# Patient Record
Sex: FEMALE | Race: WHITE | NOT HISPANIC OR LATINO | URBAN - METROPOLITAN AREA
[De-identification: names, ages, dates, MRNs, and addresses within clinical notes are randomized per-mention and may not be internally consistent; named-entity substitution may affect disease eponyms.]

---

## 2017-01-24 ENCOUNTER — OFFICE VISIT - HEALTHEAST (OUTPATIENT)
Dept: INTERNAL MEDICINE | Facility: CLINIC | Age: 52
End: 2017-01-24

## 2017-01-24 DIAGNOSIS — G25.81 RLS (RESTLESS LEGS SYNDROME): ICD-10-CM

## 2017-01-24 DIAGNOSIS — G47.00 INSOMNIA: ICD-10-CM

## 2017-01-24 DIAGNOSIS — E66.811 OBESITY (BMI 30.0-34.9): ICD-10-CM

## 2017-01-24 DIAGNOSIS — E03.9 HYPOTHYROIDISM: ICD-10-CM

## 2017-01-24 DIAGNOSIS — F41.9 ANXIETY: ICD-10-CM

## 2017-01-24 DIAGNOSIS — Z87.891 FORMER SMOKER: ICD-10-CM

## 2017-01-24 DIAGNOSIS — F34.1 DYSTHYMIA: ICD-10-CM

## 2017-01-24 DIAGNOSIS — E55.9 VITAMIN D DEFICIENCY: ICD-10-CM

## 2017-01-24 DIAGNOSIS — G43.909 MIGRAINE HEADACHE: ICD-10-CM

## 2017-01-24 DIAGNOSIS — N95.1 MENOPAUSAL HOT FLUSHES: ICD-10-CM

## 2017-01-24 LAB
CHOLEST SERPL-MCNC: 188 MG/DL
FASTING STATUS PATIENT QL REPORTED: YES
HBA1C MFR BLD: 5.5 % (ref 3.5–6)
HDLC SERPL-MCNC: 49 MG/DL
LDLC SERPL CALC-MCNC: 100 MG/DL
TRIGL SERPL-MCNC: 194 MG/DL

## 2017-01-26 ENCOUNTER — RECORDS - HEALTHEAST (OUTPATIENT)
Dept: ADMINISTRATIVE | Facility: OTHER | Age: 52
End: 2017-01-26

## 2017-01-30 ENCOUNTER — COMMUNICATION - HEALTHEAST (OUTPATIENT)
Dept: INTERNAL MEDICINE | Facility: CLINIC | Age: 52
End: 2017-01-30

## 2017-01-30 DIAGNOSIS — E03.9 HYPOTHYROIDISM: ICD-10-CM

## 2017-03-02 ENCOUNTER — RECORDS - HEALTHEAST (OUTPATIENT)
Dept: ADMINISTRATIVE | Facility: OTHER | Age: 52
End: 2017-03-02

## 2017-03-07 ENCOUNTER — RECORDS - HEALTHEAST (OUTPATIENT)
Dept: ADMINISTRATIVE | Facility: OTHER | Age: 52
End: 2017-03-07

## 2017-08-16 ENCOUNTER — OFFICE VISIT - HEALTHEAST (OUTPATIENT)
Dept: INTERNAL MEDICINE | Facility: CLINIC | Age: 52
End: 2017-08-16

## 2017-08-16 DIAGNOSIS — R60.0 BILATERAL LEG EDEMA: ICD-10-CM

## 2017-08-16 DIAGNOSIS — R09.89 JVD (JUGULAR VENOUS DISTENSION): ICD-10-CM

## 2017-08-22 ENCOUNTER — HOSPITAL ENCOUNTER (OUTPATIENT)
Dept: CARDIOLOGY | Facility: HOSPITAL | Age: 52
Discharge: HOME | End: 2017-08-22

## 2017-08-22 ENCOUNTER — COMMUNICATION - HEALTHEAST (OUTPATIENT)
Dept: INTERNAL MEDICINE | Facility: CLINIC | Age: 52
End: 2017-08-22

## 2017-08-22 DIAGNOSIS — R60.0 BILATERAL LEG EDEMA: ICD-10-CM

## 2017-08-22 DIAGNOSIS — R09.89 JVD (JUGULAR VENOUS DISTENSION): ICD-10-CM

## 2017-08-22 LAB
AORTIC ROOT: 2.6 CM
AORTIC VALVE MEAN VELOCITY: 75 CM/S
AV DIMENSIONLESS INDEX VTI: 0.7
AV MEAN GRADIENT: 3 MMHG
AV PEAK GRADIENT: 5.1 MMHG
AV VALVE AREA: 1.8 CM2
AV VELOCITY RATIO: 0.8
BSA FOR ECHO PROCEDURE: 1.76 M2
CV BLOOD PRESSURE: NORMAL MMHG
CV ECHO HEIGHT: 60 IN
CV ECHO WEIGHT: 162 LBS
DOP CALC AO PEAK VEL: 113 CM/S
DOP CALC AO VTI: 23.5 CM
DOP CALC LVOT AREA: 2.54 CM2
DOP CALC LVOT DIAMETER: 1.8 CM
DOP CALC LVOT PEAK VEL: 85 CM/S
DOP CALC LVOT STROKE VOLUME: 42.5 CM3
DOP CALCLVOT PEAK VEL VTI: 16.7 CM
EJECTION FRACTION: 65 % (ref 55–75)
FRACTIONAL SHORTENING: 32.3 % (ref 28–44)
INTERVENTRICULAR SEPTUM IN END DIASTOLE: 0.72 CM (ref 0.6–0.9)
IVS/PW RATIO: 0.7
LA AREA 1: 15.3 CM2
LA AREA 2: 15.3 CM2
LEFT ATRIUM LENGTH: 4.3 CM
LEFT ATRIUM SIZE: 3.1 CM
LEFT ATRIUM TO AORTIC ROOT RATIO: 1.19 NO UNITS
LEFT ATRIUM VOLUME INDEX: 26.3 ML/M2
LEFT ATRIUM VOLUME: 46.3 CM3
LEFT VENTRICLE CARDIAC INDEX: 1.7 L/MIN/M2
LEFT VENTRICLE CARDIAC OUTPUT: 3.1 L/MIN
LEFT VENTRICLE DIASTOLIC VOLUME INDEX: 18.8 CM3/M2 (ref 34–74)
LEFT VENTRICLE DIASTOLIC VOLUME: 33.1 CM3 (ref 46–106)
LEFT VENTRICLE HEART RATE: 72 BPM
LEFT VENTRICLE MASS INDEX: 60.2 G/M2
LEFT VENTRICLE SYSTOLIC VOLUME INDEX: 6.6 CM3/M2 (ref 11–31)
LEFT VENTRICLE SYSTOLIC VOLUME: 11.6 CM3 (ref 14–42)
LEFT VENTRICULAR INTERNAL DIMENSION IN DIASTOLE: 3.93 CM (ref 3.8–5.2)
LEFT VENTRICULAR INTERNAL DIMENSION IN SYSTOLE: 2.66 CM (ref 2.2–3.5)
LEFT VENTRICULAR MASS: 106 G
LEFT VENTRICULAR OUTFLOW TRACT MEAN GRADIENT: 1 MMHG
LEFT VENTRICULAR OUTFLOW TRACT MEAN VELOCITY: 51.3 CM/S
LEFT VENTRICULAR OUTFLOW TRACT PEAK GRADIENT: 3 MMHG
LEFT VENTRICULAR POSTERIOR WALL IN END DIASTOLE: 1.07 CM (ref 0.6–0.9)
LV STROKE VOLUME INDEX: 24.1 ML/M2
MITRAL VALVE DECELERATION SLOPE: 3230 MM/S2
MITRAL VALVE DECELERATION SLOPE: 3230 MM/S2
MITRAL VALVE E/A RATIO: 1.1
MITRAL VALVE PRESSURE HALF-TIME: 60 MS
MV AVERAGE E/E' RATIO: 8.7 CM/S
MV DECELERATION TIME: 194 MS
MV E'TISSUE VEL-LAT: 9.86 CM/S
MV E'TISSUE VEL-MED: 5.51 CM/S
MV LATERAL E/E' RATIO: 6.7
MV MEDIAL E/E' RATIO: 12.1
MV PEAK A VELOCITY: 62.6 CM/S
MV PEAK E VELOCITY: 66.5 CM/S
MV VALVE AREA PRESSURE 1/2 METHOD: 3.7 CM2
NUC REST DIASTOLIC VOLUME INDEX: 2592 LBS
NUC REST SYSTOLIC VOLUME INDEX: 60 IN
RIGHT VENTRICULAR INTERNAL DIMENSION IN DYSTOLE: 2.71 CM
TRICUSPID VALVE ANULAR PLANE SYSTOLIC EXCURSION: 2.4 CM

## 2017-08-22 ASSESSMENT — MIFFLIN-ST. JEOR: SCORE: 1261.33

## 2017-09-01 ENCOUNTER — OFFICE VISIT - HEALTHEAST (OUTPATIENT)
Dept: INTERNAL MEDICINE | Facility: CLINIC | Age: 52
End: 2017-09-01

## 2017-09-01 DIAGNOSIS — E03.9 HYPOTHYROIDISM: ICD-10-CM

## 2017-09-01 DIAGNOSIS — R60.0 BILATERAL LOWER EXTREMITY EDEMA: ICD-10-CM

## 2017-09-01 DIAGNOSIS — F34.1 DYSTHYMIA: ICD-10-CM

## 2017-09-01 DIAGNOSIS — E66.9 OBESITY: ICD-10-CM

## 2017-09-01 ASSESSMENT — MIFFLIN-ST. JEOR: SCORE: 1250.44

## 2017-09-12 ENCOUNTER — COMMUNICATION - HEALTHEAST (OUTPATIENT)
Dept: INTERNAL MEDICINE | Facility: CLINIC | Age: 52
End: 2017-09-12

## 2017-10-04 ENCOUNTER — COMMUNICATION - HEALTHEAST (OUTPATIENT)
Dept: INTERNAL MEDICINE | Facility: CLINIC | Age: 52
End: 2017-10-04

## 2017-10-04 DIAGNOSIS — R60.0 BILATERAL LOWER EXTREMITY EDEMA: ICD-10-CM

## 2017-10-06 ENCOUNTER — OFFICE VISIT - HEALTHEAST (OUTPATIENT)
Dept: FAMILY MEDICINE | Facility: CLINIC | Age: 52
End: 2017-10-06

## 2017-10-06 ENCOUNTER — COMMUNICATION - HEALTHEAST (OUTPATIENT)
Dept: FAMILY MEDICINE | Facility: CLINIC | Age: 52
End: 2017-10-06

## 2017-10-06 ENCOUNTER — AMBULATORY - HEALTHEAST (OUTPATIENT)
Dept: LAB | Facility: CLINIC | Age: 52
End: 2017-10-06

## 2017-10-06 DIAGNOSIS — Z13.0 SCREENING FOR DEFICIENCY ANEMIA: ICD-10-CM

## 2017-10-06 DIAGNOSIS — E88.810 METABOLIC SYNDROME: ICD-10-CM

## 2017-10-06 DIAGNOSIS — R63.5 WEIGHT GAIN: ICD-10-CM

## 2017-10-06 DIAGNOSIS — E78.5 DYSLIPIDEMIA: ICD-10-CM

## 2017-10-06 DIAGNOSIS — E66.9 OBESITY: ICD-10-CM

## 2017-10-06 DIAGNOSIS — E03.9 HYPOTHYROIDISM: ICD-10-CM

## 2017-10-06 DIAGNOSIS — E55.9 VITAMIN D DEFICIENCY: ICD-10-CM

## 2017-10-06 DIAGNOSIS — Z71.9 HEALTH EDUCATION: ICD-10-CM

## 2017-10-06 DIAGNOSIS — Z13.1 SCREENING FOR DIABETES MELLITUS: ICD-10-CM

## 2017-10-06 DIAGNOSIS — Z13.220 SCREENING FOR LIPID DISORDERS: ICD-10-CM

## 2017-10-06 DIAGNOSIS — R60.9 EDEMA: ICD-10-CM

## 2017-10-06 LAB
CHOLEST SERPL-MCNC: 178 MG/DL
FASTING STATUS PATIENT QL REPORTED: YES
HBA1C MFR BLD: 5.5 % (ref 3.5–6)
HDLC SERPL-MCNC: 48 MG/DL
LDLC SERPL CALC-MCNC: 89 MG/DL
TRIGL SERPL-MCNC: 206 MG/DL

## 2017-10-06 ASSESSMENT — MIFFLIN-ST. JEOR: SCORE: 1242.05

## 2017-10-06 NOTE — ASSESSMENT & PLAN NOTE
"51 y.o. female in clinic today to discuss treatment of the following conditions through radical diet change, lifestyle modification and weight loss:  1. Obesity    2. Hypothyroidism    3. Screening for lipid disorders    4. Screening for deficiency anemia    5. Vitamin D deficiency    6. Screening for diabetes mellitus    7. Metabolic syndrome    8. Dyslipidemia    9. Edema      This patient has multiple psychosocial components to her obesity and obstacles to losing weight.  She has a stressful job with long hours.  There are financial concerns which may necessitate her getting an additional job.  She is the caregiver of her  who is both emotionally needy and actively hostile to her degree of obesity and her desire to lose weight.  During our encounter we talked about strategies to structure her life to address her nutritional goals.  We also discussed the benefits of physical activity both a physical in a mental health perspective.  She recently had an echocardiogram which is normal.  There is no contra indication to her going on phentermine.  Given that she has been on phentermine we discussed that I will be evaluating her lifestyle changes while she is on this medication to crease likelihood that she does not gain weight after it is ultimately tapered.  Additionally, given the laboratory and physical exam characteristics consistent with metabolic syndrome, I am recommending metformin.  We discussed potential side effects.She will start this medication a couple of weeks after starting phentermine.    The patient defines success for this program as follows:   - qualitative: Closed bit better, \"feel goo, \"increase strength in legs (metric would be to wear heels).   - goal weight: Lose 50 pounds.  She does not recall having been at this weight in the past.     Medications prescribed today:   - Phentermine.  No obvious contraindication (reviewed echo from August). Metformin was prescribed.     The patient " attended group visit to learn about obesity as a disease, an approach to treatment and metabolic factors. Nutrition counseling reviewed., Labs ordered and will review and discuss with the patient., Body composition analyzer done and results reviewed with the patient. Please see scanned results in chart. and Recommend 2000 mg of fish oil daily, a multivitamin daily, chromium as directed, and vitamin D supplementation as directed.    Dietary Intervention: Reduced calorie, reduced carbohydrates, whole food diet., Recommend increasing movement throughout the day--sitting less, moving more. Will increase activity over time to reach a goal of at least 150 minutes of moderate exercise each week., Behavior modification: Cognitive restructuring exercises, journaling stressors, triggers for food cravings., Recommend journaling and tracking food intake using either an online program such as [employer] or loseit.com or tracking using a paper and pencil. Advised paying particular attention to total carbohydrates, fiber, protein, calories, and fats, and added sugars. and Greater than 50% of this 30 minute visit was spent in counseling regarding obesity is a disease as well as the nutritional and exercise recommendations of our program as it pertains to the patient's own individual healthcare needs.           Return to clinicin 4 weeks.

## 2017-10-12 ENCOUNTER — COMMUNICATION - HEALTHEAST (OUTPATIENT)
Dept: INTERNAL MEDICINE | Facility: CLINIC | Age: 52
End: 2017-10-12

## 2017-10-16 ENCOUNTER — COMMUNICATION - HEALTHEAST (OUTPATIENT)
Dept: FAMILY MEDICINE | Facility: CLINIC | Age: 52
End: 2017-10-16

## 2017-10-16 ENCOUNTER — RECORDS - HEALTHEAST (OUTPATIENT)
Dept: ADMINISTRATIVE | Facility: OTHER | Age: 52
End: 2017-10-16

## 2017-10-16 DIAGNOSIS — E03.9 HYPOTHYROIDISM: ICD-10-CM

## 2017-11-10 ENCOUNTER — OFFICE VISIT - HEALTHEAST (OUTPATIENT)
Dept: FAMILY MEDICINE | Facility: CLINIC | Age: 52
End: 2017-11-10

## 2017-11-10 DIAGNOSIS — E78.5 DYSLIPIDEMIA: ICD-10-CM

## 2017-11-10 DIAGNOSIS — E66.9 OBESITY: ICD-10-CM

## 2017-11-10 DIAGNOSIS — E88.810 METABOLIC SYNDROME: ICD-10-CM

## 2017-11-10 DIAGNOSIS — E55.9 VITAMIN D DEFICIENCY: ICD-10-CM

## 2017-11-10 NOTE — ASSESSMENT & PLAN NOTE
51 y.o. year old female in clinic today to discuss treatment of the following conditions through diet and lifestyle modification and weight loss:  1. Obesity    2. Vitamin D deficiency    3. Metabolic syndrome    4. Dyslipidemia      The patient's efforts this past month were successful as evidenced by weight loss.  I am concerned that the patient was not consuming adequate nutrition.  She is on an anorexigenic medication and had been waiting until she is hungry to trigger eating.  We performed a body composition analysis and compared to the study that was done last month.  She is losing approximately 1.25 pounds of fat for every pound muscle..  I recommend that the patient focus on eating regularily.   -Increase regularity of eating.  I recommended to the patient that she decrease the rate of weight loss given the balanced fat/muscle loss as shown in the body composition analysis.  -For now, continue phentermine.  If she continues to describe skipping meals and inadequate nutrition, will taper and discontinue this medication (or at least consider decreasing the dose given her weight).  -Continue metformin.  -Utilize ways to wellness.  Meet with dietitian.

## 2017-12-15 ENCOUNTER — OFFICE VISIT - HEALTHEAST (OUTPATIENT)
Dept: FAMILY MEDICINE | Facility: CLINIC | Age: 52
End: 2017-12-15

## 2017-12-15 DIAGNOSIS — E55.9 VITAMIN D DEFICIENCY: ICD-10-CM

## 2017-12-15 DIAGNOSIS — E78.5 DYSLIPIDEMIA: ICD-10-CM

## 2017-12-15 DIAGNOSIS — E66.9 OBESITY: ICD-10-CM

## 2017-12-15 DIAGNOSIS — E88.810 METABOLIC SYNDROME: ICD-10-CM

## 2017-12-15 NOTE — ASSESSMENT & PLAN NOTE
52 y.o. year old female in clinic today to discuss treatment of the following conditions through diet and lifestyle modification and weight loss:  1. Obesity    2. Vitamin D deficiency    3. Metabolic syndrome    4. Dyslipidemia      Thepatient's efforts this past month were successful as evidenced by weightloss.  I recommend that the patient focus on journaling, increasing exercise.   - continue phentermine   - JOURNAL.  I encouraged the patient to slow down weight loss in the interest of stability.   - Plan (discuss with )

## 2018-01-14 ENCOUNTER — COMMUNICATION - HEALTHEAST (OUTPATIENT)
Dept: FAMILY MEDICINE | Facility: CLINIC | Age: 53
End: 2018-01-14

## 2018-01-14 DIAGNOSIS — E11.9 DIABETES (H): ICD-10-CM

## 2018-01-15 ENCOUNTER — OFFICE VISIT - HEALTHEAST (OUTPATIENT)
Dept: FAMILY MEDICINE | Facility: CLINIC | Age: 53
End: 2018-01-15

## 2018-01-15 DIAGNOSIS — E55.9 VITAMIN D DEFICIENCY: ICD-10-CM

## 2018-01-15 DIAGNOSIS — E78.5 DYSLIPIDEMIA: ICD-10-CM

## 2018-01-15 DIAGNOSIS — Z12.31 VISIT FOR SCREENING MAMMOGRAM: ICD-10-CM

## 2018-01-15 DIAGNOSIS — E03.9 HYPOTHYROIDISM: ICD-10-CM

## 2018-01-15 DIAGNOSIS — E66.9 OBESITY: ICD-10-CM

## 2018-01-15 DIAGNOSIS — E88.810 METABOLIC SYNDROME: ICD-10-CM

## 2018-01-15 LAB — TSH SERPL DL<=0.005 MIU/L-ACNC: 0.28 UIU/ML (ref 0.3–5)

## 2018-01-15 NOTE — ASSESSMENT & PLAN NOTE
52 y.o. year old female in clinic today to discuss treatment of the following conditions through diet and lifestyle modification and weight loss:  1. Obesity    2. Vitamin D deficiency    3. Metabolic syndrome    4. Dyslipidemia    5. Visit for screening mammogram    6. Hypothyroidism      The patient's efforts this past month were successful as evidenced by weight stability.  - I am concerned the patient is describing depression.  She does endorse a history of variation of mood with the season.  She is working with a psychologist.  She also continues to take her citalopram as prescribed by her primary care physician.  Recommended a light box.  She might benefit from Wellbutrin but I am hesitant to prescribe this medication in conjunction with phentermine.  She continues to struggle, I will have her follow-up with her primary care physician.  -Continue planning and tracking.  -Continue metformin and phentermine.  -Check TSH.  Synthroid was recently adjusted.  -Return to clinic in 1 month.

## 2018-01-16 ENCOUNTER — COMMUNICATION - HEALTHEAST (OUTPATIENT)
Dept: FAMILY MEDICINE | Facility: CLINIC | Age: 53
End: 2018-01-16

## 2018-01-16 DIAGNOSIS — E03.9 HYPOTHYROIDISM, UNSPECIFIED TYPE: ICD-10-CM

## 2018-02-08 ENCOUNTER — OFFICE VISIT - HEALTHEAST (OUTPATIENT)
Dept: INTERNAL MEDICINE | Facility: CLINIC | Age: 53
End: 2018-02-08

## 2018-02-08 DIAGNOSIS — J04.0 LARYNGITIS: ICD-10-CM

## 2018-02-08 DIAGNOSIS — J02.9 ST (SORE THROAT): ICD-10-CM

## 2018-02-08 DIAGNOSIS — B34.9 VIRAL INFECTION: ICD-10-CM

## 2018-02-08 ASSESSMENT — MIFFLIN-ST. JEOR: SCORE: 1173.45

## 2018-02-09 ENCOUNTER — COMMUNICATION - HEALTHEAST (OUTPATIENT)
Dept: INTERNAL MEDICINE | Facility: CLINIC | Age: 53
End: 2018-02-09

## 2018-02-13 ENCOUNTER — COMMUNICATION - HEALTHEAST (OUTPATIENT)
Dept: INTERNAL MEDICINE | Facility: CLINIC | Age: 53
End: 2018-02-13

## 2018-02-14 ENCOUNTER — COMMUNICATION - HEALTHEAST (OUTPATIENT)
Dept: FAMILY MEDICINE | Facility: CLINIC | Age: 53
End: 2018-02-14

## 2018-02-14 DIAGNOSIS — E66.9 OBESITY: ICD-10-CM

## 2018-02-14 DIAGNOSIS — E88.810 METABOLIC SYNDROME: ICD-10-CM

## 2018-02-14 DIAGNOSIS — E55.9 VITAMIN D DEFICIENCY: ICD-10-CM

## 2018-02-14 DIAGNOSIS — E78.5 DYSLIPIDEMIA: ICD-10-CM

## 2018-02-23 ENCOUNTER — COMMUNICATION - HEALTHEAST (OUTPATIENT)
Dept: INTERNAL MEDICINE | Facility: CLINIC | Age: 53
End: 2018-02-23

## 2018-02-23 ENCOUNTER — OFFICE VISIT - HEALTHEAST (OUTPATIENT)
Dept: FAMILY MEDICINE | Facility: CLINIC | Age: 53
End: 2018-02-23

## 2018-02-23 ENCOUNTER — COMMUNICATION - HEALTHEAST (OUTPATIENT)
Dept: FAMILY MEDICINE | Facility: CLINIC | Age: 53
End: 2018-02-23

## 2018-02-23 DIAGNOSIS — J01.00 ACUTE NON-RECURRENT MAXILLARY SINUSITIS: ICD-10-CM

## 2018-02-23 DIAGNOSIS — G25.81 RLS (RESTLESS LEGS SYNDROME): ICD-10-CM

## 2018-02-23 DIAGNOSIS — R05.9 COUGH: ICD-10-CM

## 2018-02-23 DIAGNOSIS — E88.810 METABOLIC SYNDROME: ICD-10-CM

## 2018-02-23 DIAGNOSIS — E55.9 VITAMIN D DEFICIENCY: ICD-10-CM

## 2018-02-23 DIAGNOSIS — F34.1 DYSTHYMIA: ICD-10-CM

## 2018-02-23 DIAGNOSIS — E78.5 DYSLIPIDEMIA: ICD-10-CM

## 2018-02-23 DIAGNOSIS — J37.0 LARYNGITIS, CHRONIC: ICD-10-CM

## 2018-02-23 DIAGNOSIS — E66.9 OBESITY: ICD-10-CM

## 2018-02-23 ASSESSMENT — MIFFLIN-ST. JEOR: SCORE: 1159.84

## 2018-02-25 LAB — BACTERIA SPEC CULT: NORMAL

## 2018-03-07 ENCOUNTER — OFFICE VISIT - HEALTHEAST (OUTPATIENT)
Dept: OTOLARYNGOLOGY | Facility: CLINIC | Age: 53
End: 2018-03-07

## 2018-03-07 DIAGNOSIS — J37.0 CHRONIC LARYNGITIS: ICD-10-CM

## 2018-03-07 DIAGNOSIS — J38.2 SINGERS' NODULES: ICD-10-CM

## 2018-03-19 ENCOUNTER — COMMUNICATION - HEALTHEAST (OUTPATIENT)
Dept: INTERNAL MEDICINE | Facility: CLINIC | Age: 53
End: 2018-03-19

## 2018-03-19 DIAGNOSIS — N95.1 MENOPAUSAL HOT FLUSHES: ICD-10-CM

## 2018-03-27 ENCOUNTER — COMMUNICATION - HEALTHEAST (OUTPATIENT)
Dept: FAMILY MEDICINE | Facility: CLINIC | Age: 53
End: 2018-03-27

## 2018-03-27 DIAGNOSIS — E78.5 DYSLIPIDEMIA: ICD-10-CM

## 2018-03-27 DIAGNOSIS — E88.810 METABOLIC SYNDROME: ICD-10-CM

## 2018-03-27 DIAGNOSIS — E55.9 VITAMIN D DEFICIENCY: ICD-10-CM

## 2018-03-30 ENCOUNTER — OFFICE VISIT - HEALTHEAST (OUTPATIENT)
Dept: FAMILY MEDICINE | Facility: CLINIC | Age: 53
End: 2018-03-30

## 2018-03-30 DIAGNOSIS — F34.1 DYSTHYMIA: ICD-10-CM

## 2018-03-30 DIAGNOSIS — E78.5 DYSLIPIDEMIA: ICD-10-CM

## 2018-03-30 DIAGNOSIS — E55.9 VITAMIN D DEFICIENCY: ICD-10-CM

## 2018-03-30 DIAGNOSIS — Z91.89 AT HIGH RISK FOR CAREGIVER ROLE STRAIN: ICD-10-CM

## 2018-03-30 DIAGNOSIS — E88.810 METABOLIC SYNDROME: ICD-10-CM

## 2018-03-30 DIAGNOSIS — F43.20 ADJUSTMENT DISORDER: ICD-10-CM

## 2018-03-30 DIAGNOSIS — E66.9 OBESITY: ICD-10-CM

## 2018-03-30 NOTE — ASSESSMENT & PLAN NOTE
52 y.o. year old female in clinic today to discuss treatment of the following conditions through diet and lifestyle modification and weight loss:  1. Vitamin D deficiency    2. Metabolic syndrome    3. Dyslipidemia      The patient's efforts this past month were mixed.   - she has strong social and family barriers to weight loss.  Ending the second job should help.   - continue phentermine, continue metformin.  -  Psychotherapy referral.     - consider starting long taper of phentermine if she continues to stagnate with her weight.

## 2018-04-27 ENCOUNTER — COMMUNICATION - HEALTHEAST (OUTPATIENT)
Dept: FAMILY MEDICINE | Facility: CLINIC | Age: 53
End: 2018-04-27

## 2018-04-27 DIAGNOSIS — E78.5 DYSLIPIDEMIA: ICD-10-CM

## 2018-04-27 DIAGNOSIS — E55.9 VITAMIN D DEFICIENCY: ICD-10-CM

## 2018-04-27 DIAGNOSIS — E88.810 METABOLIC SYNDROME: ICD-10-CM

## 2018-05-01 ENCOUNTER — COMMUNICATION - HEALTHEAST (OUTPATIENT)
Dept: FAMILY MEDICINE | Facility: CLINIC | Age: 53
End: 2018-05-01

## 2018-05-01 DIAGNOSIS — E03.9 HYPOTHYROIDISM, UNSPECIFIED TYPE: ICD-10-CM

## 2018-05-03 ENCOUNTER — AMBULATORY - HEALTHEAST (OUTPATIENT)
Dept: FAMILY MEDICINE | Facility: CLINIC | Age: 53
End: 2018-05-03

## 2018-05-03 DIAGNOSIS — J38.2 VOCAL NODULES IN ADULTS: ICD-10-CM

## 2018-05-04 ENCOUNTER — OFFICE VISIT - HEALTHEAST (OUTPATIENT)
Dept: FAMILY MEDICINE | Facility: CLINIC | Age: 53
End: 2018-05-04

## 2018-05-04 DIAGNOSIS — E66.811 CLASS 1 OBESITY WITH SERIOUS COMORBIDITY AND BODY MASS INDEX (BMI) OF 30.0 TO 30.9 IN ADULT, UNSPECIFIED OBESITY TYPE: ICD-10-CM

## 2018-05-04 DIAGNOSIS — F41.9 ANXIETY: ICD-10-CM

## 2018-05-04 DIAGNOSIS — E88.810 METABOLIC SYNDROME: ICD-10-CM

## 2018-05-04 DIAGNOSIS — E55.9 VITAMIN D DEFICIENCY: ICD-10-CM

## 2018-05-04 DIAGNOSIS — E78.5 DYSLIPIDEMIA: ICD-10-CM

## 2018-05-04 NOTE — ASSESSMENT & PLAN NOTE
52 y.o. year old female in clinic today to discuss treatment of the following conditions through diet and lifestyle modification and weight loss:  1. Anxiety    2. Vitamin D deficiency    3. Metabolic syndrome    4. Dyslipidemia    patient's efforts this past month were successful as evidenced by weight stability despite stress and increased cravings.  It appears that phentermine is no longer providing anorexigenic properties for this patient.  Her stress levels and family responsibilities remain high.  -Discussed taper regimen for phentermine.  The medicine was refilled last week.  -Start Wellbutrin/naltrexone combination.  Bupropion prescribed at 100 mg twice daily sustained-release formulation.  Start naltrexone 25 mg daily.  -Return to clinic in 1 month.

## 2018-06-11 ENCOUNTER — COMMUNICATION - HEALTHEAST (OUTPATIENT)
Dept: FAMILY MEDICINE | Facility: CLINIC | Age: 53
End: 2018-06-11

## 2018-06-11 ENCOUNTER — OFFICE VISIT - HEALTHEAST (OUTPATIENT)
Dept: FAMILY MEDICINE | Facility: CLINIC | Age: 53
End: 2018-06-11

## 2018-06-11 DIAGNOSIS — E55.9 VITAMIN D DEFICIENCY: ICD-10-CM

## 2018-06-11 DIAGNOSIS — E78.5 DYSLIPIDEMIA: ICD-10-CM

## 2018-06-11 DIAGNOSIS — E03.9 HYPOTHYROIDISM: ICD-10-CM

## 2018-06-11 DIAGNOSIS — F41.9 ANXIETY: ICD-10-CM

## 2018-06-11 DIAGNOSIS — E88.810 METABOLIC SYNDROME: ICD-10-CM

## 2018-06-11 DIAGNOSIS — E66.811 CLASS 1 OBESITY WITH SERIOUS COMORBIDITY AND BODY MASS INDEX (BMI) OF 30.0 TO 30.9 IN ADULT, UNSPECIFIED OBESITY TYPE: ICD-10-CM

## 2018-06-11 LAB
ANION GAP SERPL CALCULATED.3IONS-SCNC: 9 MMOL/L (ref 5–18)
BUN SERPL-MCNC: 13 MG/DL (ref 8–22)
CALCIUM SERPL-MCNC: 9.1 MG/DL (ref 8.5–10.5)
CHLORIDE BLD-SCNC: 105 MMOL/L (ref 98–107)
CO2 SERPL-SCNC: 26 MMOL/L (ref 22–31)
CREAT SERPL-MCNC: 0.89 MG/DL (ref 0.6–1.1)
GFR SERPL CREATININE-BSD FRML MDRD: >60 ML/MIN/1.73M2
GLUCOSE BLD-MCNC: 100 MG/DL (ref 70–125)
POTASSIUM BLD-SCNC: 3.9 MMOL/L (ref 3.5–5)
SODIUM SERPL-SCNC: 140 MMOL/L (ref 136–145)
TSH SERPL DL<=0.005 MIU/L-ACNC: 1.1 UIU/ML (ref 0.3–5)

## 2018-06-11 NOTE — ASSESSMENT & PLAN NOTE
52 y.o. year old female in clinic today to discuss treatment of the following conditions through diet and lifestyle modification and weight loss:  1. Class 1 obesity with serious comorbidity and body mass index (BMI)of 30.0 to 30.9 in adult, unspecified obesity type    2. Anxiety    3. Metabolic syndrome    4. Dyslipidemia    5. Vitamin D deficiency    6. Hypothyroidism      The patient's efforts this past month were successful as evidenced by weight loss.    - continue phentermine taper.  Every other day ---> 1 day on and 2 days off.   - continue bupropion/naltrexone at current dose.  Consider increasing bupropion.   - start walking at work (tomorrow) with follow-up    - check TSH. BMP.

## 2018-06-15 ENCOUNTER — COMMUNICATION - HEALTHEAST (OUTPATIENT)
Dept: FAMILY MEDICINE | Facility: CLINIC | Age: 53
End: 2018-06-15

## 2018-06-19 ENCOUNTER — COMMUNICATION - HEALTHEAST (OUTPATIENT)
Dept: INTERNAL MEDICINE | Facility: CLINIC | Age: 53
End: 2018-06-19

## 2018-06-19 DIAGNOSIS — N95.1 MENOPAUSAL HOT FLUSHES: ICD-10-CM

## 2018-06-20 ENCOUNTER — COMMUNICATION - HEALTHEAST (OUTPATIENT)
Dept: FAMILY MEDICINE | Facility: CLINIC | Age: 53
End: 2018-06-20

## 2018-07-07 ENCOUNTER — COMMUNICATION - HEALTHEAST (OUTPATIENT)
Dept: INTERNAL MEDICINE | Facility: CLINIC | Age: 53
End: 2018-07-07

## 2018-07-07 DIAGNOSIS — G25.81 RLS (RESTLESS LEGS SYNDROME): ICD-10-CM

## 2018-07-13 ENCOUNTER — COMMUNICATION - HEALTHEAST (OUTPATIENT)
Dept: FAMILY MEDICINE | Facility: CLINIC | Age: 53
End: 2018-07-13

## 2018-07-13 DIAGNOSIS — E88.810 METABOLIC SYNDROME: ICD-10-CM

## 2018-07-13 DIAGNOSIS — E55.9 VITAMIN D DEFICIENCY: ICD-10-CM

## 2018-07-13 DIAGNOSIS — E78.5 DYSLIPIDEMIA: ICD-10-CM

## 2018-07-27 ENCOUNTER — OFFICE VISIT - HEALTHEAST (OUTPATIENT)
Dept: FAMILY MEDICINE | Facility: CLINIC | Age: 53
End: 2018-07-27

## 2018-07-27 DIAGNOSIS — E55.9 VITAMIN D DEFICIENCY: ICD-10-CM

## 2018-07-27 DIAGNOSIS — E78.5 DYSLIPIDEMIA: ICD-10-CM

## 2018-07-27 DIAGNOSIS — E88.810 METABOLIC SYNDROME: ICD-10-CM

## 2018-07-27 DIAGNOSIS — E66.811 CLASS 1 OBESITY WITH SERIOUS COMORBIDITY AND BODY MASS INDEX (BMI) OF 30.0 TO 30.9 IN ADULT, UNSPECIFIED OBESITY TYPE: ICD-10-CM

## 2018-07-27 ASSESSMENT — MIFFLIN-ST. JEOR: SCORE: 1146.23

## 2018-07-27 NOTE — ASSESSMENT & PLAN NOTE
52 y.o. year old female in clinic today to discuss treatment of the following conditions through diet and lifestyle modification and weight loss:  1. Class 1 obesity with serious comorbidity and body mass index (BMI) of 30.0 to 30.9 in adult, unspecified obesity type    2. Vitamin D deficiency    3. Metabolic syndrome    4. Dyslipidemia      The patient's weight loss result since the last visit was successful as evidenced by weight loss.  The patient did not taper phentermine as we had previously discussed.  However, she has been very diligent with planning and prepping her meals.  -Continue bupropion/naltrexone without change.  No side effects identified.  -Phentermine taper: 2 days on.  One day off.  -No symptoms of hypotension.  No down titration of blood pressure medications.  -Consider increasing bupropion at a future visit as we taper phentermine.  -Return to clinic in 1 month.

## 2018-08-06 ENCOUNTER — COMMUNICATION - HEALTHEAST (OUTPATIENT)
Dept: INTERNAL MEDICINE | Facility: CLINIC | Age: 53
End: 2018-08-06

## 2018-08-12 ENCOUNTER — COMMUNICATION - HEALTHEAST (OUTPATIENT)
Dept: FAMILY MEDICINE | Facility: CLINIC | Age: 53
End: 2018-08-12

## 2018-08-12 DIAGNOSIS — F41.9 ANXIETY: ICD-10-CM

## 2018-08-12 DIAGNOSIS — F34.1 DYSTHYMIA: ICD-10-CM

## 2018-08-12 DIAGNOSIS — Z91.89 AT HIGH RISK FOR CAREGIVER ROLE STRAIN: ICD-10-CM

## 2018-08-21 ENCOUNTER — COMMUNICATION - HEALTHEAST (OUTPATIENT)
Dept: FAMILY MEDICINE | Facility: CLINIC | Age: 53
End: 2018-08-21

## 2018-08-21 DIAGNOSIS — F34.1 DYSTHYMIA: ICD-10-CM

## 2018-08-21 DIAGNOSIS — Z91.89 AT HIGH RISK FOR CAREGIVER ROLE STRAIN: ICD-10-CM

## 2018-08-21 DIAGNOSIS — F41.9 ANXIETY: ICD-10-CM

## 2018-08-24 ENCOUNTER — OFFICE VISIT - HEALTHEAST (OUTPATIENT)
Dept: INTERNAL MEDICINE | Facility: CLINIC | Age: 53
End: 2018-08-24

## 2018-08-24 DIAGNOSIS — K14.6 TONGUE SORE: ICD-10-CM

## 2018-08-24 DIAGNOSIS — R22.0 SUBMANDIBULAR SWELLING: ICD-10-CM

## 2018-08-24 DIAGNOSIS — Z12.31 VISIT FOR SCREENING MAMMOGRAM: ICD-10-CM

## 2018-08-24 DIAGNOSIS — F32.5 MAJOR DEPRESSION IN COMPLETE REMISSION (H): ICD-10-CM

## 2018-08-24 DIAGNOSIS — Z12.11 SCREEN FOR COLON CANCER: ICD-10-CM

## 2018-08-24 DIAGNOSIS — R22.1 SUBMANDIBULAR SWELLING: ICD-10-CM

## 2018-08-24 ASSESSMENT — MIFFLIN-ST. JEOR: SCORE: 1139.65

## 2018-08-27 ENCOUNTER — COMMUNICATION - HEALTHEAST (OUTPATIENT)
Dept: INTERNAL MEDICINE | Facility: CLINIC | Age: 53
End: 2018-08-27

## 2018-08-27 LAB
HSV SPECIMEN: NORMAL
HSV1 DNA SPEC QL NAA+PROBE: NEGATIVE
HSV2 DNA SPEC QL NAA+PROBE: NEGATIVE

## 2018-08-29 ENCOUNTER — COMMUNICATION - HEALTHEAST (OUTPATIENT)
Dept: FAMILY MEDICINE | Facility: CLINIC | Age: 53
End: 2018-08-29

## 2018-08-29 ENCOUNTER — OFFICE VISIT - HEALTHEAST (OUTPATIENT)
Dept: FAMILY MEDICINE | Facility: CLINIC | Age: 53
End: 2018-08-29

## 2018-08-29 DIAGNOSIS — E66.9 OBESITY: ICD-10-CM

## 2018-08-29 DIAGNOSIS — E78.5 DYSLIPIDEMIA: ICD-10-CM

## 2018-08-29 DIAGNOSIS — E55.9 VITAMIN D DEFICIENCY: ICD-10-CM

## 2018-08-29 DIAGNOSIS — E88.810 METABOLIC SYNDROME: ICD-10-CM

## 2018-08-29 NOTE — ASSESSMENT & PLAN NOTE
52 y.o. year old female in clinic today to discuss treatment of the following conditions throughdiet and lifestyle modification and weight loss:  1. Obesity    2. Metabolic syndrome    3. Vitamin D deficiency    4. Dyslipidemia      The patient's weight loss result since the last visit was successful as evidenced by weight stability.  She has successfully started her taper of phentermine.  Bupropion/naltrexone is going well.   - increase bupropion/naltrexone.  She is approaching Contrave doses.   - continue to meal plan/prep.   - add exercise as able   - decrease phentermine.  Take medication everyother day.   - continue taking metformin.   - no hypotensive symptoms.

## 2018-08-30 ENCOUNTER — COMMUNICATION - HEALTHEAST (OUTPATIENT)
Dept: FAMILY MEDICINE | Facility: CLINIC | Age: 53
End: 2018-08-30

## 2018-08-31 ENCOUNTER — COMMUNICATION - HEALTHEAST (OUTPATIENT)
Dept: INTERNAL MEDICINE | Facility: CLINIC | Age: 53
End: 2018-08-31

## 2018-08-31 DIAGNOSIS — R60.0 BILATERAL LOWER EXTREMITY EDEMA: ICD-10-CM

## 2018-09-16 ENCOUNTER — COMMUNICATION - HEALTHEAST (OUTPATIENT)
Dept: FAMILY MEDICINE | Facility: CLINIC | Age: 53
End: 2018-09-16

## 2018-09-16 DIAGNOSIS — E88.810 METABOLIC SYNDROME: ICD-10-CM

## 2018-09-16 DIAGNOSIS — E78.5 DYSLIPIDEMIA: ICD-10-CM

## 2018-09-16 DIAGNOSIS — E55.9 VITAMIN D DEFICIENCY: ICD-10-CM

## 2018-10-01 ENCOUNTER — COMMUNICATION - HEALTHEAST (OUTPATIENT)
Dept: OTOLARYNGOLOGY | Facility: CLINIC | Age: 53
End: 2018-10-01

## 2018-10-01 ENCOUNTER — COMMUNICATION - HEALTHEAST (OUTPATIENT)
Dept: INTERNAL MEDICINE | Facility: CLINIC | Age: 53
End: 2018-10-01

## 2018-10-01 DIAGNOSIS — K14.6 TONGUE SORE: ICD-10-CM

## 2018-10-03 ENCOUNTER — OFFICE VISIT - HEALTHEAST (OUTPATIENT)
Dept: OTOLARYNGOLOGY | Facility: CLINIC | Age: 53
End: 2018-10-03

## 2018-10-03 DIAGNOSIS — K21.9 LPRD (LARYNGOPHARYNGEAL REFLUX DISEASE): ICD-10-CM

## 2018-10-03 DIAGNOSIS — K14.6 TONGUE SORE: ICD-10-CM

## 2018-10-18 ENCOUNTER — COMMUNICATION - HEALTHEAST (OUTPATIENT)
Dept: FAMILY MEDICINE | Facility: CLINIC | Age: 53
End: 2018-10-18

## 2018-10-18 DIAGNOSIS — E03.9 HYPOTHYROIDISM, UNSPECIFIED TYPE: ICD-10-CM

## 2018-10-25 ENCOUNTER — COMMUNICATION - HEALTHEAST (OUTPATIENT)
Dept: FAMILY MEDICINE | Facility: CLINIC | Age: 53
End: 2018-10-25

## 2018-10-25 DIAGNOSIS — E55.9 VITAMIN D DEFICIENCY: ICD-10-CM

## 2018-10-25 DIAGNOSIS — E88.810 METABOLIC SYNDROME: ICD-10-CM

## 2018-10-25 DIAGNOSIS — E78.5 DYSLIPIDEMIA: ICD-10-CM

## 2019-01-01 ENCOUNTER — COMMUNICATION - HEALTHEAST (OUTPATIENT)
Dept: INTERNAL MEDICINE | Facility: CLINIC | Age: 54
End: 2019-01-01

## 2019-01-01 ENCOUNTER — COMMUNICATION - HEALTHEAST (OUTPATIENT)
Dept: FAMILY MEDICINE | Facility: CLINIC | Age: 54
End: 2019-01-01

## 2019-01-01 ENCOUNTER — OFFICE VISIT - HEALTHEAST (OUTPATIENT)
Dept: INTERNAL MEDICINE | Facility: CLINIC | Age: 54
End: 2019-01-01

## 2019-01-01 ENCOUNTER — COMMUNICATION - HEALTHEAST (OUTPATIENT)
Dept: SCHEDULING | Facility: CLINIC | Age: 54
End: 2019-01-01

## 2019-01-01 ENCOUNTER — OFFICE VISIT - HEALTHEAST (OUTPATIENT)
Dept: OTOLARYNGOLOGY | Facility: CLINIC | Age: 54
End: 2019-01-01

## 2019-01-01 ENCOUNTER — AMBULATORY - HEALTHEAST (OUTPATIENT)
Dept: INTERNAL MEDICINE | Facility: CLINIC | Age: 54
End: 2019-01-01

## 2019-01-01 ENCOUNTER — OFFICE VISIT - HEALTHEAST (OUTPATIENT)
Dept: FAMILY MEDICINE | Facility: CLINIC | Age: 54
End: 2019-01-01

## 2019-01-01 DIAGNOSIS — E88.810 METABOLIC SYNDROME: ICD-10-CM

## 2019-01-01 DIAGNOSIS — E78.5 DYSLIPIDEMIA: ICD-10-CM

## 2019-01-01 DIAGNOSIS — F32.5 MAJOR DEPRESSION IN COMPLETE REMISSION (H): ICD-10-CM

## 2019-01-01 DIAGNOSIS — F34.1 DYSTHYMIA: ICD-10-CM

## 2019-01-01 DIAGNOSIS — K02.9 DENTAL CARIES: ICD-10-CM

## 2019-01-01 DIAGNOSIS — E55.9 VITAMIN D DEFICIENCY: ICD-10-CM

## 2019-01-01 DIAGNOSIS — Z12.11 SCREEN FOR COLON CANCER: ICD-10-CM

## 2019-01-01 DIAGNOSIS — E03.9 HYPOTHYROIDISM, UNSPECIFIED TYPE: ICD-10-CM

## 2019-01-01 DIAGNOSIS — G25.81 RLS (RESTLESS LEGS SYNDROME): ICD-10-CM

## 2019-01-01 DIAGNOSIS — K08.89 PAIN, DENTAL: ICD-10-CM

## 2019-01-01 DIAGNOSIS — J37.0 CHRONIC LARYNGITIS: ICD-10-CM

## 2019-01-01 DIAGNOSIS — K14.8 TONGUE LESION: ICD-10-CM

## 2019-01-01 DIAGNOSIS — K14.6 TONGUE SORE: ICD-10-CM

## 2019-01-01 DIAGNOSIS — R09.81 CONGESTION OF PARANASAL SINUS: ICD-10-CM

## 2019-01-01 DIAGNOSIS — J40 BRONCHITIS: ICD-10-CM

## 2019-01-01 DIAGNOSIS — H69.91 DYSFUNCTION OF RIGHT EUSTACHIAN TUBE: ICD-10-CM

## 2019-01-01 DIAGNOSIS — R68.84 JAW PAIN: ICD-10-CM

## 2019-01-01 DIAGNOSIS — K21.9 LPRD (LARYNGOPHARYNGEAL REFLUX DISEASE): ICD-10-CM

## 2019-01-01 DIAGNOSIS — Z12.11 SCREENING FOR COLON CANCER: ICD-10-CM

## 2019-01-01 DIAGNOSIS — N95.1 MENOPAUSAL HOT FLUSHES: ICD-10-CM

## 2019-01-01 DIAGNOSIS — R60.0 BILATERAL LOWER EXTREMITY EDEMA: ICD-10-CM

## 2019-01-01 DIAGNOSIS — E66.09 CLASS 1 OBESITY DUE TO EXCESS CALORIES WITH SERIOUS COMORBIDITY AND BODY MASS INDEX (BMI) OF 30.0 TO 30.9 IN ADULT: ICD-10-CM

## 2019-01-01 DIAGNOSIS — Z23 NEED FOR VACCINATION: ICD-10-CM

## 2019-01-01 DIAGNOSIS — E66.811 CLASS 1 OBESITY DUE TO EXCESS CALORIES WITH SERIOUS COMORBIDITY AND BODY MASS INDEX (BMI) OF 30.0 TO 30.9 IN ADULT: ICD-10-CM

## 2019-01-01 LAB — TSH SERPL DL<=0.005 MIU/L-ACNC: 28.83 UIU/ML (ref 0.3–5)

## 2019-01-01 RX ORDER — PRAMIPEXOLE DIHYDROCHLORIDE 0.75 MG/1
TABLET ORAL
Qty: 90 TABLET | Refills: 0 | Status: SHIPPED | OUTPATIENT
Start: 2019-01-01

## 2019-01-01 RX ORDER — BUPROPION HYDROCHLORIDE 150 MG/1
TABLET, EXTENDED RELEASE ORAL
Qty: 180 TABLET | Refills: 2 | Status: SHIPPED | OUTPATIENT
Start: 2019-01-01

## 2019-01-01 RX ORDER — NALTREXONE HYDROCHLORIDE 50 MG/1
25 TABLET, FILM COATED ORAL 2 TIMES DAILY
Qty: 90 TABLET | Refills: 0 | Status: SHIPPED | OUTPATIENT
Start: 2019-01-01

## 2019-01-01 ASSESSMENT — MIFFLIN-ST. JEOR
SCORE: 1129.22
SCORE: 1138.29
SCORE: 1161.54

## 2019-01-01 ASSESSMENT — PATIENT HEALTH QUESTIONNAIRE - PHQ9: SUM OF ALL RESPONSES TO PHQ QUESTIONS 1-9: 8

## 2019-01-22 ENCOUNTER — COMMUNICATION - HEALTHEAST (OUTPATIENT)
Dept: INTERNAL MEDICINE | Facility: CLINIC | Age: 54
End: 2019-01-22

## 2019-01-22 DIAGNOSIS — N95.1 MENOPAUSAL HOT FLUSHES: ICD-10-CM

## 2019-01-31 ENCOUNTER — COMMUNICATION - HEALTHEAST (OUTPATIENT)
Dept: ALLERGY | Facility: CLINIC | Age: 54
End: 2019-01-31

## 2019-02-08 ENCOUNTER — OFFICE VISIT - HEALTHEAST (OUTPATIENT)
Dept: FAMILY MEDICINE | Facility: CLINIC | Age: 54
End: 2019-02-08

## 2019-02-08 DIAGNOSIS — Z12.31 VISIT FOR SCREENING MAMMOGRAM: ICD-10-CM

## 2019-02-08 DIAGNOSIS — E88.810 METABOLIC SYNDROME: ICD-10-CM

## 2019-02-08 DIAGNOSIS — E78.5 DYSLIPIDEMIA: ICD-10-CM

## 2019-02-08 DIAGNOSIS — E55.9 VITAMIN D DEFICIENCY: ICD-10-CM

## 2019-02-08 DIAGNOSIS — E03.9 HYPOTHYROIDISM, UNSPECIFIED TYPE: ICD-10-CM

## 2019-02-08 LAB
ANION GAP SERPL CALCULATED.3IONS-SCNC: 8 MMOL/L (ref 5–18)
BUN SERPL-MCNC: 10 MG/DL (ref 8–22)
CALCIUM SERPL-MCNC: 9.3 MG/DL (ref 8.5–10.5)
CHLORIDE BLD-SCNC: 105 MMOL/L (ref 98–107)
CO2 SERPL-SCNC: 29 MMOL/L (ref 22–31)
CREAT SERPL-MCNC: 0.83 MG/DL (ref 0.6–1.1)
GFR SERPL CREATININE-BSD FRML MDRD: >60 ML/MIN/1.73M2
GLUCOSE BLD-MCNC: 85 MG/DL (ref 70–125)
POTASSIUM BLD-SCNC: 4.3 MMOL/L (ref 3.5–5)
SODIUM SERPL-SCNC: 142 MMOL/L (ref 136–145)
TSH SERPL DL<=0.005 MIU/L-ACNC: 1.32 UIU/ML (ref 0.3–5)

## 2019-02-08 NOTE — ASSESSMENT & PLAN NOTE
"53 y.o. year old female in clinic today to discuss treatment of the following conditions through diet and lifestyle modification and weight loss:  1. BMI 28.0-28.9,adult    2. Vitamin D deficiency    3. Metabolic syndrome    4. Dyslipidemia    5. Visit for screening mammogram    6. Hypothyroidism, unspecified type      From a purely weight loss perspective, this patient continues to do well.  At this time, she has lost 16% from where she started.  Her energy remains high and she has increased her physical activity over the past couple of months since her last visit.  I am somewhat concerned that her cravings continue to be \"overwhelming\" at times, in particular while she is on phentermine, naltrexone, bupropion.  She is been intolerant to metformin and this was discontinued.  For now, we will continue current eating strategy.  At a future visit I am going to suggest a deliberate increase in healthy fat consumption, specifically omega-3-containing foods.  Refill phentermine sent to pharmacy.    Follow-up: 1 months    "

## 2019-03-18 ENCOUNTER — COMMUNICATION - HEALTHEAST (OUTPATIENT)
Dept: FAMILY MEDICINE | Facility: CLINIC | Age: 54
End: 2019-03-18

## 2019-03-18 DIAGNOSIS — F34.1 DYSTHYMIA: ICD-10-CM

## 2019-03-28 ENCOUNTER — COMMUNICATION - HEALTHEAST (OUTPATIENT)
Dept: INTERNAL MEDICINE | Facility: CLINIC | Age: 54
End: 2019-03-28

## 2019-03-28 DIAGNOSIS — G25.81 RLS (RESTLESS LEGS SYNDROME): ICD-10-CM

## 2019-06-26 NOTE — ASSESSMENT & PLAN NOTE
53 y.o. year old female in clinic today to discuss treatment of the following conditions through diet and lifestyle modification and weight loss:  1. Screen for colon cancer    2. Major depression in complete remission (H)    3. BMI 28.0-28.9,adult    4. Hypothyroidism, unspecified type    5. Metabolic syndrome    6. Dyslipidemia    7. Vitamin D deficiency      The patient's weight loss result since the last visit was mixed based on lack of confidence and contentment with current progress.   - she lacks time for self-care, sleep, healthy eating    - return to clinic in 6-8 weeks   - no change to medications    - check labs at next visit   - exercise: Less than previous.  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  Continue to be physically active.  Given her workload and lack of sleep, recommendation increase her physical activity is impractical although this was discussed.

## 2019-10-20 NOTE — ASSESSMENT & PLAN NOTE
This patient is overdue for clinic visit.  Refill will be sent with instructions to return before additional refills.  Consider decrease from 30 mg to 15 mg.

## 2019-12-03 NOTE — ASSESSMENT & PLAN NOTE
54 y.o. year old female in clinic today to discuss treatment of the following conditions through diet and lifestyle modification and weight loss:   Class 1 obesity due to excess calories with serious comorbidity and body mass index (BMI) of 30.0 to 30.9 in adult    2. Need for vaccination    3. Screening for colon cancer    4. Metabolic syndrome    5. Hypothyroidism, unspecified type    6. Vitamin D deficiency    7. Dyslipidemia    8. Major depression in completeremission (H)    9. BMI 28.0-28.9,adult    10. Pain, dental      The patient's weight loss result since the last visit was mixed based on weight gain.  The patient describes working 70+ hours per week.  Social stress is high she is undergoing a bankruptcy.  She anticipates a change in her situation after the bankruptcy goes through and she will work only her primary job.  She says that the medications are not helpful at this time.  I believe the medications are somewhat helpful but they are not able to compensate for the lack of self-care with respect to sleep and stress.  No medication changes were recommended at this time.  I am hopeful that she will find success as she has time to take care of herself.  I gave her a goal of 8+ hours of sleep per night.  -Continue current medications.  - Hormonal replacement therapy: Status post oophorectomy.  Unclear if the discontinuance of Premarin is playing a role in weight gain recently.  She will follow-up with her gynecologist.  - Return to clinic in 6-8 weeks.

## 2020-01-01 ENCOUNTER — COMMUNICATION - HEALTHEAST (OUTPATIENT)
Dept: FAMILY MEDICINE | Facility: CLINIC | Age: 55
End: 2020-01-01

## 2020-01-01 ENCOUNTER — COMMUNICATION - HEALTHEAST (OUTPATIENT)
Dept: INTERNAL MEDICINE | Facility: CLINIC | Age: 55
End: 2020-01-01

## 2020-01-01 ENCOUNTER — ANESTHESIA - HEALTHEAST (OUTPATIENT)
Dept: SURGERY | Facility: AMBULATORY SURGERY CENTER | Age: 55
End: 2020-01-01

## 2020-01-01 ENCOUNTER — RECORDS - HEALTHEAST (OUTPATIENT)
Dept: ADMINISTRATIVE | Facility: OTHER | Age: 55
End: 2020-01-01

## 2020-01-01 ENCOUNTER — COMMUNICATION - HEALTHEAST (OUTPATIENT)
Dept: SURGERY | Facility: CLINIC | Age: 55
End: 2020-01-01

## 2020-01-01 ENCOUNTER — HOSPITAL ENCOUNTER (INPATIENT)
Dept: INTENSIVE CARE | Facility: HOSPITAL | Age: 55
End: 2020-04-01
Attending: INTERNAL MEDICINE | Admitting: INTERNAL MEDICINE

## 2020-01-01 ENCOUNTER — OFFICE VISIT - HEALTHEAST (OUTPATIENT)
Dept: FAMILY MEDICINE | Facility: CLINIC | Age: 55
End: 2020-01-01

## 2020-01-01 ENCOUNTER — SURGERY - HEALTHEAST (OUTPATIENT)
Dept: GASTROENTEROLOGY | Facility: HOSPITAL | Age: 55
End: 2020-01-01

## 2020-01-01 ENCOUNTER — COMMUNICATION - HEALTHEAST (OUTPATIENT)
Dept: SCHEDULING | Facility: CLINIC | Age: 55
End: 2020-01-01

## 2020-01-01 ENCOUNTER — SURGERY - HEALTHEAST (OUTPATIENT)
Dept: SURGERY | Facility: AMBULATORY SURGERY CENTER | Age: 55
End: 2020-01-01

## 2020-01-01 ENCOUNTER — RECORDS - HEALTHEAST (OUTPATIENT)
Dept: HEALTH INFORMATION MANAGEMENT | Facility: CLINIC | Age: 55
End: 2020-01-01

## 2020-01-01 DIAGNOSIS — R60.0 BILATERAL LOWER EXTREMITY EDEMA: ICD-10-CM

## 2020-01-01 DIAGNOSIS — E55.9 VITAMIN D DEFICIENCY: ICD-10-CM

## 2020-01-01 DIAGNOSIS — R19.5 POSITIVE COLORECTAL CANCER SCREENING USING COLOGUARD TEST: ICD-10-CM

## 2020-01-01 DIAGNOSIS — E66.09 CLASS 1 OBESITY DUE TO EXCESS CALORIES WITH SERIOUS COMORBIDITY AND BODY MASS INDEX (BMI) OF 31.0 TO 31.9 IN ADULT: ICD-10-CM

## 2020-01-01 DIAGNOSIS — Z12.31 VISIT FOR SCREENING MAMMOGRAM: ICD-10-CM

## 2020-01-01 DIAGNOSIS — E66.811 CLASS 1 OBESITY DUE TO EXCESS CALORIES WITH SERIOUS COMORBIDITY AND BODY MASS INDEX (BMI) OF 31.0 TO 31.9 IN ADULT: ICD-10-CM

## 2020-01-01 DIAGNOSIS — Z99.11: ICD-10-CM

## 2020-01-01 DIAGNOSIS — Z43.0 ATTENTION TO TRACHEOSTOMY (H): ICD-10-CM

## 2020-01-01 DIAGNOSIS — E88.810 METABOLIC SYNDROME: ICD-10-CM

## 2020-01-01 DIAGNOSIS — G72.81 INTENSIVE CARE (ICU) MYOPATHY: ICD-10-CM

## 2020-01-01 DIAGNOSIS — I26.99 OTHER PULMONARY EMBOLISM WITHOUT ACUTE COR PULMONALE, UNSPECIFIED CHRONICITY (H): ICD-10-CM

## 2020-01-01 DIAGNOSIS — E03.9 HYPOTHYROIDISM, UNSPECIFIED TYPE: ICD-10-CM

## 2020-01-01 DIAGNOSIS — J69.0 ASPIRATION PNEUMONIA OF LEFT LOWER LOBE DUE TO GASTRIC SECRETIONS (H): ICD-10-CM

## 2020-01-01 DIAGNOSIS — J80 ACUTE RESPIRATORY DISTRESS SYNDROME (ARDS) (H): ICD-10-CM

## 2020-01-01 DIAGNOSIS — J96.01 ACUTE RESPIRATORY FAILURE WITH HYPOXEMIA (H): ICD-10-CM

## 2020-01-01 DIAGNOSIS — E78.5 DYSLIPIDEMIA: ICD-10-CM

## 2020-01-01 LAB
25(OH)D3 SERPL-MCNC: 16.2 NG/ML (ref 30–80)
AEROBIC BLOOD CULTURE BOTTLE: NO GROWTH
AEROBIC BLOOD CULTURE BOTTLE: NO GROWTH
ALBUMIN SERPL-MCNC: 2 G/DL (ref 3.5–5)
ALBUMIN SERPL-MCNC: 2.3 G/DL (ref 3.5–5)
ALBUMIN SERPL-MCNC: 2.4 G/DL (ref 3.5–5)
ALBUMIN UR-MCNC: ABNORMAL MG/DL
ALP SERPL-CCNC: 119 U/L (ref 45–120)
ALP SERPL-CCNC: 253 U/L (ref 45–120)
ALP SERPL-CCNC: 78 U/L (ref 45–120)
ALT SERPL W P-5'-P-CCNC: 14 U/L (ref 0–45)
ALT SERPL W P-5'-P-CCNC: 15 U/L (ref 0–45)
ALT SERPL W P-5'-P-CCNC: 41 U/L (ref 0–45)
ANA SER QL: 0.3 U
ANAEROBIC BLOOD CULTURE BOTTLE: NO GROWTH
ANAEROBIC BLOOD CULTURE BOTTLE: NO GROWTH
ANCA IGG TITR SER IF: NORMAL {TITER}
ANION GAP SERPL CALCULATED.3IONS-SCNC: 10 MMOL/L (ref 5–18)
ANION GAP SERPL CALCULATED.3IONS-SCNC: 12 MMOL/L (ref 5–18)
ANION GAP SERPL CALCULATED.3IONS-SCNC: 5 MMOL/L (ref 5–18)
ANION GAP SERPL CALCULATED.3IONS-SCNC: 5 MMOL/L (ref 5–18)
ANION GAP SERPL CALCULATED.3IONS-SCNC: 6 MMOL/L (ref 5–18)
ANION GAP SERPL CALCULATED.3IONS-SCNC: 7 MMOL/L (ref 5–18)
ANION GAP SERPL CALCULATED.3IONS-SCNC: 8 MMOL/L (ref 5–18)
ANION GAP SERPL CALCULATED.3IONS-SCNC: 9 MMOL/L (ref 5–18)
APPEARANCE FLD: ABNORMAL
APPEARANCE FLD: ABNORMAL
APPEARANCE UR: CLEAR
APTT PPP: 38 SECONDS (ref 24–37)
ARUP MISCELLANEOUS TEST: NORMAL
AST SERPL W P-5'-P-CCNC: 13 U/L (ref 0–40)
AST SERPL W P-5'-P-CCNC: 16 U/L (ref 0–40)
AST SERPL W P-5'-P-CCNC: 31 U/L (ref 0–40)
ATRIAL RATE - MUSE: 81 BPM
BACTERIA #/AREA URNS HPF: ABNORMAL HPF
BACTERIA SPEC CULT: ABNORMAL
BACTERIA SPEC CULT: NORMAL
BAL CELLS/UL- HISTORICAL: 108 /UL
BAL CELLS/UL- HISTORICAL: 86 /UL
BASE EXCESS BLDA CALC-SCNC: 1.2 MMOL/L
BASE EXCESS BLDA CALC-SCNC: 1.2 MMOL/L
BASE EXCESS BLDA CALC-SCNC: 1.4 MMOL/L
BASE EXCESS BLDA CALC-SCNC: 1.5 MMOL/L
BASE EXCESS BLDA CALC-SCNC: 1.6 MMOL/L
BASE EXCESS BLDA CALC-SCNC: 10.8 MMOL/L
BASE EXCESS BLDA CALC-SCNC: 11.5 MMOL/L
BASE EXCESS BLDA CALC-SCNC: 11.9 MMOL/L
BASE EXCESS BLDA CALC-SCNC: 12.4 MMOL/L
BASE EXCESS BLDA CALC-SCNC: 12.8 MMOL/L
BASE EXCESS BLDA CALC-SCNC: 12.9 MMOL/L
BASE EXCESS BLDA CALC-SCNC: 13 MMOL/L
BASE EXCESS BLDA CALC-SCNC: 13 MMOL/L
BASE EXCESS BLDA CALC-SCNC: 13.1 MMOL/L
BASE EXCESS BLDA CALC-SCNC: 13.3 MMOL/L
BASE EXCESS BLDA CALC-SCNC: 13.5 MMOL/L
BASE EXCESS BLDA CALC-SCNC: 13.6 MMOL/L
BASE EXCESS BLDA CALC-SCNC: 13.7 MMOL/L
BASE EXCESS BLDA CALC-SCNC: 13.8 MMOL/L
BASE EXCESS BLDA CALC-SCNC: 14.1 MMOL/L
BASE EXCESS BLDA CALC-SCNC: 14.5 MMOL/L
BASE EXCESS BLDA CALC-SCNC: 14.6 MMOL/L
BASE EXCESS BLDA CALC-SCNC: 14.7 MMOL/L
BASE EXCESS BLDA CALC-SCNC: 14.9 MMOL/L
BASE EXCESS BLDA CALC-SCNC: 15.7 MMOL/L
BASE EXCESS BLDA CALC-SCNC: 2 MMOL/L
BASE EXCESS BLDA CALC-SCNC: 2 MMOL/L
BASE EXCESS BLDA CALC-SCNC: 2.4 MMOL/L
BASE EXCESS BLDA CALC-SCNC: 2.4 MMOL/L
BASE EXCESS BLDA CALC-SCNC: 2.7 MMOL/L
BASE EXCESS BLDA CALC-SCNC: 2.8 MMOL/L
BASE EXCESS BLDA CALC-SCNC: 3.1 MMOL/L
BASE EXCESS BLDA CALC-SCNC: 3.5 MMOL/L
BASE EXCESS BLDA CALC-SCNC: 4 MMOL/L
BASE EXCESS BLDA CALC-SCNC: 4.3 MMOL/L
BASE EXCESS BLDA CALC-SCNC: 5.2 MMOL/L
BASE EXCESS BLDA CALC-SCNC: 6.4 MMOL/L
BASE EXCESS BLDA CALC-SCNC: 6.6 MMOL/L
BASE EXCESS BLDA CALC-SCNC: 6.7 MMOL/L
BASE EXCESS BLDA CALC-SCNC: 7.4 MMOL/L
BASE EXCESS BLDA CALC-SCNC: 7.9 MMOL/L
BASE EXCESS BLDA CALC-SCNC: 8.3 MMOL/L
BASE EXCESS BLDA CALC-SCNC: 8.8 MMOL/L
BASE EXCESS BLDA CALC-SCNC: 9.7 MMOL/L
BASE EXCESS BLDV CALC-SCNC: 5.1 MMOL/L
BASOPHILS # BLD AUTO: 0 THOU/UL (ref 0–0.2)
BASOPHILS NFR BLD AUTO: 0 % (ref 0–2)
BILIRUB DIRECT SERPL-MCNC: 0.1 MG/DL
BILIRUB DIRECT SERPL-MCNC: 0.1 MG/DL
BILIRUB SERPL-MCNC: 0.2 MG/DL (ref 0–1)
BILIRUB SERPL-MCNC: 0.3 MG/DL (ref 0–1)
BILIRUB SERPL-MCNC: 0.5 MG/DL (ref 0–1)
BILIRUB UR QL STRIP: NEGATIVE
BNP SERPL-MCNC: 260 PG/ML (ref 0–76)
BSA FOR ECHO PROCEDURE: 1.58 M2
BSA FOR ECHO PROCEDURE: 1.66 M2
BSA FOR ECHO PROCEDURE: 1.66 M2
BUN SERPL-MCNC: 10 MG/DL (ref 8–22)
BUN SERPL-MCNC: 11 MG/DL (ref 8–22)
BUN SERPL-MCNC: 13 MG/DL (ref 8–22)
BUN SERPL-MCNC: 14 MG/DL (ref 8–22)
BUN SERPL-MCNC: 16 MG/DL (ref 8–22)
BUN SERPL-MCNC: 20 MG/DL (ref 8–22)
BUN SERPL-MCNC: 21 MG/DL (ref 8–22)
BUN SERPL-MCNC: 21 MG/DL (ref 8–22)
BUN SERPL-MCNC: 23 MG/DL (ref 8–22)
BUN SERPL-MCNC: 23 MG/DL (ref 8–22)
BUN SERPL-MCNC: 24 MG/DL (ref 8–22)
BUN SERPL-MCNC: 25 MG/DL (ref 8–22)
BUN SERPL-MCNC: 25 MG/DL (ref 8–22)
BUN SERPL-MCNC: 26 MG/DL (ref 8–22)
BUN SERPL-MCNC: 28 MG/DL (ref 8–22)
BUN SERPL-MCNC: 32 MG/DL (ref 8–22)
BUN SERPL-MCNC: 35 MG/DL (ref 8–22)
BUN SERPL-MCNC: 35 MG/DL (ref 8–22)
BUN SERPL-MCNC: 40 MG/DL (ref 8–22)
BUN SERPL-MCNC: 41 MG/DL (ref 8–22)
BUN SERPL-MCNC: 44 MG/DL (ref 8–22)
BUN SERPL-MCNC: 55 MG/DL (ref 8–22)
BUN SERPL-MCNC: 56 MG/DL (ref 8–22)
BUN SERPL-MCNC: 59 MG/DL (ref 8–22)
BUN SERPL-MCNC: 60 MG/DL (ref 8–22)
BUN SERPL-MCNC: 62 MG/DL (ref 8–22)
C DIFF TOX B STL QL: NEGATIVE
C REACTIVE PROTEIN LHE: 10.9 MG/DL (ref 0–0.8)
C REACTIVE PROTEIN LHE: 13.4 MG/DL (ref 0–0.8)
C REACTIVE PROTEIN LHE: 22.4 MG/DL (ref 0–0.8)
C REACTIVE PROTEIN LHE: 29.4 MG/DL (ref 0–0.8)
C REACTIVE PROTEIN LHE: 31.9 MG/DL (ref 0–0.8)
CALCIUM SERPL-MCNC: 7.9 MG/DL (ref 8.5–10.5)
CALCIUM SERPL-MCNC: 7.9 MG/DL (ref 8.5–10.5)
CALCIUM SERPL-MCNC: 8 MG/DL (ref 8.5–10.5)
CALCIUM SERPL-MCNC: 8.3 MG/DL (ref 8.5–10.5)
CALCIUM SERPL-MCNC: 8.4 MG/DL (ref 8.5–10.5)
CALCIUM SERPL-MCNC: 8.5 MG/DL (ref 8.5–10.5)
CALCIUM SERPL-MCNC: 8.6 MG/DL (ref 8.5–10.5)
CALCIUM SERPL-MCNC: 8.6 MG/DL (ref 8.5–10.5)
CALCIUM SERPL-MCNC: 8.7 MG/DL (ref 8.5–10.5)
CALCIUM SERPL-MCNC: 8.8 MG/DL (ref 8.5–10.5)
CALCIUM SERPL-MCNC: 8.9 MG/DL (ref 8.5–10.5)
CALCIUM SERPL-MCNC: 8.9 MG/DL (ref 8.5–10.5)
CALCIUM SERPL-MCNC: 9 MG/DL (ref 8.5–10.5)
CALCIUM SERPL-MCNC: 9 MG/DL (ref 8.5–10.5)
CALCIUM SERPL-MCNC: 9.1 MG/DL (ref 8.5–10.5)
CALCIUM SERPL-MCNC: 9.1 MG/DL (ref 8.5–10.5)
CALCIUM SERPL-MCNC: 9.2 MG/DL (ref 8.5–10.5)
CALCIUM SERPL-MCNC: 9.3 MG/DL (ref 8.5–10.5)
CALCIUM SERPL-MCNC: 9.3 MG/DL (ref 8.5–10.5)
CALCIUM SERPL-MCNC: 9.5 MG/DL (ref 8.5–10.5)
CALCIUM, IONIZED MEASURED: 1.11 MMOL/L (ref 1.11–1.3)
CAP COMMENT: ABNORMAL
CHLORIDE BLD-SCNC: 100 MMOL/L (ref 98–107)
CHLORIDE BLD-SCNC: 101 MMOL/L (ref 98–107)
CHLORIDE BLD-SCNC: 102 MMOL/L (ref 98–107)
CHLORIDE BLD-SCNC: 103 MMOL/L (ref 98–107)
CHLORIDE BLD-SCNC: 104 MMOL/L (ref 98–107)
CHLORIDE BLD-SCNC: 105 MMOL/L (ref 98–107)
CHLORIDE BLD-SCNC: 106 MMOL/L (ref 98–107)
CHLORIDE BLD-SCNC: 107 MMOL/L (ref 98–107)
CHLORIDE BLD-SCNC: 107 MMOL/L (ref 98–107)
CHLORIDE BLD-SCNC: 94 MMOL/L (ref 98–107)
CHLORIDE BLD-SCNC: 95 MMOL/L (ref 98–107)
CHLORIDE BLD-SCNC: 95 MMOL/L (ref 98–107)
CHLORIDE BLD-SCNC: 96 MMOL/L (ref 98–107)
CHLORIDE BLD-SCNC: 96 MMOL/L (ref 98–107)
CHLORIDE BLD-SCNC: 97 MMOL/L (ref 98–107)
CHLORIDE BLD-SCNC: 98 MMOL/L (ref 98–107)
CHLORIDE BLD-SCNC: 99 MMOL/L (ref 98–107)
CHLORIDE BLD-SCNC: 99 MMOL/L (ref 98–107)
CHOLEST SERPL-MCNC: 239 MG/DL
CK SERPL-CCNC: 180 U/L (ref 30–190)
CK SERPL-CCNC: 30 U/L (ref 30–190)
CK SERPL-CCNC: 31 U/L (ref 30–190)
CO2 SERPL-SCNC: 24 MMOL/L (ref 22–31)
CO2 SERPL-SCNC: 26 MMOL/L (ref 22–31)
CO2 SERPL-SCNC: 26 MMOL/L (ref 22–31)
CO2 SERPL-SCNC: 27 MMOL/L (ref 22–31)
CO2 SERPL-SCNC: 28 MMOL/L (ref 22–31)
CO2 SERPL-SCNC: 28 MMOL/L (ref 22–31)
CO2 SERPL-SCNC: 29 MMOL/L (ref 22–31)
CO2 SERPL-SCNC: 30 MMOL/L (ref 22–31)
CO2 SERPL-SCNC: 30 MMOL/L (ref 22–31)
CO2 SERPL-SCNC: 31 MMOL/L (ref 22–31)
CO2 SERPL-SCNC: 32 MMOL/L (ref 22–31)
CO2 SERPL-SCNC: 33 MMOL/L (ref 22–31)
CO2 SERPL-SCNC: 33 MMOL/L (ref 22–31)
CO2 SERPL-SCNC: 34 MMOL/L (ref 22–31)
CO2 SERPL-SCNC: 35 MMOL/L (ref 22–31)
CO2 SERPL-SCNC: 36 MMOL/L (ref 22–31)
CO2 SERPL-SCNC: 36 MMOL/L (ref 22–31)
CO2 SERPL-SCNC: 37 MMOL/L (ref 22–31)
COHGB MFR BLD: 100 % (ref 96–97)
COHGB MFR BLD: 100 % (ref 96–97)
COHGB MFR BLD: 91.3 % (ref 96–97)
COHGB MFR BLD: 95.1 % (ref 96–97)
COHGB MFR BLD: 95.4 % (ref 96–97)
COHGB MFR BLD: 95.7 % (ref 96–97)
COHGB MFR BLD: 95.9 % (ref 96–97)
COHGB MFR BLD: 96 % (ref 96–97)
COHGB MFR BLD: 96.5 % (ref 96–97)
COHGB MFR BLD: 96.7 % (ref 96–97)
COHGB MFR BLD: 96.8 % (ref 96–97)
COHGB MFR BLD: 96.9 % (ref 96–97)
COHGB MFR BLD: 97 % (ref 96–97)
COHGB MFR BLD: 97 % (ref 96–97)
COHGB MFR BLD: 97.4 % (ref 96–97)
COHGB MFR BLD: 97.5 % (ref 96–97)
COHGB MFR BLD: 97.6 % (ref 96–97)
COHGB MFR BLD: 97.6 % (ref 96–97)
COHGB MFR BLD: 97.7 % (ref 96–97)
COHGB MFR BLD: 97.8 % (ref 96–97)
COHGB MFR BLD: 97.8 % (ref 96–97)
COHGB MFR BLD: 97.9 % (ref 96–97)
COHGB MFR BLD: 98 % (ref 96–97)
COHGB MFR BLD: 98.1 % (ref 96–97)
COHGB MFR BLD: 98.1 % (ref 96–97)
COHGB MFR BLD: 98.2 % (ref 96–97)
COHGB MFR BLD: 98.2 % (ref 96–97)
COHGB MFR BLD: 98.3 % (ref 96–97)
COHGB MFR BLD: 98.4 % (ref 96–97)
COHGB MFR BLD: 98.5 % (ref 96–97)
COHGB MFR BLD: 98.6 % (ref 96–97)
COHGB MFR BLD: 98.8 % (ref 96–97)
COHGB MFR BLD: 98.9 % (ref 96–97)
COHGB MFR BLD: 99 % (ref 96–97)
COHGB MFR BLD: 99 % (ref 96–97)
COHGB MFR BLD: 99.3 % (ref 96–97)
COHGB MFR BLD: 99.4 % (ref 96–97)
COHGB MFR BLD: 99.5 % (ref 96–97)
COHGB MFR BLD: 99.6 % (ref 96–97)
COHGB MFR BLD: 99.8 % (ref 96–97)
COLOGUARD-ABSTRACT: POSITIVE
COLOR UR AUTO: YELLOW
COVID-19 - HISTORICAL: NEGATIVE
CREAT SERPL-MCNC: 0.57 MG/DL (ref 0.6–1.1)
CREAT SERPL-MCNC: 0.61 MG/DL (ref 0.6–1.1)
CREAT SERPL-MCNC: 0.61 MG/DL (ref 0.6–1.1)
CREAT SERPL-MCNC: 0.64 MG/DL (ref 0.6–1.1)
CREAT SERPL-MCNC: 0.65 MG/DL (ref 0.6–1.1)
CREAT SERPL-MCNC: 0.67 MG/DL (ref 0.6–1.1)
CREAT SERPL-MCNC: 0.67 MG/DL (ref 0.6–1.1)
CREAT SERPL-MCNC: 0.68 MG/DL (ref 0.6–1.1)
CREAT SERPL-MCNC: 0.72 MG/DL (ref 0.6–1.1)
CREAT SERPL-MCNC: 0.74 MG/DL (ref 0.6–1.1)
CREAT SERPL-MCNC: 0.78 MG/DL (ref 0.6–1.1)
CREAT SERPL-MCNC: 0.84 MG/DL (ref 0.6–1.1)
CREAT SERPL-MCNC: 0.84 MG/DL (ref 0.6–1.1)
CREAT SERPL-MCNC: 0.87 MG/DL (ref 0.6–1.1)
CREAT SERPL-MCNC: 0.88 MG/DL (ref 0.6–1.1)
CREAT SERPL-MCNC: 0.9 MG/DL (ref 0.6–1.1)
CREAT SERPL-MCNC: 0.95 MG/DL (ref 0.6–1.1)
CREAT SERPL-MCNC: 0.97 MG/DL (ref 0.6–1.1)
CREAT SERPL-MCNC: 1 MG/DL (ref 0.6–1.1)
CREAT SERPL-MCNC: 1.09 MG/DL (ref 0.6–1.1)
CREAT SERPL-MCNC: 1.2 MG/DL (ref 0.6–1.1)
CREAT SERPL-MCNC: 1.33 MG/DL (ref 0.6–1.1)
CREAT SERPL-MCNC: 1.66 MG/DL (ref 0.6–1.1)
CV BLOOD PRESSURE: NORMAL MMHG
CV ECHO HEIGHT: 58 IN
CV ECHO HEIGHT: 58 IN
CV ECHO HEIGHT: 58.5 IN
CV ECHO WEIGHT: 139 LBS
CV ECHO WEIGHT: 140 LBS
CV ECHO WEIGHT: 152 LBS
DIASTOLIC BLOOD PRESSURE - MUSE: NORMAL
EJECTION FRACTION: 63 % (ref 55–75)
EJECTION FRACTION: 63 % (ref 55–75)
EOSINOPHIL # BLD AUTO: 0 THOU/UL (ref 0–0.4)
EOSINOPHIL # BLD AUTO: 0.2 THOU/UL (ref 0–0.4)
EOSINOPHIL # BLD AUTO: 0.2 THOU/UL (ref 0–0.4)
EOSINOPHIL # BLD AUTO: 0.4 THOU/UL (ref 0–0.4)
EOSINOPHIL NFR BLD AUTO: 0 % (ref 0–6)
EOSINOPHIL NFR BLD AUTO: 2 % (ref 0–6)
EOSINOPHIL NFR BLD AUTO: 2 % (ref 0–6)
EOSINOPHIL NFR BLD AUTO: 3 % (ref 0–6)
EOSINOPHIL NFR FLD MANUAL: 1 %
EOSINOPHIL NFR FLD MANUAL: 5 %
EPITHELIAL %- HISTORICAL: 2 %
EPITHELIAL %- HISTORICAL: 4 %
ERYTHROCYTE [DISTWIDTH] IN BLOOD BY AUTOMATED COUNT: 13.1 % (ref 11–14.5)
ERYTHROCYTE [DISTWIDTH] IN BLOOD BY AUTOMATED COUNT: 13.2 % (ref 11–14.5)
ERYTHROCYTE [DISTWIDTH] IN BLOOD BY AUTOMATED COUNT: 13.3 % (ref 11–14.5)
ERYTHROCYTE [DISTWIDTH] IN BLOOD BY AUTOMATED COUNT: 13.4 % (ref 11–14.5)
ERYTHROCYTE [DISTWIDTH] IN BLOOD BY AUTOMATED COUNT: 13.6 % (ref 11–14.5)
ERYTHROCYTE [DISTWIDTH] IN BLOOD BY AUTOMATED COUNT: 13.7 % (ref 11–14.5)
ERYTHROCYTE [DISTWIDTH] IN BLOOD BY AUTOMATED COUNT: 13.8 % (ref 11–14.5)
ERYTHROCYTE [DISTWIDTH] IN BLOOD BY AUTOMATED COUNT: 13.9 % (ref 11–14.5)
ERYTHROCYTE [DISTWIDTH] IN BLOOD BY AUTOMATED COUNT: 14 % (ref 11–14.5)
ERYTHROCYTE [DISTWIDTH] IN BLOOD BY AUTOMATED COUNT: 14 % (ref 11–14.5)
ERYTHROCYTE [DISTWIDTH] IN BLOOD BY AUTOMATED COUNT: 14.2 % (ref 11–14.5)
ERYTHROCYTE [DISTWIDTH] IN BLOOD BY AUTOMATED COUNT: 15 % (ref 11–14.5)
ERYTHROCYTE [DISTWIDTH] IN BLOOD BY AUTOMATED COUNT: 15.1 % (ref 11–14.5)
ERYTHROCYTE [DISTWIDTH] IN BLOOD BY AUTOMATED COUNT: 15.3 % (ref 11–14.5)
ERYTHROCYTE [DISTWIDTH] IN BLOOD BY AUTOMATED COUNT: 15.5 % (ref 11–14.5)
ERYTHROCYTE [DISTWIDTH] IN BLOOD BY AUTOMATED COUNT: 15.6 % (ref 11–14.5)
ERYTHROCYTE [DISTWIDTH] IN BLOOD BY AUTOMATED COUNT: 15.7 % (ref 11–14.5)
ERYTHROCYTE [DISTWIDTH] IN BLOOD BY AUTOMATED COUNT: 15.8 % (ref 11–14.5)
ERYTHROCYTE [SEDIMENTATION RATE] IN BLOOD BY WESTERGREN METHOD: 58 MM/HR (ref 0–20)
FASTING STATUS PATIENT QL REPORTED: NO
FASTING STATUS PATIENT QL REPORTED: NO
FASTING STATUS PATIENT QL REPORTED: NORMAL
FASTING STATUS PATIENT QL REPORTED: YES
FLOW: 45 LPM
FLOW: 45 LPM
FLUAV AG SPEC QL IA: NORMAL
FLUBV AG SPEC QL IA: NORMAL
FLUBV IGG TITR SER: ABNORMAL {TITER}
FRACTIONAL SHORTENING: 44.9 % (ref 28–44)
GFR SERPL CREATININE-BSD FRML MDRD: 32 ML/MIN/1.73M2
GFR SERPL CREATININE-BSD FRML MDRD: 42 ML/MIN/1.73M2
GFR SERPL CREATININE-BSD FRML MDRD: 47 ML/MIN/1.73M2
GFR SERPL CREATININE-BSD FRML MDRD: 53 ML/MIN/1.73M2
GFR SERPL CREATININE-BSD FRML MDRD: 58 ML/MIN/1.73M2
GFR SERPL CREATININE-BSD FRML MDRD: 60 ML/MIN/1.73M2
GFR SERPL CREATININE-BSD FRML MDRD: >60 ML/MIN/1.73M2
GLUCOSE BLD-MCNC: 101 MG/DL (ref 70–125)
GLUCOSE BLD-MCNC: 102 MG/DL (ref 70–125)
GLUCOSE BLD-MCNC: 103 MG/DL (ref 70–125)
GLUCOSE BLD-MCNC: 104 MG/DL (ref 70–125)
GLUCOSE BLD-MCNC: 104 MG/DL (ref 70–125)
GLUCOSE BLD-MCNC: 107 MG/DL (ref 70–125)
GLUCOSE BLD-MCNC: 113 MG/DL (ref 70–125)
GLUCOSE BLD-MCNC: 115 MG/DL (ref 70–125)
GLUCOSE BLD-MCNC: 115 MG/DL (ref 70–125)
GLUCOSE BLD-MCNC: 119 MG/DL (ref 70–125)
GLUCOSE BLD-MCNC: 121 MG/DL (ref 70–125)
GLUCOSE BLD-MCNC: 121 MG/DL (ref 70–125)
GLUCOSE BLD-MCNC: 123 MG/DL (ref 70–125)
GLUCOSE BLD-MCNC: 125 MG/DL (ref 70–125)
GLUCOSE BLD-MCNC: 126 MG/DL (ref 70–125)
GLUCOSE BLD-MCNC: 129 MG/DL (ref 70–125)
GLUCOSE BLD-MCNC: 135 MG/DL (ref 70–125)
GLUCOSE BLD-MCNC: 140 MG/DL (ref 70–125)
GLUCOSE BLD-MCNC: 141 MG/DL (ref 70–125)
GLUCOSE BLD-MCNC: 147 MG/DL (ref 70–125)
GLUCOSE BLD-MCNC: 169 MG/DL (ref 70–125)
GLUCOSE BLD-MCNC: 198 MG/DL (ref 70–125)
GLUCOSE BLD-MCNC: 77 MG/DL (ref 70–125)
GLUCOSE BLD-MCNC: 90 MG/DL (ref 70–125)
GLUCOSE BLD-MCNC: 92 MG/DL (ref 70–125)
GLUCOSE BLD-MCNC: 93 MG/DL (ref 70–125)
GLUCOSE BLD-MCNC: 95 MG/DL (ref 70–125)
GLUCOSE BLD-MCNC: 98 MG/DL (ref 70–125)
GLUCOSE BLD-MCNC: 99 MG/DL (ref 70–125)
GLUCOSE BLDC GLUCOMTR-MCNC: 101 MG/DL (ref 70–139)
GLUCOSE BLDC GLUCOMTR-MCNC: 101 MG/DL (ref 70–139)
GLUCOSE BLDC GLUCOMTR-MCNC: 102 MG/DL (ref 70–139)
GLUCOSE BLDC GLUCOMTR-MCNC: 102 MG/DL (ref 70–139)
GLUCOSE BLDC GLUCOMTR-MCNC: 103 MG/DL (ref 70–139)
GLUCOSE BLDC GLUCOMTR-MCNC: 104 MG/DL (ref 70–139)
GLUCOSE BLDC GLUCOMTR-MCNC: 105 MG/DL (ref 70–139)
GLUCOSE BLDC GLUCOMTR-MCNC: 105 MG/DL (ref 70–139)
GLUCOSE BLDC GLUCOMTR-MCNC: 106 MG/DL (ref 70–139)
GLUCOSE BLDC GLUCOMTR-MCNC: 106 MG/DL (ref 70–139)
GLUCOSE BLDC GLUCOMTR-MCNC: 107 MG/DL (ref 70–139)
GLUCOSE BLDC GLUCOMTR-MCNC: 108 MG/DL (ref 70–139)
GLUCOSE BLDC GLUCOMTR-MCNC: 109 MG/DL (ref 70–139)
GLUCOSE BLDC GLUCOMTR-MCNC: 110 MG/DL (ref 70–139)
GLUCOSE BLDC GLUCOMTR-MCNC: 111 MG/DL (ref 70–139)
GLUCOSE BLDC GLUCOMTR-MCNC: 112 MG/DL (ref 70–139)
GLUCOSE BLDC GLUCOMTR-MCNC: 113 MG/DL (ref 70–139)
GLUCOSE BLDC GLUCOMTR-MCNC: 114 MG/DL (ref 70–139)
GLUCOSE BLDC GLUCOMTR-MCNC: 115 MG/DL (ref 70–139)
GLUCOSE BLDC GLUCOMTR-MCNC: 116 MG/DL (ref 70–139)
GLUCOSE BLDC GLUCOMTR-MCNC: 117 MG/DL (ref 70–139)
GLUCOSE BLDC GLUCOMTR-MCNC: 118 MG/DL (ref 70–139)
GLUCOSE BLDC GLUCOMTR-MCNC: 119 MG/DL (ref 70–139)
GLUCOSE BLDC GLUCOMTR-MCNC: 120 MG/DL (ref 70–139)
GLUCOSE BLDC GLUCOMTR-MCNC: 120 MG/DL (ref 70–139)
GLUCOSE BLDC GLUCOMTR-MCNC: 121 MG/DL (ref 70–139)
GLUCOSE BLDC GLUCOMTR-MCNC: 123 MG/DL (ref 70–139)
GLUCOSE BLDC GLUCOMTR-MCNC: 124 MG/DL (ref 70–139)
GLUCOSE BLDC GLUCOMTR-MCNC: 125 MG/DL (ref 70–139)
GLUCOSE BLDC GLUCOMTR-MCNC: 126 MG/DL (ref 70–139)
GLUCOSE BLDC GLUCOMTR-MCNC: 127 MG/DL (ref 70–139)
GLUCOSE BLDC GLUCOMTR-MCNC: 127 MG/DL (ref 70–139)
GLUCOSE BLDC GLUCOMTR-MCNC: 129 MG/DL (ref 70–139)
GLUCOSE BLDC GLUCOMTR-MCNC: 130 MG/DL (ref 70–139)
GLUCOSE BLDC GLUCOMTR-MCNC: 130 MG/DL (ref 70–139)
GLUCOSE BLDC GLUCOMTR-MCNC: 131 MG/DL (ref 70–139)
GLUCOSE BLDC GLUCOMTR-MCNC: 132 MG/DL (ref 70–139)
GLUCOSE BLDC GLUCOMTR-MCNC: 133 MG/DL (ref 70–139)
GLUCOSE BLDC GLUCOMTR-MCNC: 134 MG/DL (ref 70–139)
GLUCOSE BLDC GLUCOMTR-MCNC: 135 MG/DL (ref 70–139)
GLUCOSE BLDC GLUCOMTR-MCNC: 136 MG/DL (ref 70–139)
GLUCOSE BLDC GLUCOMTR-MCNC: 136 MG/DL (ref 70–139)
GLUCOSE BLDC GLUCOMTR-MCNC: 137 MG/DL (ref 70–139)
GLUCOSE BLDC GLUCOMTR-MCNC: 138 MG/DL (ref 70–139)
GLUCOSE BLDC GLUCOMTR-MCNC: 139 MG/DL (ref 70–139)
GLUCOSE BLDC GLUCOMTR-MCNC: 140 MG/DL (ref 70–139)
GLUCOSE BLDC GLUCOMTR-MCNC: 141 MG/DL (ref 70–139)
GLUCOSE BLDC GLUCOMTR-MCNC: 142 MG/DL (ref 70–139)
GLUCOSE BLDC GLUCOMTR-MCNC: 142 MG/DL (ref 70–139)
GLUCOSE BLDC GLUCOMTR-MCNC: 143 MG/DL (ref 70–139)
GLUCOSE BLDC GLUCOMTR-MCNC: 145 MG/DL (ref 70–139)
GLUCOSE BLDC GLUCOMTR-MCNC: 145 MG/DL (ref 70–139)
GLUCOSE BLDC GLUCOMTR-MCNC: 146 MG/DL (ref 70–139)
GLUCOSE BLDC GLUCOMTR-MCNC: 147 MG/DL (ref 70–139)
GLUCOSE BLDC GLUCOMTR-MCNC: 148 MG/DL (ref 70–139)
GLUCOSE BLDC GLUCOMTR-MCNC: 149 MG/DL (ref 70–139)
GLUCOSE BLDC GLUCOMTR-MCNC: 149 MG/DL (ref 70–139)
GLUCOSE BLDC GLUCOMTR-MCNC: 150 MG/DL (ref 70–139)
GLUCOSE BLDC GLUCOMTR-MCNC: 150 MG/DL (ref 70–139)
GLUCOSE BLDC GLUCOMTR-MCNC: 151 MG/DL (ref 70–139)
GLUCOSE BLDC GLUCOMTR-MCNC: 151 MG/DL (ref 70–139)
GLUCOSE BLDC GLUCOMTR-MCNC: 152 MG/DL (ref 70–139)
GLUCOSE BLDC GLUCOMTR-MCNC: 153 MG/DL (ref 70–139)
GLUCOSE BLDC GLUCOMTR-MCNC: 153 MG/DL (ref 70–139)
GLUCOSE BLDC GLUCOMTR-MCNC: 154 MG/DL (ref 70–139)
GLUCOSE BLDC GLUCOMTR-MCNC: 154 MG/DL (ref 70–139)
GLUCOSE BLDC GLUCOMTR-MCNC: 156 MG/DL (ref 70–139)
GLUCOSE BLDC GLUCOMTR-MCNC: 157 MG/DL (ref 70–139)
GLUCOSE BLDC GLUCOMTR-MCNC: 158 MG/DL (ref 70–139)
GLUCOSE BLDC GLUCOMTR-MCNC: 160 MG/DL (ref 70–139)
GLUCOSE BLDC GLUCOMTR-MCNC: 160 MG/DL (ref 70–139)
GLUCOSE BLDC GLUCOMTR-MCNC: 162 MG/DL (ref 70–139)
GLUCOSE BLDC GLUCOMTR-MCNC: 163 MG/DL (ref 70–139)
GLUCOSE BLDC GLUCOMTR-MCNC: 164 MG/DL (ref 70–139)
GLUCOSE BLDC GLUCOMTR-MCNC: 164 MG/DL (ref 70–139)
GLUCOSE BLDC GLUCOMTR-MCNC: 165 MG/DL (ref 70–139)
GLUCOSE BLDC GLUCOMTR-MCNC: 168 MG/DL (ref 70–139)
GLUCOSE BLDC GLUCOMTR-MCNC: 168 MG/DL (ref 70–139)
GLUCOSE BLDC GLUCOMTR-MCNC: 169 MG/DL (ref 70–139)
GLUCOSE BLDC GLUCOMTR-MCNC: 170 MG/DL (ref 70–139)
GLUCOSE BLDC GLUCOMTR-MCNC: 174 MG/DL (ref 70–139)
GLUCOSE BLDC GLUCOMTR-MCNC: 176 MG/DL (ref 70–139)
GLUCOSE BLDC GLUCOMTR-MCNC: 177 MG/DL (ref 70–139)
GLUCOSE BLDC GLUCOMTR-MCNC: 178 MG/DL (ref 70–139)
GLUCOSE BLDC GLUCOMTR-MCNC: 178 MG/DL (ref 70–139)
GLUCOSE BLDC GLUCOMTR-MCNC: 180 MG/DL (ref 70–139)
GLUCOSE BLDC GLUCOMTR-MCNC: 195 MG/DL (ref 70–139)
GLUCOSE BLDC GLUCOMTR-MCNC: 196 MG/DL (ref 70–139)
GLUCOSE BLDC GLUCOMTR-MCNC: 205 MG/DL (ref 70–139)
GLUCOSE BLDC GLUCOMTR-MCNC: 211 MG/DL (ref 70–139)
GLUCOSE BLDC GLUCOMTR-MCNC: 211 MG/DL (ref 70–139)
GLUCOSE BLDC GLUCOMTR-MCNC: 219 MG/DL (ref 70–139)
GLUCOSE BLDC GLUCOMTR-MCNC: 26 MG/DL (ref 70–139)
GLUCOSE BLDC GLUCOMTR-MCNC: 326 MG/DL (ref 70–139)
GLUCOSE BLDC GLUCOMTR-MCNC: 79 MG/DL (ref 70–139)
GLUCOSE BLDC GLUCOMTR-MCNC: 79 MG/DL (ref 70–139)
GLUCOSE BLDC GLUCOMTR-MCNC: 84 MG/DL (ref 70–139)
GLUCOSE BLDC GLUCOMTR-MCNC: 84 MG/DL (ref 70–139)
GLUCOSE BLDC GLUCOMTR-MCNC: 87 MG/DL (ref 70–139)
GLUCOSE BLDC GLUCOMTR-MCNC: 88 MG/DL (ref 70–139)
GLUCOSE BLDC GLUCOMTR-MCNC: 89 MG/DL (ref 70–139)
GLUCOSE BLDC GLUCOMTR-MCNC: 90 MG/DL (ref 70–139)
GLUCOSE BLDC GLUCOMTR-MCNC: 91 MG/DL (ref 70–139)
GLUCOSE BLDC GLUCOMTR-MCNC: 92 MG/DL (ref 70–139)
GLUCOSE BLDC GLUCOMTR-MCNC: 92 MG/DL (ref 70–139)
GLUCOSE BLDC GLUCOMTR-MCNC: 93 MG/DL (ref 70–139)
GLUCOSE BLDC GLUCOMTR-MCNC: 94 MG/DL (ref 70–139)
GLUCOSE BLDC GLUCOMTR-MCNC: 94 MG/DL (ref 70–139)
GLUCOSE BLDC GLUCOMTR-MCNC: 95 MG/DL (ref 70–139)
GLUCOSE BLDC GLUCOMTR-MCNC: 95 MG/DL (ref 70–139)
GLUCOSE BLDC GLUCOMTR-MCNC: 96 MG/DL (ref 70–139)
GLUCOSE BLDC GLUCOMTR-MCNC: 96 MG/DL (ref 70–139)
GLUCOSE BLDC GLUCOMTR-MCNC: 97 MG/DL (ref 70–139)
GLUCOSE BLDC GLUCOMTR-MCNC: 98 MG/DL (ref 70–139)
GLUCOSE BLDC GLUCOMTR-MCNC: 99 MG/DL (ref 70–139)
GLUCOSE BLDC GLUCOMTR-MCNC: NORMAL MG/DL
GLUCOSE UR STRIP-MCNC: NEGATIVE MG/DL
GRAM STAIN RESULT: NORMAL
HBA1C MFR BLD: 5.2 %
HCO3, ARTERIAL CALC - HISTORICAL: 25.8 MMOL/L (ref 23–29)
HCO3, ARTERIAL CALC - HISTORICAL: 25.9 MMOL/L (ref 23–29)
HCO3, ARTERIAL CALC - HISTORICAL: 26 MMOL/L (ref 23–29)
HCO3, ARTERIAL CALC - HISTORICAL: 26 MMOL/L (ref 23–29)
HCO3, ARTERIAL CALC - HISTORICAL: 26.1 MMOL/L (ref 23–29)
HCO3, ARTERIAL CALC - HISTORICAL: 26.4 MMOL/L (ref 23–29)
HCO3, ARTERIAL CALC - HISTORICAL: 26.5 MMOL/L (ref 23–29)
HCO3, ARTERIAL CALC - HISTORICAL: 26.8 MMOL/L (ref 23–29)
HCO3, ARTERIAL CALC - HISTORICAL: 26.8 MMOL/L (ref 23–29)
HCO3, ARTERIAL CALC - HISTORICAL: 27 MMOL/L (ref 23–29)
HCO3, ARTERIAL CALC - HISTORICAL: 27.1 MMOL/L (ref 23–29)
HCO3, ARTERIAL CALC - HISTORICAL: 27.4 MMOL/L (ref 23–29)
HCO3, ARTERIAL CALC - HISTORICAL: 27.6 MMOL/L (ref 23–29)
HCO3, ARTERIAL CALC - HISTORICAL: 28.1 MMOL/L (ref 23–29)
HCO3, ARTERIAL CALC - HISTORICAL: 28.3 MMOL/L (ref 23–29)
HCO3, ARTERIAL CALC - HISTORICAL: 29 MMOL/L (ref 23–29)
HCO3, ARTERIAL CALC - HISTORICAL: 29.9 MMOL/L (ref 23–29)
HCO3, ARTERIAL CALC - HISTORICAL: 30 MMOL/L (ref 23–29)
HCO3, ARTERIAL CALC - HISTORICAL: 30.1 MMOL/L (ref 23–29)
HCO3, ARTERIAL CALC - HISTORICAL: 30.7 MMOL/L (ref 23–29)
HCO3, ARTERIAL CALC - HISTORICAL: 31 MMOL/L (ref 23–29)
HCO3, ARTERIAL CALC - HISTORICAL: 31.3 MMOL/L (ref 23–29)
HCO3, ARTERIAL CALC - HISTORICAL: 31.7 MMOL/L (ref 23–29)
HCO3, ARTERIAL CALC - HISTORICAL: 32.5 MMOL/L (ref 23–29)
HCO3, ARTERIAL CALC - HISTORICAL: 33.3 MMOL/L (ref 23–29)
HCO3, ARTERIAL CALC - HISTORICAL: 33.9 MMOL/L (ref 23–29)
HCO3, ARTERIAL CALC - HISTORICAL: 34.2 MMOL/L (ref 23–29)
HCO3, ARTERIAL CALC - HISTORICAL: 34.6 MMOL/L (ref 23–29)
HCO3, ARTERIAL CALC - HISTORICAL: 34.9 MMOL/L (ref 23–29)
HCO3, ARTERIAL CALC - HISTORICAL: 35 MMOL/L (ref 23–29)
HCO3, ARTERIAL CALC - HISTORICAL: 35.1 MMOL/L (ref 23–29)
HCO3, ARTERIAL CALC - HISTORICAL: 35.1 MMOL/L (ref 23–29)
HCO3, ARTERIAL CALC - HISTORICAL: 35.3 MMOL/L (ref 23–29)
HCO3, ARTERIAL CALC - HISTORICAL: 35.5 MMOL/L (ref 23–29)
HCO3, ARTERIAL CALC - HISTORICAL: 35.5 MMOL/L (ref 23–29)
HCO3, ARTERIAL CALC - HISTORICAL: 35.6 MMOL/L (ref 23–29)
HCO3, ARTERIAL CALC - HISTORICAL: 35.7 MMOL/L (ref 23–29)
HCO3, ARTERIAL CALC - HISTORICAL: 35.9 MMOL/L (ref 23–29)
HCO3, ARTERIAL CALC - HISTORICAL: 36.2 MMOL/L (ref 23–29)
HCO3, ARTERIAL CALC - HISTORICAL: 36.3 MMOL/L (ref 23–29)
HCO3, ARTERIAL CALC - HISTORICAL: 36.4 MMOL/L (ref 23–29)
HCO3, ARTERIAL CALC - HISTORICAL: 36.6 MMOL/L (ref 23–29)
HCO3, ARTERIAL CALC - HISTORICAL: 37.2 MMOL/L (ref 23–29)
HCO3, VENOUS, CALC - HISTORICAL: 28.8 MMOL/L (ref 24–30)
HCT VFR BLD AUTO: 24.2 % (ref 35–47)
HCT VFR BLD AUTO: 24.2 % (ref 35–47)
HCT VFR BLD AUTO: 26.8 % (ref 35–47)
HCT VFR BLD AUTO: 26.8 % (ref 35–47)
HCT VFR BLD AUTO: 26.9 % (ref 35–47)
HCT VFR BLD AUTO: 28.4 % (ref 35–47)
HCT VFR BLD AUTO: 29.1 % (ref 35–47)
HCT VFR BLD AUTO: 29.1 % (ref 35–47)
HCT VFR BLD AUTO: 29.2 % (ref 35–47)
HCT VFR BLD AUTO: 29.2 % (ref 35–47)
HCT VFR BLD AUTO: 29.5 % (ref 35–47)
HCT VFR BLD AUTO: 29.9 % (ref 35–47)
HCT VFR BLD AUTO: 30 % (ref 35–47)
HCT VFR BLD AUTO: 30.4 % (ref 35–47)
HCT VFR BLD AUTO: 30.5 % (ref 35–47)
HCT VFR BLD AUTO: 31.2 % (ref 35–47)
HCT VFR BLD AUTO: 31.9 % (ref 35–47)
HCT VFR BLD AUTO: 31.9 % (ref 35–47)
HCT VFR BLD AUTO: 32.6 % (ref 35–47)
HCT VFR BLD AUTO: 33.5 % (ref 35–47)
HCT VFR BLD AUTO: 33.9 % (ref 35–47)
HCT VFR BLD AUTO: 33.9 % (ref 35–47)
HCT VFR BLD AUTO: 35.6 % (ref 35–47)
HDLC SERPL-MCNC: 37 MG/DL
HGB BLD-MCNC: 10.1 G/DL (ref 12–16)
HGB BLD-MCNC: 10.2 G/DL (ref 12–16)
HGB BLD-MCNC: 10.4 G/DL (ref 12–16)
HGB BLD-MCNC: 10.5 G/DL (ref 12–16)
HGB BLD-MCNC: 10.7 G/DL (ref 12–16)
HGB BLD-MCNC: 10.9 G/DL (ref 12–16)
HGB BLD-MCNC: 10.9 G/DL (ref 12–16)
HGB BLD-MCNC: 11.1 G/DL (ref 12–16)
HGB BLD-MCNC: 7.7 G/DL (ref 12–16)
HGB BLD-MCNC: 8 G/DL (ref 12–16)
HGB BLD-MCNC: 8.2 G/DL (ref 12–16)
HGB BLD-MCNC: 8.2 G/DL (ref 12–16)
HGB BLD-MCNC: 8.3 G/DL (ref 12–16)
HGB BLD-MCNC: 8.3 G/DL (ref 12–16)
HGB BLD-MCNC: 8.7 G/DL (ref 12–16)
HGB BLD-MCNC: 8.8 G/DL (ref 12–16)
HGB BLD-MCNC: 9 G/DL (ref 12–16)
HGB BLD-MCNC: 9 G/DL (ref 12–16)
HGB BLD-MCNC: 9.1 G/DL (ref 12–16)
HGB BLD-MCNC: 9.3 G/DL (ref 12–16)
HGB BLD-MCNC: 9.5 G/DL (ref 12–16)
HGB BLD-MCNC: 9.7 G/DL (ref 12–16)
HGB BLD-MCNC: 9.8 G/DL (ref 12–16)
HGB BLD-MCNC: 9.9 G/DL (ref 12–16)
HGB UR QL STRIP: ABNORMAL
HIV 1+2 AB+HIV1 P24 AG SERPL QL IA: NEGATIVE
HIV 1+2 AB+HIV1 P24 AG SERPL QL IA: NEGATIVE
INFLUENZA VIRUS A IGG AB [TITER] IN SERUM: ABNORMAL {TITER}
INFLUENZA VIRUS A IGM AB [TITER] IN SERUM: ABNORMAL {TITER}
INFLUENZA VIRUS B IGM AB [TITER] IN SERUM: ABNORMAL {TITER}
INR PPP: 1.34 (ref 0.9–1.1)
INTERPRETATION ECG - MUSE: NORMAL
INTERVENTRICULAR SEPTUM IN END DIASTOLE: 0.84 CM (ref 0.6–0.9)
ION CA PH 7.4: 1.13 MMOL/L (ref 1.11–1.3)
IVS/PW RATIO: 1.1
KETONES UR STRIP-MCNC: NEGATIVE MG/DL
KOH PREPARATION: ABNORMAL
KOH PREPARATION: NORMAL
L PNEUMO DNA SPEC QL NAA+PROBE: NOT DETECTED
L PNEUMO DNA SPEC QL NAA+PROBE: NOT DETECTED
L PNEUMO1 AG UR QL IA: NORMAL
LAB AP CHARGES (HE HISTORICAL CONVERSION): ABNORMAL
LAB MED GENERAL PATH INTERP (HE HISTORICAL CONVERSION): ABNORMAL
LACTATE SERPL-SCNC: 0.6 MMOL/L (ref 0.5–2.2)
LACTATE SERPL-SCNC: 0.9 MMOL/L (ref 0.5–2.2)
LACTATE SERPL-SCNC: 1.2 MMOL/L (ref 0.5–2.2)
LACTATE SERPL-SCNC: 1.6 MMOL/L (ref 0.5–2.2)
LDLC SERPL CALC-MCNC: 172 MG/DL
LEFT ATRIUM SIZE: 3.1 CM
LEFT VENTRICLE DIASTOLIC VOLUME INDEX: 50.7 CM3/M2 (ref 29–61)
LEFT VENTRICLE DIASTOLIC VOLUME INDEX: 59.2 CM3/M2 (ref 29–61)
LEFT VENTRICLE DIASTOLIC VOLUME: 84.1 CM3 (ref 46–106)
LEFT VENTRICLE DIASTOLIC VOLUME: 93.5 CM3 (ref 46–106)
LEFT VENTRICLE HEART RATE: 82 BPM
LEFT VENTRICLE HEART RATE: 83 BPM
LEFT VENTRICLE HEART RATE: 93 BPM
LEFT VENTRICLE MASS INDEX: 59 G/M2
LEFT VENTRICLE SYSTOLIC VOLUME INDEX: 18.7 CM3/M2 (ref 8–24)
LEFT VENTRICLE SYSTOLIC VOLUME INDEX: 21.8 CM3/M2 (ref 8–24)
LEFT VENTRICLE SYSTOLIC VOLUME: 31 CM3 (ref 14–42)
LEFT VENTRICLE SYSTOLIC VOLUME: 34.4 CM3 (ref 14–42)
LEFT VENTRICULAR INTERNAL DIMENSION IN DIASTOLE: 4.05 CM (ref 3.8–5.2)
LEFT VENTRICULAR INTERNAL DIMENSION IN SYSTOLE: 2.23 CM (ref 2.2–3.5)
LEFT VENTRICULAR MASS: 98 G
LEFT VENTRICULAR POSTERIOR WALL IN END DIASTOLE: 0.79 CM (ref 0.6–0.9)
LEGIONELLA DNA SPEC NAA+PROBE: NOT DETECTED
LEGIONELLA DNA SPEC NAA+PROBE: NOT DETECTED
LEUKOCYTE ESTERASE UR QL STRIP: ABNORMAL
LYMPHOCYTES # BLD AUTO: 1.2 THOU/UL (ref 0.8–4.4)
LYMPHOCYTES # BLD AUTO: 1.2 THOU/UL (ref 0.8–4.4)
LYMPHOCYTES # BLD AUTO: 1.4 THOU/UL (ref 0.8–4.4)
LYMPHOCYTES # BLD AUTO: 1.5 THOU/UL (ref 0.8–4.4)
LYMPHOCYTES NFR BLD AUTO: 11 % (ref 20–40)
LYMPHOCYTES NFR BLD AUTO: 11 % (ref 20–40)
LYMPHOCYTES NFR BLD AUTO: 14 % (ref 20–40)
LYMPHOCYTES NFR BLD AUTO: 4 % (ref 20–40)
LYMPHOCYTES NFR FLD MANUAL: 11 %
LYMPHOCYTES NFR FLD MANUAL: 15 %
MACROPHAGE % - HISTORICAL: 41 %
MACROPHAGE % - HISTORICAL: 46 %
MAGNESIUM SERPL-MCNC: 1.5 MG/DL (ref 1.8–2.6)
MAGNESIUM SERPL-MCNC: 1.5 MG/DL (ref 1.8–2.6)
MAGNESIUM SERPL-MCNC: 1.6 MG/DL (ref 1.8–2.6)
MAGNESIUM SERPL-MCNC: 1.7 MG/DL (ref 1.8–2.6)
MAGNESIUM SERPL-MCNC: 1.7 MG/DL (ref 1.8–2.6)
MAGNESIUM SERPL-MCNC: 1.8 MG/DL (ref 1.8–2.6)
MAGNESIUM SERPL-MCNC: 1.9 MG/DL (ref 1.8–2.6)
MAGNESIUM SERPL-MCNC: 2 MG/DL (ref 1.8–2.6)
MAGNESIUM SERPL-MCNC: 2.1 MG/DL (ref 1.8–2.6)
MAGNESIUM SERPL-MCNC: 2.2 MG/DL (ref 1.8–2.6)
MAGNESIUM SERPL-MCNC: 2.3 MG/DL (ref 1.8–2.6)
MAGNESIUM SERPL-MCNC: 2.5 MG/DL (ref 1.8–2.6)
MAGNESIUM SERPL-MCNC: 2.7 MG/DL (ref 1.8–2.6)
MCH RBC QN AUTO: 28.9 PG (ref 27–34)
MCH RBC QN AUTO: 29.2 PG (ref 27–34)
MCH RBC QN AUTO: 29.3 PG (ref 27–34)
MCH RBC QN AUTO: 29.4 PG (ref 27–34)
MCH RBC QN AUTO: 29.5 PG (ref 27–34)
MCH RBC QN AUTO: 29.5 PG (ref 27–34)
MCH RBC QN AUTO: 29.6 PG (ref 27–34)
MCH RBC QN AUTO: 29.6 PG (ref 27–34)
MCH RBC QN AUTO: 29.7 PG (ref 27–34)
MCH RBC QN AUTO: 29.8 PG (ref 27–34)
MCH RBC QN AUTO: 29.9 PG (ref 27–34)
MCH RBC QN AUTO: 30 PG (ref 27–34)
MCH RBC QN AUTO: 30.2 PG (ref 27–34)
MCHC RBC AUTO-ENTMCNC: 30.4 G/DL (ref 32–36)
MCHC RBC AUTO-ENTMCNC: 30.5 G/DL (ref 32–36)
MCHC RBC AUTO-ENTMCNC: 30.8 G/DL (ref 32–36)
MCHC RBC AUTO-ENTMCNC: 30.9 G/DL (ref 32–36)
MCHC RBC AUTO-ENTMCNC: 31 G/DL (ref 32–36)
MCHC RBC AUTO-ENTMCNC: 31 G/DL (ref 32–36)
MCHC RBC AUTO-ENTMCNC: 31.2 G/DL (ref 32–36)
MCHC RBC AUTO-ENTMCNC: 31.3 G/DL (ref 32–36)
MCHC RBC AUTO-ENTMCNC: 31.4 G/DL (ref 32–36)
MCHC RBC AUTO-ENTMCNC: 31.7 G/DL (ref 32–36)
MCHC RBC AUTO-ENTMCNC: 31.7 G/DL (ref 32–36)
MCHC RBC AUTO-ENTMCNC: 31.8 G/DL (ref 32–36)
MCHC RBC AUTO-ENTMCNC: 31.8 G/DL (ref 32–36)
MCHC RBC AUTO-ENTMCNC: 31.9 G/DL (ref 32–36)
MCHC RBC AUTO-ENTMCNC: 32 G/DL (ref 32–36)
MCHC RBC AUTO-ENTMCNC: 32.2 G/DL (ref 32–36)
MCHC RBC AUTO-ENTMCNC: 32.6 G/DL (ref 32–36)
MCHC RBC AUTO-ENTMCNC: 32.6 G/DL (ref 32–36)
MCHC RBC AUTO-ENTMCNC: 33.1 G/DL (ref 32–36)
MCHC RBC AUTO-ENTMCNC: 33.1 G/DL (ref 32–36)
MCHC RBC AUTO-ENTMCNC: 33.2 G/DL (ref 32–36)
MCV RBC AUTO: 90 FL (ref 80–100)
MCV RBC AUTO: 90 FL (ref 80–100)
MCV RBC AUTO: 91 FL (ref 80–100)
MCV RBC AUTO: 92 FL (ref 80–100)
MCV RBC AUTO: 93 FL (ref 80–100)
MCV RBC AUTO: 94 FL (ref 80–100)
MCV RBC AUTO: 95 FL (ref 80–100)
MCV RBC AUTO: 96 FL (ref 80–100)
MCV RBC AUTO: 96 FL (ref 80–100)
MCV RBC AUTO: 97 FL (ref 80–100)
MISCELLANEOUS TEST DEPT. - HE HISTORICAL: NORMAL
MONOCYTES # BLD AUTO: 0.5 THOU/UL (ref 0–0.9)
MONOCYTES # BLD AUTO: 0.6 THOU/UL (ref 0–0.9)
MONOCYTES # BLD AUTO: 0.6 THOU/UL (ref 0–0.9)
MONOCYTES # BLD AUTO: 0.8 THOU/UL (ref 0–0.9)
MONOCYTES NFR BLD AUTO: 3 % (ref 2–10)
MONOCYTES NFR BLD AUTO: 4 % (ref 2–10)
MONOCYTES NFR BLD AUTO: 5 % (ref 2–10)
MONOCYTES NFR BLD AUTO: 6 % (ref 2–10)
MUCOUS THREADS #/AREA URNS LPF: ABNORMAL LPF
NEUTROPHILS # BLD AUTO: 10.2 THOU/UL (ref 2–7.7)
NEUTROPHILS # BLD AUTO: 23.5 THOU/UL (ref 2–7.7)
NEUTROPHILS # BLD AUTO: 8.1 THOU/UL (ref 2–7.7)
NEUTROPHILS # BLD AUTO: 8.7 THOU/UL (ref 2–7.7)
NEUTROPHILS NFR BLD AUTO: 76 % (ref 50–70)
NEUTROPHILS NFR BLD AUTO: 78 % (ref 50–70)
NEUTROPHILS NFR BLD AUTO: 80 % (ref 50–70)
NEUTROPHILS NFR BLD AUTO: 87 % (ref 50–70)
NEUTS BAND NFR FLD MANUAL: 30 %
NEUTS BAND NFR FLD MANUAL: 45 %
NITRATE UR QL: NEGATIVE
NUC REST DIASTOLIC VOLUME INDEX: 2224 LBS
NUC REST DIASTOLIC VOLUME INDEX: 2239.87 LBS
NUC REST DIASTOLIC VOLUME INDEX: 2430.35 LBS
NUC REST SYSTOLIC VOLUME INDEX: 58 IN
NUC REST SYSTOLIC VOLUME INDEX: 58 IN
NUC REST SYSTOLIC VOLUME INDEX: 58.5 IN
O2/TOTAL GAS SETTING VFR VENT: 0.5 %
O2/TOTAL GAS SETTING VFR VENT: 0.5 %
O2/TOTAL GAS SETTING VFR VENT: 0.65 %
O2/TOTAL GAS SETTING VFR VENT: 0.9 %
O2/TOTAL GAS SETTING VFR VENT: 0.9 %
O2/TOTAL GAS SETTING VFR VENT: 100 %
O2/TOTAL GAS SETTING VFR VENT: 100 %
O2/TOTAL GAS SETTING VFR VENT: 40 %
O2/TOTAL GAS SETTING VFR VENT: 45 %
O2/TOTAL GAS SETTING VFR VENT: 50 %
O2/TOTAL GAS SETTING VFR VENT: 60 %
O2/TOTAL GAS SETTING VFR VENT: 65 %
O2/TOTAL GAS SETTING VFR VENT: 65 %
O2/TOTAL GAS SETTING VFR VENT: 70 %
O2/TOTAL GAS SETTING VFR VENT: 80 %
O2/TOTAL GAS SETTING VFR VENT: 90 %
OTHER CELLS FLD MANUAL: ABNORMAL
OTHER CELLS FLD MANUAL: ABNORMAL
OXYHEMOGLOBIN (VBG) - HISTORICAL: 89.8 % (ref 70–75)
OXYHEMOGLOBIN - HISTORICAL: 88.9 % (ref 96–97)
OXYHEMOGLOBIN - HISTORICAL: 92.2 % (ref 96–97)
OXYHEMOGLOBIN - HISTORICAL: 92.2 % (ref 96–97)
OXYHEMOGLOBIN - HISTORICAL: 92.9 % (ref 96–97)
OXYHEMOGLOBIN - HISTORICAL: 93.1 % (ref 96–97)
OXYHEMOGLOBIN - HISTORICAL: 93.2 % (ref 96–97)
OXYHEMOGLOBIN - HISTORICAL: 93.5 % (ref 96–97)
OXYHEMOGLOBIN - HISTORICAL: 93.6 % (ref 96–97)
OXYHEMOGLOBIN - HISTORICAL: 93.7 % (ref 96–97)
OXYHEMOGLOBIN - HISTORICAL: 93.8 % (ref 96–97)
OXYHEMOGLOBIN - HISTORICAL: 94.1 % (ref 96–97)
OXYHEMOGLOBIN - HISTORICAL: 94.1 % (ref 96–97)
OXYHEMOGLOBIN - HISTORICAL: 94.2 % (ref 96–97)
OXYHEMOGLOBIN - HISTORICAL: 94.3 % (ref 96–97)
OXYHEMOGLOBIN - HISTORICAL: 94.3 % (ref 96–97)
OXYHEMOGLOBIN - HISTORICAL: 94.4 % (ref 96–97)
OXYHEMOGLOBIN - HISTORICAL: 94.4 % (ref 96–97)
OXYHEMOGLOBIN - HISTORICAL: 94.5 % (ref 96–97)
OXYHEMOGLOBIN - HISTORICAL: 94.5 % (ref 96–97)
OXYHEMOGLOBIN - HISTORICAL: 94.7 % (ref 96–97)
OXYHEMOGLOBIN - HISTORICAL: 94.7 % (ref 96–97)
OXYHEMOGLOBIN - HISTORICAL: 95 % (ref 96–97)
OXYHEMOGLOBIN - HISTORICAL: 95.1 % (ref 96–97)
OXYHEMOGLOBIN - HISTORICAL: 95.2 % (ref 96–97)
OXYHEMOGLOBIN - HISTORICAL: 95.3 % (ref 96–97)
OXYHEMOGLOBIN - HISTORICAL: 95.4 % (ref 96–97)
OXYHEMOGLOBIN - HISTORICAL: 95.5 % (ref 96–97)
OXYHEMOGLOBIN - HISTORICAL: 95.6 % (ref 96–97)
OXYHEMOGLOBIN - HISTORICAL: 95.7 % (ref 96–97)
OXYHEMOGLOBIN - HISTORICAL: 95.7 % (ref 96–97)
OXYHEMOGLOBIN - HISTORICAL: 95.8 % (ref 96–97)
OXYHEMOGLOBIN - HISTORICAL: 95.8 % (ref 96–97)
OXYHEMOGLOBIN - HISTORICAL: 95.9 % (ref 96–97)
OXYHEMOGLOBIN - HISTORICAL: 96.2 % (ref 96–97)
OXYHEMOGLOBIN - HISTORICAL: 96.2 % (ref 96–97)
OXYHEMOGLOBIN - HISTORICAL: 96.5 % (ref 96–97)
OXYHEMOGLOBIN - HISTORICAL: 96.5 % (ref 96–97)
OXYHEMOGLOBIN - HISTORICAL: 96.6 % (ref 96–97)
OXYHEMOGLOBIN - HISTORICAL: 96.9 % (ref 96–97)
OXYHEMOGLOBIN - HISTORICAL: 97.1 % (ref 96–97)
OXYHEMOGLOBIN - HISTORICAL: 97.3 % (ref 96–97)
OXYHGB MFR BLDV: 94.4 % (ref 70–75)
P AXIS - MUSE: 19 DEGREES
PATH REPORT.COMMENTS IMP SPEC: ABNORMAL
PATH REPORT.FINAL DX SPEC: ABNORMAL
PATH REPORT.MICROSCOPIC SPEC OTHER STN: ABNORMAL
PATH REPORT.RELEVANT HX SPEC: ABNORMAL
PCO2 BLD: 102 MM HG (ref 35–45)
PCO2 BLD: 42 MM HG (ref 35–45)
PCO2 BLD: 44 MM HG (ref 35–45)
PCO2 BLD: 51 MM HG (ref 35–45)
PCO2 BLD: 51 MM HG (ref 35–45)
PCO2 BLD: 52 MM HG (ref 35–45)
PCO2 BLD: 56 MM HG (ref 35–45)
PCO2 BLD: 56 MM HG (ref 35–45)
PCO2 BLD: 57 MM HG (ref 35–45)
PCO2 BLD: 58 MM HG (ref 35–45)
PCO2 BLD: 59 MM HG (ref 35–45)
PCO2 BLD: 60 MM HG (ref 35–45)
PCO2 BLD: 61 MM HG (ref 35–45)
PCO2 BLD: 61 MM HG (ref 35–45)
PCO2 BLD: 62 MM HG (ref 35–45)
PCO2 BLD: 63 MM HG (ref 35–45)
PCO2 BLD: 64 MM HG (ref 35–45)
PCO2 BLD: 64 MM HG (ref 35–45)
PCO2 BLD: 65 MM HG (ref 35–45)
PCO2 BLD: 66 MM HG (ref 35–45)
PCO2 BLD: 67 MM HG (ref 35–45)
PCO2 BLD: 68 MM HG (ref 35–45)
PCO2 BLD: 68 MM HG (ref 35–45)
PCO2 BLD: 69 MM HG (ref 35–45)
PCO2 BLD: 70 MM HG (ref 35–45)
PCO2 BLD: 70 MM HG (ref 35–45)
PCO2 BLD: 71 MM HG (ref 35–45)
PCO2 BLD: 71 MM HG (ref 35–45)
PCO2 BLD: 74 MM HG (ref 35–45)
PCO2 BLD: 79 MM HG (ref 35–45)
PCO2 BLD: 84 MM HG (ref 35–45)
PCO2 BLD: 93 MM HG (ref 35–45)
PCO2 BLD: 93 MM HG (ref 35–45)
PCO2 BLDV: 39 MM HG (ref 35–50)
PEEP: 10 CM H2O
PEEP: 12 CM H2O
PEEP: 14 CM H2O
PEEP: 5 CM H2O
PEEP: 8 CM H2O
PERFORMING LAB: NORMAL
PH BLD: 7.11 [PH] (ref 7.37–7.44)
PH BLD: 7.15 [PH] (ref 7.37–7.44)
PH BLD: 7.16 [PH] (ref 7.37–7.44)
PH BLD: 7.2 [PH] (ref 7.37–7.44)
PH BLD: 7.24 [PH] (ref 7.37–7.44)
PH BLD: 7.24 [PH] (ref 7.37–7.44)
PH BLD: 7.25 [PH] (ref 7.37–7.44)
PH BLD: 7.25 [PH] (ref 7.37–7.44)
PH BLD: 7.27 [PH] (ref 7.37–7.44)
PH BLD: 7.28 [PH] (ref 7.37–7.44)
PH BLD: 7.28 [PH] (ref 7.37–7.44)
PH BLD: 7.29 [PH] (ref 7.37–7.44)
PH BLD: 7.29 [PH] (ref 7.37–7.44)
PH BLD: 7.31 [PH] (ref 7.37–7.44)
PH BLD: 7.31 [PH] (ref 7.37–7.44)
PH BLD: 7.32 [PH] (ref 7.37–7.44)
PH BLD: 7.32 [PH] (ref 7.37–7.44)
PH BLD: 7.33 [PH] (ref 7.37–7.44)
PH BLD: 7.34 [PH] (ref 7.37–7.44)
PH BLD: 7.36 [PH] (ref 7.37–7.44)
PH BLD: 7.37 [PH] (ref 7.37–7.44)
PH BLD: 7.38 [PH] (ref 7.37–7.44)
PH BLD: 7.38 [PH] (ref 7.37–7.44)
PH BLD: 7.39 [PH] (ref 7.37–7.44)
PH BLD: 7.4 [PH] (ref 7.37–7.44)
PH BLD: 7.41 [PH] (ref 7.37–7.44)
PH BLD: 7.42 [PH] (ref 7.37–7.44)
PH BLD: 7.42 [PH] (ref 7.37–7.44)
PH BLD: 7.43 [PH] (ref 7.37–7.44)
PH BLD: 7.43 [PH] (ref 7.37–7.44)
PH BLD: 7.44 [PH] (ref 7.37–7.44)
PH BLD: 7.45 [PH] (ref 7.37–7.44)
PH BLD: 7.46 [PH] (ref 7.37–7.44)
PH BLD: 7.46 [PH] (ref 7.37–7.44)
PH BLD: 7.48 [PH] (ref 7.37–7.44)
PH BLD: 7.5 [PH] (ref 7.37–7.44)
PH BLDV: 7.48 [PH] (ref 7.35–7.45)
PH UR STRIP: 6.5 [PH] (ref 4.5–8)
PH: 7.44 (ref 7.35–7.45)
PHOSPHATE SERPL-MCNC: 3.7 MG/DL (ref 2.5–4.5)
PLATELET # BLD AUTO: 124 THOU/UL (ref 140–440)
PLATELET # BLD AUTO: 132 THOU/UL (ref 140–440)
PLATELET # BLD AUTO: 145 THOU/UL (ref 140–440)
PLATELET # BLD AUTO: 148 THOU/UL (ref 140–440)
PLATELET # BLD AUTO: 157 THOU/UL (ref 140–440)
PLATELET # BLD AUTO: 165 THOU/UL (ref 140–440)
PLATELET # BLD AUTO: 173 THOU/UL (ref 140–440)
PLATELET # BLD AUTO: 175 THOU/UL (ref 140–440)
PLATELET # BLD AUTO: 176 THOU/UL (ref 140–440)
PLATELET # BLD AUTO: 190 THOU/UL (ref 140–440)
PLATELET # BLD AUTO: 193 THOU/UL (ref 140–440)
PLATELET # BLD AUTO: 199 THOU/UL (ref 140–440)
PLATELET # BLD AUTO: 200 THOU/UL (ref 140–440)
PLATELET # BLD AUTO: 214 THOU/UL (ref 140–440)
PLATELET # BLD AUTO: 224 THOU/UL (ref 140–440)
PLATELET # BLD AUTO: 227 THOU/UL (ref 140–440)
PLATELET # BLD AUTO: 227 THOU/UL (ref 140–440)
PLATELET # BLD AUTO: 235 THOU/UL (ref 140–440)
PLATELET # BLD AUTO: 241 THOU/UL (ref 140–440)
PLATELET # BLD AUTO: 243 THOU/UL (ref 140–440)
PLATELET # BLD AUTO: 244 THOU/UL (ref 140–440)
PLATELET # BLD AUTO: 245 THOU/UL (ref 140–440)
PLATELET # BLD AUTO: 276 THOU/UL (ref 140–440)
PLATELET # BLD AUTO: 280 THOU/UL (ref 140–440)
PLATELET # BLD AUTO: 319 THOU/UL (ref 140–440)
PLATELET # BLD AUTO: 464 THOU/UL (ref 140–440)
PMV BLD AUTO: 10.1 FL (ref 8.5–12.5)
PMV BLD AUTO: 10.3 FL (ref 8.5–12.5)
PMV BLD AUTO: 8.8 FL (ref 8.5–12.5)
PMV BLD AUTO: 8.9 FL (ref 8.5–12.5)
PMV BLD AUTO: 9.1 FL (ref 8.5–12.5)
PMV BLD AUTO: 9.1 FL (ref 8.5–12.5)
PMV BLD AUTO: 9.2 FL (ref 8.5–12.5)
PMV BLD AUTO: 9.3 FL (ref 8.5–12.5)
PMV BLD AUTO: 9.3 FL (ref 8.5–12.5)
PMV BLD AUTO: 9.4 FL (ref 8.5–12.5)
PMV BLD AUTO: 9.4 FL (ref 8.5–12.5)
PMV BLD AUTO: 9.5 FL (ref 8.5–12.5)
PMV BLD AUTO: 9.6 FL (ref 8.5–12.5)
PMV BLD AUTO: 9.6 FL (ref 8.5–12.5)
PMV BLD AUTO: 9.7 FL (ref 8.5–12.5)
PMV BLD AUTO: 9.7 FL (ref 8.5–12.5)
PMV BLD AUTO: 9.8 FL (ref 8.5–12.5)
PO2 BLD: 104 MM HG (ref 80–90)
PO2 BLD: 106 MM HG (ref 80–90)
PO2 BLD: 108 MM HG (ref 80–90)
PO2 BLD: 115 MM HG (ref 80–90)
PO2 BLD: 137 MM HG (ref 80–90)
PO2 BLD: 227 MM HG (ref 80–90)
PO2 BLD: 58 MM HG (ref 80–90)
PO2 BLD: 64 MM HG (ref 80–90)
PO2 BLD: 65 MM HG (ref 80–90)
PO2 BLD: 66 MM HG (ref 80–90)
PO2 BLD: 67 MM HG (ref 80–90)
PO2 BLD: 67 MM HG (ref 80–90)
PO2 BLD: 68 MM HG (ref 80–90)
PO2 BLD: 69 MM HG (ref 80–90)
PO2 BLD: 72 MM HG (ref 80–90)
PO2 BLD: 73 MM HG (ref 80–90)
PO2 BLD: 74 MM HG (ref 80–90)
PO2 BLD: 75 MM HG (ref 80–90)
PO2 BLD: 76 MM HG (ref 80–90)
PO2 BLD: 77 MM HG (ref 80–90)
PO2 BLD: 78 MM HG (ref 80–90)
PO2 BLD: 79 MM HG (ref 80–90)
PO2 BLD: 79 MM HG (ref 80–90)
PO2 BLD: 80 MM HG (ref 80–90)
PO2 BLD: 81 MM HG (ref 80–90)
PO2 BLD: 82 MM HG (ref 80–90)
PO2 BLD: 83 MM HG (ref 80–90)
PO2 BLD: 83 MM HG (ref 80–90)
PO2 BLD: 84 MM HG (ref 80–90)
PO2 BLD: 84 MM HG (ref 80–90)
PO2 BLD: 85 MM HG (ref 80–90)
PO2 BLD: 86 MM HG (ref 80–90)
PO2 BLD: 86 MM HG (ref 80–90)
PO2 BLD: 87 MM HG (ref 80–90)
PO2 BLD: 88 MM HG (ref 80–90)
PO2 BLD: 92 MM HG (ref 80–90)
PO2 BLD: 94 MM HG (ref 80–90)
PO2 BLD: 97 MM HG (ref 80–90)
PO2 BLD: 97 MM HG (ref 80–90)
PO2 BLDV: 54 MM HG (ref 25–47)
POTASSIUM BLD-SCNC: 3.3 MMOL/L (ref 3.5–5)
POTASSIUM BLD-SCNC: 3.4 MMOL/L (ref 3.5–5)
POTASSIUM BLD-SCNC: 3.4 MMOL/L (ref 3.5–5)
POTASSIUM BLD-SCNC: 3.5 MMOL/L (ref 3.5–5)
POTASSIUM BLD-SCNC: 3.5 MMOL/L (ref 3.5–5)
POTASSIUM BLD-SCNC: 3.6 MMOL/L (ref 3.5–5)
POTASSIUM BLD-SCNC: 3.7 MMOL/L (ref 3.5–5)
POTASSIUM BLD-SCNC: 3.8 MMOL/L (ref 3.5–5)
POTASSIUM BLD-SCNC: 3.9 MMOL/L (ref 3.5–5)
POTASSIUM BLD-SCNC: 4 MMOL/L (ref 3.5–5)
POTASSIUM BLD-SCNC: 4 MMOL/L (ref 3.5–5)
POTASSIUM BLD-SCNC: 4.1 MMOL/L (ref 3.5–5)
POTASSIUM BLD-SCNC: 4.2 MMOL/L (ref 3.5–5)
POTASSIUM BLD-SCNC: 4.3 MMOL/L (ref 3.5–5)
POTASSIUM BLD-SCNC: 4.3 MMOL/L (ref 3.5–5)
POTASSIUM BLD-SCNC: 4.4 MMOL/L (ref 3.5–5)
POTASSIUM BLD-SCNC: 4.6 MMOL/L (ref 3.5–5)
POTASSIUM BLD-SCNC: 4.6 MMOL/L (ref 3.5–5)
POTASSIUM BLD-SCNC: 4.7 MMOL/L (ref 3.5–5)
POTASSIUM BLD-SCNC: 4.8 MMOL/L (ref 3.5–5)
POTASSIUM BLD-SCNC: 5 MMOL/L (ref 3.5–5)
POTASSIUM BLD-SCNC: 5.1 MMOL/L (ref 3.5–5)
POTASSIUM BLD-SCNC: 5.1 MMOL/L (ref 3.5–5)
PR INTERVAL - MUSE: 130 MS
PROCALCITONIN SERPL-MCNC: 0.66 NG/ML (ref 0–0.49)
PROCALCITONIN SERPL-MCNC: 0.82 NG/ML (ref 0–0.49)
PROT SERPL-MCNC: 5 G/DL (ref 6–8)
PROT SERPL-MCNC: 6 G/DL (ref 6–8)
PROT SERPL-MCNC: 6.2 G/DL (ref 6–8)
PSV (LAB BLOOD GAS): 14 CM H2O
PSV (LAB BLOOD GAS): 14 CM H2O
QRS DURATION - MUSE: 84 MS
QT - MUSE: 396 MS
QTC - MUSE: 460 MS
R AXIS - MUSE: 75 DEGREES
RATE: 12 RR/MIN
RATE: 16 RR/MIN
RATE: 18 RR/MIN
RATE: 20 RR/MIN
RATE: 22 RR/MIN
RATE: 24 RR/MIN
RATE: 26 RR/MIN
RATE: 28 RR/MIN
RATE: 30 RR/MIN
RATE: 32 RR/MIN
RATE: 35 RR/MIN
RBC # BLD AUTO: 2.63 MILL/UL (ref 3.8–5.4)
RBC # BLD AUTO: 2.7 MILL/UL (ref 3.8–5.4)
RBC # BLD AUTO: 2.78 MILL/UL (ref 3.8–5.4)
RBC # BLD AUTO: 2.81 MILL/UL (ref 3.8–5.4)
RBC # BLD AUTO: 2.83 MILL/UL (ref 3.8–5.4)
RBC # BLD AUTO: 3.07 MILL/UL (ref 3.8–5.4)
RBC # BLD AUTO: 3.08 MILL/UL (ref 3.8–5.4)
RBC # BLD AUTO: 3.1 MILL/UL (ref 3.8–5.4)
RBC # BLD AUTO: 3.11 MILL/UL (ref 3.8–5.4)
RBC # BLD AUTO: 3.11 MILL/UL (ref 3.8–5.4)
RBC # BLD AUTO: 3.12 MILL/UL (ref 3.8–5.4)
RBC # BLD AUTO: 3.23 MILL/UL (ref 3.8–5.4)
RBC # BLD AUTO: 3.25 MILL/UL (ref 3.8–5.4)
RBC # BLD AUTO: 3.31 MILL/UL (ref 3.8–5.4)
RBC # BLD AUTO: 3.32 MILL/UL (ref 3.8–5.4)
RBC # BLD AUTO: 3.34 MILL/UL (ref 3.8–5.4)
RBC # BLD AUTO: 3.48 MILL/UL (ref 3.8–5.4)
RBC # BLD AUTO: 3.5 MILL/UL (ref 3.8–5.4)
RBC # BLD AUTO: 3.55 MILL/UL (ref 3.8–5.4)
RBC # BLD AUTO: 3.57 MILL/UL (ref 3.8–5.4)
RBC # BLD AUTO: 3.71 MILL/UL (ref 3.8–5.4)
RBC # BLD AUTO: 3.73 MILL/UL (ref 3.8–5.4)
RBC # BLD AUTO: 3.79 MILL/UL (ref 3.8–5.4)
RBC #/AREA URNS AUTO: ABNORMAL HPF
REPORT STATUS: NORMAL
RHEUMATOID FACT SERPL-ACNC: <15 IU/ML (ref 0–30)
RHEUMATOID FACT SERPL-ACNC: <15 IU/ML (ref 0–30)
RIBOTYPE 027/NAP1/BI: NORMAL
SODIUM SERPL-SCNC: 137 MMOL/L (ref 136–145)
SODIUM SERPL-SCNC: 138 MMOL/L (ref 136–145)
SODIUM SERPL-SCNC: 138 MMOL/L (ref 136–145)
SODIUM SERPL-SCNC: 139 MMOL/L (ref 136–145)
SODIUM SERPL-SCNC: 140 MMOL/L (ref 136–145)
SODIUM SERPL-SCNC: 141 MMOL/L (ref 136–145)
SODIUM SERPL-SCNC: 142 MMOL/L (ref 136–145)
SODIUM SERPL-SCNC: 143 MMOL/L (ref 136–145)
SODIUM SERPL-SCNC: 145 MMOL/L (ref 136–145)
SP GR UR STRIP: 1.01 (ref 1–1.03)
SPECIMEN DESCRIPTION: ABNORMAL
SPECIMEN DESCRIPTION: NORMAL
SPECIMEN SOURCE: NORMAL
SPECIMEN SOURCE: NORMAL
SPECIMEN STATUS: NORMAL
SPECIMEN STATUS: NORMAL
SQUAMOUS #/AREA URNS AUTO: ABNORMAL LPF
SYSTOLIC BLOOD PRESSURE - MUSE: NORMAL
T AXIS - MUSE: 52 DEGREES
T4 FREE SERPL-MCNC: 0.7 NG/DL (ref 0.7–1.8)
TEMPERATURE: 37 DEGREES C
TEST NAME: NORMAL
TRICUSPID REGURGITATION PEAK PRESSURE GRADIENT: 21.2 MMHG
TRICUSPID REGURGITATION PEAK PRESSURE GRADIENT: 37 MMHG
TRICUSPID VALVE ANULAR PLANE SYSTOLIC EXCURSION: 2.2 CM
TRICUSPID VALVE PEAK REGURGITANT VELOCITY: 230 CM/S
TRICUSPID VALVE PEAK REGURGITANT VELOCITY: 304 CM/S
TRIGL SERPL-MCNC: 127 MG/DL
TRIGL SERPL-MCNC: 132 MG/DL
TRIGL SERPL-MCNC: 147 MG/DL
TRIGL SERPL-MCNC: 151 MG/DL
TROPONIN I SERPL-MCNC: 0.05 NG/ML (ref 0–0.29)
TROPONIN I SERPL-MCNC: 0.07 NG/ML (ref 0–0.29)
TROPONIN I SERPL-MCNC: 0.13 NG/ML (ref 0–0.29)
TROPONIN I SERPL-MCNC: 0.13 NG/ML (ref 0–0.29)
TROPONIN I SERPL-MCNC: 0.14 NG/ML (ref 0–0.29)
TSH SERPL DL<=0.005 MIU/L-ACNC: 9.07 UIU/ML (ref 0.3–5)
TSH SERPL DL<=0.005 MIU/L-ACNC: 9.86 UIU/ML (ref 0.3–5)
UFH PPP CHRO-ACNC: 0.24 IU/ML (ref 0.3–0.7)
UFH PPP CHRO-ACNC: 0.29 IU/ML (ref 0.3–0.7)
UFH PPP CHRO-ACNC: 0.3 IU/ML (ref 0.3–0.7)
UFH PPP CHRO-ACNC: 0.31 IU/ML (ref 0.3–0.7)
UFH PPP CHRO-ACNC: 0.34 IU/ML (ref 0.3–0.7)
UFH PPP CHRO-ACNC: 0.38 IU/ML (ref 0.3–0.7)
UFH PPP CHRO-ACNC: 0.41 IU/ML (ref 0.3–0.7)
UFH PPP CHRO-ACNC: 0.42 IU/ML (ref 0.3–0.7)
UFH PPP CHRO-ACNC: 0.46 IU/ML (ref 0.3–0.7)
UFH PPP CHRO-ACNC: 0.46 IU/ML (ref 0.3–0.7)
UFH PPP CHRO-ACNC: 0.49 IU/ML (ref 0.3–0.7)
UFH PPP CHRO-ACNC: 0.51 IU/ML (ref 0.3–0.7)
UFH PPP CHRO-ACNC: 0.55 IU/ML (ref 0.3–0.7)
UFH PPP CHRO-ACNC: 0.55 IU/ML (ref 0.3–0.7)
UFH PPP CHRO-ACNC: 0.56 IU/ML (ref 0.3–0.7)
UFH PPP CHRO-ACNC: 0.62 IU/ML (ref 0.3–0.7)
UFH PPP CHRO-ACNC: 0.62 IU/ML (ref 0.3–0.7)
UFH PPP CHRO-ACNC: 0.64 IU/ML (ref 0.3–0.7)
UFH PPP CHRO-ACNC: 0.71 IU/ML (ref 0.3–0.7)
UFH PPP CHRO-ACNC: 0.73 IU/ML (ref 0.3–0.7)
UFH PPP CHRO-ACNC: 0.87 IU/ML (ref 0.3–0.7)
UFH PPP CHRO-ACNC: 1.09 IU/ML (ref 0.3–0.7)
UROBILINOGEN UR STRIP-ACNC: ABNORMAL
VANCOMYCIN SERPL-MCNC: 18.9 UG/ML
VANCOMYCIN SERPL-MCNC: 21.1 UG/ML
VENTILATION MODE: ABNORMAL
VENTILATION MODE: AC
VENTILATOR TIDAL VOLUME: 170 ML
VENTILATOR TIDAL VOLUME: 195 ML
VENTILATOR TIDAL VOLUME: 205 ML
VENTILATOR TIDAL VOLUME: 205 ML
VENTILATOR TIDAL VOLUME: 225 ML
VENTILATOR TIDAL VOLUME: 250 ML
VENTILATOR TIDAL VOLUME: 280 ML
VENTILATOR TIDAL VOLUME: 300 ML
VENTILATOR TIDAL VOLUME: 325 ML
VENTRICULAR RATE- MUSE: 81 BPM
VIRUS SPEC CULT: NORMAL
VOLUME LAVAGE- HISTORICAL: 15 ML
VOLUME LAVAGE- HISTORICAL: 25 ML
WBC #/AREA URNS AUTO: ABNORMAL HPF
WBC: 10.6 THOU/UL (ref 4–11)
WBC: 11.1 THOU/UL (ref 4–11)
WBC: 11.8 THOU/UL (ref 4–11)
WBC: 12.4 THOU/UL (ref 4–11)
WBC: 12.7 THOU/UL (ref 4–11)
WBC: 14.7 THOU/UL (ref 4–11)
WBC: 14.8 THOU/UL (ref 4–11)
WBC: 15.1 THOU/UL (ref 4–11)
WBC: 16.5 THOU/UL (ref 4–11)
WBC: 17.9 THOU/UL (ref 4–11)
WBC: 19.8 THOU/UL (ref 4–11)
WBC: 20.2 THOU/UL (ref 4–11)
WBC: 20.6 THOU/UL (ref 4–11)
WBC: 21.1 THOU/UL (ref 4–11)
WBC: 21.5 THOU/UL (ref 4–11)
WBC: 25.1 THOU/UL (ref 4–11)
WBC: 25.2 THOU/UL (ref 4–11)
WBC: 27 THOU/UL (ref 4–11)
WBC: 27.1 THOU/UL (ref 4–11)
WBC: 27.9 THOU/UL (ref 4–11)
WBC: 28.4 THOU/UL (ref 4–11)
WBC: 29.4 THOU/UL (ref 4–11)
WBC: 32.1 THOU/UL (ref 4–11)
WBC: 9.8 THOU/UL (ref 4–11)
YEAST #/AREA URNS HPF: ABNORMAL HPF

## 2020-01-01 RX ORDER — METFORMIN HCL 500 MG
500 TABLET, EXTENDED RELEASE 24 HR ORAL DAILY
Qty: 30 TABLET | Refills: 2 | Status: SHIPPED | OUTPATIENT
Start: 2020-01-01

## 2020-01-01 RX ORDER — CITALOPRAM HYDROBROMIDE 40 MG/1
40 TABLET ORAL AT BEDTIME
Status: SHIPPED | COMMUNITY
Start: 2020-01-01

## 2020-01-01 RX ORDER — LEVOTHYROXINE SODIUM 112 UG/1
112 TABLET ORAL DAILY
Qty: 45 TABLET | Refills: 1 | Status: SHIPPED | OUTPATIENT
Start: 2020-01-01

## 2020-01-01 RX ORDER — TRIAMTERENE/HYDROCHLOROTHIAZID 37.5-25 MG
TABLET ORAL
Qty: 90 TABLET | Refills: 0 | Status: SHIPPED | OUTPATIENT
Start: 2020-01-01

## 2020-01-01 ASSESSMENT — MIFFLIN-ST. JEOR
SCORE: 1191.69
SCORE: 1138.86
SCORE: 1133.87
SCORE: 1147.75
SCORE: 1138.86
SCORE: 1142.75
SCORE: 1095.75
SCORE: 1134.32
SCORE: 1134.32
SCORE: 1129.75
SCORE: 1098.75
SCORE: 1142.75
SCORE: 1194.43
SCORE: 1125.25
SCORE: 1142.75
SCORE: 1170.69
SCORE: 1191.69
SCORE: 1095.75
SCORE: 1179.68
SCORE: 1129.75
SCORE: 1111.75
SCORE: 1103.75
SCORE: 1151.33
SCORE: 1111.75
SCORE: 1194.43
SCORE: 1133.87
SCORE: 1118.75
SCORE: 1120.75
SCORE: 1103.75
SCORE: 1159.27
SCORE: 1145.66
SCORE: 1120.75
SCORE: 1103.93
SCORE: 1132.96
SCORE: 1147.75
SCORE: 1103.93
SCORE: 1179.68
SCORE: 1129.79
SCORE: 1129.75
SCORE: 1118.75
SCORE: 1170.69
SCORE: 1106.75
SCORE: 1162.9
SCORE: 1159.27
SCORE: 1132.96
SCORE: 1129.75
SCORE: 1162.9
SCORE: 1125.25
SCORE: 1098.75
SCORE: 1142.75
SCORE: 1145.66
SCORE: 1201.23
SCORE: 1106.75
SCORE: 1201.23
SCORE: 1129.79

## 2020-01-31 NOTE — ASSESSMENT & PLAN NOTE
54 y.o. year old female in clinic today to discuss treatment of the following conditions through diet and lifestyle modification and weight loss:  1. Class 1 obesity due to excess calories with serious comorbidity and body mass index (BMI) of 31.0 to 31.9 in adult    2. Metabolic syndrome    3. Dyslipidemia    4. Hypothyroidism, unspecified type      The patient's weight loss result sincethe last visit was unsuccessful based on weight gain.  She has struggled since her job stopped. Less exercise. More snacking.     - phentermine?  Efficacy?  I suggested that she should reduce the dose to 15mg to reduce side effects (tongue discomfort? From dry mouth).   - continue bupropion/naltrexone.  - stress reduction?   - resume metformin   - return to clinic

## 2020-02-04 NOTE — ASSESSMENT & PLAN NOTE
Positive test dated 1/30/2020.  Recommenddiagnostic colonoscopy as follow-up to determine cause of positive results.  I left a message for the patient to call back clinic so I can speak with her directly.

## 2020-02-28 NOTE — ASSESSMENT & PLAN NOTE
Colonoscopy completed February 2020.  Study result was completely normal.  10-year interval was recommended for repeat.

## 2020-04-02 LAB
AEROBIC BLOOD CULTURE BOTTLE: NO GROWTH
AEROBIC BLOOD CULTURE BOTTLE: NO GROWTH
ANAEROBIC BLOOD CULTURE BOTTLE: NO GROWTH
ANAEROBIC BLOOD CULTURE BOTTLE: NO GROWTH

## 2020-04-06 LAB — BACTERIA SPEC CULT: ABNORMAL

## 2020-04-17 ENCOUNTER — COMMUNICATION - HEALTHEAST (OUTPATIENT)
Dept: FAMILY MEDICINE | Facility: CLINIC | Age: 55
End: 2020-04-17

## 2020-04-23 LAB
ACID FAST STN SPEC QL: NORMAL
BACTERIA SPEC CULT: NORMAL

## 2020-04-27 LAB
ACID FAST STN SPEC QL: NORMAL
BACTERIA SPEC CULT: NORMAL

## 2021-05-26 ASSESSMENT — PATIENT HEALTH QUESTIONNAIRE - PHQ9: SUM OF ALL RESPONSES TO PHQ QUESTIONS 1-9: 8

## 2021-05-27 NOTE — TELEPHONE ENCOUNTER
Last appt: 02/08/2019  Plan: follow up 1 month    Set up to authorize if you agree    Kimberly Castro LPN

## 2021-05-27 NOTE — TELEPHONE ENCOUNTER
RN cannot approve Refill Request    RN can NOT refill this medication PCP messaged that patient is overdue for Office Visit.     Last office visit: 8/24/2018 Misael Romero MD Last Physical: Visit date not found Last MTM visit: Visit date not found Last visit same specialty: 8/24/2018 Misael Romero MD.  Next visit within 3 mo: Visit date not found  Next physical within 3 mo: Visit date not found      Rupal Starr, [facility] Triage/Med Refill 3/28/2019    Requested Prescriptions   Pending Prescriptions Disp Refills     pramipexole (MIRAPEX) 0.75 MG tablet [Pharmacy Med Name: PRAMIPEXOLE 0.75MG TABLETS] 90 tablet 0     Sig: TAKE 1 TABLET(0.75 MG) BY MOUTH AT BEDTIME    Parkinson's Meds I Refill Protocol Failed - 3/28/2019 10:43 PM       Failed - PCP or prescribing provider visit in past 6 months     Last office visit with prescriber/PCP: Visit date not found OR same dept: 8/24/2018 Misael Romero MD OR same specialty: 8/24/2018 Misael Romero MD Last physical: Visit date not found Last MTM visit: Visit date not found     Next appt within 3 mo: Visit date not found  Next physical within 3 mo: Visit date not found  Prescriber OR PCP: Misael Romero MD  Last diagnosis associated with med order: 1. RLS (restless legs syndrome)  - pramipexole (MIRAPEX) 0.75 MG tablet [Pharmacy Med Name: PRAMIPEXOLE 0.75MG TABLETS]; TAKE 1 TABLET(0.75 MG) BY MOUTH AT BEDTIME  Dispense: 90 tablet; Refill: 0    If protocol passes may refill for 6 months if within 3 months of last provider visit (or a total of 9 months).

## 2021-05-28 NOTE — TELEPHONE ENCOUNTER
Controlled Substance Refill Request  Medication:   Requested Prescriptions     Pending Prescriptions Disp Refills     phentermine 30 MG capsule 30 capsule 0     Sig: TAKE 1 CAPSULE BY MOUTH FOR 2 OUT OF 3 DAYS(2 DAYS ON THEN 1 DAY OFF) AS DIRECTED     Date Last Fill: 4/3/19  Pharmacy: [id number]   Submit electronically to pharmacy  Controlled Substance Agreement on File:   Encounter-Level CSA Scan Date:    There are no encounter-level csa scan date.       Last office visit: Last office visit pertaining to requested medication was 2/8/19.

## 2021-05-28 NOTE — TELEPHONE ENCOUNTER
Refill Approved    Rx renewed per Medication Renewal Policy. Medication was last renewed on 8/29/18.    Radha Swann, [facility] Triage/Med Refill 4/23/2019     Requested Prescriptions   Pending Prescriptions Disp Refills     buPROPion (WELLBUTRIN SR) 150 MG 12 hr tablet [Pharmacy Med Name: BUPROPION SR 150MG TABLETS (12 H)] 180 tablet 0     Sig: TAKE 1 TABLET(150 MG) BY MOUTH TWICE DAILY       Tricyclics/Misc Antidepressant/Antianxiety Meds Refill Protocol Passed - 4/20/2019  7:19 AM        Passed - PCP or prescribing provider visit in last year     Last office visit with prescriber/PCP: 2/8/2019 Zain Cuevas MD OR same dept: 2/8/2019 Zain Cuevas MD OR same specialty: 2/8/2019 Zain Cuevas MD  Last physical: Visit date not found Last MTM visit: Visit date not found   Next visit within 3 mo: Visit date not found  Next physical within 3 mo: Visit date not found  Prescriber OR PCP: Zain Cuevas MD  Last diagnosis associated with med order: There are no diagnoses linked to this encounter.  If protocol passes may refill for 12 months if within 3 months of last provider visit (or a total of 15 months).

## 2021-05-28 NOTE — TELEPHONE ENCOUNTER
[place]  data:    4/3/19:  #30/45 days  2/8/19:  #30/30 days  12/27/18:  #30/45 days    No data for [place].

## 2021-05-29 NOTE — TELEPHONE ENCOUNTER
Controlled Substance Refill Request  Medication:   Requested Prescriptions     Pending Prescriptions Disp Refills     phentermine 30 MG capsule 30 capsule 0     Sig: TAKE 1 CAPSULE BY MOUTH FOR 2 OUT OF 3 DAYS(2 DAYS ON THEN 1 DAY OFF) AS DIRECTED.  Clinic visit required before additional refills.     Date Last Fill: 5.16.19  [facility]: [facility]   Submit electronically to pharmacy  Controlled Substance Agreement on File:   Encounter-Level CSA Scan Date:    There are no encounter-level csa scan date.       Last office visit: Last office visit pertaining to requested medication was 2.8.19 [facility].

## 2021-05-29 NOTE — TELEPHONE ENCOUNTER
Refill Approved    Rx renewed per Medication Renewal Policy. Medication was last renewed on 10/18/18.    Celeste Brown, [facility] Triage/Med Refill 6/10/2019     Requested Prescriptions   Pending Prescriptions Disp Refills     levothyroxine (SYNTHROID, LEVOTHROID) 88 MCG tablet [Pharmacy Med Name: LEVOTHYROXINE 0.088MG (88MCG) TAB] 90 tablet 0     Sig: TAKE 1 TABLET(88 MCG) BY MOUTH DAILY       Thyroid Hormones Protocol Passed - 6/6/2019  5:28 PM        Passed - Provider visit in past 12 months or next 3 months     Last office visit with prescriber/PCP: 2/8/2019 Zain Cuevas MD OR same dept: 2/8/2019 Zain Cuevas MD OR same specialty: 2/8/2019 Zain Cuevas MD  Last physical: Visit date not found Last MTM visit: Visit date not found   Next visit within 3 mo: Visit date not found  Next physical within 3 mo: Visit date not found  Prescriber OR PCP: Zain Cuevas MD  Last diagnosis associated with med order: 1. Hypothyroidism, unspecified type  - levothyroxine (SYNTHROID, LEVOTHROID) 88 MCG tablet [Pharmacy Med Name: LEVOTHYROXINE 0.088MG (88MCG) TAB]; TAKE 1 TABLET(88 MCG) BY MOUTH DAILY  Dispense: 90 tablet; Refill: 0    If protocol passes may refill for 12 months if within 3 months of last provider visit (or a total of 15 months).             Passed - TSH on file in past 12 months for patient age 12 & older     TSH   Date Value Ref Range Status   02/08/2019 1.32 0.30 - 5.00 uIU/mL Final

## 2021-05-30 VITALS — BODY MASS INDEX: 32.37 KG/M2 | WEIGHT: 163 LBS

## 2021-05-30 NOTE — TELEPHONE ENCOUNTER
RN Assessment/Reason for Call:   Okay to leave Detailed Message  Patient calling in, saw  7/19 given  rx for amoxicillin, allergic to PCN.  Pharmacist noticed she is also allergic to macrobid,   She has severe pain In her face.  She didn't want to have another issue.      RN Action/Disposition:  Dr Romero on call, paged.  Called back; verbal  Order for  clinidamycin 300 mg three times a day for  7 days.   If not improving ; needs to be  revaluted with dentist.  [facility]  Patient called and given above instructions  Call back if worse symptoms  Discussed home care measures.  Agrees to plan.    Kimberly Perera, VERO    [facility] Triage/med refill  7/20/2019  12:52 PM

## 2021-05-30 NOTE — PATIENT INSTRUCTIONS - HE
Ayr Nasal gel twice daily to the nares     Jen Pot nasal rinse daily    Flonase nasal spray 1 spray each nostril daily    Prednisone 10mg daily for 5 day with food.

## 2021-05-30 NOTE — PROGRESS NOTES
"Assessment and Plan:     BMI 28.0-28.9,adult  53 y.o. year old female in clinic today to discuss treatment of the following conditions through diet and lifestyle modification and weight loss:  1. Screen for colon cancer    2. Major depression in complete remission (H)    3. BMI 28.0-28.9,adult    4. Hypothyroidism, unspecified type    5. Metabolic syndrome    6. Dyslipidemia    7. Vitamin D deficiency      The patient's weight loss result since the last visit was mixed based on lack of confidence and contentment with current progress.   - she lacks time for self-care, sleep, healthy eating    - return to clinic in 6-8 weeks   - no change to medications    - check labs at next visit   - exercise: Less than previous.  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  Continue to be physically active.  Given her workload and lack of sleep, recommendation increase her physical activity is impractical although this was discussed.    Continue supplements.    Recommend increasing movement throughout the day--sitting less, moving more.  Will increase activity over time to reach a goal of at least 150 minutes of moderate exercise each week.  Behavior modification:  Cognitive restructuring exercises, journaling stressors, triggers for food cravings.  Dietary Intervention:  Reduced calorie, reduced carbohydrates, whole food diet.  Greater than 50% of this 15 minute visit was spent in counseling regarding patient's obesity, medications, dietary, exercise, and behavior modification recommendations.    Subjective  Patient presents for treatment of chronic, comorbid conditions using intensive therapeutic lifestyle interventions including nutrition, physical activity, and behavior management.   - successes: maintaining.     - struggles: \"I feel like I am not eating very good.\"  \"Gaining weight.\"  \"I want to be at 125.\"    - exercise plan: less than before.  Working on the floor or a retail store.  Not tracking steps. " "    - tracking/journaling: no   - following nutritional plan: no.  Deviations from plan: low macro content of food.   - hunger: increased.  \"I eat more.\"   - medication side effects: none   - Barriers to losing weight:  Behavior:  inadequate exercise    - sleep: 6 hours. (works 50+25.  Commute: 90 minutes per day)   - in process of moving to [place].      Patient Active Problem List   Diagnosis     BMI 28.0-28.9,adult     Menopausal hot flushes     Migraine headache     Former smoker - quit in 2014     Insomnia     RLS (restless legs syndrome)     Anxiety     Hypothyroidism     Metabolic syndrome     Dyslipidemia     Vitamin D deficiency     At high risk for caregiver role strain     Major depression in complete remission (H)       Current Outpatient Medications on File Prior to Visit   Medication Sig Dispense Refill     citalopram (CELEXA) 40 MG tablet TAKE 1 TABLET BY MOUTH DAILY 90 tablet 1     levothyroxine (SYNTHROID, LEVOTHROID) 88 MCG tablet TAKE 1 TABLET(88 MCG) BY MOUTH DAILY 90 tablet 2     naltrexone (DEPADE) 50 mg tablet Take 0.5 tablets (25 mg total) by mouth 2 (two) times a day. Clinic visit required before additional refills. 90 tablet 0     phentermine 30 MG capsule Take 1 capsule (30 mg total) by mouth every morning. 30 capsule 0     pramipexole (MIRAPEX) 0.75 MG tablet TAKE 1 TABLET(0.75 MG) BY MOUTH AT BEDTIME 90 tablet 1     PREMARIN 1.25 mg tablet TAKE 1 TABLET BY MOUTH DAILY 90 tablet 1     triamterene-hydrochlorothiazide (MAXZIDE-25) 37.5-25 mg per tablet Take 1 tablet by mouth daily. 90 tablet 3     [DISCONTINUED] buPROPion (WELLBUTRIN SR) 100 MG 12 hr tablet Take 100 mg by mouth 2 (two) times a day.       [DISCONTINUED] phentermine 30 MG capsule TAKE 1 CAPSULE BY MOUTH FOR 2 OUT OF 3 DAYS(2 DAYS ON THEN 1 DAY OFF) AS DIRECTED.  Clinic visit required before additional refills. 30 capsule 0     [DISCONTINUED] buPROPion (WELLBUTRIN SR) 150 MG 12 hr tablet TAKE 1 TABLET(150 MG) BY MOUTH TWICE " DAILY 180 tablet 2     [DISCONTINUED] ibuprofen (ADVIL,MOTRIN) 600 MG tablet Take 600 mg by mouth every 8 (eight) hours as needed.        [DISCONTINUED] traZODone (DESYREL) 150 MG tablet Take 0.5-1 tablets ( mg total) by mouth bedtime as needed. 90 tablet 3     No current facility-administered medications on file prior to visit.        Objective:  Vitals:    06/26/19 0832   BP: 118/70   Pulse: 72     Initial Weight: 163 lbs  Weight: 138 lb (62.6 kg)  Weight loss from initial: 25  % Weight loss: 15.34 %    Body mass index is 28.59 kg/m .  No LMP recorded. Patient has had a hysterectomy.  General:  Patient is alert, pleasant, no distress.  Patient is obese.    This note has been dictated using voice recognition software. Any grammatical or context distortions are unintentional and inherent to the software

## 2021-05-30 NOTE — PROGRESS NOTES
Internal Medicine Office Visit  [facility]  Patient Name: Harriett Romero  Patient Age: 53 y.o.  YOB: 1965  MRN: [id number]    Date of Visit: 2019  Reason for Office Visit:   Chief Complaint   Patient presents with     Sore Throat     sore throat for a couple months. Pain in ears, but right is worse. Pain is extending to the side of jaw. Sore throat/cough.            Assessment / Plan / Medical Decision Makin. Jaw pain  - Ambulatory referral to [facility]  Patient may utilize over-the-counter pain reliever as needed  2. Congestion of paranasal sinus  - predniSONE (DELTASONE) 10 mg tablet; Take 10 mg by mouth daily.  Dispense: 5 tablet; Refill: 0  - fluticasone propionate (FLONASE) 50 mcg/actuation nasal spray; 2 sprays into each nostril daily for 7 days.  Dispense: 16 g; Refill: 1  Recommend saline sinus rinse once daily  3. Dysfunction of right eustachian tube  - predniSONE (DELTASONE) 10 mg tablet; Take 10 mg by mouth daily.  Dispense: 5 tablet; Refill: 0  - fluticasone propionate (FLONASE) 50 mcg/actuation nasal spray; 2 sprays into each nostril daily for 7 days.  Dispense: 16 g; Refill: 1    Patient advised if symptoms persist, worsen or new symptoms arise they are to seek medical care.  All patients questions addressed. Patient verbalized understanding and agreement with plan.       Orders Placed This Encounter   Procedures     Ambulatory referral to [facility]   Followup: Return in about 4 weeks (around 2019). earlier if needed.    Health Maintenance Review  Health Maintenance   Topic Date Due     COLONOSCOPY  2017     MAMMOGRAM  2018     DEPRESSION FOLLOW UP  2019     TD 18+ HE  2019     ADVANCE DIRECTIVES DISCUSSED WITH PATIENT  2030 (Originally 2/10/2014)     INFLUENZA VACCINE RULE BASED (1) 2019     TDAP ADULT ONE TIME DOSE  Completed         I am having Harriett Romero start on  predniSONE and fluticasone propionate. I am also having her maintain her triamterene-hydrochlorothiazide, PREMARIN, citalopram, pramipexole, naltrexone, levothyroxine, buPROPion, and phentermine.      HPI:  Harriett Romero is a 53 y.o. year old who presents to the office today Chronic conditions including metabolic syndrome, dyslipidemia, hypothyroidism, vitamin D deficiency, menopausal hot flashes, migraine headaches, former smoker, insomnia, restless leg syndrome, anxiety, at high risk for caregiver role strain, major depression.  Patient presents to clinic today with a several week history of right sided jaw pain, right ear pain with some decline in hearing and pressure, sinus pressure, postnasal drainage.  Patient is not utilize any over-the-counter medications or home remedies for relief of symptoms.  She denies any exacerbating or alleviating factors.  Patient also reports since undergoing nasal scoping she has had some irritation in her left nare believes that she has an intermittent scab.  Patient reports no additional concerns.        Review of Systems- pertinent positive in bold:  Constitutional: Fever, chills, night sweats, fainting, weight change, fatigue, dizziness, sleeping difficulties, loud snoring/pauses in breathing  Eyes: change in vision, blurred or double vision, redness/eye pain  Ears, nose, mouth, throat: change in hearing, ear pain, hoarseness, difficulty swallowing, sores in the mouth or throat  Respiratory: shortness of breath, cough, bloody sputum, wheezing  Cardiovascular: chest pain, palpitations   Skin: change in moles/freckles, rash, nodules        Current Scheduled Meds:  Outpatient Encounter Medications as of 7/2/2019   Medication Sig Dispense Refill     buPROPion (WELLBUTRIN SR) 150 MG 12 hr tablet TAKE 1 TABLET(150 MG) BY MOUTH TWICE DAILY 180 tablet 2     citalopram (CELEXA) 40 MG tablet TAKE 1 TABLET BY MOUTH DAILY 90 tablet 1     levothyroxine (SYNTHROID, LEVOTHROID) 88 MCG  "tablet TAKE 1 TABLET(88 MCG) BY MOUTH DAILY 90 tablet 2     naltrexone (DEPADE) 50 mg tablet Take 0.5 tablets (25 mg total) by mouth 2 (two) times a day. Clinic visit required before additional refills. 90 tablet 0     phentermine 30 MG capsule Take 1 capsule (30 mg total) by mouth every morning. 30 capsule 0     pramipexole (MIRAPEX) 0.75 MG tablet TAKE 1 TABLET(0.75 MG) BY MOUTH AT BEDTIME 90 tablet 1     PREMARIN 1.25 mg tablet TAKE 1 TABLET BY MOUTH DAILY 90 tablet 1     triamterene-hydrochlorothiazide (MAXZIDE-25) 37.5-25 mg per tablet Take 1 tablet by mouth daily. 90 tablet 3     fluticasone propionate (FLONASE) 50 mcg/actuation nasal spray 2 sprays into each nostril daily for 7 days. 16 g 1     predniSONE (DELTASONE) 10 mg tablet Take 10 mg by mouth daily. 5 tablet 0     No facility-administered encounter medications on file as of 2019.      Past Medical History:   Diagnosis Date     10 year risk of MI or stroke 1.0% in 2017      Anxiety      Current use of estrogen therapy      Dysthymia      Endometriosis     History - had hysterectomy.      Former smoker      Graves disease      Hypothyroidism      Insomnia      Migraine headache      Nephrolithiasis      Trigeminal neuralgia      UTI (lower urinary tract infection)      Vitamin D deficiency      Past Surgical History:   Procedure Laterality Date     BILATERAL OOPHORECTOMY       CYSTOSCOPY W/ URETERAL STENT PLACEMENT       HYSTERECTOMY       THYROIDECTOMY       Social History     Tobacco Use     Smoking status: Former Smoker     Packs/day: 0.25     Last attempt to quit: 2014     Years since quittin.9     Smokeless tobacco: Never Used   Substance Use Topics     Alcohol use: No     Drug use: No       Objective / Physical Examination:  Vitals:    19 1234   BP: 116/68   Pulse: 88   Resp: 24   Temp: 97.9  F (36.6  C)   SpO2: 98%   Weight: 139 lb (63 kg)   Height: 4' 10.25\" (1.48 m)     Wt Readings from Last 3 Encounters:   19 139 lb (63 " kg)   06/26/19 138 lb (62.6 kg)   02/08/19 136 lb 8 oz (61.9 kg)     Body mass index is 28.8 kg/m .     General Appearance: Alert and oriented, cooperative, affect appropriate, speech clear, in no apparent distress  Head: Normocephalic, atraumatic  Ears:  mild retraction of the TM right is noted  Eyes:   Conjunctivae clear and sclerae non-icteric  Nose: Septum midline, patient does have moderate congestion clear nasal drainage with a small scab in the inner table of the left nare  Throat: Lips and mucosa moist, pharynx without erythema or exudate, postnasal drainage is noted.  She has discomfort upon palpation of the right transmandibular joint no clicking is noted  Neck: Supple, trachea midline. No cervical adenopathy  Lungs: Clear to auscultation bilaterally. No adventitious lung sounds noted. Normal inspiratory and expiratory effort  Cardiovascular: Regular rate, normal S1, S2. No murmurs, rubs, or gallops  Integumentary: Warm and dry. Without suspicious looking lesions  Neuro: Alert and oriented, follows commands appropriately.     No results found.  No results found for this or any previous visit (from the past 240 hour(s)).          Terri Jimenez, CNP

## 2021-05-30 NOTE — TELEPHONE ENCOUNTER
Medication Question or Clarification  Who is calling: Pharmacy: [facility]  What medication are you calling about? (include dose and sig)    Disp Refills Start End    amoxicillin (AMOXIL) 875 MG tablet 20 tablet 0 7/19/2019 7/29/2019    Sig - Route: Take 1 tablet (875 mg total) by mouth 2 (two) times a day for 10 days. - Oral    Sent to pharmacy as: amoxicillin (AMOXIL) 875 MG tablet      Who prescribed the medication?: Terri Jimenez CNP  What is your question/concern?: Pharmacist is requesting clarification for a concern for possible allergy.  Pharmacist also reported the patient is concerned as well and stated she has an allergy to penicillin.  Pharmacy: [facility] ([place])  Okay to leave a detailed message?: No  [phone]  - Pharmacy  [phone] - Patient's phone numbar  Site CMT - Please call the pharmacy to obtain any additional needed information.

## 2021-05-30 NOTE — PATIENT INSTRUCTIONS - HE
Interesting reading:    Dr. Naseem Mcmullen MD   - The Obesity Code   - YouTube    Dr. Savannah Rascon, DO.   Search for her name in Youtube with added criteria: Shannon Washington, PhD   - Why We Sleep

## 2021-05-30 NOTE — PROGRESS NOTES
Internal Medicine Office Visit  [facility]  Patient Name: Harriett Romero  Patient Age: 53 y.o.  YOB: 1965  MRN: [id number]    Date of Visit: 2019  Reason for Office Visit:   Chief Complaint   Patient presents with     Dental Pain     Had a tooth removed on Wednesday. Is having a lot of pain. Patient called the dentist office and was told to take Tylenol and Ibuprofen. Patient is worried out a possible infection.            Assessment / Plan / Medical Decision Makin. Pain, dental  - ketorolac injection 30 mg (TORADOL)  - naproxen (NAPROSYN) 500 MG tablet; Take 1 tablet (500 mg total) by mouth 2 (two) times a day with meals.  Dispense: 14 tablet; Refill: 0  Encouraged to alterate with tylenol.   - viscous lidocaine HC (LIDOCAINE) 2 % Soln viscous solution; Rub along gumline every 3 hours as needed  Dispense: 15 mL; Refill: 0  Ice 20 minutes at a time 4 times a day to the left lower jaw    2. Dental caries  - amoxicillin (AMOXIL) 875 MG tablet; Take 1 tablet (875 mg total) by mouth 2 (two) times a day for 10 days.  Dispense: 20 tablet; Refill: 0    Patient advised if symptoms persist, worsen or new symptoms arise they are to seek medical care.  All patients questions addressed. Patient verbalized understanding and agreement with plan.     No orders of the defined types were placed in this encounter.  Followup: Return in about 3 days (around 2019). earlier if needed.    Health Maintenance Review  Health Maintenance   Topic Date Due     COLONOSCOPY  2017     MAMMOGRAM  2018     DEPRESSION FOLLOW UP  2019     TD 18+ HE  2019     ADVANCE DIRECTIVES DISCUSSED WITH PATIENT  2030 (Originally 2/10/2014)     INFLUENZA VACCINE RULE BASED (1) 2019     TDAP ADULT ONE TIME DOSE  Completed         I am having Harriett Romero start on naproxen, viscous lidocaine HC, and amoxicillin. I am also having her maintain her  triamterene-hydrochlorothiazide, PREMARIN, citalopram, pramipexole, naltrexone, levothyroxine, buPROPion, phentermine, and predniSONE. We will continue to administer ketorolac.      HPI:  Harriett Romero is a 53 y.o. year old who presents to the office today With a chief concern of dental pain.  Reports that she was seen by the dentist earlier this week at which time they felt that it was secondary to a dental infection was subsequently extracted a lower molar she notes increased pain and discomfort she is been alternating between Tylenol ibuprofen she did speak with the dentist advised her to continue this is concern for infection she has pain in the left jaw and down into the neck.  Patient is tearful and notes that she is having trouble controlling the discomfort is requesting assistance.  Patient denies any fever, chills, malaise or diaphoresis no pain or difficulty with swallowing.        Review of Systems- pertinent positive in bold:  Constitutional: Fever, chills, night sweats, fainting, weight change, fatigue, dizziness, sleeping difficulties, loud snoring/pauses in breathing  Eyes: change in vision, blurred or double vision, redness/eye pain  Ears, nose, mouth, throat: change in hearing, ear pain, hoarseness, difficulty swallowing, sores in the mouth or throat  Respiratory: shortness of breath, cough, bloody sputum, wheezing  Cardiovascular: chest pain, palpitations   Skin: change in moles/freckles, rash, nodules  Hematologic/lymphatic: swollen lymph glands, abnormal bruising/bleeding      Current Scheduled Meds:  Outpatient Encounter Medications as of 7/19/2019   Medication Sig Dispense Refill     buPROPion (WELLBUTRIN SR) 150 MG 12 hr tablet TAKE 1 TABLET(150 MG) BY MOUTH TWICE DAILY 180 tablet 2     citalopram (CELEXA) 40 MG tablet TAKE 1 TABLET BY MOUTH DAILY 90 tablet 1     levothyroxine (SYNTHROID, LEVOTHROID) 88 MCG tablet TAKE 1 TABLET(88 MCG) BY MOUTH DAILY 90 tablet 2     naltrexone (DEPADE) 50  mg tablet Take 0.5 tablets (25 mg total) by mouth 2 (two) times a day. Clinic visit required before additional refills. 90 tablet 0     phentermine 30 MG capsule Take 1 capsule (30 mg total) by mouth every morning. 30 capsule 0     pramipexole (MIRAPEX) 0.75 MG tablet TAKE 1 TABLET(0.75 MG) BY MOUTH AT BEDTIME 90 tablet 1     PREMARIN 1.25 mg tablet TAKE 1 TABLET BY MOUTH DAILY 90 tablet 1     triamterene-hydrochlorothiazide (MAXZIDE-25) 37.5-25 mg per tablet Take 1 tablet by mouth daily. 90 tablet 3     amoxicillin (AMOXIL) 875 MG tablet Take 1 tablet (875 mg total) by mouth 2 (two) times a day for 10 days. 20 tablet 0     naproxen (NAPROSYN) 500 MG tablet Take 1 tablet (500 mg total) by mouth 2 (two) times a day with meals. 14 tablet 0     predniSONE (DELTASONE) 10 mg tablet Take 10 mg by mouth daily. 5 tablet 0     viscous lidocaine HC (LIDOCAINE) 2 % Soln viscous solution Rub along gumline every 3 hours as needed 15 mL 0     Facility-Administered Encounter Medications as of 2019   Medication Dose Route Frequency Provider Last Rate Last Dose     ketorolac injection 30 mg (TORADOL)  30 mg Intramuscular Once Terri Jimenez CNP         Past Medical History:   Diagnosis Date     10 year risk of MI or stroke 1.0% in 2017      Anxiety      Current use of estrogen therapy      Dysthymia      Endometriosis     History - had hysterectomy.      Former smoker      Graves disease      Hypothyroidism      Insomnia      Migraine headache      Nephrolithiasis      Trigeminal neuralgia      UTI (lower urinary tract infection)      Vitamin D deficiency      Past Surgical History:   Procedure Laterality Date     BILATERAL OOPHORECTOMY       CYSTOSCOPY W/ URETERAL STENT PLACEMENT       HYSTERECTOMY       THYROIDECTOMY       Social History     Tobacco Use     Smoking status: Former Smoker     Packs/day: 0.25     Last attempt to quit: 2014     Years since quittin.0     Smokeless tobacco: Never Used   Substance  "Use Topics     Alcohol use: No     Drug use: No     Family History   Problem Relation Age of Onset     Von Hippel-Lindau syndrome Sister      Von Hippel-Lindau syndrome Mother      Breast cancer Paternal Aunt         65     Diabetes Neg Hx      Heart disease Neg Hx      Colon cancer Neg Hx      Objective / Physical Examination:  Vitals:    07/19/19 1355   BP: 130/74   Pulse: 75   Resp: 20   Temp: 98.5  F (36.9  C)   SpO2: 98%   Weight: 141 lb (64 kg)   Height: 4' 10.25\" (1.48 m)     Wt Readings from Last 3 Encounters:   07/19/19 141 lb (64 kg)   07/02/19 139 lb (63 kg)   06/26/19 138 lb (62.6 kg)     Body mass index is 29.22 kg/m .     General Appearance: Alert and oriented, cooperative, affect appropriate, speech clear, in no apparent distress  Head: Normocephalic, atraumatic  Ears: Tympanic membrane clear with landmarks well visualized bilaterally  Eyes:   Conjunctivae clear and sclerae non-icteric  Nose: Septum midline, nares patent,  mucosa moist and without drainage  Throat: Lips and mucosa moist.  Dentition in fair repair she has extraction level left lower molar erythema and edema noted discomfort and swelling are noted along the left jawline to the neck  Neck: Supple, trachea midline. No cervical adenopathy  Lungs:  Normal inspiratory and expiratory effort  Integumentary: Warm and dry. Without suspicious looking lesions  Neuro: Alert and oriented, follows commands appropriately.       Terri Jimenez, CNP  "

## 2021-05-31 VITALS — BODY MASS INDEX: 30.2 KG/M2 | WEIGHT: 147 LBS

## 2021-05-31 VITALS — HEIGHT: 59 IN | BODY MASS INDEX: 32.86 KG/M2 | WEIGHT: 163 LBS

## 2021-05-31 VITALS — WEIGHT: 152 LBS | BODY MASS INDEX: 31.23 KG/M2

## 2021-05-31 VITALS — HEIGHT: 60 IN | BODY MASS INDEX: 31.33 KG/M2 | WEIGHT: 159.6 LBS

## 2021-05-31 VITALS — BODY MASS INDEX: 31.8 KG/M2 | HEIGHT: 60 IN | WEIGHT: 162 LBS

## 2021-05-31 VITALS — WEIGHT: 162.8 LBS | BODY MASS INDEX: 32.33 KG/M2

## 2021-05-31 NOTE — TELEPHONE ENCOUNTER
RN cannot approve Refill Request    RN can NOT refill this medication med is not covered by policy/route to provider. Last office visit: 6/26/2019 Zain Cuevas MD Last Physical: Visit date not found Last MTM visit: Visit date not found Last visit same specialty: 6/26/2019 Zain Cuevas MD.  Next visit within 3 mo: Visit date not found  Next physical within 3 mo: Visit date not found      Millie Villareal, [facility] Triage/Med Refill 8/28/2019    Requested Prescriptions   Pending Prescriptions Disp Refills     naltrexone (DEPADE) 50 mg tablet [Pharmacy Med Name: NALTREXONE 50MG TABLETS] 90 tablet 0     Sig: TAKE ONE-HALF TABLET BY MOUTH TWICE DAILY.       There is no refill protocol information for this order

## 2021-05-31 NOTE — TELEPHONE ENCOUNTER
RN cannot approve Refill Request    RN can NOT refill this medication med is not covered by policy/route to provider. Last office visit: 6/26/2019 Zain Cuevas MD Last Physical: Visit date not found Last MTM visit: Visit date not found Last visit same specialty: 6/26/2019 Zain Cuevas MD.  Next visit within 3 mo: Visit date not found  Next physical within 3 mo: Visit date not found      [facility]    Requested Prescriptions   Pending Prescriptions Disp Refills     phentermine 30 MG capsule [Pharmacy Med Name: PHENTERMINE HCL 30MG CAPSULES] 30 capsule 0     Sig: TAKE 1 CAPSULE(30 MG) BY MOUTH EVERY MORNING       There is no refill protocol information for this order

## 2021-06-01 VITALS — WEIGHT: 147 LBS | BODY MASS INDEX: 29.94 KG/M2

## 2021-06-01 VITALS — WEIGHT: 144 LBS | BODY MASS INDEX: 29.03 KG/M2 | HEIGHT: 59 IN

## 2021-06-01 VITALS — BODY MASS INDEX: 29.64 KG/M2 | WEIGHT: 147 LBS | HEIGHT: 59 IN

## 2021-06-01 VITALS — WEIGHT: 141 LBS | BODY MASS INDEX: 28.43 KG/M2 | HEIGHT: 59 IN

## 2021-06-01 VITALS — WEIGHT: 146 LBS | BODY MASS INDEX: 29.74 KG/M2

## 2021-06-02 ENCOUNTER — RECORDS - HEALTHEAST (OUTPATIENT)
Dept: ADMINISTRATIVE | Facility: CLINIC | Age: 56
End: 2021-06-02

## 2021-06-02 VITALS — BODY MASS INDEX: 29.66 KG/M2 | HEIGHT: 58 IN | WEIGHT: 141.3 LBS

## 2021-06-02 VITALS — WEIGHT: 136.5 LBS | BODY MASS INDEX: 28.28 KG/M2

## 2021-06-02 VITALS — WEIGHT: 141.3 LBS | BODY MASS INDEX: 29.28 KG/M2

## 2021-06-02 NOTE — TELEPHONE ENCOUNTER
Please call patient -    ______________________________________________________________________     Home phone:  [phone] (home)     Cell phone:   Telephone Information:   Mobile [phone]       Other contacts:  Name Home Phone Work Phone Mobile Phone Relationship Lgl Grd   VINCE SOLER [phone] Spouse      ______________________________________________________________________     We did a 3 month refill of her citalopram (Celexa) and her pramipexole (Mirapex).    I would like to see her back in the next 3 months to follow up on her health.    Please help her to schedule a follow up (?physical?).    Misael Romero MD  [facility]  10/3/2019, 6:49 AM

## 2021-06-02 NOTE — TELEPHONE ENCOUNTER
RN cannot approve Refill Request    RN can NOT refill this medication med is not covered by policy/route to provider. Last office visit: 6/26/2019 Zain Cuevas MD Last Physical: Visit date not found Last MTM visit: Visit date not found Last visit same specialty: 6/26/2019 Zain Cuevas MD.  Next visit within 3 mo: Visit date not found  Next physical within 3 mo: Visit date not found      Kimberly Perera, [facility] Triage/Med Refill 10/20/2019    Requested Prescriptions   Pending Prescriptions Disp Refills     phentermine 30 MG capsule [Pharmacy Med Name: PHENTERMINE HCL 30MG CAPSULES] 30 capsule 0     Sig: TAKE 1 CAPSULE BY MOUTH EVERY MORNING       There is no refill protocol information for this order

## 2021-06-02 NOTE — TELEPHONE ENCOUNTER
Refill NOT Approved  Refill NOT Given> 15 months since last office visit  Rx renewed per Medication Renewal Policy. Medication was last renewed on 9/1/18  .    Kimberly Perera, [facility] Connection Triage/Med Refill 10/20/2019     Requested Prescriptions   Pending Prescriptions Disp Refills     triamterene-hydrochlorothiazide (MAXZIDE-25) 37.5-25 mg per tablet [Pharmacy Med Name: TRIAMTERENE 37.5MG/ HCTZ 25MG TABS] 90 tablet 0     Sig: TAKE 1 TABLET BY MOUTH DAILY       Diuretics/Combination Diuretics Refill Protocol  Passed - 10/20/2019  1:44 PM        Passed - Visit with PCP or prescribing provider visit in past 12 months     Last office visit with prescriber/PCP: 8/24/2018 Misael Romero MD OR same dept: 7/19/2019 Terri Jimenez CNP OR same specialty: 7/19/2019 Terri Jimenez CNP  Last physical: Visit date not found Last MTM visit: Visit date not found   Next visit within 3 mo: Visit date not found  Next physical within 3 mo: Visit date not found  Prescriber OR PCP: Misael Romero MD  Last diagnosis associated with med order: 1. Bilateral lower extremity edema  - triamterene-hydrochlorothiazide (MAXZIDE-25) 37.5-25 mg per tablet [Pharmacy Med Name: TRIAMTERENE 37.5MG/ HCTZ 25MG TABS]; Take 1 tablet by mouth daily.  Dispense: 90 tablet; Refill: 0    If protocol passes may refill for 12 months if within 3 months of last provider visit (or a total of 15 months).             Passed - Serum Potassium in past 12 months      Lab Results   Component Value Date    Potassium 4.3 02/08/2019             Passed - Serum Sodium in past 12 months      Lab Results   Component Value Date    Sodium 142 02/08/2019             Passed - Blood pressure on file in past 12 months     BP Readings from Last 1 Encounters:   07/19/19 130/74             Passed - Serum Creatinine in past 12 months      Creatinine   Date Value Ref Range Status   02/08/2019 0.83 0.60 - 1.10 mg/dL Final

## 2021-06-02 NOTE — TELEPHONE ENCOUNTER
BMI 28.0-28.9,adult  This patient is overdue for clinic visit.  Refill will be sent with instructions to return before additional refills.  Consider decrease from 30 mg to 15 mg.

## 2021-06-03 VITALS
OXYGEN SATURATION: 99 % | SYSTOLIC BLOOD PRESSURE: 122 MMHG | TEMPERATURE: 98.2 F | WEIGHT: 141.3 LBS | DIASTOLIC BLOOD PRESSURE: 62 MMHG | HEART RATE: 72 BPM | BODY MASS INDEX: 29.28 KG/M2 | RESPIRATION RATE: 16 BRPM

## 2021-06-03 VITALS — BODY MASS INDEX: 29.6 KG/M2 | HEIGHT: 58 IN | WEIGHT: 141 LBS

## 2021-06-03 VITALS — WEIGHT: 139 LBS | BODY MASS INDEX: 29.18 KG/M2 | HEIGHT: 58 IN

## 2021-06-03 VITALS — WEIGHT: 138 LBS | BODY MASS INDEX: 28.59 KG/M2

## 2021-06-03 NOTE — PROGRESS NOTES
"HPI: This patient is a 54yo F previously seen by me in 3/2018 and 10/2018 for chronic reflux-related symptoms who presents back for another evaluation of the same symptoms.  Patient notes she has had metallic taste in her mouth for awhile. Continues to feel her tongue is \"too big\" for her mouth and \"gets in the way\" when talking.  Also notes she's had a sore on the left side of her tongue that made it difficult to eat.  Did see a dentist who pulled a tooth on the lower left side, but she notes it didn't relieve tongue symptoms.  Patient does note tongue does feel much better from onset of symptoms. She does have chronic dry mouth due to medication side effects. Denies fevers, otalgia, weight loss, odynophagia, dysphagia, hemoptysis, and shortness of breath. Does not complain of sour taste, heartburn, or burping, but does complain pain on the left side of her throat. Is not taking reflux medication. Quit smoking in 2014.     Past medical history, surgical history, social history, family history, medications, and allergies have been reviewed with the patient and are documented above.     Review of Systems: a 10-system review was performed. Pertinent positives are noted in the HPI and on a separate scanned document in the chart.     PHYSICAL EXAMINATION:  GEN: no acute distress, normocephalic  EYES: extraocular movements are intact, pupils are equal and round. Sclera clear.   EARS: auricles are normally formed.  NOSE: anterior nares are patent.   OC/OP: clear, dentition is in good repair. Dry mucosa. Left lateral tongue with healing ulcer.  Lower molars on either side of where tooth was pulled, have sharp edges that correlates with area on lateral tongue.  NECK: soft and supple. No lymphadenopathy or masses. Airway is midline.  NEURO: CN VII and XII symmetric and strong. No nystagmus. Gait is normal.  PULM: breathing comfortably on room air, normal chest expansion with respiration     MEDICAL DECISION-MAKING: This " patient is a 54yo F with tongue ulcer that appears to be healing.  Recommend she follow-up with dentist to have teeth filed.  Should follow-up in 2-3 months for recheck.  As for her throat symptoms, recommend she work on managing reflux. Patient evaluated and examined with Meryl Venegas PA-C.

## 2021-06-03 NOTE — PROGRESS NOTES
Internal Medicine Office Visit  [facility]  Patient Name: Harriett Romero  Patient Age: 53 y.o.  YOB: 1965  MRN: [id number]    Date of Visit: 2019  Reason for Office Visit:   Chief Complaint   Patient presents with     Cough     x 2 weeks and now has lost her voice. no SOB or chest pain, she wants her tongue checked out again. she says he has a hole in her tongue.           Assessment / Plan / Medical Decision Makin. Bronchitis  Recommend Mucinex 12-hour, increase water intake  - XR Chest 2 Views  - predniSONE (DELTASONE) 10 mg tablet; Take 10 mg by mouth daily for 5 days.  Dispense: 5 tablet; Refill: 0    2. Tongue lesion    - Ambulatory referral to ENT      Orders Placed This Encounter   Procedures     XR Chest 2 Views     Ambulatory referral to ENT   Followup: No follow-ups on file. earlier if needed.    Health Maintenance Review  Health Maintenance   Topic Date Due     PREVENTIVE CARE VISIT  1965     HIV SCREENING  1982     COLONOSCOPY  2017     ZOSTER VACCINES (1 of 2) 2017     MAMMOGRAM  2018     DEPRESSION FOLLOW UP  2019     INFLUENZA VACCINE RULE BASED (1) 2019     TD 18+ HE  2019     ADVANCE CARE PLANNING  2030 (Originally 2/10/2014)     TDAP ADULT ONE TIME DOSE  Completed     PAP SMEAR  Discontinued     HPV TEST  Discontinued         I am having Harriett Romero start on predniSONE. I am also having her maintain her levothyroxine, naproxen, viscous lidocaine HC, buPROPion, naltrexone, pramipexole, citalopram, triamterene-hydrochlorothiazide, and phentermine.      HPI:  Harriett Romero is a 53 y.o. year old who presents to the office today Chronic conditions include migraine headaches, hypothyroidism, former smoker, major depression, obesity, menopausal hot flashes, insomnia, anxiety, metabolic disorder syndrome, dyslipidemia, vitamin D deficiency, Graves' disease, trigeminal  neuralgia    Patient presents with a 2-week history of nonproductive cough which she states has now become productive over the last 24 hours no shortness of breath, chest pain, chest pressure heart palpitations.  She reports that she has some laryngitis she been utilizing some over-the-counter NyQuil though has not found relief of symptoms.    Patient also reports having a sore on the left lateral side of her tongue for the last 6 months she initially thought it was related to medication discontinue the medication started to improve start medication again though the lesion continues she notes it is quite painful she was seen by her dentist had a tooth extracted though again no improvement in the lesion.    Review of Systems- pertinent positive in bold:  Constitutional: Fever, chills, night sweats, fainting, weight change, fatigue, dizziness, sleeping difficulties, loud snoring/pauses in breathing  Eyes: change in vision, blurred or double vision, redness/eye pain  Ears, nose, mouth, throat: change in hearing, ear pain, hoarseness, difficulty swallowing, sores in the mouth or throat  Respiratory: shortness of breath, cough, bloody sputum, wheezing  Cardiovascular: chest pain, palpitations   Gastrointestinal: abdominal pain, heartburn/indigestion, nausea/vomiting, change in appetite, change in bowel habits, constipation or diarrhea, rectal bleeding/dark stools, difficulty swallowing  Urinary: painful urination, frequent urination, urinary urgency/incontinence, blood in urine/dark urine, nocturia (new or increasing), muscle cramps, swelling of hands, feet, ankles, leg pain/redness  Skin: change in moles/freckles, rash, nodules  Hematologic/lymphatic: swollen lymph glands, abnormal bruising/bleeding  Endocrine: excessive thirst/urination, cold or heat intolerance  Neurologic/emotional: worrisome memory change, numbness/tingling, anxiety, mood swings      Current Scheduled Meds:  Outpatient Encounter Medications as of  11/6/2019   Medication Sig Dispense Refill     buPROPion (WELLBUTRIN SR) 150 MG 12 hr tablet TAKE 1 TABLET(150 MG) BY MOUTH TWICE DAILY 180 tablet 2     citalopram (CELEXA) 40 MG tablet TAKE 1 TABLET BY MOUTH DAILY 90 tablet 0     levothyroxine (SYNTHROID, LEVOTHROID) 88 MCG tablet TAKE 1 TABLET(88 MCG) BY MOUTH DAILY 90 tablet 2     naltrexone (DEPADE) 50 mg tablet TAKE ONE-HALF TABLET BY MOUTH TWICE DAILY. 90 tablet 0     phentermine 30 MG capsule Take 1 capsule (30 mg total) by mouth every morning. Clinic visit required before additional refills. 30 capsule 0     pramipexole (MIRAPEX) 0.75 MG tablet TAKE 1 TABLET(0.75 MG) BY MOUTH AT BEDTIME 90 tablet 0     triamterene-hydrochlorothiazide (MAXZIDE-25) 37.5-25 mg per tablet Take 1 tablet by mouth daily. Overdue for follow up with Dr. Romero.  LAST refill at this time.  Schedule visit. 90 tablet 0     naproxen (NAPROSYN) 500 MG tablet Take 1 tablet (500 mg total) by mouth 2 (two) times a day with meals. (Patient not taking: Reported on 11/6/2019      ) 14 tablet 0     predniSONE (DELTASONE) 10 mg tablet Take 10 mg by mouth daily for 5 days. 5 tablet 0     viscous lidocaine HC (LIDOCAINE) 2 % Soln viscous solution Rub along gumline every 3 hours as needed 15 mL 0     No facility-administered encounter medications on file as of 11/6/2019.      Past Medical History:   Diagnosis Date     10 year risk of MI or stroke 1.0% in 2017      Anxiety      Current use of estrogen therapy      Dysthymia      Endometriosis     History - had hysterectomy.      Former smoker      Graves disease      Hypothyroidism      Insomnia      Migraine headache      Nephrolithiasis      Trigeminal neuralgia      UTI (lower urinary tract infection)      Vitamin D deficiency      Past Surgical History:   Procedure Laterality Date     BILATERAL OOPHORECTOMY       CYSTOSCOPY W/ URETERAL STENT PLACEMENT       THYROIDECTOMY       TOTAL ABDOMINAL HYSTERECTOMY  2006    FOR ENDOMETRIOSIS     Social  History     Tobacco Use     Smoking status: Former Smoker     Packs/day: 0.25     Last attempt to quit: 2014     Years since quittin.3     Smokeless tobacco: Never Used   Substance Use Topics     Alcohol use: No     Drug use: No     Family History   Problem Relation Age of Onset     Von Hippel-Lindau syndrome Sister      Von Hippel-Lindau syndrome Mother      Breast cancer Paternal Aunt         65     Diabetes Neg Hx      Heart disease Neg Hx      Colon cancer Neg Hx      Objective / Physical Examination:  Vitals:    19 1033   BP: 122/62   Patient Site: Right Arm   Patient Position: Sitting   Cuff Size: Adult Regular   Pulse: 72   Resp: 16   Temp: 98.2  F (36.8  C)   TempSrc: Oral   SpO2: 99%   Weight: 141 lb 4.8 oz (64.1 kg)     Wt Readings from Last 3 Encounters:   19 141 lb 4.8 oz (64.1 kg)   19 141 lb (64 kg)   19 139 lb (63 kg)     Body mass index is 29.28 kg/m .     General Appearance: Alert and oriented, cooperative, affect appropriate, speech clear, in no apparent distress  Head: Normocephalic, atraumatic  Ears: Tympanic membrane clear with landmarks well visualized bilaterally  Eyes:  Conjunctivae clear and sclerae non-icteric  Nose: Septum midline, mild congestion with clear nasal drainage  Throat: Lips and mucosa moist. Teeth in good repair, pharynx without erythema or exudate, patient noted to have a lesion on the left lateral aspect of her tongue  Neck: Supple, trachea midline.  Lymphadenopathy appreciated  Lungs: Coarsely clear to auscultation bilaterally. No adventitious lung sounds noted. Normal inspiratory and expiratory effort  Cardiovascular: Regular rate, normal S1, S2. No murmurs, rubs, or gallops  Extremities: Pulses 2+ and equal throughout. No edema.   Integumentary: Warm and dry.   Neuro: Alert and oriented, follows commands appropriately.         Terri Jimenez, CNP

## 2021-06-03 NOTE — PROGRESS NOTES
Assessment and Plan:     Obesity due to excess calories with serious comorbidity  54 y.o. year old female in clinic today to discuss treatment of the following conditions through diet and lifestyle modification and weight loss:  1. Class 1 obesity due to excess calories with serious comorbidity and body mass index (BMI) of 30.0 to 30.9 in adult    2. Need for vaccination    3. Screening for colon cancer    4. Metabolic syndrome    5. Hypothyroidism, unspecified type    6. Vitamin D deficiency    7. Dyslipidemia    8. Major depression in complete remission (H)    9. BMI 28.0-28.9,adult    10. Pain, dental      The patient's weight loss result since the last visit was mixed based on weight gain.  The patient describes working 70+ hours per week.  Social stress is high she is undergoing a bankruptcy.  She anticipates a change in her situation after the bankruptcy goes through and she will work only her primary job.  She says that the medications are not helpful at this time.  I believe the medications are somewhat helpful but they are not able to compensate for the lack of self-care with respect to sleep and stress.  No medication changes were recommended at this time.  I am hopeful that she will find success as she has time to take care of herself.  I gave her a goal of 8+ hours of sleep per night.  -Continue current medications.  - Hormonal replacement therapy: Status post oophorectomy.  Unclear if the discontinuance of Premarin is playing a role in weight gain recently.  She will follow-up with her gynecologist.  - Return to clinic in 6-8 weeks.    Continue supplements.    Recommend increasing movement throughout the day--sitting less, moving more.  Will increase activity over time to reach a goal of at least 150 minutes of moderate exercise each week.  Behavior modification:  Cognitive restructuring exercises, journaling stressors, triggers for food cravings.  Dietary Intervention:  Reduced calorie, reduced  "carbohydrates, whole food diet.  Greater than 50% of this 30 minute visit was spent in counseling regarding patient's obesity, medications, dietary, exercise, and behavior modification recommendations.    Subjective  Patient presents for treatment of chronic, comorbid conditions using intensive therapeutic lifestyle interventions including nutrition, physical activity, and behavior management.   - successes: None - \"back to old habits.\"  Frustrated that she has reverted.  She starts day with protein and veggies.  She is hungry at night at work.  \"Peck\"  \"More bread.\"  More candy.     - sleep: 4 1/2 hours per night.    - struggles: social stress: in process of filing personal bankruptcy.  Weight gain over the past couple of months.   - exercise plan: Walking on job.  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   - medication side effects: Increased shortness with people around her.  Unsure if this is related to phentermine or medications.    Patient Active Problem List   Diagnosis     Obesity due to excess calories with serious comorbidity     Menopausal hot flushes     Migraine headache     Former smoker - quit in 2014     Insomnia     RLS (restless legs syndrome)     Anxiety     Hypothyroidism     Metabolic syndrome     Dyslipidemia     Vitamin D deficiency     At high risk for caregiver role strain     Major depression in complete remission (H)       Current Outpatient Medications on File Prior to Visit   Medication Sig Dispense Refill     citalopram (CELEXA) 40 MG tablet TAKE 1 TABLET BY MOUTH DAILY 90 tablet 0     levothyroxine (SYNTHROID, LEVOTHROID) 88 MCG tablet TAKE 1 TABLET(88 MCG) BY MOUTH DAILY 90 tablet 2     naproxen (NAPROSYN) 500 MG tablet Take 1 tablet (500 mg total) by mouth 2 (two) times a day with meals. 14 tablet 0     pramipexole (MIRAPEX) 0.75 MG tablet TAKE 1 TABLET(0.75 MG) BY MOUTH AT BEDTIME 90 tablet 0     triamterene-hydrochlorothiazide " (MAXZIDE-25) 37.5-25 mg per tablet Take 1 tablet by mouth daily. Overdue for follow up with Dr. Romero.  LAST refill at this time.  Schedule visit. 90 tablet 0     [DISCONTINUED] buPROPion (WELLBUTRIN SR) 150 MG 12 hr tablet TAKE 1 TABLET(150 MG) BY MOUTH TWICE DAILY 180 tablet 2     [DISCONTINUED] naltrexone (DEPADE) 50 mg tablet TAKE ONE-HALF TABLET BY MOUTH TWICE DAILY. 90 tablet 0     [DISCONTINUED] phentermine 30 MG capsule Take 1 capsule (30 mg total) by mouth every morning. Clinic visit required before additional refills. 30 capsule 0     [DISCONTINUED] viscous lidocaine HC (LIDOCAINE) 2 % Soln viscous solution Rub along gumline every 3 hours as needed 15 mL 0     No current facility-administered medications on file prior to visit.        Objective:  Vitals:    12/03/19 0835   BP: 126/74   Pulse: 72     Initial Weight: 163 lbs  Weight: 147 lb (66.7 kg)  Weight loss from initial: 16  % Weight loss: 9.82 %    Body mass index is 30.72 kg/m .  No LMP recorded. Patient has had a hysterectomy.  General:  Patient is alert, pleasant, no distress.  Patient is obese.    This note has been dictated using voice recognition software. Any grammatical or context distortions are unintentional and inherent to the software

## 2021-06-03 NOTE — TELEPHONE ENCOUNTER
Left message to call back for: LM for patient to call and schedule appointment  Information to relay to patient:  Please see note below and schedule appointment upon recall

## 2021-06-04 VITALS
BODY MASS INDEX: 28.55 KG/M2 | BODY MASS INDEX: 28.55 KG/M2 | HEIGHT: 58 IN | HEIGHT: 58 IN | WEIGHT: 136.02 LBS | WEIGHT: 136.02 LBS

## 2021-06-04 VITALS — HEIGHT: 60 IN | WEIGHT: 144 LBS | BODY MASS INDEX: 28.27 KG/M2

## 2021-06-04 VITALS
HEART RATE: 80 BPM | WEIGHT: 149.2 LBS | BODY MASS INDEX: 31.18 KG/M2 | DIASTOLIC BLOOD PRESSURE: 60 MMHG | SYSTOLIC BLOOD PRESSURE: 122 MMHG

## 2021-06-04 VITALS
BODY MASS INDEX: 30.86 KG/M2 | WEIGHT: 147 LBS | HEIGHT: 58 IN | SYSTOLIC BLOOD PRESSURE: 126 MMHG | HEART RATE: 72 BPM | DIASTOLIC BLOOD PRESSURE: 74 MMHG

## 2021-06-04 VITALS
WEIGHT: 143 LBS | HEIGHT: 59 IN | OXYGEN SATURATION: 97 % | HEART RATE: 76 BPM | DIASTOLIC BLOOD PRESSURE: 64 MMHG | RESPIRATION RATE: 16 BRPM | BODY MASS INDEX: 28.83 KG/M2 | SYSTOLIC BLOOD PRESSURE: 100 MMHG | TEMPERATURE: 98.5 F

## 2021-06-05 NOTE — TELEPHONE ENCOUNTER
Positive cologuard result received. One copy was placed in your mail box for review, and one copy was sent to be scanned into her EHR.

## 2021-06-05 NOTE — PROGRESS NOTES
Assessment and Plan:     Obesity due to excess calories with serious comorbidity  54 y.o. year old female in clinic today to discuss treatment of the following conditions through diet and lifestyle modification and weight loss:  1. Class 1 obesity due to excess calories with serious comorbidity and body mass index (BMI) of 31.0 to 31.9 in adult    2. Metabolic syndrome    3. Dyslipidemia    4. Hypothyroidism, unspecified type      The patient's weight loss result since the last visit was unsuccessful based on weight gain.  She has struggled since her job stopped. Less exercise. More snacking.     - phentermine?  Efficacy?  I suggested that she should reduce the dose to 15mg to reduce side effects (tongue discomfort? From dry mouth).   - continue bupropion/naltrexone.  - stress reduction?   - resume metformin   - return to clinic     Continue supplements.    Recommend increasing movement throughout the day--sitting less, moving more.  Will increase activity over time to reach a goal of at least 150 minutes of moderate exercise each week.  Behavior modification:  Cognitive restructuring exercises, journaling stressors, triggers for food cravings.  Dietary Intervention:  Reduced calorie, reduced carbohydrates, whole food diet.  Greater than 50% of this 15 minute visit was spent in counseling regarding patient's obesity, medications, dietary, exercise, and behavior modification recommendations.    Subjective  Patient presents for treatment of chronic, comorbid conditions using intensive therapeutic lifestyle interventions including nutrition, physical activity, and behavior management.   - struggles: she had to quit her part time job.  She has not applied this time to replacing the physical activity.  Tired in general.    - dentist thinks that she has sleep apnea.   - stress remains high (maybe less than normal)   - the patient is upset that she has gained weight.   - less diligent with meals.  Dinner is animal protein.   More time for snacks.  More focus in the past three days.  She has focused on portions and timing of meals.      - medication side effects: dry mouth?   - Barriers to losing weight:  Behavior:  inadequate exercise    Patient Active Problem List   Diagnosis     Obesity due to excess calories with serious comorbidity     Menopausal hot flushes     Migraine headache     Former smoker - quit in 2014     Insomnia     RLS (restless legs syndrome)     Anxiety     Hypothyroidism     Metabolic syndrome     Dyslipidemia     Vitamin D deficiency     At high risk for caregiver role strain     Major depression in complete remission (H)       Current Outpatient Medications on File Prior to Visit   Medication Sig Dispense Refill     buPROPion (WELLBUTRIN SR) 150 MG 12 hr tablet TAKE 1 TABLET(150 MG) BY MOUTH TWICE DAILY 180 tablet 2     citalopram (CELEXA) 40 MG tablet TAKE 1 TABLET BY MOUTH DAILY 90 tablet 0     levothyroxine (SYNTHROID, LEVOTHROID) 100 MCG tablet Take 1 tablet (100 mcg total) by mouth Daily at 6:00 am. 45 tablet 1     naltrexone (DEPADE) 50 mg tablet Take 0.5 tablets (25 mg total) by mouth 2 (two) times a day. 90 tablet 0     naproxen (NAPROSYN) 500 MG tablet Take 1 tablet (500 mg total) by mouth 2 (two) times a day with meals. 14 tablet 0     phentermine 30 MG capsule Take 1 capsule (30 mg total) by mouth every morning. 90 capsule 0     pramipexole (MIRAPEX) 0.75 MG tablet TAKE 1 TABLET(0.75 MG) BY MOUTH AT BEDTIME 90 tablet 0     triamterene-hydrochlorothiazide (MAXZIDE-25) 37.5-25 mg per tablet TAKE 1 TABLET BY MOUTH DAILY 90 tablet 0     No current facility-administered medications on file prior to visit.        Objective:  Vitals:    01/31/20 1430   BP: 122/60   Pulse: 80     Initial Weight: 163 lbs  Weight: 149 lb 3.2 oz (67.7 kg)  Weight loss from initial: 13.8  % Weight loss: 8.47 %    Body mass index is 31.18 kg/m .  No LMP recorded. Patient has had a hysterectomy.  General:  Patient is alert,  pleasant, no distress.  Patient is obese.    This note has been dictated using voice recognition software. Any grammatical or context distortions are unintentional and inherent to the software

## 2021-06-05 NOTE — TELEPHONE ENCOUNTER
Positive colorectal cancer screening using Cologuard test  Positive test dated 1/30/2020.  Recommend diagnostic colonoscopy as follow-up to determine cause of positive results.  I left a message for the patient to call back clinic so I can speak with her directly.    I spoke with the patient directly in the morning of 2/7.  Colonoscopy referral placed.

## 2021-06-06 NOTE — PROCEDURES
ENDOTRACHEAL INTUBATION PROCEDURE NOTE (NON-OR)    Date of Procedure:  3/11/2020  Procedural Physician:  Jenni Quinteros CNP    Indication for endotracheal intubation:  ARDS, b/l PE  Route:  mouth    Sedation:  etomidate and propofol  Paralytic:  succinylcholine  Lidocaine:  not applicable  Atropine:  not applicable  Equipment:  # 7.5 ETT, glide scope  Cricoid Pressure:  not applicable  Number of attempts:  1  ETT location confirmed by:  by CXR and ETCO2 monitor.    Complications: none    Procedure Details:   Procedure done under emergency: yes  The risks, benefits, complications, treatment options, and expected outcomes were discussed with PATIENT and   . The risks and potential complications of their problem and purposed treatment include but are not limited to infection, bleeding, adverse drug reaction, pulmonary aspiration, the need for additional procedures, and creating a complication requiring transfusion or operation.   The patient/alternate (see above) concurred with the proposed plan, giving informed consent.  The patient was identified as Harriett Romero with Date of Birth 1965 and the procedure verified as  Endotracheal Intubation.   A Time Out was held and the above information confirmed.    Vocal cords were able to be visualized and the patient was intubated via the mouth    route.  The tube was taped at22  cm.  Post intubation examination was carried out with auscultation, end tidal carbon dioxide detector, and a stat portable chest x-ray.  She had 98% oxygen saturation noted following the sedation.      Condition: unstable    Attending Attestation: I was present for the entire procedure.  Sedation orders given by Dr Rock;    Jenni Quinteros, 3/11/2020, 3:15 PM    Attending Physician: Kirk Rock MD

## 2021-06-06 NOTE — PLAN OF CARE
Problem: Inadequate Airway Clearance  Goal: Patient will maintain patent airway  Outcome: Progressing     Problem: Mechanical Ventilation  Goal: Patient will maintain patent airway  Outcome: Progressing  Goal: ET tube will be managed safely  Outcome: Progressing     Vent Mode: PCV/VG  FiO2 (%):  [40 %-50 %] 45 %  S RR:  [35] 35  S VT:  [225 mL] 225 mL  PEEP/CPAP (cm H2O):  [12 cm H2O-14 cm H2O] 12 cm H2O  Minute Ventilation (L/min):  [6.3 L/min-8.9 L/min] 8.7 L/min  PIP:  [33 cm H2O-40 cm H2O] 33 cm H2O  MAP (cm H2O):  [19-23] 19  ETT 7.5 21 at teeth.  Albuterol nebs given as scheduled, breath sounds coarse.  Suctioning thick yellow secretions.  veletri full strength running continously, no weans

## 2021-06-06 NOTE — TREATMENT PLAN
".......... RCAT Treatment Plan      Patient Score:5  Patient Acutiy:1    Clinical Indication for Therapy:Aspiration Pneumon  Therapy Ordered: Albuterol PRN    Assessment Summary: PT in with Aspiration Pneumonia with persistent cough and inability to mobilize secretions even with a Strong cough.  PT on RA currently.    /61   Pulse 72   Temp 97.3  F (36.3  C) (Oral)   Resp 20   Ht 4' 10.5\" (1.486 m)   Wt 147 lb 4.3 oz (66.8 kg)   SpO2 95%   BMI 30.26 kg/m          03/03/20 0900   Reason for Assessment (RCAT)   RCAT Assesment Initial   $ RCAT Eval Time 30 min.  Yes   Vitals (RCAT)   Heart Rate 72   /61   Resp 20   SpO2 95 %   FiO2 (%) 21 %   Chart Assessment (RCAT)   Pulmonary Status  1   Surgical Status  0   Chest Xray  2   Patient Assessment (RCAT)    Respiratory Pattern  0   Mental Status  0   Breath Sounds  1   Cough Effectiveness  1   Level of Activity  0   O2 Required SpO2 >= 92%  0   Chart + Pt. Assessment Total Points  5   RCAT Acuity Score 5 (Acuity 1)   Clinical Indications (RCAT)   Aerosol Hygiene Physician order   Broncho-Pulmonary Therapy Productive cough   Volume Expansion Therapy Decreased breath sounds   RCAT Order Placed  Yes         ALBERT TinsleyT  "

## 2021-06-06 NOTE — PROGRESS NOTES
RESPIRATORY CARE NOTE     Patient Name: Harriett Romero  Today's Date: 3/14/2020     Increased RR to 24 at 0601 after AM ABG at 0511: 7.27/70/104/28.1/99.6.    Donna Buckley, LRT/NPS

## 2021-06-06 NOTE — PROGRESS NOTES
[address]   [facility] [address]  Phone: [phone]  April 29, 2019      Angela Crawford  [address] [facility] [address]      Dear Angela,    We care about your health and have reviewed your health plan including your medical conditions, medications, and lab results.  Based on this review, it is recommended that you follow up regarding the following health topic(s):  -Cervical Cancer Screening  -Wellness (Physical) Visit     We recommend you take the following action(s):  -schedule a PAP SMEAR EXAM which is due.  Please disregard this reminder if you have had this exam elsewhere within the last year.  It would be helpful for us to have a copy of your recent pap smear report to update your records.     Please call us at the [facility] - [phone] (or use [employer]) to address the above recommendations.     Thank you for trusting [facility] and we appreciate the opportunity to serve you.  We look forward to supporting your healthcare needs in the future.    Healthy Regards,    Your Health Care Team  [facility]   "                                    Respiratory Care Procedure note  Harriett Romero, 1965    Problem:  Aspiration pneumonia of left lower lobe due to gastric secretions (H)    Procedure:  Bronch Assist :  Procedure done by Dr. Pranav Trent present.  Pt hyper oxygenated during procedure.     Time out done pre procedure.     /60   Pulse 93   Temp 97.7  F (36.5  C) (Axillary)   Resp (!) 34   Ht 4' 10\" (1.473 m)   Wt 139 lb (63 kg)   SpO2 94%   BMI 29.05 kg/m       Vent Mode: VCV  FiO2 (%):  [50 %-100 %] 100 %  S RR:  [16] 16  S VT:  [300 mL-325 mL] 300 mL  PEEP/CPAP (cm H2O):  [10 cm H2O] 10 cm H2O  Minute Ventilation (L/min):  [5.6 L/min-9.9 L/min] 9.9 L/min  PIP:  [36 cm H2O-38 cm H2O] 38 cm H2O  MAP (cm H2O):  [13-15] 15      Lidocaine 4% x 0 nebulized x 0.    Lidocaine 4% x 0, instilled.   Lidocaine 1% x 0, instilled.    50 ml x 2 NaCl instilled for BAL sample.     Airway secured, Ambu present. Bilateral BS.     Airway patent during procedure. Specimens sent/given to lab. BAL - RML and LLL.     Patient tolerated procedure well, no accessory muscles used, no strider, no wheezing. WOB with in normal limits.      Houston Jackson, LRT          "

## 2021-06-06 NOTE — PLAN OF CARE
Goal: Patient s discharge needs are met.  Outcome: Care Progression reviewed with Hospitalist, Care Manager.  Discharge Disposition: Discussed and plan to discharge to: TBD  Planned Discharge Date: several days  Problem: Barriers to discharge include: critical care needs, clinical progression, intubation, pressors  Transportation needs/Ride Time:  TBD    Chart reviewed.  Patient critically ill.  Care management will follow care progression and assist as needed.    Ileana Zacarias RN, BSN,  Care Manager  3/11/2020   4:57 PM

## 2021-06-06 NOTE — PROGRESS NOTES
Clinical Nutrition Therapy Follow Up Note      Current Nutrition Prescription:   Diet: Tube Feeding, No Tray  Formula: Isosource 1.5 @ 25ml/hr x 21hrs dt levothyroxine hold  Flushes 300ml q 6 hours  Diet Supplements: none  IV dextrose or Fluids:dextrose 10%  epoprostenol 0.25 mg/50 mL (Half Strength) (VELETRI) inhalation solution, Last Rate: 40,000 ng/hr (03/14/20 0748)  fentaNYL 2500 mcg/50 mL (50 mcg/mL), Last Rate: 175 mcg/hr (03/14/20 0802)  heparin, Last Rate: 15 Units/kg/hr (03/14/20 0800)  norepinephrine, Last Rate: Stopped (03/14/20 0915)  propofol, Last Rate: 65 mcg/kg/min (03/14/20 0800)    Current Nutrition Intake:  Enteral nutrition access is a oral gastric tube, with a placement date of 3/11/20. The current tube feeding order will provide 787 kcals, 35 grams protein, 7 grams fiber, 92 grams carbohydrate, 401mls free water from formula, 1200 mls from fluid flush, for a total of 1601 mls free water daily.  The current propofol order provides 649.4 calories daily  Total nutrient intake from all sources meets estimated nutritional needs for calories but not protein.     Anthropometrics:  Admission weight: 147 lb 4.3 oz (66.8 kg) bed  Weight: 143 lb 8 oz (65.1 kg) +1 and nonpitting edema, 5.5% wt loss in 1 week, fluid up per I/Os    GI Status/Output:   GI symptoms include: Loose per nurse  Bowel Sounds hypoactive per nurse  Last BM: 3/14/20 per nurse    Skin/Wound:  No wound was noted.    Medications:  Maxipime, colace, lasix, novolog, lantus, synthroid/levothroid, solu-medrol, pantoprazole, kayciel, mirapex, senokot, thiamine, insulin, diprivan    Labs:  -219, Bun 35, Cr 1.33, GFR 42, rbc 3.07, hgb 9.0, hct 29.1    Estimated Nutrition Needs:  Assessment weight is 50.1kg, adjusted weight     Energy Needs: 0937-8996 kcals daily, 25-30 kcal/kg  Protein Needs: 60-75 g daily, 1.2-1.5 g/kg.  Fluid Needs: 9303-3754 mls daily, 30-35 mls/kg    Malnutrition: Not noted yet at risk w/ prolonged poor  intake    Nutrition Risk Level: high risk    Nutrition dx:  Inadequate oral intake r/t respiratory failure as evidenced by need for nutrition support    Goal Status:  Lose weight-met  Meet estimated nutrition needs- not progressing  Diet advance-not progressing    Intervention:  Per doctor request during rounds reduced TF to trickle feed @ 15ml/hr which will provide 540kcals, 24g protein, 64g CHO, 21g fat, 275ml free water, 900ml water from flushes for a total of 1175ml fluid daily.     Reduced flushes per nurse d/t suspected culprit for high residuals.    Monitoring/Evaluation:  TF tolerance, weight, labs, propofol rate

## 2021-06-06 NOTE — PLAN OF CARE
Problem: Discharge Barriers  Goal: Patient's discharge needs are met  Outcome: Progressing     Goal: Patient s discharge needs are met.  Outcome: Care Progression reviewed with Hospitalist, Care Manager.  Discharge Disposition: Discussed and plan to discharge to: TBD  Planned Discharge Date: TBD  Problem: Barriers to discharge include: critically ill patient requiring mechanical vent, IV meds  Transportation needs/Ride Time: TBD

## 2021-06-06 NOTE — PROGRESS NOTES
Hospital Medicine Progress Note    Assessment/Plan  Harriett Romero is a 54 y.o. female with obesity, MDD, anxiety, restless leg syndrome, former smoker , migraine headaches, insomnia admitted from [facility] center after failing outpatient treatment with clindamycin for aspiration pneumonia.  Patient aspirated on 2/25 while laying on her left side during a screening colonoscopy.  Patient transferred to ICU 3/7 due to increased O2 needs.     Acute resp failure increased O2 needs and transferred to ICU 3/7  - pulm consulted and following along.  Patient weaned off of BiPAP and now on high flow nasal cannula  -She was initially started on a steroid burst and did seem to have benefit from this and so it has been continued.  Discussed with pulmonologist this is not typically used in aspiration pneumonitis, okay with me if they want to try stopping this.  -Being given intermittent doses of diuretic to keep her on the dry side   - echo was normal  - likely significant irritation and inflammation from aspiration vs underlying lung disease with h/o smoking   -Influenza, Legionella negative  -Continue pulmonary toilet with flutter valve and okay to use incentive spirometry as well  -Patient does feel she is unable to take deep breaths when breathing through her nose, I ordered her some Afrin to try  -Patient is very discouraged about slow progress, reassurance provided. Did order another CXR today at family request  -Of note she complains of feeling like she cannot take a deep breath and when I listened I did notice that it seems like her alveolar breath sounds stop part way through her expiration, I wonder if she is having some vocal cord abnormality from her aspiration?     Aspiration pneumonia Left lower and upper lobes Due to gastric secretions during colonoscopy  -failed outpatient clindamycin took 6 doses  - pleuritic pain improving with toradol scheduled and as above bronchial hygiene with nebs  - aspiration of  gastric acid and significant inflammation and irritation will take some time to improve  -On antibiotics with cefepime, WBC and CXR improving  -Passed bedside swallow evaluation  - PPI at at bedtime and HOB > 30      Hypothyroidism  - TSH, 9.07 on 1/31, previously 28 12/2019> Rechecked here TSH  9.86   -on 112mcg, dose increased to 125mcg, recheck TSH in 4-6 weeks     Former smoker   -Patient smoke-free for over 5 years      Diarrhea having liquid stools and was on clindamycin no further complaints   - sent stool for Cdiff negative and stool cx NGTD  - immodium prn      Anxiety/MDD  -Stable on home medications Wellbutrin and Celexa      Restless leg syndrome  -Seems to be controlled on Mirapex continue this     Metabolic syndrome  -takes phentermine and Glucophage only has medications for the past 1 week  -We will hold these medications for now      Hypokalemia   -replacement protocol      JANAE  - recommend patient get her CPAP from her sleep clinic     Deconditioning  PT/OT    Social: I filled out [place] paperwork for her    Primary hospital problems:  Principal Problem:    Aspiration pneumonia of left lower lobe due to gastric secretions (H)  Active Problems:    Hypokalemia    Former smoker - quit in 2014    Anxiety    Hypothyroidism    Metabolic syndrome    Aspiration pneumonia due to inhalation of vomitus (H)    Diarrhea of presumed infectious origin    Hypotension, unspecified hypotension type    Acute respiratory failure with hypoxia (H)    Remains hospitalized for: resp fail     FENGI: Diet Regular.    VTE prophylaxis: Moderate risk. Lovenox    Disposition: Inpatient. Not ready for discharge.  Code status: Full Code    Objective    Physical Exam  General: Well-appearing female sitting in bedside chair not in acute distress, oriented x3  HEENT: Head normocephalic atraumatic, oral mucosa moist. Sclerae anicteric  CV: Regular rhythm, normal rate, no murmurs  Resp: Scattered rhonchi, alveolar breath sounds less  "during second half of expiratory phase  GI: Belly soft, nondistended, nontender, bowel sounds present  Skin: No rashes or lesions  Extremities: No peripheral edema  Psych: Normal affect, anxious mood  Neuro: CNII-XII grossly intact, moving all 4 extremities  BP 92/50 (Patient Position: Lying)   Pulse 77   Temp 97.6  F (36.4  C) (Axillary)   Resp (!) 32   Ht 4' 10\" (1.473 m)   Wt 151 lb 14.4 oz (68.9 kg)   SpO2 96%   BMI 31.75 kg/m      Results    Recent Results (from the past 16 hour(s))   Hyperal Lytes    Collection Time: 03/09/20  4:15 AM   Result Value Ref Range    BUN 32 (H) 8 - 22 mg/dL    Calcium 9.2 8.5 - 10.5 mg/dL    Chloride 95 (L) 98 - 107 mmol/L    CO2 37 (H) 22 - 31 mmol/L    Creatinine 0.88 0.60 - 1.10 mg/dL    GFR MDRD Af Amer >60 >60 mL/min/1.73m2    GFR MDRD Non Af Amer >60 >60 mL/min/1.73m2    Glucose 95 70 - 125 mg/dL    Potassium 3.8 3.5 - 5.0 mmol/L    Magnesium 2.3 1.8 - 2.6 mg/dL    Sodium 141 136 - 145 mmol/L    Phosphorus 3.7 2.5 - 4.5 mg/dL   HM2(CBC W/O DIFF)    Collection Time: 03/09/20  4:15 AM   Result Value Ref Range    WBC 19.8 (H) 4.0 - 11.0 thou/uL    RBC 3.48 (L) 3.80 - 5.40 mill/uL    Hemoglobin 10.2 (L) 12.0 - 16.0 g/dL    Hematocrit 31.9 (L) 35.0 - 47.0 %    MCV 92 80 - 100 fL    MCH 29.3 27.0 - 34.0 pg    MCHC 32.0 32.0 - 36.0 g/dL    RDW 13.3 11.0 - 14.5 %    Platelets 224 140 - 440 thou/uL    MPV 9.1 8.5 - 12.5 fL   POCT Glucose    Collection Time: 03/09/20  6:12 AM    Specimen: Blood   Result Value Ref Range    Glucose 91 70 - 139 mg/dL   POCT Glucose    Collection Time: 03/09/20 11:35 AM    Specimen: Blood   Result Value Ref Range    Glucose 211 (H) 70 - 139 mg/dL   Potassium    Collection Time: 03/09/20  3:44 PM   Result Value Ref Range    Potassium 3.9 3.5 - 5.0 mmol/L   POCT Glucose    Collection Time: 03/09/20  4:28 PM    Specimen: Blood   Result Value Ref Range    Glucose 205 (H) 70 - 139 mg/dL     Personally reviewed today: Labs    Total time: 40 minutes with " >50% time spent with coordination of care and counseling reviewing plan of care for the day with patient and spouse as outlined above. Questions were answered to the best of my ability.    3/9/2020  Maddi Hernandez MD  [facility]    Subjective      Patient states she is worried today because it feels like she just cannot take a deep breath.  States when she breathes through her mouth this is easier.  Of note when I had her demonstrate this, she took a breath through her nose and said she could not breathe in all the way then she took another breath through her nose and said she was able to breathe and fully but I think she thought she was breathing in through her mouth?     and son concerned that patient is very weak and would like us to start PT.  I told him this is reasonable.  Overall they are worried about lack of progress and whether she may have spreading pneumonia and I reassured them.

## 2021-06-06 NOTE — PLAN OF CARE
Problem: Inadequate Airway Clearance  Goal: Patient will maintain patent airway  3/2/2020 0048 by Erendira Vasquez RN  Outcome: Progressing  3/1/2020 2227 by Erendira Vasquez RN  Outcome: Progressing  Goal: Patient will achieve/maintain normal respiratory rate/effort  Outcome: Progressing   Patient denies shortness of breath, but report dry non-productive cough. Elevate head  of bed, cough syrup prn and rest between cares. Antibiotics per order.  Problem: Inadequate Gas Exchange  Goal: Patient is adequately oxygenated and ventilation is improved  Outcome: Progressing

## 2021-06-06 NOTE — PLAN OF CARE
"  Problem: Pain  Goal: Patient's pain/discomfort is manageable  Outcome: Progressing   Patient states pain in upper shoulders, back and right side lower back. She states this is from her frequent strong coughing. Acetaminophen given with fair relief. Discussed with MD and new orders for IV Toradol, lidocaine ointment and heating pad. Pt states her pain was down form 7 to a 2 after these interventions. Pt later felt \"unsettled\" and nauseated. Zofran given and desi oils. Will continue to monitor. Pt states she easily get nauseated and has difficulty swallowing and taking meds.   "

## 2021-06-06 NOTE — PLAN OF CARE
Problem: Mechanical Ventilation  Goal: ET tube will be managed safely  Outcome: Progressing     Problem: Mechanical Ventilation  Goal: Patient will maintain patent airway  Outcome: Progressing     Problem: Impaired Gas Exchange  Goal: Demonstrate improved ventilation and adequate oxygenation of tissues as evidenced by absence of respiratory distress  Outcome: Progressing     Problem: Abnormal Respirations  Goal: Patient will maintain/improve baseline respiratory rate/effort  Outcome: Progressing     Maintain oxygen saturation of 88%-92%.     Patient had an episode of high peak pressures, stacked breathing/circuit occlusion at 0210 after cares. She woke up and appeared to be fighting vent. Suctioned for small to moderate amount of secretions. RN gave PRN sedation. PRN Albuterol neb given. Troubleshooting vent. Changed exhalation valve and flow sensor. She recovered shortly after all interventions where done.    Patient is on continuous full strength Veletri neb.    She is currently on settings of PCV-VG 22, 280, +14, 0.9 IT, 60%. PIP 36/37, plateau pressures 32/35, RR 22, Spo2 97%. Albuterol neb given on evening shift x 1. HR 66/72. Breath sounds coarse and diminished. 7.5 ETT/21 cm at teeth, re-positioning Q2H per RT/RN. Continue to monitor closely.

## 2021-06-06 NOTE — PLAN OF CARE
"  Problem: Inadequate Airway Clearance  Goal: Patient will maintain patent airway  Outcome: Progressing   Patient received Albuterol nebulizer with flutter valve for pulmonary hygiene x 2 as schedule. BS diminished with increase aeration post treatment- pt has strong non-productive cough. RT will follow     /64 (Patient Position: Lying)   Pulse 80   Temp 98  F (36.7  C) (Oral)   Resp 16   Ht 4' 10.5\" (1.486 m)   Wt 147 lb 4.3 oz (66.8 kg)   SpO2 91%   BMI 30.26 kg/m     "

## 2021-06-06 NOTE — PROGRESS NOTES
"Spiritual Assessment:     Harriett feeling anxious due to intubation    Family frightened, concerned, and overwhelmed by patient's condition     Admission bringing up memories, fears and concerns from previous family illness    Care Provided:   Empathic listening and presence  Offered prayer  Helped patient in processing of emotions  Normalized/validated feelings of concern  Encouraged self-care    Spiritual Care Note: Was notified by unit staff of Harriett's request for a . Harriett was on oxygen but her SO Brian explained that Harriett is feeling anxious as she would be intubated shortly and would like prayer. Prayer shared.     Spoke outside of Harriett's room with Brian and Harriett's father and step-mother. Brian was anxious as he just learned that Harriett has bilateral clots. He was not comfortable standing outside of her room while she was being intubated, so we moved to the hallway. Harriett's step-mother was tearful as she explained that they \"almost lost\" one of their daughters in the past and that this admission brings much of that back. They state that Harriett has been fairly healthy most of her life and that is unusual for her. They share that she usually talks to her sister as a form of self-care. The family reports that they have good support around them now. I encouraged them to make use of the 3rd floor lounge when it is available, the 2nd floor family lounge, or the 1st floor chapel if they need a quiet place to sit or talk. I also encouraged them to tend to their physical needs in the days ahead.     Plan of Care: Will remain available for further support as patient/family needs/desires.    Larissa Montero M.Div.  Staff   [phone]    "

## 2021-06-06 NOTE — PROGRESS NOTES
RESPIRATORY CARE NOTE     Patient Name: Harriett Romero  Today's Date: 3/15/2020     Patient is currently on ventilator settings of PCV-VG 35, 195, +14, 50%, IT 0.7. PIP 28, plateau pressure 24, RR 35, Spo2 97%, intrinsic peep +1. Albuterol neb given x 1. Full strength Veletri continuous neb. HR 67/69. Breath sounds coarse and diminished. Suctioning scant amount of tan secretions. 7.5 ETT/21 cm at teeth, re-positioning Q2H per RT/RN. Will continue to monitor closely.    Donna Buckley, LRT/NPS

## 2021-06-06 NOTE — PLAN OF CARE
Problem: Pain  Goal: Patient's pain/discomfort is manageable  Outcome: Progressing     Problem: Urinary Incontinence  Goal: Perineal skin integrity is maintained or improved  Outcome: Progressing     Problem: Glucose Imbalance  Goal: Achieve optimal glucose control  Outcome: Progressing   Pain managed with IV morphine.  Patients right arm was swollen at the start of shift, US identified a DVT around the PICC line.  MD ordered iv heparin drip and to remove the picc line, these were completed by this writer.  Glucose sliding scale followed as ordered, last check was within normal limits.  Urine output continues to be adequate.

## 2021-06-06 NOTE — PROGRESS NOTES
53 yo female seen by Dr. Rich yesterday afternoon. Admitted with progressive shortness of breath following an aspiration event.  She is being treated with ceftriaxone and flagyl.   She was treated with furosemide and responded well. However her oxygen requirements continued to climb and she developed progressively worsening tachypnea. She was transferred to Cardiac Telemetry and placed on BiPAP. She received nebulizer treatments and some mild sedation. Her oxygen saturations improved with BiPAP but she continued to be very tachypneic.     Vitals:    03/07/20 0500 03/07/20 0515 03/07/20 0530 03/07/20 0545   BP:       Patient Position:       Pulse: 91 91 92 92   Resp: (!) 51 (!) 45 (!) 45 (!) 49   Temp:       TempSrc:       SpO2: 95% 94% 92% 96%   Weight:       Height:           Gen: Alert, anxious.  Lungs: Crackles right base. Clear Left. No wheezes. Non productive cough. No accessory muscles. Speaks in short sentences.  Heart: RRR. S1, S2  Abdomen: Soft, non tender. +Bowel sounds    Recent Results (from the past 24 hour(s))   Influenza A/B Rapid Test   Result Value Ref Range    Influenza  A, Rapid Antigen No Influenza A antigen detected No Influenza A antigen detected    Influenza B, Rapid Antigen No Influenza B antigen detected No Influenza B antigen detected   Lactic Acid   Result Value Ref Range    Lactic Acid 1.6 0.5 - 2.2 mmol/L   Blood Gases, Arterial   Result Value Ref Range    pH, Arterial 7.50 (H) 7.37 - 7.44    pCO2, Arterial 42 35 - 45 mm Hg    pO2, Arterial 67 (L) 80 - 90 mm Hg    Bicarbonate, Arterial Calc 31.7 (H) 23.0 - 29.0 mmol/L    O2 Sat, Arterial 96.7 96.0 - 97.0 %    Oxyhemoglobin 94.2 (L) 96.0 - 97.0 %    Base Excess, Arterial Calc 8.8 mmol/L    Flow 45.0 LPM    Sample Stabilized Temperature 37.0 degrees C   POCT Glucose   Result Value Ref Range    Glucose 125 70 - 139 mg/dL   Basic Metabolic Panel   Result Value Ref Range    Sodium 140 136 - 145 mmol/L    Potassium 3.6 3.5 - 5.0 mmol/L     Chloride 101 98 - 107 mmol/L    CO2 30 22 - 31 mmol/L    Anion Gap, Calculation 9 5 - 18 mmol/L    Glucose 77 70 - 125 mg/dL    Calcium 8.7 8.5 - 10.5 mg/dL    BUN 25 (H) 8 - 22 mg/dL    Creatinine 0.84 0.60 - 1.10 mg/dL    GFR MDRD Af Amer >60 >60 mL/min/1.73m2    GFR MDRD Non Af Amer >60 >60 mL/min/1.73m2   HM2(CBC W/O DIFF)   Result Value Ref Range    WBC 25.2 (H) 4.0 - 11.0 thou/uL    RBC 3.57 (L) 3.80 - 5.40 mill/uL    Hemoglobin 10.7 (L) 12.0 - 16.0 g/dL    Hematocrit 33.5 (L) 35.0 - 47.0 %    MCV 94 80 - 100 fL    MCH 30.0 27.0 - 34.0 pg    MCHC 31.9 (L) 32.0 - 36.0 g/dL    RDW 13.2 11.0 - 14.5 %    Platelets 464 (H) 140 - 440 thou/uL    MPV 8.8 8.5 - 12.5 fL   Blood Gases, Arterial   Result Value Ref Range    pH, Arterial 7.41 7.37 - 7.44    pCO2, Arterial 51 (H) 35 - 45 mm Hg    pO2, Arterial 67 (L) 80 - 90 mm Hg    Bicarbonate, Arterial Calc 30.1 (H) 23.0 - 29.0 mmol/L    O2 Sat, Arterial 96.0 96.0 - 97.0 %    Oxyhemoglobin 93.5 (L) 96.0 - 97.0 %    Base Excess, Arterial Calc 6.7 mmol/L    Ventilation Mode Bi-PAP     Rate 16 rr/min    FIO2 0.90     PSV 14 cm H2O    Peep 8 cm H2O    Sample Stabilized Temperature 37.0 degrees C     XR CHEST 1 VIEW PORTABLE  LOCATION: [facility]  DATE/TIME: 3/7/2020 3:46 AM     INDICATION: SOB  COMPARISON: 03/16/2020.     IMPRESSION:   Stable heart size. Bilateral airspace opacities left greater than right. Slight interval improvement in aeration left lung. Trace left effusion. Bony thorax unremarkable. Monitor electrodes.    Assessment:   1) Aspiration pneumonia.  2) Acute respiratory failure with hypoxia. Appears to be tiring out.   3) Fluid overload.     Continue current bipap settings.   Repeat furosemide.   Place moreno catheter for accurate I/O  Continue current antibiotics  Low threshold for intubation.     Meredith Enriquez, CNP  Pulmonary Critical Care

## 2021-06-06 NOTE — PROGRESS NOTES
At start of this shift pt 's and 's.  Propofol and Fentanyl being titrated per BIS.  Since pt is on paralytic agent and tachycardia and HTN not usual trend for this pt the Propofol and Fentanyl were increased per protocols in an attempt to decrease HR and lower SBP.  Rational was discussed with CNP and Midazolam was given after little effect noted from Propofol and Fentanyl.  After administration of anxiolytic pt's 's HR 70'-80's. CNP updated.  Will continue to give anxiolytic per order in effort to keep HR and BP WNL.

## 2021-06-06 NOTE — PROGRESS NOTES
".......................................Length of Need: 99 months  Flow rate at Rest:  2 NC LPM  Flow rate with Activity: 2 NC LPM  Oxygen Frequency: Continuous  Oxygen Delivery Device: concentrator/portability         03/04/20 0958 03/04/20 1004 03/04/20 1006   Oxygen Therapy/Pulse Ox   O2 Device None (Room air) None (Room air) Nasal cannula   O2 Flow Rate (L/min)  --  1 L/min 2 L/min   FiO2 (%) 21 %  --   --    SpO2 (!) 87 % (!) 88 % 92 %   SpO2 Activity  R/A at rest O2 at rest O2 at rest      03/04/20 1008 03/04/20 1012   Oxygen Therapy/Pulse Ox   O2 Device Nasal cannula Nasal cannula   O2 Flow Rate (L/min) 2 L/min 2 L/min   FiO2 (%)  --   --    SpO2 90 % 91 %   SpO2 Activity  O2 activity O2 activity   Oxygen Start Date:3/4/2020     /67 (Patient Position: Lying)   Pulse 77   Temp 98  F (36.7  C) (Oral)   Resp (!) 32   Ht 4' 10.5\" (1.486 m)   Wt 147 lb 4.3 oz (66.8 kg)   SpO2 91%   BMI 30.26 kg/m    "

## 2021-06-06 NOTE — PLAN OF CARE
Problem: Pain  Goal: Patient's pain/discomfort is manageable  Outcome: Progressing     Problem: Activity Intolerance/Impaired Mobility  Goal: Mobility/activity is maintained at optimum level for patient  Outcome: Progressing  Reposition q 2 hrs to maintain skin integrity.     Problem: Nutrition Care Problem  Goal: Nutritional status is improving  3/16/2020 1728 by Larissa Velasquez RN  Outcome: Not Progressing    Continue insulin drip per protocol.    Titrate levophed per parameters.    Monitor BIS and TOF per orders.     Anti Xa WDL. Recheck in 6 hours (5916).

## 2021-06-06 NOTE — PLAN OF CARE
Problem: Psychosocial Needs  Goal: Demonstrates ability to cope with hospitalization/illness  Outcome: Progressing  Goal: Collaborate with patient/family/caregiver to identify patient specific goals for this hospitalization  Outcome: Progressing   Lisa Coffey RN

## 2021-06-06 NOTE — PROGRESS NOTES
Results for FIDEL SOLER (MRN [id number]) as of 3/16/2020 06:57   Ref. Range 3/16/2020 06:05   pH, Arterial Latest Ref Range: 7.37 - 7.44  7.37   pCO2, Arterial Latest Ref Range: 35 - 45 mm Hg 59 (H)   pO2, Arterial Latest Ref Range: 80 - 90 mm Hg 77 (L)   Bicarbonate, Arterial Calc Latest Ref Range: 23.0 - 29.0 mmol/L 30.7 (H)   O2 Sat, Arterial Latest Ref Range: 96.0 - 97.0 % 99.4 (H)   Oxyhemoglobin Latest Ref Range: 96.0 - 97.0 % 96.9   POC Base Excess Calc Latest Units: mmol/L 7.4   Ventilation Mode Unknown PCV/VG   Peep Latest Units: cm H2O 14   Sample Stabilized Temperature Latest Units: degrees C 37.0   Rate Latest Units: rr/min 35   FIO2 Unknown 50.00

## 2021-06-06 NOTE — PROGRESS NOTES
PROVIDER RESTRAINT FOR NON-VIOLENT BEHAVIOR FACE TO FACE EVALUATION    Patient's Immediate Situation:  Patient demonstrated the following behaviors: Pulling/tugging at invasive lines or tubes and does not respond to verbal/non-verbal redirection    Patient's Reaction to the intervention:  Does patient understand the reason for restraint/seclusion? Unable to Express    Medical Condition:  Is there any evidence of compromise of Skin integrity, Respiratory, Cardiovascular, Musculoskeletal, Hydration? No    Behavioral Condition:  In consultation with the RN, is there a need to continue this restraint or seclusion? Yes    See Restraint Flowsheet for complete restraint documentation and assessment.    Jenni Quinteros, CNP

## 2021-06-06 NOTE — PROGRESS NOTES
Hospital Medicine Progress Note    Assessment/Plan  Harriett Romero is a 54 y.o. female with obesity, MDD, anxiety, restless leg syndrome, former smoker , migraine headaches, insomnia admitted from [facility] center after failing outpatient treatment with clindamycin for aspiration pneumonia.  Patient aspirated on 2/25 while laying on her left side during a screening colonoscopy.  Patient transferred to ICU 3/7 due to increased O2 needs. Now diagnosed with bilateral PEs     #Acute resp failure  #Aspiration pneumonitis  #Maybe some degree of underlying lung disease from smoking  #Bilateral PEs  Patient had colonoscopy on 2/25 and aspirated during procedure.  After a few days she came in for evaluation for shortness of breath and was started on clindamycin for pneumonia but continued to worsen and ended up in our ER.  Was initially treated on the floor but worsened and ended up transferred to the ICU for BiPAP alternating with high flow nasal cannula.  Has been progressively worsening.  Today she underwent CT PE run that did show bilateral PEs despite prophylactic Lovenox since admission.  Today was intubated for bronchoscopy and remains intubated.  ICU team are currently primary.  -Heparin drip  -Cefepime  -Completed steroid burst  -Cares per ICU team    #PE  She developed a R arm clot associated with PICC, yesterday started on heparin drip  Today with PEs  Had been on Lovenox since admission  ?Hypercoagulable state or just bad luck?     #Hypothyroidism  - TSH, 9.07 on 1/31, previously 28 12/2019> Rechecked here TSH  9.86   -on 112mcg, dose increased to 125mcg, recheck TSH in 4-6 weeks     #Former smoker   -Patient smoke-free for over 5 years      #Anxiety/MDD  -Stable on home medications Wellbutrin and Celexa      #Restless leg syndrome  -Seems to be controlled on Mirapex continue this     #Metabolic syndrome  -takes phentermine and Glucophage only has medications for the past 1 week  -We will hold these medications  "for now      #Hypokalemia   -replacement protocol      #JANAE  - recommend patient get her CPAP from her sleep clinic     #Deconditioning  PT/OT    Social: I filled out [place] paperwork for her. Family wanted me to make changes to the form today. I reviewed what I wrote once again and did not feel changes are necessary.    Primary hospital problems:  Principal Problem:    Aspiration pneumonia of left lower lobe due to gastric secretions (H)  Active Problems:    Hypokalemia    Former smoker - quit in 2014    Anxiety    Hypothyroidism    Metabolic syndrome    Aspiration pneumonia due to inhalation of vomitus (H)    Diarrhea of presumed infectious origin    Hypotension, unspecified hypotension type    Acute respiratory failure with hypoxia (H)    Cough    Chest pain at rest    Deep vein thrombosis (DVT) of upper extremity (H)    Pulmonary embolism (H)    Acute respiratory distress syndrome (ARDS) (H)    Diffuse interstitial pulmonary disease (H)    Remains hospitalized for: resp fail     FENGI: NPO    VTE prophylaxis: Moderate risk. Lovenox    Disposition: Inpatient. Not ready for discharge. Condition somewhat tenuous in ICU  Code status: Full Code    Objective    Physical Exam  General: fatigued appearing female lying in hospital bed not in acute distress, oriented x3  HEENT: Head normocephalic atraumatic, oral mucosa moist. Sclerae anicteric  Skin: No rashes or lesions  Extremities: No peripheral edema  Psych: Normal affect, anxious mood  Neuro: CNII-XII grossly intact, moving all 4 extremities  /60   Pulse 93   Temp 97.7  F (36.5  C) (Axillary)   Resp (!) 34   Ht 4' 10\" (1.473 m)   Wt 139 lb (63 kg)   SpO2 94%   BMI 29.05 kg/m      Results    Recent Results (from the past 16 hour(s))   Troponin I    Collection Time: 03/11/20 12:05 AM   Result Value Ref Range    Troponin I 0.14 0.00 - 0.29 ng/mL   Anti-Xa Heparin Level    Collection Time: 03/11/20 12:05 AM   Result Value Ref Range    Anti-Xa Heparin Assay " 1.09 (H) 0.30 - 0.70 IU/mL   Basic Metabolic Panel    Collection Time: 03/11/20  7:42 AM   Result Value Ref Range    Sodium 139 136 - 145 mmol/L    Potassium 3.5 3.5 - 5.0 mmol/L    Chloride 99 98 - 107 mmol/L    CO2 32 (H) 22 - 31 mmol/L    Anion Gap, Calculation 8 5 - 18 mmol/L    Glucose 98 70 - 125 mg/dL    Calcium 8.4 (L) 8.5 - 10.5 mg/dL    BUN 25 (H) 8 - 22 mg/dL    Creatinine 0.78 0.60 - 1.10 mg/dL    GFR MDRD Af Amer >60 >60 mL/min/1.73m2    GFR MDRD Non Af Amer >60 >60 mL/min/1.73m2   HM2(CBC W/O DIFF)    Collection Time: 03/11/20  7:42 AM   Result Value Ref Range    WBC 14.8 (H) 4.0 - 11.0 thou/uL    RBC 3.71 (L) 3.80 - 5.40 mill/uL    Hemoglobin 10.9 (L) 12.0 - 16.0 g/dL    Hematocrit 33.9 (L) 35.0 - 47.0 %    MCV 91 80 - 100 fL    MCH 29.4 27.0 - 34.0 pg    MCHC 32.2 32.0 - 36.0 g/dL    RDW 13.3 11.0 - 14.5 %    Platelets 132 (L) 140 - 440 thou/uL    MPV 9.6 8.5 - 12.5 fL   Anti-Xa Heparin Level    Collection Time: 03/11/20  7:42 AM   Result Value Ref Range    Anti-Xa Heparin Assay 0.34 0.30 - 0.70 IU/mL   Troponin I    Collection Time: 03/11/20  7:42 AM   Result Value Ref Range    Troponin I 0.13 0.00 - 0.29 ng/mL   POCT Glucose    Collection Time: 03/11/20  7:57 AM    Specimen: Blood   Result Value Ref Range    Glucose 98 70 - 139 mg/dL   Echo Limited Bubble    Collection Time: 03/11/20 10:19 AM   Result Value Ref Range    BSA 1.66 m2    Hieght 58 in    Weight 2,224 lbs    /57 mmHg    HR 82 bpm    Height 58.0 in    Weight 139 lbs   POCT Glucose    Collection Time: 03/11/20 11:52 AM    Specimen: Blood   Result Value Ref Range    Glucose 112 70 - 139 mg/dL   Blood Gases, Arterial    Collection Time: 03/11/20  2:49 PM   Result Value Ref Range    pH, Arterial 7.36 (L) 7.37 - 7.44    pCO2, Arterial 59 (H) 35 - 45 mm Hg    pO2, Arterial 97 (H) 80 - 90 mm Hg    Bicarbonate, Arterial Calc 29.9 (H) 23.0 - 29.0 mmol/L    O2 Sat, Arterial 98.4 (H) 96.0 - 97.0 %    Oxyhemoglobin 95.6 (L) 96.0 - 97.0 %     "Base Excess, Arterial Calc 6.4 mmol/L    Ventilation Mode AC     Rate 16 rr/min    FIO2 100.00     Peep 10 cm H2O    Sample Stabilized Temperature 37.0 degrees C    Ventilator Tidal Volume 325 mL   Anti-Xa Heparin Level    Collection Time: 03/11/20  2:50 PM   Result Value Ref Range    Anti-Xa Heparin Assay 0.30 0.30 - 0.70 IU/mL     Personally reviewed today: Labs    3/11/2020  Maddi Hernandez MD  [facility]    Subjective      Patient seen as she is waiting to go down for CT PE run  Having more issues with hypoxia with any movement  She understands plan for additional testing to find out what is wrong  Denies significant pain    Family pulled me aside when I walked past them in the keen asked me to change what I wrote on FMLA forms (which they have not faxed yet despite me being paged twice on 3/9 that they needed these urgently). They asked me to revise estimated return to work beyond \"seven days.\" I reviewed the forms and I wrote \"several\" days not seven. No change needed.   "

## 2021-06-06 NOTE — PROGRESS NOTES
Clinical Nutrition Therapy Follow Up Note    Current Nutrition Prescription:   Diet: Regular (though was NPO per MD in rounds for tests this morning)  IV dextrose or Fluids:heparin, Last Rate: 15 Units/kg/hr (03/11/20 0700)    Current Nutrition Intake:  The patient's current meal intake is good > 75%  per chart. The pt said she ate well two days ago, but then didn't eat as well yesterday since she wasn't hungry. She was NPO this morning for tests and reported being very hungry.    Anthropometrics:  Admission weight: 147 lb 4.3 oz (66.8 kg) bed  Weight: 139 lb (63 kg)-bed    GI Status/Output:   BM 3/10 per chart.    Skin/Wound:  No wound was noted.    Medications:  Lasix, Novolog, levothyroxine    Labs:  Labs reviewed    Estimated Nutrition Needs:  Assessment weight is 50.1kg, adjusted weight     Energy Needs: 0813-9074 kcals daily, 25-30 kcal/kg  Protein Needs: 60-75 g daily, 1.2-1.5 g/kg.  Fluid Needs: 7968-3902 mls daily, 30-35 mls/kg    Malnutrition: Patient does not meet 2 diagnostic criteria for malnutrition    Nutrition Risk Level: moderate risk    Nutrition dx:  Inadequate oral intake r/t Respiratory failure as evidenced by NPO diet order.     Goal Status:  Lose weight-met  Meet estimated nutrition needs-progressing  Diet advance-met    Intervention:  ADAT after tests    Monitoring/Evaluation:  Diet advance, wt, labs     Roz Garcia RD, LD

## 2021-06-06 NOTE — PROGRESS NOTES
[facility] [place]    Assessment/Plan  Principal Problem:    Aspiration pneumonia of left lower lobe due to gastric secretions (H)  Active Problems:    Hypokalemia    Former smoker - quit in 2014    Anxiety    Hypothyroidism    Metabolic syndrome    Aspiration pneumonia due to inhalation of vomitus (H)    Diarrhea of presumed infectious origin    Hypotension, unspecified hypotension type    Acute respiratory failure with hypoxia (H)    54 y.o. old female with obesity, MDD, anxiety, restless leg syndrome, former smoker , migraine headaches, insomnia admitted from [facility] center after failing outpatient treatment with clindamycin for aspiration pneumonia.  Patient aspirated on 2/25 while laying on her left side during a screening colonoscopy.  Reports following prep and NPO.     Aspiration pneumonia Left lower and upper lobes Due to gastric secretions during colonoscopy, failed outpatient clindamycin took 6 doses   - still requiring O2  - change abx to PO flagyl and levaquin and monitor  - start bronchial hygiene with flutter valve and alb neb four times a day and keep scheduled for now and reassess in am, coarse BS and crackles and with low O2 sats  - pleuritic pain start toradol scheduled and as above bronchial hygiene with nebs  - heat and lidocaine ointment   - guaifenesin with codeine   - trial of steroids to help  - reviewed with Pulm and aspiration of gastric acid and significant inflammation and irritation will take some time to improve and nothing further to rec. Agrees with abx due to PCN allergy. No Bronch given improving plus with her aspiration risk and would not give more information     Diarrhea having liquid stools and was on clindamycin briefly for 6 dose.   - sent stool for Cdiff negative and stool cx NGTD     Acute resp failure still requiring 1-2 liters  - started bronchial hygiene today with flutter valve and alb neb four times a day to see if improvement   - wean to keep sats >90%  - trial of  steroids   - might need home O2 and will be out of pocket since wont be covered by insurance with no chronic dx  - reassess in am and trial of steroids  - also new dx of JANAE and patient still needs to get her CPAP     Hypothyroidism  -Very poorly controlled TSH, 9.07 on 1/31, previously 28 12/2019  -Rechecked here TSH  9.86   -on 112mcg, dose increased to 125mcg, recheck TSH in 4-6 weeks     Former smoker   -Patient smoke-free for over 5 years   -No needs at this time and no lung disease prior to current event     Anxiety/MDD  -Stable on home medications continue Wellbutrin and Celexa while inpatient     Restless leg syndrome  -Seems to be controlled on Mirapex continue this.     Metabolic syndrome  -takes phentermine and Glucophage only has medications for the past 1 week  -We will hold these medications for now    Hypokaliemia  - replaced and now resolved       VTE prophylaxis: SQ Lovenox    Diet: reg  Drains/tubes: none  Weight bearing restrictions: WBAT  Disposition/Barriers to discharge: possible discharge tomorrow   Full Code  Subjective  Still requiring O2 but looks much better then previous  present answered multiple questions and returned after speaking with pulm and responses to questions   Objective  Vital signs in last 24 hours  Vitals:    03/04/20 1114   BP: 105/54   Pulse: 77   Resp: 24   Temp: 98.2  F (36.8  C)   SpO2: 94%     Wt Readings from Last 1 Encounters:   03/01/20 1702 147 lb 4.3 oz (66.8 kg)   Weight change:   Body mass index is 30.26 kg/m .  Intake/Output this shift:    Intake/Output Summary (Last 24 hours) at 3/4/2020 1344  Last data filed at 3/4/2020 1122  Gross per 24 hour   Intake 770 ml   Output --   Net 770 ml     I/O last 3 completed shifts:  In: 1070 [P.O.:1020; IV Piggyback:50]  Out: -   Physical Exam  General appearance: alert, appears stated age and cooperative,  NAD   HEENT:  Normocephalic without obvious abnormality, atraumatic  Lungs:improved air movement and crackles  on LLL 1/3-1/2 up    Heart: RRR no M/R/G  Abdomen: soft,  +bowel sounds  no masses,  no organomegaly  Extremities: No E/C/C  Pulses: 2+ and symmetric  Skin: Skin color, texture, turgor normal. No rashes or lesions  Neurologic: Grossly normal  Current Medications    albuterol  2.5 mg Nebulization QID     buPROPion  150 mg Oral BID     citalopram  40 mg Oral QHS     enoxaparin ANTICOAGULANT  40 mg Subcutaneous Q24H     ketorolac  15 mg Intravenous Q6H     levoFLOXacin  500 mg Oral Daily 0600     levothyroxine  125 mcg Oral Daily 0600     lidocaine   Topical TID     methylPREDNISolone sodium succinate  60 mg Intravenous Once     metroNIDAZOLE  500 mg Oral TID     pramipexole  0.75 mg Oral QHS     sodium chloride  2.5 mL Intravenous Line Care       acetaminophen, acetaminophen, albuterol **AND** Nebulizer treatment intermittent, aluminum-magnesium hydroxide-simethicone, bisacodyL, codeine-guaiFENesin, magnesium hydroxide, melatonin, ondansetron **OR** ondansetron, polyethylene glycol  Pertinent Labs   Results from last 7 days   Lab Units 03/04/20  0604 03/03/20  0434 03/02/20  1047   LN-WHITE BLOOD CELL COUNT thou/uL 9.8 10.6 11.1*   LN-HEMOGLOBIN g/dL 9.9* 10.1* 9.7*   LN-HEMATOCRIT % 30.4* 30.5* 29.2*   LN-PLATELET COUNT thou/uL 319 280 245       Results from last 7 days   Lab Units 03/04/20  0604 03/03/20  0649 03/02/20  2150 03/02/20  0535   LN-SODIUM mmol/L 139  --   --  139   LN-POTASSIUM mmol/L 4.0 4.1 3.3* 3.4*   LN-CHLORIDE mmol/L 107  --   --  105   LN-CO2 mmol/L 24  --   --  26   LN-BLOOD UREA NITROGEN mg/dL 11  --   --  13   LN-CREATININE mg/dL 0.72  --   --  0.78   LN-CALCIUM mg/dL 8.7  --   --  8.0*   LN-ALBUMIN g/dL  --   --   --  2.3*   LN-PROTEIN TOTAL g/dL  --   --   --  5.0*   LN-BILIRUBIN TOTAL mg/dL  --   --   --  0.5   LN-ALKALINE PHOSPHATASE U/L  --   --   --  253*   LN-ALT (SGPT) U/L  --   --   --  41   LN-AST (SGOT) U/L  --   --   --  31             Total time for this visit is 35 minutes with  greater than 50% of time spent in counseling and coordination of care with patient/family, discussion with RN, consultants, reviewing labs and chart.    Destini Trevino MD.   [facility]   [phone]   Pager [phone]

## 2021-06-06 NOTE — PLAN OF CARE
Problem: Inadequate Airway Clearance  Goal: Patient will maintain patent airway  Outcome: Progressing  Goal: Patient will achieve/maintain normal respiratory rate/effort  Outcome: Progressing     Problem: Inadequate Gas Exchange  Goal: Patient will achieve/maintain normal respiratory rate/effort  Outcome: Progressing     Problem: Excessive Fluid Volume  Goal: Patient will achieve/maintain normal respiratory rate/effort  Outcome: Progressing   Patient had benadryl prn for itch, and melatonin for sleep. Still has mostly dry coughing spells, has small amount of white secretions at times. RR ranged from high 20's -30's when asleep. On HFNC, tolerating well.

## 2021-06-06 NOTE — PLAN OF CARE
Problem: Pain  Goal: Patient's pain/discomfort is manageable  Outcome: Progressing   Pt did well after getting toradol slept for a couple hours. Feels less sore at next scheduled time for toradol.      Problem: Discharge Barriers  Goal: Patient's discharge needs are met  Outcome: Progressing  Still requiring O2 at rest and activity.

## 2021-06-06 NOTE — PLAN OF CARE
Problem: Pain  Goal: Patient's pain/discomfort is manageable  Outcome: Progressing   Pt on Fentanyl gtt.  CPOT score 0-2.  Titrating gtt per orders.  Problem: Inadequate Gas Exchange  Goal: Patient will achieve/maintain normal respiratory rate/effort  Outcome: Progressing   ABG's improving after last vent changes.   See lab results.  Problem: Nutrition Care Problem  Goal: Nutritional status is improving  Outcome: Progressing   Replete with Fiber trickling @ 10/hr.  Residuals markedly improved 20-25 ml residual. Cont with plan

## 2021-06-06 NOTE — PROGRESS NOTES
[facility] [place] PROGRESS NOTE    Assessment/Plan  Principal Problem:    Aspiration pneumonia of left lower lobe due to gastric secretions (H)  Active Problems:    Hypokalemia    Former smoker - quit in 2014    Anxiety    Hypothyroidism    Metabolic syndrome    Aspiration pneumonia due to inhalation of vomitus (H)    Diarrhea of presumed infectious origin    Hypotension, unspecified hypotension type    Acute respiratory failure with hypoxia (H)    54 y.o. old female with obesity, MDD, anxiety, restless leg syndrome, former smoker , migraine headaches, insomnia admitted from [facility] center after failing outpatient treatment with clindamycin for aspiration pneumonia.  Patient aspirated on 2/25 while laying on her left side during a screening colonoscopy.  Reports following prep and NPO.     Aspiration pneumonia Left lower and upper lobes Due to gastric secretions during colonoscopy, failed outpatient clindamycin took 6 doses   - still requiring O2 1 liter   - change abx to PO flagyl and levaquin and monitor  - start bronchial hygiene with flutter valve and alb neb four times a day and keep scheduled for now and reassess in am, coarse BS and crackles and with low O2 sats  - pleuritic pain start toradol scheduled and as above bronchial hygiene with nebs  - heat and lidocaine ointment   - guaifenesin with codeine     Diarrhea having liquid stools and was on clindamycin briefly for 6 dose.   - sent stool for Cdiff and stool cx  - already had 3 liquid stools justt this am    - may be cause for low BP      Acute resp failure still requiring 1-2 liters  - started bronchial hygiene today with flutter valve and alb neb four times a day to see if improvement   - wean to keep sats >905  - reassess on the day of discharge if needs Home desat screen   - treating as above     Hypothyroidism  -Very poorly controlled TSH, 9.07 on 1/31, previously 28 12/2019  -Rechecked here TSH  9.86   -on 112mcg, dose increased to 125mcg, recheck TSH in 4-6  weeks     Former smoker   -Patient smoke-free for over 5 years   -No needs at this time and no lung disease prior to current event     Anxiety/MDD  -Stable on home medications continue Wellbutrin and Celexa while inpatient     Restless leg syndrome  -Seems to be controlled on Mirapex continue this.     Metabolic syndrome  -takes phentermine and Glucophage only has medications for the past 1 week  -We will hold these medications for now    Hypokaliemia  - replaced and now resolved       VTE prophylaxis: SQ Lovenox    Diet: reg  Drains/tubes: none  Weight bearing restrictions: WBAT  Disposition/Barriers to discharge: pending O2 needs not on oxygen at home and no formal Dx of lung disease reassess in am after aggressive bronchial hygiene, nebs and abx change overnight     Full Code  Subjective  Did not sleep well last night due to back discomfort from coughing and pleuritic pain, still requiring O2. Having liquid stools this morning   Objective  Vital signs in last 24 hours  Vitals:    03/03/20 0900   BP: 104/61   Pulse: 72   Resp: 20   Temp:    SpO2: 95%     Wt Readings from Last 1 Encounters:   03/01/20 1702 147 lb 4.3 oz (66.8 kg)   Weight change:   Body mass index is 30.26 kg/m .  Intake/Output this shift:    Intake/Output Summary (Last 24 hours) at 3/3/2020 1056  Last data filed at 3/3/2020 0545  Gross per 24 hour   Intake 2047 ml   Output --   Net 2047 ml     I/O last 3 completed shifts:  In: 2047 [P.O.:360; I.V.:1538; IV Piggyback:149]  Out: -   Physical Exam  General appearance: alert, appears stated age and cooperative, ill-appearing uncomfortable but NAD   HEENT:  Normocephalic without obvious abnormality, atraumatic  Lungs:coarse BS throughout crackles left side anteriorly and posteriorly, slight at right base no wheezes,   Heart: RRR no M/R/G  Abdomen: soft,  +bowel sounds  no masses,  no organomegaly  Extremities: No E/C/C  Pulses: 2+ and symmetric  Skin: Skin color, texture, turgor normal. No rashes or  lesions  Neurologic: Grossly normal  Current Medications    albuterol  2.5 mg Nebulization QID     buPROPion  150 mg Oral BID     citalopram  40 mg Oral QHS     enoxaparin ANTICOAGULANT  40 mg Subcutaneous Q24H     ketorolac  15 mg Intravenous Q6H     levoFLOXacin  500 mg Oral Daily 0600     levothyroxine  125 mcg Oral Daily 0600     lidocaine   Topical TID     metroNIDAZOLE  500 mg Oral TID     pramipexole  0.75 mg Oral QHS     sodium chloride  2.5 mL Intravenous Line Care       acetaminophen, acetaminophen, albuterol **AND** Nebulizer treatment intermittent, aluminum-magnesium hydroxide-simethicone, bisacodyL, codeine-guaiFENesin, magnesium hydroxide, melatonin, ondansetron **OR** ondansetron, polyethylene glycol  Pertinent Labs   Results from last 7 days   Lab Units 03/03/20  0434 03/02/20  1047   LN-WHITE BLOOD CELL COUNT thou/uL 10.6 11.1*   LN-HEMOGLOBIN g/dL 10.1* 9.7*   LN-HEMATOCRIT % 30.5* 29.2*   LN-PLATELET COUNT thou/uL 280 245       Results from last 7 days   Lab Units 03/03/20  0649 03/02/20  2150 03/02/20  0535   LN-SODIUM mmol/L  --   --  139   LN-POTASSIUM mmol/L 4.1 3.3* 3.4*   LN-CHLORIDE mmol/L  --   --  105   LN-CO2 mmol/L  --   --  26   LN-BLOOD UREA NITROGEN mg/dL  --   --  13   LN-CREATININE mg/dL  --   --  0.78   LN-CALCIUM mg/dL  --   --  8.0*   LN-ALBUMIN g/dL  --   --  2.3*   LN-PROTEIN TOTAL g/dL  --   --  5.0*   LN-BILIRUBIN TOTAL mg/dL  --   --  0.5   LN-ALKALINE PHOSPHATASE U/L  --   --  253*   LN-ALT (SGPT) U/L  --   --  41   LN-AST (SGOT) U/L  --   --  31             Total time for this visit is 35 minutes with greater than 50% of time spent in counseling and coordination of care with patient/family, discussion with RN, consultants, reviewing labs and chart.    Destini Trevino MD.   [facility]   [phone]   Pager [phone]

## 2021-06-06 NOTE — PROGRESS NOTES
CRITICAL CARE PROGRESS NOTE    Date / Time of Admission:  3/1/2020  4:51 PM  Date of service: 3/7/2020      Assessment:   Principal Problem:    Aspiration pneumonia of left lower lobe due to gastric secretions (H)  Active Problems:    Hypokalemia    Former smoker - quit in 2014    Anxiety    Hypothyroidism    Metabolic syndrome    Aspiration pneumonia due to inhalation of vomitus (H)    Diarrhea of presumed infectious origin    Hypotension, unspecified hypotension type    Acute respiratory failure with hypoxia (H)    55 yo female, former smoker, who was admitted with possible aspiration after colonoscopy.  She was treated with clindamycin as outpatient was started on ceftriaxone and Flagyl here.  She had a chest x-ray that showed left-sided infiltrate however over the past several days her chest x-ray has progressively worsened.  Her oxygen requirements have been increasing.  pt was transferred to the ICU on 3/7/2020 with increased work of breathing and requirement for BiPAP.      Plan:     Neuro: History of seizure disorder  -Alert awake oriented x3    Pulmonary: Acute respiratory failure felt to be related to aspiration pneumonitis.  She was treated with clindamycin, then ceftriaxone and Flagyl.  Now, she is on cefepime and Levaquin.  -BiPAP for increased work of breathing  -On cefepime and Levaquin.  -Scheduled and PRN nebs  -Was also started on a steroid burst  -may need to be tubed, however, will monitor for now    Cardiovascular: hemodynamically stable  -echo shows preserved EF  -lasix/Diuril for volume overload    GI: recent colonoscopy  -N.p.o. for now  -Prophylaxis with PPI     ID:   -on cefepime and levaquin for aspiration pneumonia  -CRP trending down  -f/u cultures    Heme/Onc:   -Prophylaxis with enoxaparin     Endocrine: Hypothyroidism  -On thyroid replacement  - Insulin as needed to keep glucose <180. Avoid hypoglycemia.    Advance Directives: Full Code     The patient is critically ill with  "impairment in organ system and high risk of life threatening deterioration.     Critical Care Time (CCT) greater than: 45 Minutes      Subjective:   HPI:  Harriett Romero is a 54 y.o. female, former smoker, with past medical history significant for obesity, restless leg syndrome who apparently underwent colonoscopy back on 2/25/2020 and had an aspiration event.  Patient was started on clindamycin several days after that event.  There was no improvement with her symptoms and therefore she was admitted to the hospital.  Patient is allergic to penicillin and therefore she was started on ceftriaxone and Flagyl.  She was then transitioned to Augmentin.    Interval History:  3/7: transferred to icu overnight for bipap and hypoxia. She was given additional lasix 60 mg x 1.    Objective:   VITALS:  /64 (Patient Position: Semi-lakhani)   Pulse 94   Temp 99.3  F (37.4  C) (Axillary)   Resp (!) 36   Ht 4' 10.5\" (1.486 m)   Wt 151 lb 14.4 oz (68.9 kg)   SpO2 92%   BMI 31.21 kg/m    I&O:      Intake/Output Summary (Last 24 hours) at 3/7/2020 0719  Last data filed at 3/7/2020 0630  Gross per 24 hour   Intake 940 ml   Output 2400 ml   Net -1460 ml       EXAM:   General: Alert, cooperative, appears stated age  HEENT: NCAT, without obvious abnormality,  On BiPAP  CVS:S1S2, RRR  Lungs: crackles L > right  Abd: soft, NT, + BS. No masses  Ext: No c/c/e  Skin: no rashes or lesions  Neuro: CN 2 to 12 intact      Data Review:  CXR on 3/7/2020:  Stable heart size. Bilateral airspace opacities left greater than right. Slight interval improvement in aeration left lung. Trace left effusion. Bony thorax unremarkable. Monitor electrodes.      By:  Wilmar Rich, 3/7/2020      Allergies: Macrobid [nitrofurantoin monohyd/m-cryst]; Penicillins; and Sulfa (sulfonamide antibiotics)     MEDS:  Scheduled Meds:    albuterol  2.5 mg Nebulization QID     benzonatate  100 mg Oral TID     buPROPion  150 mg Oral BID     cefepime  2 g " Intravenous Q8H     citalopram  40 mg Oral QHS     enoxaparin ANTICOAGULANT  40 mg Subcutaneous Q24H     levoFLOXacin  500 mg Oral Daily 0600     levothyroxine  125 mcg Oral Daily 0600     lidocaine   Topical TID     [Held by provider] metFORMIN  500 mg Oral DAILY     omeprazole  20 mg Oral QHS     pramipexole  0.75 mg Oral QHS     predniSONE  40 mg Oral Daily with brkfst     sodium chloride  2.5 mL Intravenous Line Care     Continuous Infusions:  PRN Meds:.acetaminophen, acetaminophen, albuterol **AND** Nebulizer treatment intermittent, aluminum-magnesium hydroxide-simethicone, bisacodyL, codeine-guaiFENesin, dextrose 50 % (D50W), glucagon (human recombinant), hydrOXYzine pamoate, loperamide, LORazepam, magnesium hydroxide, melatonin, naloxone **OR** naloxone, ondansetron **OR** ondansetron, polyethylene glycol

## 2021-06-06 NOTE — PLAN OF CARE
"  Problem: Inadequate Airway Clearance  Goal: Patient will maintain patent airway  Outcome: Progressing   Patient received Albuterol neb x 2 as schedule. BS clear and decrease in the bases- pt strong harsh non-productive cough. RT will follow     /57 (Patient Position: Semi-lakhani)   Pulse 77   Temp 97.9  F (36.6  C) (Oral)   Resp 18   Ht 4' 10.5\" (1.486 m)   Wt 154 lb (69.9 kg)   SpO2 92%   BMI 31.64 kg/m     "

## 2021-06-06 NOTE — PLAN OF CARE
Problem: Inadequate Airway Clearance  Goal: Patient will maintain patent airway  Outcome: Progressing     Problem: Inadequate Gas Exchange  Goal: Patient will achieve/maintain normal respiratory rate/effort  Outcome: Progressing     Titrating Sedation per BIS goal of 40-60. Nimbex gtt kept at 2.5 mcg. TOF 4/4. Unable to reach goal of 2/4. Updated CNP. Will stop at 2.5mcg and will not go up further. Patient synchronized with the Vent.      Problem: Glucose Imbalance  Goal: Achieve optimal glucose control  Outcome: Progressing     Blood sugar on target range. Insulin gtt at 2 units/algorithm 2. Checking Blood sugar every 2 hours.

## 2021-06-06 NOTE — PROGRESS NOTES
"Pharmacy Consult: Vancomycin Dosing    Pharmacist consulted to dose vancomycin for Harriett Romero, a 54 y.o. female.    Ordering provider: Dr. Rock    Indication for vancomycin therapy: Hospital Acquired Pneumonia    Goal Trough Range:  15-20 mcg/mL based on indication    Other current antimicrobials              vancomycin 1,000 mg in sodium chloride 0.9% 250 mL (VANCOCIN)  Every 24 hours          cefepime 2 g in NaCl 0.9 % 100 mL (MINI-BAG Plus) (MAXIPIME)  Every 12 hours                   Subjective/Objective:    Patient was admitted for Aspiration pneumonia of left lower lobe due to gastric secretions (H) on 3/1/2020    Height: 4' 10\" (1.473 m)    Actual Body Weight (ABW): 63.5 kg (139 lb 15.9 oz)    Patient must be at least 60 in tall to calculate ideal body weight    BMI: Body mass index is 29.26 kg/m .    Allergies   Allergen Reactions     Macrobid [Nitrofurantoin Monohyd/M-Cryst] Nausea And Vomiting     Penicillins Hives     3/1/2020 tolerated ceftriaxone      Sulfa (Sulfonamide Antibiotics) Hives       Patient Active Problem List   Diagnosis     Obesity due to excess calories with serious comorbidity     Hypokalemia     Menopausal hot flushes     Migraine headache     Former smoker - quit in 2014     Insomnia     RLS (restless legs syndrome)     Anxiety     Hypothyroidism     Metabolic syndrome     Dyslipidemia     Vitamin D deficiency     At high risk for caregiver role strain     Major depression in complete remission (H)     History of Positive colorectal cancer screening using Cologuard test     Aspiration pneumonia of left lower lobe due to gastric secretions (H)     Aspiration pneumonia due to inhalation of vomitus (H)     Diarrhea of presumed infectious origin     Hypotension, unspecified hypotension type     Acute respiratory failure with hypoxia (H)     Cough     Chest pain at rest     Deep vein thrombosis (DVT) of upper extremity (H)     Pulmonary embolism (H)     Acute respiratory distress " syndrome (ARDS) (H)     Diffuse interstitial pulmonary disease (H)    Past Medical History:   Diagnosis Date     10 year risk of MI or stroke 1.0% in 2017      Anxiety      Aspiration pneumonia of left lower lobe due to gastric secretions (H) 2/28/2020     Current use of estrogen therapy      Dysthymia      Endometriosis     History - had hysterectomy.      Former smoker      Graves disease      Hypothyroidism      Insomnia      Migraine headache      Nephrolithiasis      Seizures (H)     5+ years ago, no medications     Sleep apnea     Recently diagnosed, awaiting CPAP     Trigeminal neuralgia      UTI (lower urinary tract infection)      Vitamin D deficiency         Temp Readings from Current Encounter:     03/12/20 0400 03/12/20 0600 03/12/20 0800   Temp: 100.4  F (38  C) 99.9  F (37.7  C) 99.6  F (37.6  C)     Net Intake/Output (last 24 hours):  I/O last 3 completed shifts:  In: 2678.9 [I.V.:1542.9; NG/GT:60; IV Piggyback:1076]  Out: 1730 [Urine:1455; Emesis/NG output:275]    Recent Labs     03/10/20  0422 03/10/20  1330 03/10/20  1649 03/11/20  0742 03/12/20  0453 03/12/20  0453   WBC 20.6*  --  14.7* 14.8* 27.9*  --    CRP  --   --   --  13.4*  --   --    BUN  --  28*  --  25*  --  23*   CREATININE  --  0.90  --  0.78  --  1.09     Estimated Creatinine Clearance: 60.5 mL/min (by C-G formula based on SCr of 1.09 mg/dL).    Recent Labs     03/11/20  1540   CULTURE No growth to date       Results for orders placed or performed during the hospital encounter of 03/01/20   Culture/Gram Stain: Bronchial    Collection Time: 03/11/20  3:40 PM    Specimen: Respiratory   Result Value Status    Culture No growth to date Preliminary   Culture, Stool    Collection Time: 03/03/20 10:28 AM    Specimen: Stool   Result Value Status    Culture No gram negative sarita isolated Final       Recent labs: (last 7 days)     03/12/20  1437   VANCOMYCIN 21.1       Vancomycin administrations: (last 120 hours)     Date/Time Action  Medication Dose Rate    03/12/20 1442 New Bag    vancomycin 1,000 mg in sodium chloride 0.9% 250 mL (VANCOCIN) 1,000 mg 135 mL/hr    03/12/20 0324 New Bag    vancomycin 1,000 mg in sodium chloride 0.9% 250 mL (VANCOCIN) 1,000 mg 135 mL/hr    03/11/20 1624 New Bag    vancomycin 1,250 mg in sodium chloride 0.9% 500 mL (VANCOCIN) 1,250 mg 262.5 mL/hr          Assessment/Plan:    Pharmacist consulted to dose vancomycin for Hospital Acquired Pneumonia, goal trough range 15-20 mcg/mL.  1. Change to vancomycin 1000 mg IV every 24 hours (15.9 mg/kg actual body weight).  2. Vancomycin trough level of 21.1 mcg/mL was above the goal trough range. This trough level was not drawn at steady state. Trough was drawn early due to increase Scr. Will decrease dosing interval to q24   3. Pharmacist will plan to check a vancomycin trough level prior to the 4th or 5th dose.  4. Pharmacist will continue to follow.    Thank you for the consult.  Salvador Ashley, PharmD 3/12/2020 3:05 PM

## 2021-06-06 NOTE — PROGRESS NOTES
Clinical Nutrition Therapy Follow Up Note    Current Nutrition Prescription:   Diet: TF still ordered, but it has been held since yesterday (3/15) due to high GVRs.  IV dextrose or Fluids:cisatracurium (NIMBEX) infusion, Last Rate: 1.5 mcg/kg/min (03/16/20 0800)  dextrose 10%  epoprostenol 0.5 mg/50 mL (Full Strength) (VELETRI) inhalation solution, Last Rate: 80,000 ng/hr (03/16/20 1000)  fentaNYL 2500 mcg/50 mL (50 mcg/mL), Last Rate: 150 mcg/hr (03/16/20 0800)  heparin, Last Rate: 12 Units/kg/hr (03/16/20 1029)  insulin infusion (1 unit/mL), Last Rate: 2 Units/hr (03/16/20 1004)  norepinephrine, Last Rate: Stopped (03/14/20 1837)  propofol, Last Rate: 60 mcg/kg/min (03/16/20 0950)  sodium chloride 0.9%, Last Rate: 10 mL/hr (03/16/20 0800)    Current Nutrition Intake:  TF currently held.  Propofol at 22.7 mL/hour provides 599 kcals daily.    Anthropometrics:  Admission weight: 147 lb 4.3 oz (66.8 kg) bed  Weight: 147 lb 4.8 oz (66.8 kg)    GI Status/Output:   BM 3/14 per chart.    Skin/Wound:  No wound was noted.    Medications:  Lasix, levothyroxine, thiamine    Labs:  Labs reviewed    Malnutrition: Not noted yet at risk w/ prolonged poor intake    Nutrition Risk Level: high risk    Nutrition dx:  Inadequate oral intake r/t respiratory failure as evidenced by need for nutrition support    Goal Status:  Lose weight-not met-admit wt is same as current wt  Meet estimated nutrition needs-not met with TF off  Diet advance-not progressing    Intervention:  ADAT, restart TF when ok per MD    Monitoring/Evaluation:  TF tolerance/plan to restart TF, weight, labs, propofol rate    Roz Garcia RD, LD

## 2021-06-06 NOTE — PROGRESS NOTES
Physical Therapy     03/12/20 1500   Visit Specifics   Treatment Deferred Medical status   Subjective Patient Comments PT eval not complete 2/2 change in med status, will d/c PT at this time per RN request, will need new orders to resume when appropriate

## 2021-06-06 NOTE — PROGRESS NOTES
Chart Check, Patient intubated and on pressors, Intensivist managing, [facility] will follow peripherally.     Harriett Romero is a 54 y.o. female with obesity, MDD, anxiety, restless leg syndrome, former smoker , migraine headaches, insomnia admitted from [facility] after failing outpatient treatment with clindamycin for aspiration pneumonia.  Patient aspirated on 2/25 while laying on her left side during a screening colonoscopy.  Patient transferred to ICU 3/7 due to increased O2 needs. Now diagnosed with bilateral PEs on CTA performed 3/11     #Acute resp failure  #Aspiration pneumonitis  #Maybe some degree of underlying lung disease from smoking  #Bilateral PEs  Patient had colonoscopy on 2/25 and aspirated during procedure.  After a few days she came in for evaluation for shortness of breath and was started on clindamycin for pneumonia but continued to worsen and ended up in our ER.  Was initially treated on the floor but worsened and ended up transferred to the ICU for BiPAP alternating with high flow nasal cannula.  Has been progressively worsening.  Today she underwent CT PE run that did show bilateral PEs despite prophylactic Lovenox since 3/3.  Intubated 3/11 for bronchoscopy and remains intubated.  ICU team are currently primary.  -Heparin drip  -Cefepime  -Completed steroid burst  -Cares per ICU team     #PE  She developed a R arm clot associated with PICC, yesterday started on heparin drip  Today with PEs   Had been on Lovenox since 3/3  Hypercoagulable state???     #Hypothyroidism  - TSH, 9.07 on 1/31, previously 28 12/2019> Rechecked here TSH  9.86   -on 112mcg, dose increased to 125mcg, recheck TSH in 4-6 weeks     #Former smoker   -Patient smoke-free for over 5 years      #Anxiety/MDD  -Stable on home medications Wellbutrin and Celexa      #Restless leg syndrome  -Seems to be controlled on Mirapex continue this     #Metabolic syndrome  -takes phentermine and Glucophage only has medications for the  past 1 week  -We will hold these medications for now      #Hypokalemia   -replacement protocol      #JANAE  - recommend patient get her CPAP from her sleep clinic      #Deconditioning  - now intubated     Previous [facility] [place]  filled out FMLA paperwork for her. Family wanted changes to the form 3/11 and Dr. Hernandez reviewed wrote several days

## 2021-06-06 NOTE — TELEPHONE ENCOUNTER
Patient's  calling - verbal consent given by patient over the phone.   says patient was seen in clinic yesterday.  Diagnosed with pneumonia.  Given antibiotics.  Started the antibiotics last night.    She cannot stop coughing.  Oxygen level is 93%.  All other vitals are normal.  Says she is worse than yesterday.  Is having moderate difficulty breathing at rest.    Triaged to disposition on of Go to ED Now.    Gila Park RN  Triage Nurse Advisor    Reason for Disposition    MODERATE difficulty breathing (e.g., speaks in phrases, SOB even at rest, pulse 100-120)    Protocols used: PNEUMONIA ON ANTIBIOTIC FOLLOW-UP CALL-A-

## 2021-06-06 NOTE — PLAN OF CARE
Problem: Pain  Goal: Patient's pain/discomfort is manageable  Outcome: Progressing  Patient's pain has been manageable with fentanyl drip. Daily cares has met on this shift. PRN 50 mcg given of fentanyl at 14:05mg due to discomfort that led to elevated MAP (110 to 120) on this shift.    Problem: Nutrition Care Problem  Goal: Nutritional status is improving  Outcome: Progressing  Patient started back on tube feeding this morning running at 10 ml/hr and has been tolerating it well. Last residual at 12:00pm was 5 ml. She started today on IV 5 mg Reglan as scheduled.     Vital signs has been on patient parameters on this shift. Started today on metopolol as schedule twice a day and PRN hydralazine.    Blood sugar has been on normal range per insulin protocol. Running currently (3:15pm) at 1 U/kg/hr. Will continue to monitor.

## 2021-06-06 NOTE — PLAN OF CARE
Problem: Pain  Goal: Patient's pain/discomfort is manageable  Outcome: Progressing   Pain managed with scheduled Toradol and prn tylenol.  Problem: Inadequate Gas Exchange  Goal: Patient will achieve/maintain normal respiratory rate/effort  Outcome: Progressing   Lung sounds coarse crackles of left upper lobe. She needs more oxygen; increased from 4 to 6L. Short of breath with activity. Non productive cough. Pink tinged sputum x1. Continues with neb treatments.  Problem: Infection  Goal: Signs and symptoms of infections are decreased or avoided  Outcome: Progressing   On Levaquin and Flagyl. Afebrile.

## 2021-06-06 NOTE — PROGRESS NOTES
Pt oxygen needs continue to increase. Pt started at 35 lpm 70%. Pt was increased to 45 lpm 100% and pt continues to sat at 85%. House officer was called. Pt will be transferred to higher level of care on Bipap. RT will continue to monitor.    Paris Weaver, ALBERTT

## 2021-06-06 NOTE — PLAN OF CARE
Problem: Discharge Barriers  Goal: Patient's discharge needs are met  Outcome: Progressing     Goal: Patient s discharge needs are met.    Outcome: Care Progression reviewed with Hospitalist, Care Manager, & Charge RN.    Discharge Disposition: Discussed and plan to discharge to: Home     Planned Discharge Date: 2 days?    Problem: Barriers to discharge include: Medical progression, ID following     Transportation (Needs/Ride) Time: Family     Care Management Narrative: Writer spoke with pt and . Pt lives in a house with her . She feels supported by her family and they assist when needed. Pt does not use any medical equipment. Pt was driving before this hospitalization.    SMITH Holcomb

## 2021-06-06 NOTE — PLAN OF CARE
Problem: Mechanical Ventilation  Goal: Patient will maintain patent airway  Outcome: Progressing     Problem: Mechanical Ventilation  Goal: ET tube will be managed safely  3/15/2020 1421 by Annamaria Purdy LRT  Outcome: Progressing  3/15/2020 1420 by Annamaria Purdy LRT  Outcome: Progressing     Vent Mode: PCV/VG  FiO2 (%):  [40 %-100 %] 50 %  S RR:  [24-30] 30  S VT:  [205 mL-250 mL] 205 mL  PEEP/CPAP (cm H2O):  [14 cm H2O] 14 cm H2O  Minute Ventilation (L/min):  [5 L/min-6.6 L/min] 6.6 L/min  PIP:  [32 cm H2O-43 cm H2O] 32 cm H2O  MAP (cm H2O):  [20-24] 21     Pt. remains on full vent support, settings above, ETT 7.5, 21@teeth. BS coarse/ crackles , suctioning small to moderate amount of bloody secretions via ETT. PIP 32-40, plats 33-38 (MD aware),  Veletri switched back to full strength per MD order. Bronchoscopy was performed by Dr. Rock, BAL of RML was collected. RT following    PHYLLIS Hart

## 2021-06-06 NOTE — PLAN OF CARE
Problem: Inadequate Gas Exchange  Goal: Patient will achieve/maintain normal respiratory rate/effort  Outcome: Progressing   Tolerating longer breaks from BIPAP  Fio2 65% on hi belkis  continues tachypneic    Problem: Activity Intolerance/Impaired Mobility  Goal: Mobility/activity is maintained at optimum level for patient  Outcome: Progressing   Up to chair at bedside with BIPAP on  transitioned to hiflo cannula after activity    Problem: Excessive Fluid Volume  Goal: Patient will achieve/maintain normal respiratory rate/effort  Outcome: Progressing   Lasix given x1 this am  with good response.

## 2021-06-06 NOTE — PLAN OF CARE
Problem: Discharge Barriers  Goal: Patient's discharge needs are met  Outcome: Progressing     Goal: Patient s discharge needs are met.     Outcome: Care Progression reviewed with Hospitalist, Care Manager, & Charge RN.     Discharge Disposition: Discussed and plan to discharge to: TBD     Planned Discharge Date: TBD     Problem: Barriers to discharge include: Pt currently intubated     Transportation (Needs/Ride) Time: TBD    SMITH Holcomb

## 2021-06-06 NOTE — PROGRESS NOTES
CRITICAL CARE PROGRESS NOTE:    Assessment/Plan:  52-year-old female, former smoker, with past medical history significant for obesity, restless leg syndrome who apparently underwent colonoscopy back on 2/25/2020 and had an aspiration event.  Patient was started on clindamycin several days after that event.  There was no improvement with her symptoms and therefore she was admitted to the hospital. Intubated and bronch'd on 3/11 for worsening hypoxemic respiratory failure and CT c/w ARDS.     NEURO:  Therapeutic sedationon vent. Had normal MS prior to intubation    Cont propofol, fentanyl    RASS goal -3    Tylenol prn pain    Avoid benzo's if possible    Required paralysis x2 (vec pushes) since being intubated for vent dyssynchrony    Propofol labs OK tody, normal CK, LA and TG.    CV:  ciculatory shock. Likely hypovolemic vs. Vasodilatory from ?sepsis. No positive cultures. Capillary leak from ARDS possible.    MAP >65, wean NE as able. requirin very low dose NE@0.02 today    Lactate normal no need to trend    Echo 3/7 EF 63%, normal RV size/funcion, no pulm HTN, no RV strain, no shunt study done    Hold home anti-hypertensive for now    RESP:  Acute resp failure with hypoxemia, intubated 3/11 for worsening CT scan c/w ARDS. Presumed massive aspiration event sometime after colonoscopy 2/25, with progressive inlammation and pneumonitis. No positive cultures.    Cont LPV, high PEEP/low FiO2 protocol    Pplat checked at 1:15pm -> 31 with Vt 250cc (~6cc/kg IBW), DP 31 - 14 = 15 cm H2O, at goal, maintain for now    ABG q6h     Titrate FiO2 for goal O2 sat 88-92%, PaO2 55 mm Hg or greater    Permissive hypercapnia, last pH acceptable 7.32    ABG q8h, next at 12pm.    Reduce veletri to 1/2 strength today    Holding off on proning as she is way out from initial lung injury (>36 hours)    IV steroids 80mg methylpred for extensive ground glass component/alveolitis, today is day3 high dose steroids.  ?steroid response  "disease. May be helping. Continue for now. Had received several days of 40mg IV methylpred earlier in stay without improvement.     RADHA, ANCA, RF and HIV negative, no e/o if interstitial lung disease currently     GI:  High GVR's. AXR this AM no obstruction. May have developing ileus    Bowel regimen    PPI    Trickle feeds today, hold for GVR >500cc    RENAL:  Bump in SCr from aggressive diuresis. Low dose NE maintainin RPP     Cut back Lasix to 40mg IV daily from two times a day    Keep on dry side    Propofol labs (CK, TG, LA) nomal no e/o rhabdo/PIS    Clakr in place    Avoid nephrotixins    ID:  Presumed aspiration pneumonia with ARDS, has gotten many days of abx, cultures negative on BAL 3/11    Cont cefepime for now    Fu culture data    Check another PCT today if negative plan to d/c all abx    HEMATOLOGIC:  LUE DVT, bilateral PE\"s without RH strain. Marked leukocytosis from ?infection vs. Steroids vs. Stress.    Continue UFH drip    plt OK, cont to follow    hgb >7    ENDOCRINE:  Hyperglycemia due to high dose IV steroids and critical illness.    FSBG checks, insulin SS/drip per ICU protocol    Stop Lantus, ISS for now use insulin drip protocol.    Cont synthroid     ICU PROPHYLAXIS:    UFH drip    PPI    peridex    DISPO/CODE STATUS: full code    FAMILY COMMUNICATION: will update  later today    Lines/Drains/Tubes:  ETT  OG tube  Clark  Right brachial arterial line  Right femoral TLC, placed in crash situation on 3/11. Maintain for now, plan for PICC line at some point.   PICC line from [facility] w/ DVT was removed earlier this week.     Restraints  Progress Note  Restraint Application    I recognize that restraints are physical and/or chemical interventions intended to restrict a person's movements. Restraints are currently needed to ensure the safety of this patient and/or others. My clinical rationale appears below.    Category/Type of Restraint     Non Violent:  Soft limb restraint " "x2  --  Behavior  Pulling at tubes/lines  --  Root Cause of the Behavior  Sedation/intubation  --  Less-Restrictive Measures that Failed  Non Violent Measures:  Close Observation  --  Response to the Restraint  Patient unable to pull at tubes/lines  --  Criteria for Release from the Restraint  Patient calm and off sedation    Kirk Rock MD (Avi)  [facility]  Pager [phone]  Clinic [phone]      Overnight events:  She had alexandr vent dyssynchrony  Required one dose of vecuronium with improvement  FSBG climbing  GVR's>500, TF held.  Last P:F 173 on FS veletri, PEEP 14 and FiO2 0.6    Subjective:  Unable to assess    Objective:  Physical Exam:  Vent settings for last 24 hours:  Vent Mode: PCV/VG  FiO2 (%):  [50 %-65 %] 50 %  S RR:  [22-24] 24  S VT:  [280 mL] 280 mL  PEEP/CPAP (cm H2O):  [14 cm H2O] 14 cm H2O  Minute Ventilation (L/min):  [5.5 L/min-7.3 L/min] 6.7 L/min  PIP:  [31 cm H2O-38 cm H2O] 38 cm H2O  MAP (cm H2O):  [17-22] 22    /58 (Patient Position: Lying)   Pulse 63   Temp 98  F (36.7  C) (Oral)   Resp 24   Ht 4' 10\" (1.473 m)   Wt 143 lb 8 oz (65.1 kg)   SpO2 94%   BMI 29.99 kg/m      Intake/Output last 3 shifts:  I/O last 3 completed shifts:  In: 2838.5 [I.V.:1062.5; NG/GT:1457; IV Piggyback:319]  Out: 1900 [Urine:1900]  Intake/Output this shift:  I/O this shift:  In: 105.2 [NG/GT:30; IV Piggyback:75.2]  Out: 150 [Urine:150]    Physical Exam  Gen: intubated, sedated  HEENT: no OP lesions, no JESSICA  CV: RRR, no m/g/r  Resp: diminished anteriorly no wheezing.   Abd: soft, nontender, BS+  Neuro: PERRL, nonfocal  Ext: LUE edema improving    LAB:  Results from last 7 days   Lab Units 03/14/20  0511   LN-WHITE BLOOD CELL COUNT thou/uL 27.0*   LN-HEMOGLOBIN g/dL 9.0*   LN-HEMATOCRIT % 29.1*   LN-PLATELET COUNT thou/uL 190     Results from last 7 days   Lab Units 03/14/20  0511 03/13/20  0500 03/12/20  0453   LN-SODIUM mmol/L 141 141 139 "   LN-POTASSIUM mmol/L 3.5 4.8 4.2   LN-CHLORIDE mmol/L 102 106 103   LN-CO2 mmol/L 30 29 27   LN-BLOOD UREA NITROGEN mg/dL 35* 21 23*   LN-CREATININE mg/dL 1.33* 0.97 1.09   LN-CALCIUM mg/dL 8.6 8.7 8.6   LN-PROTEIN TOTAL g/dL  --  6.2  --    LN-BILIRUBIN TOTAL mg/dL  --  0.2  --    LN-ALKALINE PHOSPHATASE U/L  --  119  --    LN-ALT (SGPT) U/L  --  15  --    LN-AST (SGOT) U/L  --  13  --        Micro  PCT 0.82 on 3/2  Bronch/BAL cultures all negative, fungal/AFB pending.  Flu swab neg 3/6  C diff neg  Legionella neg    HIV neg  RADHA normal  RF neg  ANCA <1:20    Cytology from BAL 3/11  Final Diagnosis    A) LUNG, LEFT LOWER LOBE, BRONCHIOALVEOLAR LAVAGE:        1)  MILD CHRONIC INFLAMMATION WITHOUT INCREASED ACUTE INFLAMMATION        2)  NUMEROUS RESPIRATORY EPITHELIAL CELLS        3)  NEGATIVE FOR VIRAL INCLUSIONS, DYSPLASTIC AND MALIGNANT CELLS        4)  GMS STAINED SUREPATH SMEAR DEMONSTRATES OCCASIONAL BUDDING YEAST             AND PSEUDOHYPHAE CONSISTENT WITH ROSY SPECIES (POSSIBLE OROPHARYNGEAL             CONTAMINANT); NEGATIVE FOR PNEUMOCYSTIS     B) LUNG, RIGHT MIDDLE LOBE, BRONCHIOALVEOLAR LAVAGE:        1)  ABUNDANT ALVEOLAR MACROPHAGES, RESPIRATORY EPITHELIAL CELLS, MILD             BACKGROUND CHRONIC INFLAMMATION AND OCCASIONAL SQUAMOUS EPITHELIAL             CELLS        2)  NEGATIVE FOR VIRAL INCLUSIONS, DYSPLASTIC AND MALIGNANT CELLS        3)  CELL BLOCK SECTIONS AND GMS STAINED SMEARS DEMONSTRATE BUDDING YEAST             AND PSEUDOHYPHAE CONSISTENT WITH ROSY SPECIES (POSSIBLE OROPHARYNGEAL             CONTAMINANT); NEGATIVE FOR PNEUMOCYSTIS ORGANISMS          Current Facility-Administered Medications   Medication Dose Route Frequency Provider Last Rate Last Dose     acetaminophen solution 650 mg (TYLENOL)  650 mg Enteral Tube Q6H PRN Babar Leigh MD   650 mg at 03/12/20 1734     acetaminophen suppository 650 mg (TYLENOL)  650 mg Rectal Q4H PRN Meredith Enriquez, CNP   650  mg at 03/13/20 0205     albuterol nebulizer solution 2.5 mg (PROVENTIL)  2.5 mg Nebulization Q4H PRN Meredith Enriquez CNP   2.5 mg at 03/14/20 0224     albuterol nebulizer solution 2.5 mg (PROVENTIL)  2.5 mg Nebulization QID Meredith Enriquez CNP   2.5 mg at 03/14/20 0748     aluminum-magnesium hydroxide-simethicone 200-200-20 mg/5 mL suspension 30 mL (MAALOX ADVANCED)  30 mL Enteral Tube Q4H PRN Babar Leigh MD         benzonatate capsule 100 mg (TESSALON)  100 mg Oral TID PRN Wilmar Rich MD         bisacodyL suppository 10 mg (DULCOLAX)  10 mg Rectal Daily PRN Meredith Enriquez CNP   10 mg at 03/13/20 1255     buPROPion tablet 150 mg (WELLBUTRIN)  150 mg Enteral Tube BID Alba York CNP   150 mg at 03/14/20 0849     cefepime 2 g in NaCl 0.9 % 100 mL (MINI-BAG Plus) (MAXIPIME)  2 g Intravenous Q24H Kirk Rock MD         chlorhexidine 0.12 % solution 15 mL (PERIDEX)  15 mL Topical Q12H Kirk Rock MD   15 mL at 03/14/20 0419     citalopram tablet 40 mg (celeXA)  40 mg Enteral Tube QHS Babar Leigh MD   40 mg at 03/13/20 2113     codeine-guaiFENesin  mg/5 mL liquid 5 mL (GUAIFENESIN AC)  5 mL Oral Q4H PRN Jenni Quinteros CNP   5 mL at 03/11/20 0544     dextrose 10%  15 mL/hr Intravenous Continuous PRN Kirk Rock MD         dextrose 50 % (D50W) syringe 20-50 mL  20-50 mL Intravenous Q15 Min PRN Meredith Enriquez CNP         diphenhydrAMINE injection 25 mg (BENADRYL)  25 mg Intravenous Q6H PRN Alba York CNP   25 mg at 03/10/20 0014     docusate sodium 50 mg/5 mL oral liquid 100 mg (COLACE)  100 mg Enteral Tube BID Alba York, CNP   100 mg at 03/14/20 0849     epoprostenol 0.25 mg/50 mL (Half Strength) (VELETRI) inhalation solution  40,000 ng/hr Nebulization Continuous Kirk Rock MD 8 mL/hr at 03/14/20 0748 40,000 ng/hr at 03/14/20 0748     fentaNYL - BOLUS DOSE from infusion 50 mcg  50 mcg Intravenous Q1H PRN Kirk Rock MD   50 mcg  at 03/14/20 0216     fentaNYL 2500 mcg/50 mL CADD infusion (50 mcg/mL)   mcg/hr Intravenous Continuous Kirk Rock MD 3.5 mL/hr at 03/14/20 0802 175 mcg/hr at 03/14/20 0802     fentaNYL pf injection 100 mcg (SUBLIMAZE)  100 mcg Intravenous Once Kirk Rock MD         [START ON 3/15/2020] furosemide injection 40 mg (LASIX)  40 mg Intravenous DAILY Kirk Rock MD         glucagon (human recombinant) injection 1 mg  1 mg Subcutaneous Q15 Min PRN Meredith Enriquez CNP         heparin 25,000 units in 0.45% sodium chloride (100 units/ml) 250 mL ANTICOAGULANT infusion  1-50 Units/kg/hr Intravenous Continuous Kirk Rock MD 9.4 mL/hr at 03/14/20 0800 15 Units/kg/hr at 03/14/20 0800     hydrOXYzine pamoate capsule 25-50 mg (VISTARIL)  25-50 mg Enteral Tube Q4H PRN Babar Leigh MD         insulin regular 1 Units/mL in sodium chloride 0.9% 250 mL  0-24 Units/hr Intravenous Continuous PRN Kirk Rock MD         levothyroxine tablet 125 mcg (SYNTHROID, LEVOTHROID)  125 mcg Enteral Tube Daily 0600 Babar Leigh MD   125 mcg at 03/14/20 0647     lidocaine (PF) 10 mg/mL (1 %) injection 1-5 mL (XYLOCAINE-MPF)  1-5 mL Intradermal Once PRN Wilmar Rich MD         lidocaine 5 % ointment (XYLOCAINE)   Topical TID PRN Jenni Quinteros CNP         lipase-protease-amylase 5,000-17,000- 24,000 unit capsule 2 capsule (ZENPEP)  2 capsule Enteral Tube PRN Destini Trevino MD        And     sodium bicarbonate tablet 325 mg  325 mg Enteral Tube PRN Destini Trevino MD         loperamide capsule 2 mg (IMODIUM)  2 mg Enteral Tube QID PRN Babar Leigh MD         LORazepam 0.5 mg injection (diluted concentration)  0.5 mg Intravenous Q4H PRN Wilmar Rich MD   0.5 mg at 03/08/20 2004     magnesium hydroxide suspension 30 mL (MILK OF MAG)  30 mL Enteral Tube Daily PRN Babar Leigh MD         melatonin tablet 3 mg  3 mg Enteral Tube  Bedtime PRN Babar Leigh MD         methylPREDNISolone sod suc(PF) injection 80 mg (SOLU-MEDROL)  80 mg Intravenous Q8H Jenni Quinteros CNP   80 mg at 03/14/20 0849     midazolam (PF) injection 2 mg (VERSED)  2 mg Intravenous Q1H PRN Jenni Quinteros CNP   2 mg at 03/14/20 0215     morphine injection 2 mg  2 mg Intravenous Q4H PRN Wilmar Rich MD   2 mg at 03/11/20 1317     naloxone injection 0.2-0.4 mg (NARCAN)  0.2-0.4 mg Intravenous PRN Meredith Enriquez, AME        Or     naloxone injection 0.2-0.4 mg (NARCAN)  0.2-0.4 mg Intramuscular PRN Meredith Enriquez, AME         norepinephrine 4 mg/250 ml in NS (0.016 mg/ml)  0.01-0.4 mcg/kg/min Intravenous Continuous Kirk Rock MD   Stopped at 03/14/20 0915     ondansetron injection 4 mg (ZOFRAN)  4 mg Intravenous Q4H PRN Meredith Enriquez, CNP   4 mg at 03/07/20 0354    Or     ondansetron tablet 8 mg (ZOFRAN)  8 mg Oral Q8H PRN Meredith Enriquez, AME         oxymetazoline 0.05 % nasal spray 2 spray (AFRIN)  2 spray Each Nare BID PRN Maddi Hernandez MD         pantoprazole 40 mg injection  40 mg Intravenous Q24H Meredith Enriquez, CNP   40 mg at 03/13/20 2057     polyethylene glycol packet 17 g (MIRALAX)  17 g Enteral Tube BID PRN Babar Leigh MD         polyvinyl alcohol 1.4 % ophthalmic solution 1 drop (LIQUIFILM TEARS)  1 drop Both Eyes TID PRN Jenni Quinteros CNP   1 drop at 03/12/20 2056     pramipexole (MIRAPEX) tablet 0.75 mg  0.75 mg Enteral Tube QHS Babar Leigh MD   0.75 mg at 03/13/20 2113     propofoL injection (DIPRIVAN)  5-75 mcg/kg/min Intravenous Continuous Kirk Rock MD 24.6 mL/hr at 03/14/20 0941 65 mcg/kg/min at 03/14/20 0941     sennosides syrup 8.8 mg (for SENOKOT)  8.8 mg Enteral Tube DAILY Alba York, CNP   8.8 mg at 03/14/20 0849     sodium chloride 0.9%  10 mL/hr Intravenous Continuous Kirk Rock MD         sodium chloride bacteriostatic 0.9 % injection 1-5 mL  1-5 mL  Intradermal Once PRN Wilmar Rich MD         sodium chloride flush 10-20 mL (NS)  10-20 mL Intravenous PRN Wilmar Rich MD   20 mL at 03/14/20 0032     sodium chloride flush 10-30 mL (NS)  10-30 mL Intravenous PRN Wilmar Rich MD         sodium chloride flush 2.5 mL (NS)  2.5 mL Intravenous Line Care Meredith Enriquez, CNP   10 mL at 03/14/20 0850     sodium chloride flush 20 mL (NS)  20 mL Intravenous PRN Wilmar Rich MD         thiamine 100 mg in sodium chloride 0.9% 50 mL (vitamin B1)  100 mg Intravenous DAILY Destini Trevino  mL/hr at 03/14/20 0922 100 mg at 03/14/20 0922       Critical care attestation: 45 minutes spent managing the following issues: acute respiratory failure requiring intubation/IMV, ARDS due to massive aspiration with pneumonia and pneumonitis, circulatory shock requiring continuous vasopressor infusions, acute kidney injury. High risk for organ deterioration and death requiring ICU level care.

## 2021-06-06 NOTE — PROGRESS NOTES
CRITICAL CARE PROGRESS NOTE:    Assessment/Plan:  52-year-old female, former smoker, with past medical history significant for obesity, restless leg syndrome who apparently underwent colonoscopy back on 2/25/2020 and had an aspiration event.  Patient was started on clindamycin several days after that event.  There was no improvement with her symptoms and therefore she was admitted to the hospital. Intubated and bronch'd on 3/11 for worsening hypoxemic respiratory failure and CT c/w ARDS.     NEURO:  Therapeutic sedationon vent. Had normal MS prior to intubation    Cont propofol, fentanyl    RASS goal -3    Tylenol prn pain    Avoid benzo's if possible    Required paralysis x2 (vec pushes) since being intubated for vent dyssynchrony, seems OK now. Holding off on paralysis for now.     Propofol labs OK 3/13, normal CK, LA and TG.    CV:  ciculatory shock. Likely hypovolemic vs. Vasodilatory from ?sepsis. No positive cultures. Capillary leak from ARDS possible. On and off low dose. May be due to sedation as well.     MAP >65, wean NE as able    Lactate normal no need to trend    Echo 3/7 EF 63%, normal RV size/funcion, no pulm HTN, no RV strain, no shunt study done    Hold home anti-hypertensive for now    RESP:  Acute resp failure with hypoxemia, intubated 3/11 for worsening CT scan c/w ARDS. Presumed massive aspiration event sometime after colonoscopy 2/25, with progressive inlammation and pneumonitis. No positive cultures. Bronch 3/11 no mucus plugs. Bronch 3/15 no mucus plugs, diffuse airway inflammation/friabiliaty, BAL done in L x2 NO evidence of DAH.     Cont LPV, high PEEP/low FiO2 protocol, current 0.6/14 and trying to wean down FiO2 after bronch.    Pplat still above goal, last was 36. Reduced Vt to 205cc (~5cc/kg IBW)    ABG q6h , next at noon.    Titrate FiO2 for goal O2 sat 88-92%, PaO2 55 mm Hg or greater    Permissive hypercapnia, last pH acceptable 7.24, continue to follow    Veletri back up to FS  "today, oxygenation still tenuous.     Holding off on proning as she is way out from initial lung injury (>36 hours)    IV steroids 80mg methylpred for extensive ground glass component/alveolitis, today is day 4.    RADHA, ANCA, RF and HIV negative, no e/o if interstitial lung disease currently. No e/o DAH on bronch 3/15.     GI:  High GVR's a few days ago, improved. AXR 3/14 no obstruction. May have developing ileus. Tolerating trophic feeds.     Bowel regimen    PPI    Advance TF as tolerated.     RENAL:  Bump in SCr from aggressive diuresis. Low dose NE maintainin RPP. Scr still up today.    Continue Lasix 40mg IV daily to keep on dry side.     Propofol labs (CK, TG, LA) nomal on 3/13, no e/o rhabdo/PIS    Clark in place    Avoid nephrotixins    ID:  Presumed aspiration pneumonia with ARDS, has gotten many days of abx, cultures negative on BAL 3/11. Bronch 3/15 (2nd one) c/w diffuse infectious/inflammatory process with some secretions, no mucus plugs, secretions were not thick or copious but airways friable and erythematous. Wbc still up at 25K but improved, no fever, could be from steroids.     Stopping cefepime, off all abx 3/15. Has gotten almost 2 weeks of IV abx.     F/u new bronch/BAL cultures from 3/15.     Fu culture data    PCT still mildly up but lower than 3/2.     HEMATOLOGIC:  LUE DVT around PICC (since removed), right subclavian thrombus and looked like there was a thrombus in RIJ on bedside US today (was doing an US prior to line palcement). bilateral PE\"s without RH strain. Marked leukocytosis from ?infection vs. Steroids vs. Stress.    Continue UFH drip    plt OK, cont to follow    hgb >7    ENDOCRINE:  Hyperglycemia due to high dose IV steroids and critical illness.    FSBG checks, insulin SS/drip per ICU protocol    Stop Lantus, ISS for now use insulin drip protocol.    Cont synthroid     ICU PROPHYLAXIS:    UFH drip    PPI    peridex    DISPO/CODE STATUS: full code    FAMILY COMMUNICATION: " "updated  Brian multiple times today.     Lines/Drains/Tubes:  ETT  OG tube  Clark  Right brachial arterial line  Right femoral TLC - REMOVE today.  Left IJ TLC placed 3/15 AM  PICC line from EMILY stein w/ DVT was removed earlier this week.     Restraints  Progress Note  Restraint Application    I recognize that restraints are physical and/or chemical interventions intended to restrict a person's movements. Restraints are currently needed to ensure the safety of this patient and/or others. My clinical rationale appears below.    Category/Type of Restraint     Non Violent:  Soft limb restraint x2  --  Behavior  Pulling at tubes/lines  --  Root Cause of the Behavior  Sedation/intubation  --  Less-Restrictive Measures that Failed  Non Violent Measures:  Close Observation  --  Response to the Restraint  Patient unable to pull at tubes/lines  --  Criteria for Release from the Restraint  Patient calm and off sedation    Kirk Mobley) MD Pranav  [facility] Pulmonary & Critical Care  Pager [phone]  Clinic [phone]      Overnight events:  No major issues overnight  SCr still climbing. UOP is good with daily Lasix.  Having some bloody secretions per RN  Repeat bronch, new left IJ TLC placed this AM see procedure notes.  hgb stable  P:F 170 this AM  Veletri incr back to FS this AM  FiO2 1.0 -> 0.8 -> 0.6 after bronch (placed on 100% for bronch)  PEEP 14  Vt now 205 cc (~5cc/kg IBW)  Last pH 7.24 -> RR bumped up to 30bpm    Subjective:  Unable to assess    Objective:  Physical Exam:  Vent settings for last 24 hours:  Vent Mode: PCV/VG  FiO2 (%):  [40 %-100 %] 80 %  S RR:  [24-30] 30  S VT:  [205 mL-280 mL] 205 mL  PEEP/CPAP (cm H2O):  [14 cm H2O] 14 cm H2O  Minute Ventilation (L/min):  [5 L/min-6.7 L/min] 6 L/min  PIP:  [38 cm H2O-43 cm H2O] 38 cm H2O  MAP (cm H2O):  [20-24] 23    /63 (Patient Position: Lying)   Pulse 62   Temp 98.9  F (37.2  C) (Oral)   Resp (!) 30   Ht 4' 10\" " (1.473 m)   Wt 146 lb 8 oz (66.5 kg)   SpO2 92%   BMI 30.62 kg/m      Intake/Output last 3 shifts:  I/O last 3 completed shifts:  In: 2238.9 [I.V.:990; NG/GT:1069; IV Piggyback:180]  Out: 1392 [Urine:1392]  Intake/Output this shift:  I/O this shift:  In: 150 [NG/GT:150]  Out: 300 [Urine:300]    Physical Exam  Gen: intubated, sedated  HEENT: no OP lesions, no JESSICA  CV: RRR, no m/g/r  Resp: diminished anteriorly no wheezing.   Abd: soft, nontender, BS+  Neuro: PERRL, nonfocal  Ext: LUE edema improving    LAB:  Results from last 7 days   Lab Units 03/15/20  0623   LN-WHITE BLOOD CELL COUNT thou/uL 25.1*   LN-HEMOGLOBIN g/dL 9.1*   LN-HEMATOCRIT % 29.9*   LN-PLATELET COUNT thou/uL 199     Results from last 7 days   Lab Units 03/15/20  0623 03/14/20  1438 03/14/20  0511 03/13/20  0500   LN-SODIUM mmol/L 138  --  141 141   LN-POTASSIUM mmol/L 5.1* 4.1 3.5 4.8   LN-CHLORIDE mmol/L 101  --  102 106   LN-CO2 mmol/L 28  --  30 29   LN-BLOOD UREA NITROGEN mg/dL 55*  --  35* 21   LN-CREATININE mg/dL 1.66*  --  1.33* 0.97   LN-CALCIUM mg/dL 9.3  --  8.6 8.7   LN-PROTEIN TOTAL g/dL  --   --   --  6.2   LN-BILIRUBIN TOTAL mg/dL  --   --   --  0.2   LN-ALKALINE PHOSPHATASE U/L  --   --   --  119   LN-ALT (SGPT) U/L  --   --   --  15   LN-AST (SGOT) U/L  --   --   --  13       Micro  PCT 0.82 on 3/2 -> 0.66 3/14  Bronch/BAL cultures all negative, fungal/AFB pending.  Flu swab neg 3/6  C diff neg  Legionella neg    HIV neg  RADHA normal  RF neg  ANCA <1:20  ESR 58  CRP 29 -> 22 -> 10 -> 13    Cytology from BAL 3/11  Final Diagnosis    A) LUNG, LEFT LOWER LOBE, BRONCHIOALVEOLAR LAVAGE:        1)  MILD CHRONIC INFLAMMATION WITHOUT INCREASED ACUTE INFLAMMATION        2)  NUMEROUS RESPIRATORY EPITHELIAL CELLS        3)  NEGATIVE FOR VIRAL INCLUSIONS, DYSPLASTIC AND MALIGNANT CELLS        4)  GMS STAINED SUREPATH SMEAR DEMONSTRATES OCCASIONAL BUDDING YEAST             AND PSEUDOHYPHAE CONSISTENT WITH ROSY SPECIES (POSSIBLE  OROPHARYNGEAL             CONTAMINANT); NEGATIVE FOR PNEUMOCYSTIS     B) LUNG, RIGHT MIDDLE LOBE, BRONCHIOALVEOLAR LAVAGE:        1)  ABUNDANT ALVEOLAR MACROPHAGES, RESPIRATORY EPITHELIAL CELLS, MILD             BACKGROUND CHRONIC INFLAMMATION AND OCCASIONAL SQUAMOUS EPITHELIAL             CELLS        2)  NEGATIVE FOR VIRAL INCLUSIONS, DYSPLASTIC AND MALIGNANT CELLS        3)  CELL BLOCK SECTIONS AND GMS STAINED SMEARS DEMONSTRATE BUDDING YEAST             AND PSEUDOHYPHAE CONSISTENT WITH ROSY SPECIES (POSSIBLE OROPHARYNGEAL             CONTAMINANT); NEGATIVE FOR PNEUMOCYSTIS ORGANISMS          Current Facility-Administered Medications   Medication Dose Route Frequency Provider Last Rate Last Dose     acetaminophen solution 650 mg (TYLENOL)  650 mg Enteral Tube Q6H PRN Babar Leigh MD   650 mg at 03/12/20 1734     acetaminophen suppository 650 mg (TYLENOL)  650 mg Rectal Q4H PRN Meredith Enriquez, CNP   650 mg at 03/13/20 0205     albuterol nebulizer solution 2.5 mg (PROVENTIL)  2.5 mg Nebulization Q4H PRN Meredith Enriquez., CNP   2.5 mg at 03/14/20 0224     albuterol nebulizer solution 2.5 mg (PROVENTIL)  2.5 mg Nebulization QID Meredith Enriquez., CNP   2.5 mg at 03/15/20 0714     aluminum-magnesium hydroxide-simethicone 200-200-20 mg/5 mL suspension 30 mL (MAALOX ADVANCED)  30 mL Enteral Tube Q4H PRN Babar Leigh MD         benzonatate capsule 100 mg (TESSALON)  100 mg Oral TID PRN Wilmar Rich MD         bisacodyL suppository 10 mg (DULCOLAX)  10 mg Rectal Daily PRN Meredith Enriquez CNP   10 mg at 03/13/20 1255     buPROPion tablet 150 mg (WELLBUTRIN)  150 mg Enteral Tube BID Alba York CNP   150 mg at 03/15/20 0825     chlorhexidine 0.12 % solution 15 mL (PERIDEX)  15 mL Topical Q12H Kirk Rock MD   15 mL at 03/15/20 1803     citalopram tablet 40 mg (celeXA)  40 mg Enteral Tube QHS Babar Leigh MD   40 mg at 03/14/20 1332      codeine-guaiFENesin  mg/5 mL liquid 5 mL (GUAIFENESIN AC)  5 mL Oral Q4H PRN Jenni Quinteros CNP   5 mL at 03/11/20 0544     dextrose 10%  15 mL/hr Intravenous Continuous PRN Kirk Rock MD         dextrose 50 % (D50W) syringe 20-50 mL  20-50 mL Intravenous Q15 Min PRN Meredith Enriquez CNP         diphenhydrAMINE injection 25 mg (BENADRYL)  25 mg Intravenous Q6H PRN Alba York CNP   25 mg at 03/10/20 0014     docusate sodium 50 mg/5 mL oral liquid 100 mg (COLACE)  100 mg Enteral Tube BID Alba York CNP   100 mg at 03/15/20 0825     epoprostenol 0.5 mg/50 mL (Full Strength) (VELETRI) inhalation solution  80,000 ng/hr Nebulization Continuous Kirk Rock MD 8 mL/hr at 03/15/20 0959 80,000 ng/hr at 03/15/20 0959     fentaNYL - BOLUS DOSE from infusion 50 mcg  50 mcg Intravenous Q1H PRN Kirk Rock MD   50 mcg at 03/14/20 0216     fentaNYL 2500 mcg/50 mL CADD infusion (50 mcg/mL)   mcg/hr Intravenous Continuous Kirk Rock MD 3.5 mL/hr at 03/15/20 0800 175 mcg/hr at 03/15/20 0800     fentaNYL pf injection 100 mcg (SUBLIMAZE)  100 mcg Intravenous Once Kirk Rock MD         furosemide injection 40 mg (LASIX)  40 mg Intravenous DAILY Kirk Rock MD   40 mg at 03/15/20 0821     glucagon (human recombinant) injection 1 mg  1 mg Subcutaneous Q15 Min PRN Meredith Enriquez CNP         heparin 25,000 units in 0.45% sodium chloride (100 units/ml) 250 mL ANTICOAGULANT infusion  1-50 Units/kg/hr Intravenous Continuous Kirk Rock MD   Stopped at 03/15/20 0938     hydrOXYzine pamoate capsule 25-50 mg (VISTARIL)  25-50 mg Enteral Tube Q4H PRN Babar Leigh MD         insulin regular 1 Units/mL in sodium chloride 0.9% 250 mL  0-24 Units/hr Intravenous Continuous PRN Kirk Rock MD 1 mL/hr at 03/15/20 0848 1 Units/hr at 03/15/20 0848     levothyroxine tablet 125 mcg (SYNTHROID, LEVOTHROID)  125 mcg Enteral Tube Daily 0600 Babar Leigh MD   125 mcg  at 03/15/20 0626     lidocaine (PF) 10 mg/mL (1 %) injection 1-5 mL (XYLOCAINE-MPF)  1-5 mL Intradermal Once PRN Wilmar Rich MD         lidocaine 5 % ointment (XYLOCAINE)   Topical TID PRN Jenni Quinteros CNP         lipase-protease-amylase 5,000-17,000- 24,000 unit capsule 2 capsule (ZENPEP)  2 capsule Enteral Tube PRN Destini Trevino MD        And     sodium bicarbonate tablet 325 mg  325 mg Enteral Tube PRN Destini Trevino MD         loperamide capsule 2 mg (IMODIUM)  2 mg Enteral Tube QID PRN Babra Leigh MD         LORazepam 0.5 mg injection (diluted concentration)  0.5 mg Intravenous Q4H PRN Wilmar Rich MD   0.5 mg at 03/08/20 2004     magnesium hydroxide suspension 30 mL (MILK OF MAG)  30 mL Enteral Tube Daily PRN Babar Leigh MD         melatonin tablet 3 mg  3 mg Enteral Tube Bedtime PRN Babar Leigh MD         methylPREDNISolone sod suc(PF) injection 80 mg (SOLU-MEDROL)  80 mg Intravenous Q8H Jenni Qiunteros CNP   80 mg at 03/15/20 0821     midazolam (PF) injection 2 mg (VERSED)  2 mg Intravenous Q1H PRN Jenni Quinteros CNP   2 mg at 03/14/20 0215     morphine injection 2 mg  2 mg Intravenous Q4H PRN Wilmar Rich MD   2 mg at 03/11/20 1317     naloxone injection 0.2-0.4 mg (NARCAN)  0.2-0.4 mg Intravenous PRN Meredith Enriquez CNP        Or     naloxone injection 0.2-0.4 mg (NARCAN)  0.2-0.4 mg Intramuscular PRN Meredith Enriquez CNP         norepinephrine 4 mg/250 ml in NS (0.016 mg/ml)  0.01-0.4 mcg/kg/min Intravenous Continuous Kirk Rock MD   Stopped at 03/14/20 1837     ondansetron injection 4 mg (ZOFRAN)  4 mg Intravenous Q4H PRN Meredith Enriquez, CNP   4 mg at 03/07/20 0354    Or     ondansetron tablet 8 mg (ZOFRAN)  8 mg Oral Q8H PRN Meredith Enriquez, AME         oxymetazoline 0.05 % nasal spray 2 spray (AFRIN)  2 spray Each Nare BID PRN Maddi Hernandez MD         pantoprazole 40 mg injection  40 mg  Intravenous Q24H Meredith Enriquez CNP   40 mg at 03/14/20 2128     polyethylene glycol packet 17 g (MIRALAX)  17 g Enteral Tube BID PRN Babar Leigh MD         polyvinyl alcohol 1.4 % ophthalmic solution 1 drop (LIQUIFILM TEARS)  1 drop Both Eyes TID PRN Jenni Quinteros CNP   1 drop at 03/12/20 2056     pramipexole (MIRAPEX) tablet 0.75 mg  0.75 mg Enteral Tube QHS Babar Leigh MD   0.75 mg at 03/14/20 2125     propofoL injection (DIPRIVAN)  5-75 mcg/kg/min Intravenous Continuous Kirk Rock MD 24.6 mL/hr at 03/15/20 1037 65 mcg/kg/min at 03/15/20 1037     sennosides syrup 8.8 mg (for SENOKOT)  8.8 mg Enteral Tube DAILY Alba York CNP   8.8 mg at 03/15/20 0821     sodium chloride 0.9%  10 mL/hr Intravenous Continuous Kirk Rock MD 10 mL/hr at 03/14/20 2000 10 mL/hr at 03/14/20 2000     sodium chloride bacteriostatic 0.9 % injection 1-5 mL  1-5 mL Intradermal Once PRN Wilmar Rich MD         sodium chloride flush 10-20 mL (NS)  10-20 mL Intravenous PRN Wilmar Rich MD   20 mL at 03/14/20 0032     sodium chloride flush 10-30 mL (NS)  10-30 mL Intravenous PRN Wilmar Rich MD         sodium chloride flush 2.5 mL (NS)  2.5 mL Intravenous Line Care Meredith Enriquez CNP   10 mL at 03/15/20 0821     sodium chloride flush 20 mL (NS)  20 mL Intravenous PRN Wilmar Rich MD         thiamine 100 mg in sodium chloride 0.9% 50 mL (vitamin B1)  100 mg Intravenous DAILY Destini Trevino  mL/hr at 03/15/20 0821 100 mg at 03/15/20 0821       Critical care attestation: 45 minutes spent managing the following issues: acute respiratory failure requiring intubation/IMV, ARDS due to massive aspiration with pneumonia and pneumonitis, circulatory shock requiring continuous vasopressor infusions, acute kidney injury. High risk for organ deterioration and death requiring ICU level care.

## 2021-06-06 NOTE — PLAN OF CARE
Problem: Mechanical Ventilation  Goal: Patient will maintain patent airway  Outcome: Progressing     Problem: Mechanical Ventilation  Goal: ET tube will be managed safely  Outcome: Progressing     Problem: Inadequate Airway Clearance  Goal: Patient will achieve/maintain normal respiratory rate/effort  Outcome: Progressing     Problem: Inadequate Gas Exchange  Goal: Patient is adequately oxygenated and ventilation is improved  Outcome: Progressing     Maintain oxygen saturation of 88%-92%.    Patient remains on ventilator setting of PCV-VG 24, weaned VT    to 205 from 250 at 0619, +14, weaned FIO2 to 40% per CNP after ABG. PIP 40/43. Plateau pressure 36, RR 23/24, intrinsic peep +3, Spo2 90%/93%. She is on half-strength Velitri continuous neb. Albuterol neb given x 1 on evening shift. HR 61/66. Breath sounds coarse. Suctioning moderate amounts of thick, pink-tinged secretions. 7.5 ETT/21 cm at teeth, re-positioning Q2H per RT/RN.

## 2021-06-06 NOTE — PROGRESS NOTES
[facility] [place]GRESS [place]    Assessment/Plan  Principal Problem:    Aspiration pneumonia of left lower lobe due to gastric secretions (H)  Active Problems:    Hypokalemia    Former smoker - quit in 2014    Anxiety    Hypothyroidism    Metabolic syndrome    Aspiration pneumonia due to inhalation of vomitus (H)    Diarrhea of presumed infectious origin    Hypotension, unspecified hypotension type    Acute respiratory failure with hypoxia (H)    54 y.o. old female with obesity, MDD, anxiety, restless leg syndrome, former smoker , migraine headaches, insomnia admitted from urgency center after failing outpatient treatment with clindamycin for aspiration pneumonia.  Patient aspirated on 2/25 while laying on her left side during a screening colonoscopy.  Reports following prep and NPO. Patient transferred to ICU lat night and intensivist now managing      Acute resp failure increased O2 needs and transferred to ICU last night   - pulm consulted and following along discussed case with Dr. Rich continue Bipap and Intensivist managing   - continue steroids  - IV lasix and Diuril given   - echo limited done today   - also new dx of JANAE and patient still needs to get her CPAP   - likely significant irritation and inflammation from aspiration vs underlying lung disease with h/o smoking   - sputum cx   -repeat influenza neg  - legionella pending   - no intubation at this time, but guarded and intensivist managing now     Aspiration pneumonia Left lower and upper lobes Due to gastric secretions during colonoscopy, failed outpatient clindamycin took 6 doses, was initially placed on -  bronchial hygiene with flutter valve and alb neb four times a day and keep scheduled for now and reassess in am, coarse BS and crackles and with low O2 sats  - pleuritic pain improving with  toradol scheduled and as above bronchial hygiene with nebs  - reviewed with Pulm and aspiration of gastric acid and significant inflammation and irritation will take  some time to improve and nothing further to rec.   - IV solumedrol yesterday on 3/4 and 3/5 and changed to PO 3/6  - worsening symptoms and had pulm see patient and reviewed with Dr Rich and change abx regiment and use cefepime and continue levaquin, flagyl stopped, PCN allg unable to use zosyn     - lactic normal   - repeat CXR in am   - SLP seen no signs of aspiration   - PPI at at bedtime and HOB > 30   - WBC checked but received IV steroids and now 32 was down to 9 on 3/4 and steroids were started on this day      Hypothyroidism  - TSH, 9.07 on 1/31, previously 28 12/2019> Rechecked here TSH  9.86   -on 112mcg, dose increased to 125mcg, recheck TSH in 4-6 weeks     Former smoker   -Patient smoke-free for over 5 years      Diarrhea having liquid stools and was on clindamycin no further complaints   - sent stool for Cdiff negative and stool cx NGTD  - immodium prn   - now NPO     Anxiety/MDD  -Stable on home medications hold Wellbutrin and Celexa while on bipap      Restless leg syndrome  -Seems to be controlled on Mirapex continue this when able to take PO      Metabolic syndrome  -takes phentermine and Glucophage only has medications for the past 1 week  -We will hold these medications for now     Hypokalemia   -replacement protocol     JANAE  - recommend patient get her CPAP fro her sleep clinic       VTE prophylaxis: SQ Lovenox    Diet: NPO  Drains/tubes: moreno and PICC   Weight bearing restrictions: WBAT  Disposition/Barriers to discharge: reassess in am    Full Code    Objective  Vital signs in last 24 hours  Vitals:    03/07/20 1400   BP: 113/81   Pulse: 79   Resp: (!) 42   Temp:    SpO2: 97%     Wt Readings from Last 1 Encounters:   03/07/20 0410 151 lb 14.4 oz (68.9 kg)   03/06/20 1435 152 lb 8 oz (69.2 kg)   03/05/20 1908 154 lb (69.9 kg)   03/01/20 1702 147 lb 4.3 oz (66.8 kg)   Weight change: -1 lb 8 oz (-0.68 kg)  Body mass index is 31.21 kg/m .  Intake/Output this shift:    Intake/Output Summary  (Last 24 hours) at 3/7/2020 1521  Last data filed at 3/7/2020 1400  Gross per 24 hour   Intake 550 ml   Output 2675 ml   Net -2125 ml     I/O last 3 completed shifts:  In: 550 [P.O.:240; IV Piggyback:310]  Out: 2675 [Urine:2675]  Current Medications    albuterol  2.5 mg Nebulization QID     benzonatate  100 mg Oral TID     buPROPion  150 mg Oral BID     cefepime  2 g Intravenous Q8H     citalopram  40 mg Oral QHS     enoxaparin ANTICOAGULANT  40 mg Subcutaneous Q24H     insulin aspart (NovoLOG) injection   Subcutaneous Q4H FIXED TIMES     levoFLOXacin  500 mg Intravenous Q24H     levothyroxine  125 mcg Oral Daily 0600     lidocaine   Topical TID     [Held by provider] metFORMIN  500 mg Oral DAILY     methylPREDNISolone sodium succinate  40 mg Intravenous Q24H     omeprazole  20 mg Oral QHS     potassium chloride  10 mEq Intravenous Q1H     pramipexole  0.75 mg Oral QHS     sodium chloride  10-30 mL Intravenous Q8H FIXED TIMES     sodium chloride  2.5 mL Intravenous Line Care       acetaminophen, acetaminophen, albuterol **AND** Nebulizer treatment intermittent, aluminum-magnesium hydroxide-simethicone, bisacodyL, codeine-guaiFENesin, dextrose 50 % (D50W), glucagon (human recombinant), hydrOXYzine pamoate, lidocaine (PF), loperamide, LORazepam, magnesium hydroxide, melatonin, morphine  injection, naloxone **OR** naloxone, ondansetron **OR** ondansetron, polyethylene glycol, sodium chloride bacteriostatic, sodium chloride, sodium chloride, sodium chloride  Pertinent Labs   Results from last 7 days   Lab Units 03/07/20  0519 03/06/20  0613 03/04/20  0604 03/03/20  0434   LN-WHITE BLOOD CELL COUNT thou/uL 25.2* 32.1* 9.8 10.6   LN-HEMOGLOBIN g/dL 10.7*  --  9.9* 10.1*   LN-HEMATOCRIT % 33.5*  --  30.4* 30.5*   LN-PLATELET COUNT thou/uL 464*  --  319 280       Results from last 7 days   Lab Units 03/07/20  0519 03/05/20  0850 03/04/20  0604  03/02/20  0535   LN-SODIUM mmol/L 140 138 139  --  139   LN-POTASSIUM mmol/L 3.6  3.8 4.0   < > 3.4*   LN-CHLORIDE mmol/L 101 104 107  --  105   LN-CO2 mmol/L 30 26 24  --  26   LN-BLOOD UREA NITROGEN mg/dL 25* 14 11  --  13   LN-CREATININE mg/dL 0.84 0.72 0.72  --  0.78   LN-CALCIUM mg/dL 8.7 8.8 8.7  --  8.0*   LN-ALBUMIN g/dL  --   --   --   --  2.3*   LN-PROTEIN TOTAL g/dL  --   --   --   --  5.0*   LN-BILIRUBIN TOTAL mg/dL  --   --   --   --  0.5   LN-ALKALINE PHOSPHATASE U/L  --   --   --   --  253*   LN-ALT (SGPT) U/L  --   --   --   --  41   LN-AST (SGOT) U/L  --   --   --   --  31    < > = values in this interval not displayed.             Destini Trevino MD.   [facility]   [phone]   Pager [phone]

## 2021-06-06 NOTE — PROGRESS NOTES
"/67   Pulse 66   Temp 99.6  F (37.6  C) (Oral)   Resp 20   Ht 4' 10\" (1.473 m)   Wt 139 lb 15.9 oz (63.5 kg)   SpO2 99%   BMI 29.26 kg/m      Vent Mode: PCV/VG  FiO2 (%):  [70 %-100 %] 70 %  S RR:  [18-20] 20  S VT:  [300 mL] 300 mL  PEEP/CPAP (cm H2O):  [10 cm H2O-12 cm H2O] 12 cm H2O  Minute Ventilation (L/min):  [5.4 L/min-7.4 L/min] 7.2 L/min  PIP:  [15 cm H2O-34 cm H2O] 28 cm H2O  MAP (cm H2O):  [10-15] 15    Pt remains on full vent support. Settings are shown above. Veletri is running continuously and more than half still remains. BS were coarse bilaterally. RT will continue to follow.   "

## 2021-06-06 NOTE — PROGRESS NOTES
Intensivist update    US reviewed, DVT around PICC and in subclavian vein.  Will start UFH drip, remove the PICC line.    Kirk (Pato) MD Pranav  [facility] Pulmonary & Critical Care  Pager [phone]  Clinic [phone]

## 2021-06-06 NOTE — PLAN OF CARE
Problem: Knowledge Deficit  Goal: Patient/family/caregiver demonstrates understanding of disease process, treatment plan, medications, and discharge instructions  Outcome: Progressing   Educated pt & pts spouse on treatment plan, both parties voiced understanding.     Problem: Activity Intolerance/Impaired Mobility  Goal: Mobility/activity is maintained at optimum level for patient  Outcome: Progressing   Pt independent in room.

## 2021-06-06 NOTE — PLAN OF CARE
Problem: Pain  Goal: Patient's pain/discomfort is manageable  Outcome: Progressing controlled with a fentanyl gtt.  Appears to be resting comfortably.  Problem: Safety  Goal: Patient will be injury free during hospitalization  Outcome: Progressing:  Has remained safe on this shift.  1:1 RN while on paralytic.  Problem: Daily Care  Goal: Daily care needs are met  Outcome: Progressing:  Pt 's ABG's continue to require vent changes per med orders.   here on evenings and received update and all questions answered.

## 2021-06-06 NOTE — SIGNIFICANT EVENT
Clark catheter very positional. Flushed with 30cc and 60cc. No resistance flushing in but no urine return. Changed Clark cath.

## 2021-06-06 NOTE — PLAN OF CARE
Problem: Knowledge Deficit  Goal: Patient/family/caregiver demonstrates understanding of disease process, treatment plan, medications, and discharge instructions  Outcome: Progressing   POC discussed, pt verbalizes understanding. Pt states breathing feels improved today. However requiring more oxygen. Attempted to wean oxygen but pt did not tolerate well and felt more shortness of breath at rest and requesting to increase oxygen. Pt currently on 3L/NC. Pt started on IV steroids X2, continues on po abx, nebs and pulmonary toilet. Lung sounds diminished with some coarse breath sounds to LLL. Prn cough syrup with codeine helpful. Will continue with current interventions.

## 2021-06-06 NOTE — PLAN OF CARE
Problem: Potential for transmission of organism by Contact, Enteric, Droplet and/or Airborne routes  Goal: Prevent transmission of organisms    Outcome: Completed  Pt has been removed from droplet/contact precautions  Ruma Carrasquillo

## 2021-06-06 NOTE — PROCEDURES
"Procedure Note - Bronchoscopy    Procedure:  Bronchoscopy, Diagnostic  Bronchoalveolar lavage, BAL    Pre-Operative Diagnosis: ?hemorrhage, respiratory failure, pneumonia    Post-Operative Diagnosis: Same    Indication: as above    Anesthesia/Sedation: propofol, fentanyl.   Versed, fentanyl pushes for add'l sedation    Risk assessment for airborne pathogens: The clinical data was carefully considered for risk of airborne pathogens, including tuberculosis. Clinical suspicion was sufficently low, that this procedure could be safely performed in a room without negative-pressure flow    Procedure Details: The intended procedure, risks, and alternatives were discussed with the patient's  and informed consent was obtained. \"Pause for the cause\" was utilized to identify correct patient and intended procedure. The bronchocope was inserted into the main airway via the endotracheal tube.Continuous oximetry monitoring throughout the procedure.    Findings: ET tube in low position, near main johnnie.  The airways were diffusely erythematous and friable consistent with infections vs. Inflammatory process.  There were a few secretions in the RML lobe and LLL that were removed easily with suctioning.  A BAL was performed in the RML x2, 50cc NS instilled and approx 20-30cc return. The fluid became progressively more clear with the second lavage, thus this is NOT consistent with DAH.  The first aliquot was slightly blood tinged with a few mucus plugs noted.    Specimens: cultures, gram stain (routine, AFB, fungal, viral, legionella).     Patient tolerated the procedure well. O2 sat was 97% on 1.0 FiO2 and pEEP 14.  Kirk Rock MD    "

## 2021-06-06 NOTE — PROGRESS NOTES
Brief update    Worse today and almost had to get intubated again this morning    Intensivist managing primarily, appreciate assistance    I visited briefly with patient, she is satisfied with cares    Did ask [facility] to screen her.    Maddi Hernandez MD  [facility] Medicine Service

## 2021-06-06 NOTE — PLAN OF CARE
Problem: Inadequate Gas Exchange  Goal: Patient will achieve/maintain normal respiratory rate/effort  Outcome: Progressing  Goal: Patient is adequately oxygenated and ventilation is improved  Outcome: Progressing   Attempted to wean from 100% FiO2, pt did not tolerate and is back on 100% FiO2.     Problem: Activity Intolerance/Impaired Mobility  Goal: Mobility/activity is maintained at optimum level for patient  Outcome: Progressing   Pt did not tolerate turns in bed- would start to cough and desat. Micro turns only.   Problem: Urinary Incontinence  Goal: Perineal skin integrity is maintained or improved  Outcome: Progressing   Clark in place, decreased output this shift, NP aware. Additional lasix was given.     Problem: Psychosocial Needs  Goal: Demonstrates ability to cope with hospitalization/illness  Outcome: Progressing  Goal: Collaborate with patient/family/caregiver to identify patient specific goals for this hospitalization  Outcome: Progressing   Pt difficult to sedate this shift, increased both fentanyl and propofol- next shift will start precedex and PRN versed. Pt would seem comfortable, but during cares would not tolerate movement or suctioning.

## 2021-06-06 NOTE — PLAN OF CARE
Problem: Pain  Goal: Patient's pain/discomfort is manageable  Outcome: Progressing   She gets scheduled toradol q 6 hours which provides adequate pain relief.  Problem: Inadequate Gas Exchange  Goal: Patient is adequately oxygenated and ventilation is improved  Outcome: Progressing   Her sats are 93% on 2lt NC.  Her lungs sounds are diminished with rhonchi in the LLL.  Problem: Activity Intolerance/Impaired Mobility  Goal: Mobility/activity is maintained at optimum level for patient  Outcome: Adequate for Discharge   She is up independently.

## 2021-06-06 NOTE — PLAN OF CARE
Problem: Pain  Goal: Patient's pain/discomfort is manageable  Outcome: Progressing  Patient reports pain manageable with scheduled Toradol. States that pain is generalized from frequent, strong coughing bouts.     Problem: Safety  Goal: Patient will be injury free during hospitalization  Outcome: Progressing  Patient remains free from injury during hospitalization, call light within reach, independent in room.     Problem: Inadequate Gas Exchange  Goal: Patient is adequately oxygenated and ventilation is improved  Outcome: Progressing  Patient is receiving supplemental oxygen via nasal cannula, breath sounds diminished on the left, crackles on the right. Nebulizer treatments started, patient reports these help but they are causing her to feel anxious - one time dose of PO Ativan given to help with this as well as lavender essential oil.     Problem: Potential for transmission of organism by Contact, Enteric, Droplet and/or Airborne routes  Goal: Prevent transmission of organisms  Outcome: Progressing  Patient remains in enteric precautions until c diff is ruled out.

## 2021-06-06 NOTE — PROGRESS NOTES
Patient remains on Hi-Oniel Nasal Cannula 45lpm/55% FiO2. Duoneb given x 3, BS diminished throughout before and after. No change with treatment. Patient was able to do Flutter Valve x 2. Patient tolerated well with no adverse reaction.

## 2021-06-06 NOTE — PROCEDURES
CENTRAL LINE INSERTION PROCEDURE NOTE  (NON-OR)    Procedure Date: 3/11/2020   Performing Physician: Jenni Quinteros CNP    Procedure:  Insertion of Central Venous Catheter  Right   FEMORAL    Indications:  ARDS and RESPIRATORY FAILURE       Estimated Blood Loss:  MINIMAL  ml  Complications: NONE         Procedure Details:   Procedure was done as an emergency : yes  The risks, benefits, complications, treatment options, and expected outcomes were discussed with PATIENT and  .  The risks and potential complications of their problem and purposed procedure include but are not limited to infection, bleeding, pain,  lung puncture, the need for additional procedures, creating a complication requiring transfusion or operation, and nerve and vessel injury.  The patient/alternate (see above) concurred with the proposed plan, giving informed consent.  The site of the procedure was properly noted/marked. The patient was identified as Harriett Romero with Date of Birth 1965 and the procedure verified as Insertion of Central Venous Catheter.  A Time Out was held and the above information confirmed.    Under sterile conditions the skin over the  Right   FEMORAL     was prepped with Chlorhexidine and covered with a sterile drape.  Bedsde sonosite with sterile cover, was used to identify the vessel.  Strict sterile conditions were maintained,  Cap, mask, and sterile gloves were worn by all participants.    A 22-gauge needle was used to identify the vein.  Using Sledinger technique an 18-gauge needle was then inserted into the vein.  A guide wire was then passed easily through the catheter.  There were no arrythmias    .  The catheter was then withdrawn.  A 7.0 Pashto tripple lumen   was then inserted into the vessel over the guide wire.  All ports were flushed with sterile solution and had good non-pulsatile blood return.  The catheter was sutured into place and an occlusive sterile dressing applied.  The patient  tolerated the procedure well with no change in vital signs.     Number of attempts:  1     A chest x-ray was not indicated      Condition: unstable      ________________________________________________________________________  Jenni Quinteros  3/11/65016:22 PM  [facility] Pulmonary and Critical Care Service

## 2021-06-06 NOTE — PROGRESS NOTES
Pharmacy Note - Admission Medication History    Pertinent Provider Information: pt would like to continue to hold all weight loss meds that she has already been holding since before colonoscopy.  Would only take to take citalopram, levothyroxine, pramipexole, and triamterene/hctz.       ______________________________________________________________________    Prior To Admission (PTA) med list completed and updated in EMR.       PTA Med List   Medication Sig Last Dose     buPROPion (WELLBUTRIN SR) 150 MG 12 hr tablet TAKE 1 TABLET(150 MG) BY MOUTH TWICE DAILY Past Week at hold for wt loss     citalopram (CELEXA) 40 MG tablet Take 40 mg by mouth at bedtime. 2/29/2020 at Unknown time     clindamycin (CLEOCIN HCL) 150 MG capsule Take 1 capsule (150 mg total) by mouth 4 (four) times a day for 10 days. 150 mg + 300 mg = 450 mg total 3/1/2020 at x1     clindamycin (CLEOCIN) 300 MG capsule Take 1 capsule (300 mg total) by mouth 3 (three) times a day for 10 days. 3/1/2020 at x1     levothyroxine (SYNTHROID, LEVOTHROID) 112 MCG tablet Take 1 tablet (112 mcg total) by mouth Daily at 6:00 am. 2/29/2020 at Unknown time     metFORMIN (GLUCOPHAGE XR) 500 MG 24 hr tablet Take 1 tablet (500 mg total) by mouth daily. Past Week at hold for wt loss     naltrexone (DEPADE) 50 mg tablet Take 0.5 tablets (25 mg total) by mouth 2 (two) times a day. Past Week at hold for wt loss     phentermine 30 MG capsule Take 1 capsule (30 mg total) by mouth every morning. Past Week at hold for wt loss     pramipexole (MIRAPEX) 0.75 MG tablet TAKE 1 TABLET(0.75 MG) BY MOUTH AT BEDTIME 2/29/2020 at Unknown time     triamterene-hydrochlorothiazide (MAXZIDE-25) 37.5-25 mg per tablet TAKE 1 TABLET BY MOUTH DAILY 2/29/2020 at Unknown time       Information source(s): Patient and CareEverywhere/SureScripts    Summary of Changes to PTA Med List  New: none  Discontinued: none  Changed: timing    Patient was asked about OTC/herbal products specifically.  PTA med  list reflects this.    Based on the pharmacist s assessment, the PTA med list information appears reliable    Patient appears adherent: Yes    Allergies were reviewed, assessed, and updated with the patient.      Patient does not use any multi-dose medications prior to admission.    Thank you for the opportunity to participate in the care of this patient.    Arpita Ladd, PharmD  3/1/2020 5:59 PM

## 2021-06-06 NOTE — PROGRESS NOTES
CRITICAL CARE PROGRESS NOTE    Date / Time of Admission:  3/1/2020  4:51 PM  Date of service: 3/10/2020      Assessment:   Principal Problem:    Aspiration pneumonia of left lower lobe due to gastric secretions (H)  Active Problems:    Hypokalemia    Former smoker - quit in 2014    Anxiety    Hypothyroidism    Metabolic syndrome    Aspiration pneumonia due to inhalation of vomitus (H)    Diarrhea of presumed infectious origin    Hypotension, unspecified hypotension type    Acute respiratory failure with hypoxia (H)        53 yo female, former smoker, who was admitted with possible aspiration after colonoscopy.  She was treated with clindamycin as outpatient was started on ceftriaxone and Flagyl here.  She had a chest x-ray that showed left-sided infiltrate however over the past several days her chest x-ray has progressively worsened.  Her oxygen requirements have been increasing.  pt was transferred to the ICU on    3/7/20 with increased work of breathing and requirement for BiPAP.  3/9/20  HFNC most of day    Last 24 hours: cough continues, still needs HFNC  C/p cp      Plan:     Neuro: History of seizure disorder  -Alert awake oriented x3    Pulmonary: Acute respiratory failure felt to be related to aspiration pneumonitis.  She was treated with clindamycin as outpt, then ceftriaxone and Flagyl while inpatient.  Now, she is on cefepime and Levaquin as well as steroids  -on HFNC  -BIPAP prn  -On cefepime and Levaquin.  -Scheduled and PRN nebs  -Was started on prednisone.  Benefit is questionable in the setting of aspiration pneumonitis, however, no objection to continuing for now given that patient is improving.  Would not give more than 5 days-stopped today 3/10/20  -check ABG  - at risk for needing intubation  I did d/w pt and her   -get ABG  -cxr now  -will add cough syrup with juancarlos CARDOSO   Recent PSG   Was to get home NCPAP, did not happen due to this admission     Cardiovascular:   -trend  "trops  -echo shows preserved EF from 3/7  -lasix BID    GI: recent colonoscopy  -N.p.o. for now  -Prophylaxis with PPI  -Can start clears once patient is off BiPAP    ID:   -on cefepime and levaquin for aspiration pneumonia  -We will de-escalate antibiotics as she improves.  -CRP trending down  -f/u cultures    Heme/Onc:   -Prophylaxis with enoxaparin     Endocrine: Hypothyroidism  -On thyroid replacement  - Insulin as needed to keep glucose <180. Avoid hypoglycemia.    Advance Directives: Full Code     The patient is critically ill with impairment in organ system and high risk of life threatening deterioration.     Critical Care Time (CCT) greater than: 40  Minutes    Jenni Quinteros CNP  [facility] Pulmonary and Critical Care services       Subjective:   HPI:  aHrriett Romero is a 54 y.o. female, former smoker, with past medical history significant for obesity, restless leg syndrome who apparently underwent colonoscopy back on 2/25/2020 and had an aspiration event.  Patient was started on clindamycin several days after that event.  There was no improvement with her symptoms and therefore she was admitted to the hospital.  Patient is allergic to penicillin and therefore she was started on ceftriaxone and Flagyl.  She was then transitioned to Augmentin.      Objective:   VITALS:  /58   Pulse 74   Temp 97.6  F (36.4  C) (Axillary)   Resp (!) 30   Ht 4' 10\" (1.473 m)   Wt 137 lb 9.1 oz (62.4 kg)   SpO2 99%   BMI 28.75 kg/m    I&O:      Intake/Output Summary (Last 24 hours) at 3/10/2020 1127  Last data filed at 3/10/2020 1041  Gross per 24 hour   Intake 1268.5 ml   Output 2475 ml   Net -1206.5 ml       EXAM:   General: Alert, cooperative, appears stated age  HEENT: NC PERRL  CVS:S1S2, RRR c/p cp  Lungs: crackles L > right, has loose coughing with deep inspirations   Presently on HFNC  Abd: soft, NT, + BS. No masses  Ext: No c/c/e  Skin: no rashes or lesions  Neuro: CN 2 to 12 intact      Data " Review:  CXR on 3/7/2020:  Stable heart size. Bilateral airspace opacities left greater than right. Slight interval improvement in aeration left lung. Trace left effusion. Bony thorax unremarkable. Monitor electrodes.    CXR on 3/8: shows improvement    Allergies: Macrobid [nitrofurantoin monohyd/m-cryst]; Penicillins; and Sulfa (sulfonamide antibiotics)     MEDS:  Scheduled Meds:    albuterol  2.5 mg Nebulization QID     buPROPion  150 mg Oral BID     cefepime  2 g Intravenous Q12H     citalopram  40 mg Oral QHS     enoxaparin ANTICOAGULANT  40 mg Subcutaneous Q24H     furosemide  40 mg Intravenous BID - diuretic     insulin aspart (NovoLOG) injection   Subcutaneous TID with meals     insulin aspart (NovoLOG) injection   Subcutaneous QHS     levothyroxine  125 mcg Oral Daily 0600     lidocaine   Topical TID     [Held by provider] metFORMIN  500 mg Oral DAILY     pantoprazole  40 mg Intravenous Q24H     pramipexole  0.75 mg Oral QHS     sodium chloride  10-30 mL Intravenous Q8H FIXED TIMES     sodium chloride  2.5 mL Intravenous Line Care     Continuous Infusions:  PRN Meds:.acetaminophen, acetaminophen, albuterol **AND** Nebulizer treatment intermittent, aluminum-magnesium hydroxide-simethicone, benzonatate, bisacodyL, codeine-guaiFENesin, dextrose 50 % (D50W), diphenhydrAMINE, glucagon (human recombinant), hydrOXYzine pamoate, lidocaine (PF), loperamide, LORazepam, magnesium hydroxide, melatonin, morphine  injection, naloxone **OR** naloxone, ondansetron **OR** ondansetron, oxymetazoline, polyethylene glycol, sodium chloride bacteriostatic, sodium chloride, sodium chloride, sodium chloride

## 2021-06-06 NOTE — PLAN OF CARE
Problem: Inadequate Gas Exchange  Goal: Patient is adequately oxygenated and ventilation is improved  Outcome: Progressing   Improved blood gases a noon today.  pt tolerating turns FIO2 70%    Problem: Infection  Goal: Signs and symptoms of infections are decreased or avoided  Outcome: Progressing   Low grade temp.  cultures pending    Problem: Nutrition Care Problem  Goal: Nutritional status is improving  Outcome: Progressing     Problem: Nutrition Care Problem  Goal: Nutritional status is improving  Outcome: Progressing  Tube feeding started at 1330 today  10cc/hr

## 2021-06-06 NOTE — PLAN OF CARE
Problem: Inadequate Airway Clearance  Goal: Patient will achieve/maintain normal respiratory rate/effort  Outcome: Progressing     Problem: Inadequate Gas Exchange  Goal: Patient will achieve/maintain normal respiratory rate/effort  Outcome: Progressing  Goal: Patient is adequately oxygenated and ventilation is improved  Outcome: Progressing     Problem: Excessive Fluid Volume  Goal: Patient will achieve/maintain normal respiratory rate/effort  Outcome: Progressing   Lisa Coffey RN

## 2021-06-06 NOTE — PLAN OF CARE
"Pt resting on a PCV-VG vent to a # 7.5 subglottic ETT, 21 cm at the teeth. Pt is receiving continuous Veletri neb. Last changed at 0256. BS: Clear and dim post sxn. Sxn x 3 for scant x 1, small x 2, thick/thin pink tinged.  PEEP titrated from +12 to +10 at 0547 this shift per new order. Continue with same. Plateau pressures 28 and 30.    Vent Mode: PCV/VG  FiO2 (%):  [65 %-75 %] 65 %  S RR:  [20] 20  S VT:  [300 mL] 300 mL  PEEP/CPAP (cm H2O):  [10 cm H2O-12 cm H2O] 10 cm H2O  Minute Ventilation (L/min):  [6.2 L/min-7.2 L/min] 6.2 L/min  PIP:  [28 cm H2O-36 cm H2O] 36 cm H2O  MAP (cm H2O):  [14-25] 15       Blood pressure 127/60, pulse 64, temperature (!) 100.6  F (38.1  C), temperature source Oral, resp. rate 20, height 4' 10\" (1.473 m), weight 139 lb 15.9 oz (63.5 kg), SpO2 95 %, not currently breastfeeding.      Problem: Inadequate Airway Clearance  Goal: Patient will maintain patent airway  Outcome: Progressing  Goal: Patient will achieve/maintain normal respiratory rate/effort  Outcome: Progressing     Problem: Inadequate Gas Exchange  Goal: Patient will achieve/maintain normal respiratory rate/effort  Outcome: Progressing  Goal: Patient is adequately oxygenated and ventilation is improved  Outcome: Progressing     Problem: Excessive Fluid Volume  Goal: Patient will achieve/maintain normal respiratory rate/effort  Outcome: Progressing     Problem: Mechanical Ventilation  Goal: Patient will maintain patent airway  Outcome: Progressing  Goal: ET tube will be managed safely  Outcome: Progressing     "

## 2021-06-06 NOTE — PLAN OF CARE
Problem: Inadequate Airway Clearance  Goal: Patient will maintain patent airway  Outcome: Progressing  Patient denies problem breathing. Lung sounds clear but diminish, continue deep breathing and cough, administer antibiotics and cough medicine as ordered.  Goal: Patient will achieve/maintain normal respiratory rate/effort  Outcome: Progressing   Patient denies shortness of breath and none observed, elevate head of bed and rest between cares.  Problem: Inadequate Gas Exchange  Goal: Patient is adequately oxygenated and ventilation is improved  Outcome: Progressing

## 2021-06-06 NOTE — PLAN OF CARE
"  Problem: Inadequate Airway Clearance  Goal: Patient will maintain patent airway  Outcome: Not Progressing  0207 Patient desat 84%, tachypnea 35-40, stacking breaths and hypotention following 0200 cares. Rass -4. Ventilator alarming \"circuit occlusion.\" Versed and fentanyl administered per prn order, Propofol increased. RT manually bagging patient temporarily, suction performed and ventilator filter changed r/t Veletri clogging.   Patient resting comfort with regular unlabored breathing following interventions. VSS.   Patient will continue to be monitored and assessed.     "

## 2021-06-06 NOTE — PROGRESS NOTES
CRITICAL CARE PROGRESS NOTE    Date / Time of Admission:  3/1/2020  4:51 PM  Date of service: 3/8/2020      Assessment:   Principal Problem:    Aspiration pneumonia of left lower lobe due to gastric secretions (H)  Active Problems:    Hypokalemia    Former smoker - quit in 2014    Anxiety    Hypothyroidism    Metabolic syndrome    Aspiration pneumonia due to inhalation of vomitus (H)    Diarrhea of presumed infectious origin    Hypotension, unspecified hypotension type    Acute respiratory failure with hypoxia (H)    55 yo female, former smoker, who was admitted with possible aspiration after colonoscopy.  She was treated with clindamycin as outpatient was started on ceftriaxone and Flagyl here.  She had a chest x-ray that showed left-sided infiltrate however over the past several days her chest x-ray has progressively worsened.  Her oxygen requirements have been increasing.  pt was transferred to the ICU on 3/7/2020 with increased work of breathing and requirement for BiPAP.      Plan:     Neuro: History of seizure disorder  -Alert awake oriented x3    Pulmonary: Acute respiratory failure felt to be related to aspiration pneumonitis.  She was treated with clindamycin as outpt, then ceftriaxone and Flagyl while inpatient.  Now, she is on cefepime and Levaquin as well as steroids  -BiPAP for increased work of breathing  -On cefepime and Levaquin.  -Scheduled and PRN nebs  -Was started on prednisone.  Benefit is questionable in the setting of aspiration pneumonitis, however, no objection to continuing for now given that patient is improving.  Would not give more than 5 days    Cardiovascular: hemodynamically stable  -echo shows preserved EF  -lasix/Diuril for volume overload on 3/7.  -give lasix 80 mg x 1 today    GI: recent colonoscopy  -N.p.o. for now  -Prophylaxis with PPI  -Can start clears once patient is off BiPAP    ID:   -on cefepime and levaquin for aspiration pneumonia  -We will de-escalate antibiotics as  "she improves.  -CRP trending down  -f/u cultures    Heme/Onc:   -Prophylaxis with enoxaparin     Endocrine: Hypothyroidism  -On thyroid replacement  - Insulin as needed to keep glucose <180. Avoid hypoglycemia.    Advance Directives: Full Code     The patient is critically ill with impairment in organ system and high risk of life threatening deterioration.     Critical Care Time (CCT) greater than: 45 Minutes      Subjective:   HPI:  Harriett Romero is a 54 y.o. female, former smoker, with past medical history significant for obesity, restless leg syndrome who apparently underwent colonoscopy back on 2/25/2020 and had an aspiration event.  Patient was started on clindamycin several days after that event.  There was no improvement with her symptoms and therefore she was admitted to the hospital.  Patient is allergic to penicillin and therefore she was started on ceftriaxone and Flagyl.  She was then transitioned to Augmentin.    Interval History:  3/7: transferred to icu overnight for bipap and hypoxia. She was given additional lasix 60 mg x 1.  1/8: breathing is better. Coming down on FiO2.  Has diuresed well with Lasix.    Objective:   VITALS:  /55   Pulse 72   Temp 97.4  F (36.3  C) (Axillary)   Resp (!) 34   Ht 4' 10.5\" (1.486 m)   Wt 151 lb 14.4 oz (68.9 kg)   SpO2 95%   BMI 31.21 kg/m    I&O:      Intake/Output Summary (Last 24 hours) at 3/8/2020 0712  Last data filed at 3/8/2020 0400  Gross per 24 hour   Intake 550 ml   Output 3165 ml   Net -2615 ml       EXAM:   General: Alert, cooperative, appears stated age  HEENT: NCAT, without obvious abnormality,  On BiPAP  CVS:S1S2, RRR  Lungs: crackles L > right  Abd: soft, NT, + BS. No masses  Ext: No c/c/e  Skin: no rashes or lesions  Neuro: CN 2 to 12 intact      Data Review:  CXR on 3/7/2020:  Stable heart size. Bilateral airspace opacities left greater than right. Slight interval improvement in aeration left lung. Trace left effusion. Bony thorax " unremarkable. Monitor electrodes.    CXR on 3/8: shows improvement      By:  Wilmar Rich, 3/8/2020      Allergies: Macrobid [nitrofurantoin monohyd/m-cryst]; Penicillins; and Sulfa (sulfonamide antibiotics)     MEDS:  Scheduled Meds:    albuterol  2.5 mg Nebulization QID     benzonatate  100 mg Oral TID     buPROPion  150 mg Oral BID     cefepime  2 g Intravenous Q8H     citalopram  40 mg Oral QHS     enoxaparin ANTICOAGULANT  40 mg Subcutaneous Q24H     insulin aspart (NovoLOG) injection   Subcutaneous Q6H FIXED TIMES     levoFLOXacin  500 mg Intravenous Q24H     levothyroxine  125 mcg Oral Daily 0600     lidocaine   Topical TID     [Held by provider] metFORMIN  500 mg Oral DAILY     methylPREDNISolone sodium succinate  40 mg Intravenous Q24H     pantoprazole  40 mg Intravenous Q24H     pramipexole  0.75 mg Oral QHS     sodium chloride  10-30 mL Intravenous Q8H FIXED TIMES     sodium chloride  2.5 mL Intravenous Line Care     Continuous Infusions:  PRN Meds:.acetaminophen, acetaminophen, albuterol **AND** Nebulizer treatment intermittent, aluminum-magnesium hydroxide-simethicone, bisacodyL, codeine-guaiFENesin, dextrose 50 % (D50W), glucagon (human recombinant), hydrOXYzine pamoate, lidocaine (PF), loperamide, LORazepam, magnesium hydroxide, melatonin, morphine  injection, naloxone **OR** naloxone, ondansetron **OR** ondansetron, polyethylene glycol, sodium chloride bacteriostatic, sodium chloride, sodium chloride, sodium chloride

## 2021-06-06 NOTE — PROGRESS NOTES
"Critical Care Progress Note     Admit Date: 3/1/2020  Moved to ICU on 3/7 from P4     Code Status: full code    CC: SOB    HPI: 52-year-old female, former smoker, with past medical history significant for obesity, restless leg syndrome who apparently underwent colonoscopy back on 2/25/2020 and had an aspiration event.  Patient was started on clindamycin several days after that event.  There was no improvement with her symptoms and therefore she was admitted to the hospital.  Patient is allergic to penicillin and therefore she was started on ceftriaxone and Flagyl.  She was then transitioned to Augmentin.     See below for complicated hospitla course    Interval events:  3/01/20: Admitted to P4 for shortness of breath   Aspiration PNA  3/07/20:Moved to ICU for increased work of breathing and requirement for BiPAP.   Steroids started   3/09/20:  HFNC most of day  3/10/20: still on high flow oxygen   DVT  RUE around PICC and in subclavian vein    PICC removed               Heparin drip started    Steroids stopped  3/11/20: CTA + b/l PE, GGO   Intubated   bronched   ARDS   Veletri started  3/12/20: IV steroids started   BAL NGTD   Put in droplet precautions   RVP sent   veletri still full strength   OG for meds and TF   3/13/20: increased PEEP to 14   Stopped precedex   remains on full strength veletri   Lasix started     Major events over the last 24 hours: on ventilator, Peep 12, had increased GT residuals, tf held for 4 hours    Subjective: in bed sedated on ventilator   ROS: unable to do    Drips: precedex, propofol, fentanyl ,levophed heparin  And full strength veletri    Ventilator: Mechanical Ventilation Day: #3        Settings: PCV-VG  20/ 300/ 12  65%    /60 (Patient Position: Semi-lakhani)   Pulse (!) 58   Temp (!) 100.6  F (38.1  C) (Oral)   Resp 20   Ht 4' 10\" (1.473 m)   Wt 140 lb 14.4 oz (63.9 kg)   SpO2 94%   BMI 29.45 kg/m       I/O last 3 completed shifts:  In: 2667.1 [I.V.:1340.1; " NG/GT:1127; IV Piggyback:200]  Out: 1875 [Urine:1800; Emesis/NG output:75]  Weight change: 14.5 oz (0.412 kg)  Vent Mode: PCV/VG  FiO2 (%):  [65 %-70 %] 65 %  S RR:  [20] 20  S VT:  [300 mL] 300 mL  PEEP/CPAP (cm H2O):  [10 cm H2O-12 cm H2O] 10 cm H2O  Minute Ventilation (L/min):  [6.2 L/min-7.2 L/min] 6.2 L/min  PIP:  [28 cm H2O-41 cm H2O] 41 cm H2O  MAP (cm H2O):  [15-25] 15      EXAM:   Mental status: in bed sedated  HEENT: NC PERRL OETT, OGT  Resp: some anterior rhonchi    Peak AP 35-40   PLT 31  Cardiovascular: RRR  Abdominal: soft + bs  Extremeties: No e/c/c(right arm swollen 3/10, not now after OICC out)  Neurology: RASS -4   heavily sedated for ARDS, vent synchrony       Lines:  Tammy 3/11/20  Central line 3/11/20  ETT 3/11/20  Clark 3/11/20  OG 3/12/20    Labs Personally reviewed: yes    RADHA 0.3  HIV neg  RF <15.0    Results from last 7 days   Lab Units 03/13/20  0500 03/12/20  0453 03/11/20  0742   LN-WHITE BLOOD CELL COUNT thou/uL 28.4* 27.9* 14.8*   LN-HEMOGLOBIN g/dL 9.8* 11.1* 10.9*   LN-HEMATOCRIT % 31.2* 35.6 33.9*   LN-PLATELET COUNT thou/uL 173 175 132*     Results from last 7 days   Lab Units 03/13/20  0500 03/12/20  0453 03/11/20  1814 03/11/20  0742   LN-SODIUM mmol/L 141 139  --  139   LN-POTASSIUM mmol/L 4.8 4.2 4.3 3.5   LN-CHLORIDE mmol/L 106 103  --  99   LN-CO2 mmol/L 29 27  --  32*   LN-BLOOD UREA NITROGEN mg/dL 21 23*  --  25*   LN-CREATININE mg/dL 0.97 1.09  --  0.78   LN-CALCIUM mg/dL 8.7 8.6  --  8.4*   LN-PROTEIN TOTAL g/dL 6.2  --   --   --    LN-BILIRUBIN TOTAL mg/dL 0.2  --   --   --    LN-ALKALINE PHOSPHATASE U/L 119  --   --   --    LN-ALT (SGPT) U/L 15  --   --   --    LN-AST (SGOT) U/L 13  --   --   --      Results from last 7 days   Lab Units 03/10/20  1649   LN-INR  1.34*   LN-PARTIAL THROMBOPLASTIN TIME seconds 38*     abg 0450 3/13/20  7.33/ 56/69  On 65%      Microbiology: cultures form BAL pending  NGTD  Imaging (all imaging is personally reviewed):     pcxr  3/13/20-Endotracheal tube tip in good position in mid trachea. Enteric tube extends below level of the EG junction off the inferior aspect of the image within the distal stomach. Bilateral alveolar infiltrates, greater on the left lung base with   consolidation and/or atelectasis slightly improved. Minimal left basilar pleural fluid. Mild cardiac enlargement with pulmonary vascular congestion.    pcxr 3/12/20-Interval retraction of the endotracheal tube now in good position in the distal trachea. Enteric tube extending below the level of the EG junction off the inferior aspect of the image within the distal stomach. Stable consolidation left lung   base. Hazy ill-defined areas of consolidation left upper lung. Interval increase in amount of infiltrate right lung base. Pulmonary vascular congestion with mild cardiac enlargement. Minimal bilateral pleural fluid collections, greater on the left    CTA PE 3/11/20-1.  Mild bilateral pulmonary artery embolism. Borderline RV to LV ratio and slight flattening of the interventricular septum; RV strain not excluded. Pulmonary arteries not enlarged.   2.  Regions of confluent groundglass and opacities throughout all lobes bilaterally. Mild left lung consolidation. Differential includes superimposed edema / ARDS, hemorrhage and alveolar damage, or other nonspecific infectious / inflammatory process   (including a massive aspiration event). These findings make evaluation of pulmonary infarct difficult.  3.  Tiny right and small left pleural effusions.   4.  Borderline adenopathy, presumed reactive.    Impression:  1. Acute hypoxic respiratory failure   Multifactorial   2. Aspiration pneumonia of left lower lobe due to gastric secretions  3. ARDS  4. PE/RUE DVT  5. B/l GGO  6. Former smoker   Quit 2011  7. Recent JANAE dx   Has not picked up CPAP yet due to this hospitalization     PLAN:   1. Continue full vent support  2. Increase RR to 22 , peep 14  3. abg after change then  q 6 hours  4. Continue full strength veletri  5. She may need to be paralyzed and pronded based on ABGS P/f 109  6. Continue Steroids   7. Restart TF, may need to hold if she needs proing and paralytic   8. Dc Precedex, continue propopfol, fentanyl,prn versed and vec for now  RASS goal -4  9. Continue Maxipime, vanco  10. Hold  dapsone for JPJ prophylaxis(allergic to sulfa) for now, may need to start  11. Start lasix 40 mg bid  12. Follow PIP and plateaus   13. Continue Heparin drip for VTE(RUE DVT and b/l PE)  14. MAP goal > 65-presently on levophed     ICU DAILY CHECKLIST                           Can patient transfer out of ICU? no    FAST HUG:    Feeding:  Feeding: Yes.  Patient is receiving NPO and TUBE FEEDS    Clark:Yes  Analgesia/Sedation:Yes propofol and precedex  Thromboembolic prophylaxis: yes; Mode:  heparin drip  HOB>30:  Yes  Stress Ulcer Protocol Active: yes; Mode: PPI  Glycemic Control: Any glucose > 180 no; Mode of Insulin Therapy: Sliding Scale Insulin    INTUBATED:  Can patient have daily waking:  no  Can patient have spontaneous breathing trial:  no    Restraints? yes    PHYSICAL THERAPY AND MOBILITY:  Can patient have PT and mobility trial: no  Activity: Bed Rest    transfer/discharge plans: ICU critically ill    The patient is critically ill with impairment in organ system and high risk of life threatening deterioration.     Total CCT spent 70  minutes thus far today.   I discussed today's plan with Pt's  Brian at bedside    Jenni Quinteros APRN, [phone]  [facility]

## 2021-06-06 NOTE — PLAN OF CARE
Problem: Inadequate Airway Clearance  Goal: Patient will maintain patent airway  Outcome: Progressing     Problem: Mechanical Ventilation  Goal: Patient will maintain patent airway  Outcome: Progressing  Goal: ET tube will be managed safely  Outcome: Progressing     Vent Mode: PCV/VG  FiO2 (%):  [50 %-60 %] 60 %  S RR:  [30-35] 35  S VT:  [170 mL-225 mL] 225 mL  PEEP/CPAP (cm H2O):  [14 cm H2O] 14 cm H2O  Minute Ventilation (L/min):  [6 L/min-7.5 L/min] 7.5 L/min  PIP:  [28 cm H2O-39 cm H2O] 39 cm H2O  MAP (cm H2O):  [21-24] 23     Pt. remains on full vent support, settings above, ETT 7.5, 21@teeth, BS coarse, suctioning moderate amount of white secretions via ETT. PIP 39, plats 33 (MD aware). Pt. remains on full strength Veletri, RT following    ALBERT HartT

## 2021-06-06 NOTE — PLAN OF CARE
"  Problem: Inadequate Airway Clearance  Goal: Patient will maintain patent airway  Outcome: Progressing     Problem: Mechanical Ventilation  Goal: Patient will maintain patent airway  Outcome: Progressing  Goal: ET tube will be managed safely  Outcome: Progressing   RESPIRATORY CARE NOTE     Patient Name: Harriett Romero  Today's Date: 3/16/2020     Pt continues on the following settings:  Vent Mode: PCV/VG  FiO2 (%):  [40 %-100 %] 50 %  S RR:  [24-35] 35  S VT:  [170 mL-250 mL] 225 mL  PEEP/CPAP (cm H2O):  [14 cm H2O] 14 cm H2O  Minute Ventilation (L/min):  [5 L/min-7.3 L/min] 7.3 L/min  PIP:  [28 cm H2O-43 cm H2O] 34 cm H2O  MAP (cm H2O):  [20-24] 21   Plateau pressure: 27-30 cm H2O     Pt is intubated with  # 7.5 ETT secured  21 at the teeth.  Pt receives veletri cont, alb QID. BS are sl coarse and diminished. Pt has a weak cough with suction. RT suctioned pt for small tan. Pt's respiratory status is stable. RT will continue to follow per MD's orders.     /54   Pulse 64   Temp 97.9  F (36.6  C) (Oral)   Resp (!) 41   Ht 4' 10\" (1.473 m)   Wt 147 lb 4.8 oz (66.8 kg)   SpO2 96%   BMI 30.79 kg/m        Tootie Skelton, LRT   "

## 2021-06-06 NOTE — PLAN OF CARE
Problem: Pain  Goal: Patient's pain/discomfort is manageable  Outcome: Progressing     Problem: Knowledge Deficit  Goal: Patient/family/caregiver demonstrates understanding of disease process, treatment plan, medications, and discharge instructions  Outcome: Progressing     Problem: Infection  Goal: Signs and symptoms of infections are decreased or avoided  Outcome: Progressing   Patient vitals stable, accept she is requiring oxygen in order to keep her oxygen sat levels with in normal limits. Patient has required increase in oxygen level from yesterday per report, Dr. Dionna Garcia updated, see her new orders and notes. Pulmonology consulted on patient today(see their note for details). Patient is alert and orientated, CMS intact. RT is following patient(see their notes and charting for details). Patient getting IV antibiotics. Patient having anxiety, prn ativan given for anxiety. Patient received IV lasix, she is up to void frequently, purewick was placed, patient on strict intake and output. Report given to oncoming shift RN to continue care for patient.

## 2021-06-06 NOTE — PLAN OF CARE
"  Problem: Inadequate Airway Clearance  Goal: Patient will achieve/maintain normal respiratory rate/effort  Outcome: Not Progressing   She is still struggling with her continuous harsh cough/trouble breathing.  She is on 6lt humidified NC oxygen & satting 91-92%.  She did receive a prn neb around 02 which \"didn't help.\"  She declined cough syrup stating that an earlier dose just \"made it worse.\"   Called resident to evaluate.  Dr. Villegas ordered a chest x-ray, a one time dose of ativan, & talked with her.    Problem: Psychosocial Needs  Goal: Collaborate with patient/family/caregiver to identify patient specific goals for this hospitalization  Outcome: Progressing   Her  is very concerned, staying til 1AM & back here at 0430.  Updated with tonight's happenings.  "

## 2021-06-06 NOTE — PROGRESS NOTES
[facility] PROGRESS NOTE    Assessment/Plan  Principal Problem:    Aspiration pneumonia of left lower lobe due to gastric secretions (H)  Active Problems:    Hypokalemia    Former smoker - quit in 2014    Anxiety    Hypothyroidism    Metabolic syndrome    Aspiration pneumonia due to inhalation of vomitus (H)    Diarrhea of presumed infectious origin    Hypotension, unspecified hypotension type    Acute respiratory failure with hypoxia (H)    54 y.o. old female with obesity, MDD, anxiety, restless leg syndrome, former smoker , migraine headaches, insomnia admitted from [facility] center after failing outpatient treatment with clindamycin for aspiration pneumonia.  Patient aspirated on 2/25 while laying on her left side during a screening colonoscopy.  Reports following prep and NPO. Patient transferred to ICU yesterday due to increased O2 needs. Discussed with Dr Rich and will keep in ICU for now on BIpap still and try to diurese, Chart check and [facility] will continue to follow peripherally       Acute resp failure increased O2 needs and transferred to ICU last night   - pulm consulted and following along discussed case with Dr. Rich continue Bipap and Intensivist managing   - continue steroids  - IV lasix and Diuril given on 3/7 and IV lasix 80mg x1 today   - echo limited done   - also new dx of JANAE and patient still needs to get her CPAP   - likely significant irritation and inflammation from aspiration vs underlying lung disease with h/o smoking   - sputum cx   -repeat influenza neg  - legionella pending   - no intubation at this time     Aspiration pneumonia Left lower and upper lobes Due to gastric secretions during colonoscopy, failed outpatient clindamycin took 6 doses, was initially placed on -  bronchial hygiene with flutter valve and alb neb four times a day and keep scheduled for now and reassess in am, coarse BS and crackles and with low O2 sats  - pleuritic pain improving with  toradol scheduled and as above  bronchial hygiene with nebs  - reviewed with Pulm and aspiration of gastric acid and significant inflammation and irritation will take some time to improve and nothing further to rec.   - IV solumedrol  on 3/4 and 3/5 and changed to PO 3/6 would treat 5 days per Intensivist   - worsening symptoms and had pulm see patient and reviewed with Dr Rich and change abx regiment and use cefepime and continue levaquin, flagyl stopped, PCN allg unable to use zosyn     - lactic normal   - follow CXR   - SLP seen no signs of aspiration   - PPI at at bedtime and HOB > 30   - WBC checked but received IV steroids and now 32 was down to 9 on 3/4 and steroids were started on this day      Hypothyroidism  - TSH, 9.07 on 1/31, previously 28 12/2019> Rechecked here TSH  9.86   -on 112mcg, dose increased to 125mcg, recheck TSH in 4-6 weeks     Former smoker   -Patient smoke-free for over 5 years      Diarrhea having liquid stools and was on clindamycin no further complaints   - sent stool for Cdiff negative and stool cx NGTD  - immodium prn   - now NPO     Anxiety/MDD  -Stable on home medications hold Wellbutrin and Celexa while on bipap      Restless leg syndrome  -Seems to be controlled on Mirapex continue this when able to take PO      Metabolic syndrome  -takes phentermine and Glucophage only has medications for the past 1 week  -We will hold these medications for now     Hypokalemia   -replacement protocol     JANAE  - recommend patient get her CPAP fro her sleep clinic       VTE prophylaxis: SQ Lovenox    Diet: NPO  Drains/tubes: moreno and PICC   Weight bearing restrictions: WBAT  Disposition/Barriers to discharge: possible downgrade tomorrow   Full Code    Objective  Vital signs in last 24 hours  Vitals:    03/08/20 1500   BP: 120/63   Pulse: 76   Resp: 21   Temp:    SpO2: 100%     Wt Readings from Last 1 Encounters:   03/07/20 0410 151 lb 14.4 oz (68.9 kg)   03/06/20 1435 152 lb 8 oz (69.2 kg)   03/05/20 1908 154 lb (69.9 kg)    03/01/20 1702 147 lb 4.3 oz (66.8 kg)   Weight change:   Body mass index is 31.21 kg/m .  Intake/Output this shift:    Intake/Output Summary (Last 24 hours) at 3/8/2020 1547  Last data filed at 3/8/2020 1400  Gross per 24 hour   Intake 490 ml   Output 4120 ml   Net -3630 ml     I/O last 3 completed shifts:  In: 490 [IV Piggyback:490]  Out: 4120 [Urine:4120]  Current Medications    albuterol  2.5 mg Nebulization QID     buPROPion  150 mg Oral BID     cefepime  2 g Intravenous Q8H     citalopram  40 mg Oral QHS     enoxaparin ANTICOAGULANT  40 mg Subcutaneous Q24H     insulin aspart (NovoLOG) injection   Subcutaneous Q6H FIXED TIMES     levoFLOXacin  500 mg Intravenous Q24H     levothyroxine  125 mcg Oral Daily 0600     lidocaine   Topical TID     [Held by provider] metFORMIN  500 mg Oral DAILY     methylPREDNISolone sodium succinate  40 mg Intravenous Q24H     pantoprazole  40 mg Intravenous Q24H     pramipexole  0.75 mg Oral QHS     sodium chloride  10-30 mL Intravenous Q8H FIXED TIMES     sodium chloride  2.5 mL Intravenous Line Care       acetaminophen, acetaminophen, albuterol **AND** Nebulizer treatment intermittent, aluminum-magnesium hydroxide-simethicone, benzonatate, bisacodyL, codeine-guaiFENesin, dextrose 50 % (D50W), glucagon (human recombinant), hydrOXYzine pamoate, lidocaine (PF), loperamide, LORazepam, magnesium hydroxide, melatonin, morphine  injection, naloxone **OR** naloxone, ondansetron **OR** ondansetron, polyethylene glycol, sodium chloride bacteriostatic, sodium chloride, sodium chloride, sodium chloride  Pertinent Labs   Results from last 7 days   Lab Units 03/08/20  1524 03/07/20  0519 03/06/20  0613 03/04/20  0604   LN-WHITE BLOOD CELL COUNT thou/uL 21.5* 25.2* 32.1* 9.8   LN-HEMOGLOBIN g/dL 10.9* 10.7*  --  9.9*   LN-HEMATOCRIT % 33.9* 33.5*  --  30.4*   LN-PLATELET COUNT thou/uL 276 464*  --  319       Results from last 7 days   Lab Units 03/08/20  0438 03/07/20  1803 03/07/20  0519   03/02/20  0535   LN-SODIUM mmol/L 141 140 140   < > 139   LN-POTASSIUM mmol/L 3.8 4.4 3.6   < > 3.4*   LN-CHLORIDE mmol/L 98 98 101   < > 105   LN-CO2 mmol/L 34* 32* 30   < > 26   LN-BLOOD UREA NITROGEN mg/dL 26* 24* 25*   < > 13   LN-CREATININE mg/dL 0.87 1.00 0.84   < > 0.78   LN-CALCIUM mg/dL 9.0 8.9 8.7   < > 8.0*   LN-ALBUMIN g/dL  --   --   --   --  2.3*   LN-PROTEIN TOTAL g/dL  --   --   --   --  5.0*   LN-BILIRUBIN TOTAL mg/dL  --   --   --   --  0.5   LN-ALKALINE PHOSPHATASE U/L  --   --   --   --  253*   LN-ALT (SGPT) U/L  --   --   --   --  41   LN-AST (SGOT) U/L  --   --   --   --  31    < > = values in this interval not displayed.             Destini Trevino MD.   [facility] Medicine Service   [phone]   Pager [phone]

## 2021-06-06 NOTE — PROGRESS NOTES
Respiratory Care Note     Patient is on continue non-invasive Bipap all night. Breath sound is diminished. Checked the skin under the mask and no breakdown noted. Will continue to monitor.       03/08/20 0400   Non-Invasive    $ Bi-PAP Subsequent Day Charge Yes   Pt Owned Device No   Device V 60   Pt. Interface Over the nose   Over the nose Size Small   Mode ST   IPAP 14 cmH2O   EPAP 6 cmH2O   Resp Rate (Set) 16   Insp Time (sec) 1 sec   FiO2 (%) 70 %   Monitoring   Resp Rate Observed 26   Tidal (Observed) 366 mL   Minute Ventilation (L/min) 9.2 L/min   PIP Observed (cm H2O) 14 cm H2O   Ti/Ttot 30   Pt Leak 14   Alarms   Insp Pressure High (cm H2O) 20 cm H2O   Insp Pressure Low (cm H2O) 10 cm H2O   Tidal Volume High 1200   Tidal Volume Low 200   Low Presssure Delay 30 sec   MV Low (L/min) 3 L/min   High Resp Rate 70   Low Resp Rate 10   Alarm Functional and On Yes   NPPV Other   SpO2 97 %   Heart Rate 72   Skin Under Mask No breakdown

## 2021-06-06 NOTE — PLAN OF CARE
Problem: Pain  Goal: Patient's pain/discomfort is manageable  Outcome: Progressing     Problem: Daily Care  Goal: Daily care needs are met  Outcome: Progressing       Problem: Knowledge Deficit  Goal: Patient/family/caregiver demonstrates understanding of disease process, treatment plan, medications, and discharge instructions  Outcome: Progressing     Pt complains of 7/10 pain over entire chest, back and throat. Pt states it coincides with cough. Unable to express cough. Meds given with great results; 3/10. Pt has had soft pressures; asymptomatic. Pt up independently and able to call appropriately.

## 2021-06-06 NOTE — PROCEDURES
ARTERIAL LINE INSERTION PROCEDURE NOTE  (NON-OR)    Procedure Date:  3/11/2020   Performing Physician:  Jenni Quinteros CNP    Pre-Procedure Diagnosis:     ARDS and RESPIRATORY FAILURE  Post-Procedure Diagnosis:  Same as Pre-Procedure Diagnosis    Procedure:  Arterial line insertion  Right Brachial  Indications:  ARDS, HYPOTENSION and RESPIRATORY FAILURE      Estimated Blood Loss: Minimal   Complications: none      Procedure Details:   The risks, benefits, complications, treatment options, and expected outcomes were discussed with the patient. The risks and potential complications of their problem and purposed procedure include but are not limited to infection, bleeding, pain,  the need for additional procedures, and nerve and vessel injury. The patient/alternate (see above) concurred with the proposed plan, giving informed consent.  The site of the procedure was properly noted/marked. The patient was identified as Harriett Romero with Date of Birth 1965 and the procedure verified as Arterial Line Insertion.  A Time Out was held and the above information confirmed.        An Gael test was not  done before the procedure.   In sterile fashion, the line site was prepped with Chlorhexidine.  Strict sterile conditions were maintained,  Cap, mask, and sterile gloves were worn by all participants.  the arterial line was placed in the Right Brachial artery percutaneously,  without  difficulty.  The linewas  sutured in place  and an occlusive sterile dressing was applied.  The total number of needle stick attempts was 1.      Condition: unstable    Jenni Quinteros, 3/11/2020, 3:19 PM  [facility]

## 2021-06-06 NOTE — PLAN OF CARE
Problem: Inadequate Gas Exchange  Goal: Patient will achieve/maintain normal respiratory rate/effort  Outcome: Progressing    PT remains intubated and ventilated. PT has 7.5 ETT, secured 21cm ATT. PT is currently receiving Albuterol four times a day with continuous Velitri. Vent settings below.  PT condition is stable with intermittent bouts of high inspiratory pressures and plateau pressures. RT to continue to monitor and assess.   Vent Mode: PCV/VG  FiO2 (%):  [60 %-70 %] 65 %  S RR:  [20-22] 22  S VT:  [280 mL-300 mL] 280 mL  PEEP/CPAP (cm H2O):  [10 cm H2O-14 cm H2O] 14 cm H2O  Minute Ventilation (L/min):  [6.1 L/min-7.2 L/min] 6.1 L/min  PIP:  [28 cm H2O-41 cm H2O] 33 cm H2O  MAP (cm H2O):  [15-25] 18    Marty ALBERT HendersonT

## 2021-06-06 NOTE — PROGRESS NOTES
03/17/20 1208   Vent Information   Interface Invasive   Vent ID [id number]   Vent Mode PCV/VG   Patient Ventilator Status On   Flowmeter at Bedside Yes   Ambu-Bag With Mask at Bedside Yes   Vent Settings   FiO2 (%) 50 %   Heater Temperature 98.6  F (37  C)   Resp Rate (Set) 35   Vt (Set, mL) 225 mL   PEEP/CPAP (cm H2O) 14 cm H2O   PIP Set (cm H2O) 60 cm H2O   Trigger Sensitivity Flow (L/min) 2 L/min   Bias Flow (lpm)  5 L/min   Humidification BTPS;Heater   Insp Time (sec) 0 sec   I:E Ratio 1:1.4   Patient Data   Vt Exp (mL) 228 mL   Minute Ventilation (L/min) 6.3 L/min   Total Resp Rate  28 BPM   PIP Observed (cm H2O) 36 cm H2O   MAP (cm H2O) 22   Auto/Intrinsic PEEP Observed (cm H2O)   (Failed)   Plateau Pressure (cm H2O) 34 cm H2O   Static Compliance (L/cm H2O) 13   Dynamic Compliance (L/cm H2O) 11 L/cm H2O   SpO2 92 %   Heart Rate 75   Alarms   High Resp Rate 40   Low PEEP 3 cm H2O   Insp Pressure High (cm H2O) 60 cm H2O   Insp Pressure Low (cm H2O) 15 cm H2O   MV High (L/min) 16 L/min   MV Low (L/min) 2.5 L/min   Vt High (mL) 800 mL   Vt Low (mL) 150 mL   Apnea Interval (sec) 30 seconds   Apnea Rate 35   Apnea Volume (mL) 35 mL   ETCO2/TCM High (mmHg) 225 mmHg   Alarm Functional and On Yes   Backup Mode Set Yes   Airways   Airway LDA Subglottic Suction endotracheal tube   Subglottic Suction ET Tube 7.5   Placement Date: 03/11/20   Subglottic Suction ETT Entry Site: Oral  Size : 7.5  Cuffed: Cuffed  Insertion attempts: 1  ETT Placement Confirmation: Bilateral breath sounds;End tidal CO2 device;Confirmed by x-ray  Placed By: NP/PA   Secured at (cm) 21 cm   Measured from Teeth   Secured Location Center   Secured with Commercial tube barrett   Cuff Pressure (cm H2O) 32 cm H2O   Site Condition Cool;Dry   Subglottic Secretions Scant   Subglottic Suction Frequency Intermittent suction   Subglottic Suction Pressure 120 mmHg   Subglottic Suction Lumen Intervention Cleaned   Weaning Assessment    Weaning Assessment  "Complete Y   Safety Screen weaning assessment ARWY Unstable/unsafe ariway (usualy a post surgery problem)   FiO2 >50% (0.5) No   PEEP >8 cm H2O Yes   Ve > 13 L/min No    SBP<90 No   MD Cancels weaning trial attempt Yes (Comment reason for cancellation)  (On veletri)   Weaning Assessment Meets All Criteria No   IHI Ventilator Associated Pneumonia Bundle   Head of Bed Elevated  HOB 30   Adult IBW/VT Calculations   Height 4' 10\" (1.473 m)   IBW/kg (Calculated)  40.9   mL/kg Vt  7.5   54yr F admitted on 3/1/20 for aspiration pneumonia post colonoscopy, over the course of her hospital stay, patient has had increased FiO2 needed needing BiPAP and High Flow NC 40L , FiO2 70%.  She also has Albuterol neb four times a day.  Patient intubated on 3/11/2020 with a #7.5 ETT subglottic at 14:20 vented, see settings above and Jerel full strength  Remains on the above vent settings and continues on full strength Veletri.  No weaning today.    BP (!) 192/88 Comment: Gave 50 mcg Fentanyl bolus  Pulse (!) 113   Temp 97.9  F (36.6  C) (Oral)   Resp (!) 35   Ht 4' 10\" (1.473 m)   Wt 142 lb 13.7 oz (64.8 kg)   SpO2 98%   BMI 29.86 kg/m      EXAM: XR CHEST 1 VIEW PORTABLE  LOCATION: [facility]  DATE/TIME: 3/15/2020 12:37 PM     INDICATION: s/p left IJ TLC placement confirm location, r/o PTX  COMPARISON: 03/15/2020 at 0602 hours     IMPRESSION:   New left IJ central venous catheter tip is in the right atrium. No pneumothorax. NG tube courses below the diaphragm. Endotracheal tube remains above the johnnie. Bilateral pulmonary infiltrates have not appreciably changed.    Component      Latest Ref Rng & Units 3/17/2020 3/17/2020           3:58 AM 10:07 AM   pH, Arterial      7.37 - 7.44 7.38 7.39   pCO2, Arterial      35 - 45 mm Hg 68 (H) 69 (H)   pO2, Arterial      80 - 90 mm Hg 77 (L) 92 (H)   Bicarbonate, Arterial Calc      23.0 - 29.0 mmol/L 35.0 (H) 36.2 (H)   O2 Sat, Arterial      96.0 - 97.0 % 98.0 (H) 98.8 (H) "   Oxyhemoglobin      96.0 - 97.0 % 94.7 (L) 95.3 (L)   POC Base Excess Calc      mmol/L 12.9 14.5   Ventilation Mode       PCV/VG PCV/VG   Rate      rr/min 35 35   FIO2       50.00 50.00   Peep      cm H2O 14 14   Sample Stabilized Temperature      degrees C 37.0 37.0   Ventilator Tidal Volume      mL 225 225     Continue current and wean when indicated, titrate O2 & PEEP as indicated.

## 2021-06-06 NOTE — PLAN OF CARE
Problem: Pain  Goal: Patient's pain/discomfort is manageable  Outcome: Progressing  Fentanyl IVP bolus for facial grimacing and double stacking of breaths on vent    Problem: Nutrition Care Problem  Goal: Nutritional status is improving  Outcome: Progressing  TF restarted at 10cc/hr per Jenni Neli after residual check at 0800 was 80cc,  TF increased to goal of 25cc/hr at noon after residual check was 165cc, suppository given since no BM in 3 days and hypoactive BS    Problem: Abnormal Respirations  Goal: Patient will maintain/improve baseline respiratory rate/effort  Outcome: Progressing  Versed IVP x2 given for vent asynchrony    Will continue to  monitor    Ruma Carrasquillo

## 2021-06-06 NOTE — PROGRESS NOTES
Clinical Nutrition Therapy Follow Up Note      Current Nutrition Prescription:   Diet: Tube Feeding, No Tray  Formula: Isosource 1.5 @ 15ml/hr  Flushes: 150ml q 4 hours  Diet Supplements: none  IV dextrose or Fluids:dextrose 10%  epoprostenol 0.5 mg/50 mL (Full Strength) (VELETRI) inhalation solution, Last Rate: 80,000 ng/hr (03/15/20 0959)  fentaNYL 2500 mcg/50 mL (50 mcg/mL), Last Rate: 175 mcg/hr (03/15/20 0800)  heparin, Last Rate: Stopped (03/15/20 0938)  insulin infusion (1 unit/mL), Last Rate: 1 Units/hr (03/15/20 0848)  norepinephrine, Last Rate: Stopped (03/14/20 1837)  propofol, Last Rate: 65 mcg/kg/min (03/15/20 1037)  sodium chloride 0.9%, Last Rate: 10 mL/hr (03/14/20 2000)    Current Nutrition Intake:  Enteral nutrition access is a oral gastric tube, with a placement date of 3/11/20. The current tube feeding order will provide 540 kcals, 24 grams protein, 5 grams fiber, 64 grams carbohydrate, 275mls free water from formula, 900 mls from fluid flush, for a total of 1175 mls free water daily.  The current propofol order provides 649 calories daily  Total nutrient intake from all sources meets estimated nutritional needs for calories but not protein.     Anthropometrics:  Admission weight: 147 lb 4.3 oz (66.8 kg) bed  Weight: 146 lb 8 oz (66.5 kg) +1, nonpitting edema    GI Status/Output:   GI symptoms include: 1 loose stool yesterday per nurse  Bowel Sounds hypoactive per nurse  Last BM: 3/14/20 per nurse    Skin/Wound:  No wound was noted.    Medications:  Colace, lasix, synthroid/levothroid, solu-medrol, pantoprazole, senokot, vit b1, insulin, diprivan, maxipime, lantus    Labs:  K 5.1, BUN 55, Cr 1.66, GFR 32, rbc 3.08, hgb 9.1, hct 29.9    Estimated Nutrition Needs:  Assessment weight is 43.8kg, ideal weight per ASPEN guidelines for critically ill and obese patients     Energy Needs: 965-1095 kcals daily, 22-25 kcal/kg  Protein Needs: 87 g daily, 2 g/kg.  May need to decrease to 1.2-1.5 if Cr >  2  Fluid Needs: 8523-7695 mls daily, 30-35 mls/kg    Malnutrition: Not noted yet at risk w/ prolonged poor intake    Nutrition Risk Level: high risk    Nutrition dx:  Inadequate oral intake r/t respiratory failure as evidenced by need for nutrition support    Goal Status:  Lose weight-met  Meet estimated nutrition needs- progressing  Diet advance-not progressing    Intervention:  Per doctor request during rounds patient ready to slowly increase TF to goal.    Switched TF formula to Replete Fiber to better meet protein needs and avoid overfeeding as propofol is still providing about 649kcals daily. (Will adjust TF rate w/ any changes.)    Initiated Replete Fiber @ 15ml/hr x 8 hours w/ 150ml flushes q 4 hours then increase to 20ml/hr.    At goal Replete Fiber will provide 480kcals, 30g protein, 60g CHO, 16g fat, 399ml free water, 900ml from flushes for a total of 1299ml fluid daily.     Ordered Prosource No Carb three times a day to provide additional 180kcals and 45g protein bringing daily totals (incluidng propofol) to 1309kcals and 75g protein daily.     Monitoring/Evaluation:  TF tolerance, weight, labs, propofol rate

## 2021-06-06 NOTE — PROGRESS NOTES
1. Does the patient have a moderate to severe fever?  No  2. Has the patient had a serious reaction to a flu shot before?   No  3. Has the patient ever had [place]illian [place] Syndrome within 6 weeks of a previous flu shot?  No  4. Is the patient less than 6 months of age?  No    Patient is eligible to receive the vaccine based on all questions being answered as 'No'.   Chart Check, Patient intubated and on pressors, Intensivist managing, [facility] [place]ally. Still vented    Harriett Romero is a 54 y.o. female with obesity, MDD, anxiety, restless leg syndrome, former smoker , migraine headaches, insomnia admitted from [facility] center after failing outpatient treatment with clindamycin for aspiration pneumonia.  Patient aspirated on 2/25 while laying on her left side during a screening colonoscopy.  Patient transferred to ICU 3/7 due to increased O2 needs. Now diagnosed with bilateral PEs on CTA performed 3/11     #Acute resp failure  #Aspiration pneumonitis  #Maybe some degree of underlying lung disease from smoking  #Bilateral PEs  Patient had colonoscopy on 2/25 and aspirated during procedure.  After a few days she came in for evaluation for shortness of breath and was started on clindamycin for pneumonia but continued to worsen and ended up in our ER.  Was initially treated on the floor but worsened and ended up transferred to the ICU for BiPAP alternating with high flow nasal cannula.  Has been progressively worsening.  Today she underwent CT PE run that did show bilateral PEs despite prophylactic Lovenox since 3/3.  Intubated 3/11 for bronchoscopy and remains intubated.  ICU team are currently primary.  -Heparin drip  -Cefepime  -Completed steroid burst, but now resumed IV solumedrol   -Cares per ICU team     #PE  She developed a R arm clot associated with PICC, yesterday started on heparin drip  Had been on Lovenox since 3/3  Hypercoagulable state???     #Hypothyroidism  - TSH, 9.07 on 1/31, previously 28 12/2019> Rechecked here TSH  9.86   -on 112mcg, dose increased to 125mcg, recheck TSH in 4-6 weeks     #Former smoker   -Patient smoke-free for over 5 years      #Anxiety/MDD  -Stable on home medications Wellbutrin and Celexa      #Restless leg syndrome  -Seems to be controlled on Mirapex continue this     #Metabolic syndrome  -takes phentermine and  Glucophage only has medications for the past 1 week  -We will hold these medications for now      #Hypokalemia   -replacement protocol      #JANAE  - recommend patient get her CPAP from her sleep clinic      #Deconditioning  - now intubated     [place] [facility]  [place] her. Family wanted changes to the form 3/11 and Dr. Hernandez reviewed wrote several days

## 2021-06-06 NOTE — PLAN OF CARE
Problem: Inadequate Airway Clearance  Goal: Patient will achieve/maintain normal respiratory rate/effort  Outcome: Progressing     Problem: Inadequate Gas Exchange  Goal: Patient will achieve/maintain normal respiratory rate/effort  Outcome: Progressing     Pt continues with duonebs and flutter valve per schedule.  Decreased breath sounds with crackles noted at bases left greater than right.  Pt dry cough regularly.  ABGs drawn on HFNC, 45 lpm and 65% fi02, PH 7.42, PC02 52, Po2 73, HC03 31, and sat 94.1 %.  Pt alert and oriented.  Possibly going to CT today.

## 2021-06-06 NOTE — PLAN OF CARE
Goal: Patient s discharge needs are met.    Outcome: Care Progression reviewed with Hospitalist, Care Manager, & Charge RN.    Discharge Disposition: Discussed and plan to discharge to: home    Planned Discharge Date: 3/4-3/5    Problem: Barriers to discharge include: medical progression    Transportation (Needs/Ride) : family

## 2021-06-06 NOTE — PLAN OF CARE
CPOT score 0 all shift. Fio2 able to be titrated down to 50% today from 60%. Levophed was paused from 0915 to 1352 for MAP above 65, now running at 0.01 mcg/kg/min. Residuals high at beginning of shift, 475. TF held until residuals under 250, then TF restarted at rate of 15 ml/hr. Residuals continue to improve. Blood sugars trending up over 200, insulin infusion initiated and currently running at 1.5 units/hr. Tolerated repositioning. Some bloody urine with small amt of clots and pink tinged secretions observed. Intensivist notified, no new orders. Continue to monitor.    Acacia Lino, RN 3/14/2020     Problem: Pain  Goal: Patient's pain/discomfort is manageable  Outcome: Progressing     Problem: Inadequate Airway Clearance  Goal: Patient will achieve/maintain normal respiratory rate/effort  Outcome: Progressing     Problem: Inadequate Gas Exchange  Goal: Patient will achieve/maintain normal respiratory rate/effort  Outcome: Progressing     Problem: Nutrition Care Problem  Goal: Nutritional status is improving  Outcome: Progressing     Problem: Potential for Compromised Skin Integrity  Goal: Nutritional status is improving  Outcome: Progressing     Problem: Excessive Fluid Volume  Goal: Patient will achieve/maintain normal respiratory rate/effort  Outcome: Progressing     Problem: Glucose Imbalance  Goal: Achieve optimal glucose control  Outcome: Not Progressing

## 2021-06-06 NOTE — PROGRESS NOTES
Clinical Nutrition Therapy Follow Up Note    Current Nutrition Prescription:   Diet: TF with IsoSource 1.5 with goal rate of 25 mL/hour over 21 hours with water flushes of 300 mL four times a day.   TF did ramp up to goal yesterday, but at midnight she had residuals of 425 mL so TF was stopped. Then the RN held it through the night due to levothyroxine administration, so she was restarted on TF at 8:00 this morning at 10 mL/hour. Plan is to ramp it back up to goal today.  IV dextrose or Fluids:epoprostenol 0.5 mg/50 mL (Full Strength) (VELETRI) inhalation solution, Last Rate: 80,000 ng/hr (03/13/20 0909)  fentaNYL 2500 mcg/50 mL (50 mcg/mL), Last Rate: 150 mcg/hr (03/13/20 0800)  heparin, Last Rate: 15 Units/kg/hr (03/13/20 0800)  norepinephrine, Last Rate: Stopped (03/13/20 1000)  propofol, Last Rate: 60 mcg/kg/min (03/13/20 0807)    Current Nutrition Intake:  TF at goal provides 787 kcals, 35 g protein, 92 g CHO, 31 g fat, 7 g fiber, 401 mL fluid (total 1601 mL between formula and flushes).   Pt has OG tube placed 3/11/20, confirmed gastric.  Propofol at 22.7 mL/hour provides 599 kcals daily.    Anthropometrics:  Admission weight: 147 lb 4.3 oz (66.8 kg) bed  Weight: 140 lb 14.4 oz (63.9 kg)    GI Status/Output:   No BM since 3/10. RN will give suppository to promote BM today.    Skin/Wound:  No wound was noted.    Medications:  Lasix, Novolog, Levothyroxine, thiamine    Labs:  Labs reviewed    Estimated Nutrition Needs:  Assessment weight is 50.1 kg, adjusted weight     Energy Needs: 0801-5644 kcals daily, 25-30 kcal/kg  Protein Needs: 60-75 g daily, 1.2-1.5 g/kg.  Fluid Needs: 1451-0586 mls daily, 30-35 mls/kg    Malnutrition: Patient does not meet 2 diagnostic criteria for malnutrition    Nutrition Risk Level: moderate risk    Nutrition dx:  Inadequate oral intake r/t respiratory failure as evidenced by NPO diet order.-resolved    Goal Status:  Lose weight-met  Meet estimated nutrition needs-progressing  Diet  advance-met    Intervention:  When Propofol running at high rate:  Start TF using IsoSource 1.5 at 10 mL/hour and increase by 10 mL q 6 hours as tolerated until goal rate of 25 mL/hour is reached. Run over 21 hours d/t levothyroxine. Water flushes should be 300 mL four times a day. This regimen will provide 787 kcals, 35 g protein, 92 g CHO, 31 g fat, 7 g fiber, 401 mL fluid (total 1601 mL between formula and flushes).   Continue this regimen for now, will reassess need to change it 3/16/20.     When Propofol stops or rate significantly decreases:  Goal rate is 45 mL/hour over 21 hour d/t levothyroxine. Water flushes should be 220 mL four times a day. This regimen will provide 1417 kcals, 64g protein, 166 g CHO, 55 g fat, 14 g fiber, 721 mL fluid (total 1601 mL between formula and flushes).    Monitoring/Evaluation:  TF tolerance, weight, labs, propofol rate    Roz Garcia RD, LD

## 2021-06-06 NOTE — PLAN OF CARE
"  Problem: Inadequate Airway Clearance  Goal: Patient will maintain patent airway  Outcome: Progressing     Problem: Inadequate Airway Clearance  Goal: Patient will maintain patent airway  Outcome: Progressing     BP 98/56 (Patient Position: Semi-lakhani)   Pulse 70   Temp 99.1  F (37.3  C) (Oral)   Resp (!) 31   Ht 4' 10\" (1.473 m)   Wt 139 lb (63 kg)   SpO2 94%   BMI 29.05 kg/m      Pt has remained on HHNC this shift.   Bipap on standby .        "

## 2021-06-06 NOTE — PLAN OF CARE
Problem: Potential for Compromised Skin Integrity  Goal: Nutritional status is improving  Outcome: Progressing    Patient had RV over 200 mls (mostly water per RN)  Will change Water flush:   from 150 mls every 6 hours   Change to 100 mLs every 4 hours

## 2021-06-06 NOTE — H&P
Admission History and Physical   Harriett Romero,  1965, MRN [id number]    PCP: Zain Cuevas MD, [phone]   Code status:  Full Code       Extended Emergency Contact Information  Primary Emergency Contact: Hugo Romero  Address: [address] [place] [address] [place]  Home Phone: [phone]  Mobile Phone: [phone]  Relation: Spouse       Assessment and Plan     Harriett Romero is a 54 y.o. old female PMH of obesity, MDD, anxiety, restless leg syndrome, smoker/quit in , migraine headaches, insomnia, admitted from  with:    Aspiration pneumonia of left lower lobe due to gastric secretions (H).  LLL and ARTEM infiltrates.  History of underlying chronic lung disease.  Failed outpatient course of clindamycin.  Allergic to penicillin, will treat with Ceftriaxone/Flagyl, transition to Augmentin.  Symptomatic treatment, expectorant, albuterol neb as needed, supplemental oxygen for SPO2 less than 90%.  She is allergic to penicillin, per discussion with Pharm.D., in the past patient was given prescription to ampicillin.    Former smoker - quit in .  She was placed on staying smoking-free.    Anxiety MDD, stable on home regimen.  Continue Wellbutrin, Celexa.    Restless leg syndrome-on Mirapex.    Hypothyroidism-historically poorly controlled, TSH in January was 9.07, down from 28 couple months ago.  Continue home dose of thyroxine.  Check TSH with a.m. labs.    Metabolic syndrome, takes phentermine and Glucophage.  She has been taking these medications for the last week.  Will hold off for now.    Code  DVT prophylaxis-low risk, ambulate  Disposition-anticipate hospitalization for more than 2 midnights.       Chief Complaint:  Cough, weakness, abdominal pain with cough     HPI:    Harriett Romero is a 54 y.o. old female PMH of obesity, MDD, anxiety, restless leg syndrome, smoker/quit in , migraine headaches, insomnia, admitted from  with  aspiration pneumonia.  Patient had screening colonoscopy on 2/25, and vomited was waking up from anesthesia.  Since then she has been feeling weak, with generalized malaise, low-grade fever, and productive cough.  She was at home clindamycin, without symptoms improvement.  Due to persistent symptoms patient was seen at  today.  She noted to be borderline hypotensive BP 94/62, temperature 100, O2 sats 97% room air.  Chest x-ray showed consolidation in left upper lobe and left lower lobe, without effusion.  She was treated with 2 g of ceftriaxone, 500 mg Flagyl, both IV, and 1 L NS.  Patient states feeling much better after IV dose of antibiotics.       Medical History  Active Ambulatory (Non-Hospital) Problems    Diagnosis     History of Positive colorectal cancer screening using Cologuard test     Major depression in complete remission (H)     At high risk for caregiver role strain     Dyslipidemia     Vitamin D deficiency     RLS (restless legs syndrome)     Menopausal hot flushes     Migraine headache     Insomnia     Obesity due to excess calories with serious comorbidity     Past Medical History:   Diagnosis Date     10 year risk of MI or stroke 1.0% in 2017      Anxiety      Aspiration pneumonia of left lower lobe due to gastric secretions (H) 2/28/2020     Current use of estrogen therapy      Dysthymia      Endometriosis      Former smoker      Graves disease      Hypothyroidism      Insomnia      Migraine headache      Nephrolithiasis      Seizures (H)      Sleep apnea      Trigeminal neuralgia      UTI (lower urinary tract infection)      Vitamin D deficiency       Surgical History  She  has a past surgical history that includes Bilateral oophorectomy; Thyroidectomy; Cystoscopy w/ ureteral stent placement; Total abdominal hysterectomy (2006); and pr colonoscopy flx dx w/collj spec when pfrmd (N/A, 2/25/2020).       Social History  Reviewed, and she  reports that she quit smoking about 5 years ago. She  smoked 0.25 packs per day. She has never used smokeless tobacco. She reports that she does not drink alcohol or use drugs.       Allergies  Allergies   Allergen Reactions     Macrobid [Nitrofurantoin Monohyd/M-Cryst] Nausea And Vomiting     Penicillins Hives     3/1/2020 tolerated ceftriaxone      Sulfa (Sulfonamide Antibiotics) Hives    Family History  Reviewed, and family history includes Breast cancer in her paternal aunt; Von Hippel-Lindau syndrome in her mother and sister.          Prior to Admission Medications   PTA Med List   Medication Sig Last Dose     buPROPion (WELLBUTRIN SR) 150 MG 12 hr tablet TAKE 1 TABLET(150 MG) BY MOUTH TWICE DAILY Past Week at hold for wt loss     citalopram (CELEXA) 40 MG tablet Take 40 mg by mouth at bedtime. 2/29/2020 at Unknown time     clindamycin (CLEOCIN HCL) 150 MG capsule Take 1 capsule (150 mg total) by mouth 4 (four) times a day for 10 days. 150 mg + 300 mg = 450 mg total 3/1/2020 at x1     clindamycin (CLEOCIN) 300 MG capsule Take 1 capsule (300 mg total) by mouth 3 (three) times a day for 10 days. 3/1/2020 at x1     levothyroxine (SYNTHROID, LEVOTHROID) 112 MCG tablet Take 1 tablet (112 mcg total) by mouth Daily at 6:00 am. 2/29/2020 at Unknown time     metFORMIN (GLUCOPHAGE XR) 500 MG 24 hr tablet Take 1 tablet (500 mg total) by mouth daily. Past Week at hold for wt loss     naltrexone (DEPADE) 50 mg tablet Take 0.5 tablets (25 mg total) by mouth 2 (two) times a day. Past Week at hold for wt loss     phentermine 30 MG capsule Take 1 capsule (30 mg total) by mouth every morning. Past Week at hold for wt loss     pramipexole (MIRAPEX) 0.75 MG tablet TAKE 1 TABLET(0.75 MG) BY MOUTH AT BEDTIME 2/29/2020 at Unknown time     triamterene-hydrochlorothiazide (MAXZIDE-25) 37.5-25 mg per tablet TAKE 1 TABLET BY MOUTH DAILY 2/29/2020 at Unknown time          Review of Systems:  A 12 point comprehensive review of systems was negative except as noted. Physical Exam:  Temp:  [99.3  " F (37.4  C)] 99.3  F (37.4  C)  Heart Rate:  [71] 71  Resp:  [20] 20  BP: (110)/(61) 110/61         /61 (Patient Position: Semi-lakhani)   Pulse 71   Temp 99.3  F (37.4  C) (Oral)   Resp 20   Ht 4' 10.5\" (1.486 m)   Wt 147 lb 4.3 oz (66.8 kg)   SpO2 97%   BMI 30.26 kg/m    General: Alert and oriented x 3. Not in obvious distress.  HEENT: NC, AT. Neck- supple, No JVP elevation, lymphadenopathy or thyromegaly. Trachea-central.  Chest: Laterally, coarse breath sounds, no rhonchi or wheezes.  Heart: S1S2 regular. No M/R/G.  Abdomen: Soft. NT, ND. No organomegaly. Bowel sounds- active.  Back: No spine tenderness. No CVA tenderness.  Extremities: No leg swelling. Peripheral pulses 2+ bilaterally.  Neuro: Cranial nerves 1-12 grossly normal. No focal neurological deficit    Pertinent Labs  Lab Results: personally reviewed.   Lab Results   Component Value Date     02/08/2019     06/11/2018     10/06/2017    K 4.3 02/08/2019    K 3.9 06/11/2018    K 4.1 10/06/2017    CO2 29 02/08/2019    CO2 26 06/11/2018    CO2 24 10/06/2017    BUN 10 02/08/2019    BUN 13 06/11/2018    BUN 14 10/06/2017    CREATININE 0.83 02/08/2019    CREATININE 0.89 06/11/2018    CREATININE 0.73 10/06/2017    CALCIUM 9.3 02/08/2019    CALCIUM 9.1 06/11/2018    CALCIUM 9.0 10/06/2017     Lab Results   Component Value Date    CKTOTAL 370 (H) 07/26/2014    TROPONINI <0.01 07/26/2014    TROPONINI 0.02 07/25/2014    TROPONINI <0.01 02/16/2010     Lab Results   Component Value Date    WBC 8.7 10/06/2017    WBC 10.0 05/29/2015    WBC 12.3 (H) 07/26/2014    WBC 18.8 (H) 09/11/2013    HGB 13.7 10/06/2017    HGB 13.7 05/29/2015    HGB 12.4 07/26/2014    HCT 40.7 10/06/2017    HCT 40.1 05/29/2015    HCT 36.0 07/26/2014    MCV 90 10/06/2017    MCV 89 05/29/2015    MCV 90 07/26/2014     10/06/2017     05/29/2015     07/26/2014     Lab Results   Component Value Date    CHOL 178 10/06/2017    CHOL 188 01/24/2017    TRIG " 206 (H) 10/06/2017    TRIG 194 (H) 01/24/2017    HDL 48 (L) 10/06/2017    HDL 49 (L) 01/24/2017     Pertinent Radiology  Radiology Results: Personally reviewed impression/s  EKG Results: personally reviewed.     Advanced Care Planning  Sulema Ballard MD   [facility] Medicine Service   [phone]   Pager [phone]   [email or web address]

## 2021-06-06 NOTE — PLAN OF CARE
Problem: Discharge Barriers  Goal: Patient's discharge needs are met  Outcome: Progressing     Goal: Patient s discharge needs are met.    Outcome: Care Progression reviewed with Hospitalist, Care Manager, & Charge RN.    Discharge Disposition: Discussed and plan to discharge to: TBD    Planned Discharge Date: TBD    Problem: Barriers to discharge include: Pt currently intubated    Transportation (Needs/Ride) Time: TBD    Care Management Narrative: Writer was notified that the pt may be a candidate for [facility], writer submitted a referral to [facility].     SMITH Holcomb    12:10: Addendum: Writer spoke with Maggie with [facility], Maggie stated the pt appears appropriate and they will continue to follow pt.

## 2021-06-06 NOTE — PROGRESS NOTES
[facility] PROGRESS NOTE    Assessment/Plan  Principal Problem:    Aspiration pneumonia of left lower lobe due to gastric secretions (H)  Active Problems:    Hypokalemia    Former smoker - quit in 2014    Anxiety    Hypothyroidism    Metabolic syndrome    Aspiration pneumonia due to inhalation of vomitus (H)    Diarrhea of presumed infectious origin    Hypotension, unspecified hypotension type    Acute respiratory failure with hypoxia (H)    54 y.o. old female with obesity, MDD, anxiety, restless leg syndrome, former smoker , migraine headaches, insomnia admitted from [facility] center after failing outpatient treatment with clindamycin for aspiration pneumonia.  Patient aspirated on 2/25 while laying on her left side during a screening colonoscopy.  Reports following prep and NPO.     Aspiration pneumonia Left lower and upper lobes Due to gastric secretions during colonoscopy, failed outpatient clindamycin took 6 doses   - still requiring O2  -  PO flagyl and levaquin  -  bronchial hygiene with flutter valve and alb neb four times a day and keep scheduled for now and reassess in am, coarse BS and crackles and with low O2 sats  - pleuritic pain improving with  toradol scheduled and as above bronchial hygiene with nebs  - heat and lidocaine ointment   - guaifenesin with codeine   - reviewed with Pulm and aspiration of gastric acid and significant inflammation and irritation will take some time to improve and nothing further to rec. Agrees with abx due to PCN allergy. No Bronch given improving plus with her aspiration risk and would not give more information   - IV solumedrol yesterday with improvement and will give 2 doses today and reassess in am showing benefit and start po prednisone in am     Diarrhea having liquid stools and was on clindamycin briefly for 6 dose.   - sent stool for Cdiff negative and stool cx NGTD  - immodium prn      Acute resp failure still requiring 1-2 liters  - started bronchial hygiene today with  flutter valve and alb neb four times a day to see if improvement   - wean to keep sats >90%, assessed this morning due to was placed on 4 liters early but was sating 94% on 4 liters, weaned to 1 liter ad dropped to 85% then increased to 2 liters and staying at 90%.   - trial of steroids   - also new dx of JANAE and patient still needs to get her CPAP   - reassess for home O2 in am   - likely significant irritation and inflammation from aspiration vs underlying lung disease with h/o smoking     Hypothyroidism  -Very poorly controlled TSH, 9.07 on 1/31, previously 28 12/2019  -Rechecked here TSH  9.86   -on 112mcg, dose increased to 125mcg, recheck TSH in 4-6 weeks     Former smoker   -Patient smoke-free for over 5 years      Anxiety/MDD  -Stable on home medications continue Wellbutrin and Celexa while inpatient     Restless leg syndrome  -Seems to be controlled on Mirapex continue this.     Metabolic syndrome  -takes phentermine and Glucophage only has medications for the past 1 week  -We will hold these medications for now    Hypokaliemia  - replaced and now resolved     JANAE  - recommend patient get her CPAP fro her sleep clinic       VTE prophylaxis: SQ Lovenox    Diet: reg  Drains/tubes: none  Weight bearing restrictions: WBAT  Disposition/Barriers to discharge: possible discharge tomorrow keeping anther day due to improvement with IV steroids and will give 2 doses today and reassess   Full Code  Subjective  More comfortable and breathing improved but still dropping sats.   Objective  Vital signs in last 24 hours  Vitals:    03/05/20 0833   BP:    Pulse:    Resp:    Temp:    SpO2: 90%     Wt Readings from Last 1 Encounters:   03/01/20 1702 147 lb 4.3 oz (66.8 kg)   Weight change:   Body mass index is 30.26 kg/m .  Intake/Output this shift:    Intake/Output Summary (Last 24 hours) at 3/5/2020 1036  Last data filed at 3/5/2020 0630  Gross per 24 hour   Intake 1650 ml   Output --   Net 1650 ml     I/O last 3 completed  shifts:  In: 1650 [P.O.:1650]  Out: -   Physical Exam  General appearance: alert, appears stated age and cooperative,  NAD obese  HEENT:  Normocephalic without obvious abnormality, atraumatic  Lungs:improved air movement very minimal crackles left base and harsh dry cough   Heart: RRR no M/R/G  Abdomen: soft,  +bowel sounds  no masses,  no organomegaly  Extremities: No E/C/C  Pulses: 2+ and symmetric  Skin: Skin color, texture, turgor normal. No rashes or lesions  Neurologic: Grossly normal  Current Medications    albuterol  2.5 mg Nebulization QID     buPROPion  150 mg Oral BID     citalopram  40 mg Oral QHS     enoxaparin ANTICOAGULANT  40 mg Subcutaneous Q24H     ketorolac  15 mg Intravenous Q6H     levoFLOXacin  500 mg Oral Daily 0600     levothyroxine  125 mcg Oral Daily 0600     lidocaine   Topical TID     metFORMIN  500 mg Oral DAILY     methylPREDNISolone sodium succinate  40 mg Intravenous Once     metroNIDAZOLE  500 mg Oral TID     pramipexole  0.75 mg Oral QHS     predniSONE  40 mg Oral Daily with brkfst     sodium chloride  2.5 mL Intravenous Line Care       acetaminophen, acetaminophen, albuterol **AND** Nebulizer treatment intermittent, aluminum-magnesium hydroxide-simethicone, bisacodyL, codeine-guaiFENesin, dextrose 50 % (D50W), glucagon (human recombinant), hydrOXYzine pamoate, loperamide, magnesium hydroxide, melatonin, ondansetron **OR** ondansetron, polyethylene glycol  Pertinent Labs   Results from last 7 days   Lab Units 03/04/20  0604 03/03/20  0434 03/02/20  1047   LN-WHITE BLOOD CELL COUNT thou/uL 9.8 10.6 11.1*   LN-HEMOGLOBIN g/dL 9.9* 10.1* 9.7*   LN-HEMATOCRIT % 30.4* 30.5* 29.2*   LN-PLATELET COUNT thou/uL 319 280 245       Results from last 7 days   Lab Units 03/05/20  0850 03/04/20  0604 03/03/20  0649  03/02/20  0535   LN-SODIUM mmol/L 138 139  --   --  139   LN-POTASSIUM mmol/L 3.8 4.0 4.1   < > 3.4*   LN-CHLORIDE mmol/L 104 107  --   --  105   LN-CO2 mmol/L 26 24  --   --  26    LN-BLOOD UREA NITROGEN mg/dL 14 11  --   --  13   LN-CREATININE mg/dL 0.72 0.72  --   --  0.78   LN-CALCIUM mg/dL 8.8 8.7  --   --  8.0*   LN-ALBUMIN g/dL  --   --   --   --  2.3*   LN-PROTEIN TOTAL g/dL  --   --   --   --  5.0*   LN-BILIRUBIN TOTAL mg/dL  --   --   --   --  0.5   LN-ALKALINE PHOSPHATASE U/L  --   --   --   --  253*   LN-ALT (SGPT) U/L  --   --   --   --  41   LN-AST (SGOT) U/L  --   --   --   --  31    < > = values in this interval not displayed.             Total time for this visit is 35 minutes with greater than 50% of time spent in counseling and coordination of care with patient/family, discussion with RN, consultants, reviewing labs and chart.    Destini Trevino MD.   [facility] Medicine Service   [phone]   Pager [phone]

## 2021-06-06 NOTE — PLAN OF CARE
Problem: Inadequate Airway Clearance  Goal: Patient will maintain patent airway  Outcome: Progressing     Problem: Mechanical Ventilation  Goal: Patient will maintain patent airway  Outcome: Progressing  Goal: ET tube will be managed safely  Outcome: Progressing     Vent Mode: PCV/VG  FiO2 (%):  [50 %-65 %] 50 %  S RR:  [22-24] 24  S VT:  [280 mL] 280 mL  PEEP/CPAP (cm H2O):  [14 cm H2O] 14 cm H2O  Minute Ventilation (L/min):  [5.5 L/min-7.3 L/min] 6.7 L/min  PIP:  [31 cm H2O-39 cm H2O] 39 cm H2O  MAP (cm H2O):  [17-22] 22     Pt. remains on full vent support, settings above. ETT 7.5, 21 @teeth, BS coarse/ crackles, suctioning moderate amount of pink secretions via ETT. Veletri switched to half strength at 07:48 today per MD order. PIP 39, plats 29-33, VT decreased to 250 per order. RT following    ALBERT HartT

## 2021-06-06 NOTE — PROGRESS NOTES
Harriett was transferred to medical telemetry from  during the night because of increased oxygen needs. She arrived on BIPAP with a medium mask and the following settings: ST mode, RR 4, I/P 10/5, and Fio2 of 90%. Oxygen saturation 88-92%. Patient with frequent dry cough. She has a high RR up to 60 and has shallow respirations. Some of this may be attributed to her anxiety.  Her mask was changed to a small one. A prn Albuterol neb was gven to help her shortness of breath.  Continue to monitor and wean from BIPAP as able. Her High Flow NC set up is in the room

## 2021-06-06 NOTE — PROGRESS NOTES
Respiratory Care Note     Patient is on full vent support. Breath sound is course and diminished. No wean done tonight. Suction small amount of thick white secretions. Will continue to monitor.       03/17/20 0430   Vent Information   Interface Invasive   Vent ID [id number]   Vent Mode PCV/VG   Patient Ventilator Status On   Flowmeter at Bedside Yes   Ambu-Bag With Mask at Bedside Yes   Respiratory Assessment   Assessment Type Pre-treatment   Respiratory Pattern Regular   Chest Assessment Chest expansion symmetrical   Bilateral Breath Sounds Coarse;Diminished   Vent Settings   FiO2 (%) 50 %   Heater Temperature 98.6  F (37  C)   Resp Rate (Set) 35   Vt (Set, mL) 225 mL   PEEP/CPAP (cm H2O) 14 cm H2O   Trigger Sensitivity Flow (L/min) 2 L/min   Bias Flow (lpm)  5 L/min   Patient Data   Vt Exp (mL) 237 mL   Minute Ventilation (L/min) 8 L/min   Total Resp Rate  35 BPM   PIP Observed (cm H2O) 31 cm H2O   MAP (cm H2O) 20   SpO2 94 %   Heart Rate 69   Alarms   High Resp Rate 40   Low PEEP 3 cm H2O   Insp Pressure High (cm H2O) 60 cm H2O   Insp Pressure Low (cm H2O) 15 cm H2O   MV High (L/min) 16 L/min   MV Low (L/min) 2.5 L/min   Vt Low (mL) 150 mL   Apnea Interval (sec) 30 seconds   Apnea Rate 35   Apnea Volume (mL) 225 mL   Alarm Functional and On Yes   Backup Mode Set Yes   Subglottic Suction ET Tube 7.5   Placement Date: 03/11/20   Subglottic Suction ETT Entry Site: Oral  Size : 7.5  Cuffed: Cuffed  Insertion attempts: 1  ETT Placement Confirmation: Bilateral breath sounds;End tidal CO2 device;Confirmed by x-ray  Placed By: NP/PA   Secured at (cm) 21 cm   Measured from Teeth   Secured Location Right   Secured with Commercial tube barrett   Site Condition Cool;Dry   Subglottic Secretions Scant   Subglottic Suction Frequency Intermittent suction   Subglottic Suction Pressure 120 mmHg   Subglottic Suction Lumen Intervention Cleaned   Weaning Assessment    Weaning Assessment Complete N

## 2021-06-06 NOTE — PLAN OF CARE
"  Problem: Inadequate Airway Clearance  Goal: Patient will maintain patent airway  Outcome: Progressing  Goal: Patient will achieve/maintain normal respiratory rate/effort  Outcome: Progressing     Problem: Inadequate Gas Exchange  Goal: Patient will achieve/maintain normal respiratory rate/effort  Outcome: Progressing     Problem: Excessive Fluid Volume  Goal: Patient will achieve/maintain normal respiratory rate/effort  Outcome: Progressing     Blood pressure 115/58, pulse 75, temperature 97.7  F (36.5  C), temperature source Axillary, resp. rate (!) 32, height 4' 10\" (1.473 m), weight 137 lb 9.1 oz (62.4 kg), SpO2 (!) 86 %, not currently breastfeeding.  Continues on Heated High Flow. Pt does have episodes of desaturation, does corollate with coughing episode. Harsh almost stridor sounding cough.     "

## 2021-06-06 NOTE — PROGRESS NOTES
[facility]    55 y/o female with hx of MDD, anxiety, RLS, former smoker, migraines, presented here from Urgent Care for concern aspiration pneumonia that had failed outpatient treatment. She actually had a colonoscopy on 2/25/20 and had an aspiration event then, was then placed on clinda and continue to get worse and was admitted here to the floor. Unfortunately, she continued to decline in her resp status, required moving to ICU and was intubated on 3/11/20. She was also found to have arm DVT, bilateral Pe's and is on hep gtt too. She remains in the ICU vented and sedated still      Summary of Care    Prior to hospital    2/25/20--colonoscopy with aspiration event, treated as outpt with clinda      [facility]    3/1/20--Urgent care --for resp issues, sent to [facility] for admit. Placed on iv ceftriaxone +flagyl  3/2/20--wbc 11  3/3/20--3/5/20--on floor , antibiotics  3/6/20--increasing need o2, up to 6 liters, wbc 32,now on Iv Cefepime, Pulm consulted  3/7/20--Picc placed, EF on echo 63%, transferred to ICU for bipap  3/8/20--stopped levofloxacin  3/9/20--on high flow O2  3/10/20--on high flow O2, DVT right arm, picc removed, hep gtt started  3/11/20--ARDS, broch and intubated, WBC 14, pressors. CTA showed bilateral Pe's  3/12/20--still vented,high dose iv steroids, Veletri, OG for TF  3/13/20--still vented terry dose steroids, increased Peep, dc precedex  3/14/20--still on low dose pressors-weaning, still vented, FiO2 50%, high dose steroids, TF residuals, now trickle feeds, creat up 1.33, wbc 27      [facility] [place] team at this time, will follow

## 2021-06-06 NOTE — PROGRESS NOTES
"Clinical Nutrition Therapy Assessment Note      Reason for Assessment:   Harriett Romero is a 54 y.o. female assessed by the registered Dietitian for length of stay screen. Patient presents with aspiration PNA, h/o tobacco use, anxiety, respiratory failure, diarrhea.     Nutrition History:  Information from patient and chart.  Diet prior to admission: [place] [facility] [place]   Recent food/fluid intake: Prior to NPO patient was ordering an average of 2137kcals and 69g protein.   Recent Supplements: Patient stated she took 3 medications for weight loss. Patient stated they were wellbutrin, phentoin?, and an \"n\" one. (Could not decipher from [facility] med list.)  Cultural/Episcopal food needs or preferences: none  Food allergies or intolerances: nkfa, patient stated she has an intolerance to juices w/ pulp and has to drink acidic juices mixed with water.     Current Nutrition Prescription:   Diet: NPO  Diet Supplements: none  IV dextrose or Fluids: none    Current Nutrition Intake:  The patient's current meal intake is poor <50%   Total nutrient intake from all sources does not meet estimated nutritional needs.     Anthropometrics:  Height: 4' 10.5\" (148.6 cm)  Admission weight: 147 lb 4.3 oz (66.8 kg) bed  Weight: 151 lb 14.4 oz (68.9 kg) nonpitting  BMI (Calculated): 30.3  BMI indication: 30-34.9 obesity (class 1)  Ideal body weight 96.3lbs 43.8kg  % Ideal body weight 157%  Usual body weight 137lbs 62.3kg  % Usual body weight 111%  Weight History:   Wt Readings from Last 10 Encounters:   03/07/20 151 lb 14.4 oz (68.9 kg)   02/28/20 143 lb (64.9 kg)   02/25/20 144 lb (65.3 kg)   01/31/20 149 lb 3.2 oz (67.7 kg)   12/03/19 147 lb (66.7 kg)   11/06/19 141 lb 4.8 oz (64.1 kg)   07/19/19 141 lb (64 kg)   07/02/19 139 lb (63 kg)   06/26/19 138 lb (62.6 kg)   02/08/19 136 lb 8 oz (61.9 kg)     RD Nutrition Focused Physical Exam:  The patient has the following physical signs which could " indicate malnutrition: Muscle loss, Deltoids - moderate and Fluid accumulation    GI Status/Output:   GI symptoms include:Constipation per patient and Diarrhea per patient  Bowel Sounds present per nurse  Last BM: 3/7/20 per patient    Skin/Wound:  Walt score Walt Scale Score: 17    No wound was noted.    Medications:  Maxipime, lasix, levaquin, pantoprazole, kcl    Labs:  BUN 26, rbc 3.73, hgb 10.9, hct 33.9    Estimated Nutrition Needs:  Assessment weight is 50.1kg, adjusted weight    Energy Needs: 0993-5772 kcals daily, 25-30 kcal/kg  Protein Needs: 60-75 g daily, 1.2-1.5 g/kg.  Fluid Needs: 1917-2965 mls daily, 30-35 mls/kg    Malnutrition: Patient does not meet 2 diagnostic criteria for malnutrition    Nutrition Risk Level: moderate risk    Nutrition dx:   Inadequate oral intake r/t Respiratory failure as evidenced by NPO diet order.     Goal:   Lose weight, Meet estimated nutrition needs and Diet advance    Intervention:   Recommend advance diet when able.    Monitoring/Evaluation:   Diet advance, wt, labs    Electronically signed by:  Marko Avina RD

## 2021-06-06 NOTE — PLAN OF CARE
Problem: Inadequate Airway Clearance  Goal: Patient will maintain patent airway  Outcome: Progressing  Goal: Patient will achieve/maintain normal respiratory rate/effort  Outcome: Progressing     Problem: Inadequate Gas Exchange  Goal: Patient will achieve/maintain normal respiratory rate/effort  Outcome: Progressing  Goal: Patient is adequately oxygenated and ventilation is improved  Outcome: Progressing     Problem: Excessive Fluid Volume  Goal: Patient will achieve/maintain normal respiratory rate/effort  Outcome: Progressing

## 2021-06-06 NOTE — PROGRESS NOTES
"RESPIRATORY CARE NOTE     Patient Name: Harriett Romero  Today's Date: 3/12/2020     Pt continues on the following settings:  Vent Mode: PCV/VG  FiO2 (%):  [65 %-100 %] 65 %  S RR:  [20] 20  S VT:  [300 mL] 300 mL  PEEP/CPAP (cm H2O):  [12 cm H2O] 12 cm H2O  Minute Ventilation (L/min):  [6.2 L/min-7.4 L/min] 6.3 L/min  PIP:  [15 cm H2O-34 cm H2O] 29 cm H2O  MAP (cm H2O):  [12-23] 23   Plateau pressure: 25 cm H2O     Pt is intubated with  # 7.5 ETT secured  21 at the teeth. Pt receives albuterol and veletri. BS are clear pre and post neb tx. Pt has a strong cough with suction. RT suctioned pt for scant. Pt's respiratory status is stable. RT will continue to follow per MD's orders.     /60 (Patient Position: Semi-lakhani)   Pulse 69   Temp (!) 100.6  F (38.1  C) (Oral)   Resp 16   Ht 4' 10\" (1.473 m)   Wt 139 lb 15.9 oz (63.5 kg)   SpO2 94%   BMI 29.26 kg/m        Paris Weaver, LRT  "

## 2021-06-06 NOTE — PROGRESS NOTES
"   03/11/20 1428   Vent Information   Interface Invasive   Vent ID [id number]   Vent Mode VCV   Patient Ventilator Status On   $Adult Vent Charge-Initial Yes   Respiratory Assessment   Assessment Type Assess only   Respiratory Pattern Regular   Chest Assessment Chest expansion symmetrical   Bilateral Breath Sounds Coarse   Vent Settings   FiO2 (%) 100 %   Resp Rate (Set) 16   Vt (Set, mL) 325 mL   PEEP/CPAP (cm H2O) 10 cm H2O   Insp Flow (L/min) 30 L/min   Patient Data   Vt Exp (mL) 319 mL   Minute Ventilation (L/min) 5.6 L/min   Total Resp Rate  15 BPM   PIP Observed (cm H2O) 36 cm H2O   MAP (cm H2O) 13   Plateau Pressure (cm H2O) 29 cm H2O   SpO2 100 %   Heart Rate 74   Alarms   High Resp Rate 40   Low PEEP 3 cm H2O   Insp Pressure High (cm H2O) 60 cm H2O   Insp Pressure Low (cm H2O) 15 cm H2O   MV High (L/min) 16 L/min   MV Low (L/min) 4 L/min   Vt High (mL) 1000 mL   Vt Low (mL) 200 mL   Apnea Interval (sec) 30 seconds   Apnea Rate 16   Apnea Volume (mL) 325 mL   Alarm Functional and On Yes   Backup Mode Set Yes   Subglottic Suction ET Tube 7.5   Placement Date: 03/11/20   Subglottic Suction ETT Entry Site: Oral  Size : 7.5  Cuffed: Cuffed  Insertion attempts: 1  ETT Placement Confirmation: Bilateral breath sounds;End tidal CO2 device;Confirmed by x-ray  Placed By: NP/PA   Secured at (cm) 22 cm   Measured from Teeth   Secured Location Center   Secured with Commercial tube barrett   Cuff Pressure (cm H2O) 32 cm H2O   Site Condition Dry   Subglottic Secretions Scant   Subglottic Suction Frequency Intermittent suction   Subglottic Suction Pressure 120 mmHg   Subglottic Suction Lumen Intervention Cleaned   Weaning Assessment    Weaning Assessment Complete N   IHI Ventilator Associated Pneumonia Bundle   Daily Awakening Trials Performed Not applicable   Daily Assessment of Readiness to Extubate No (Comment)   Head of Bed Elevated  HOB 30   Adult IBW/VT Calculations   Height 4' 10\" (1.473 m)   IBW/kg (Calculated)  40.9 " "  mL/kg Vt  7.5   63 F, admitted on 3/1/20 for aspiration pneumonia post colonoscopy, over the course of her hospital stay, patient has had increased FiO2 needs needing BiPAP and High Flow NC 40L , FiO2 70%.  She also has Albuterol neb four times a day.  Patient taken to CT of chest this afternoon, after returning, physician decide to intubate due to worsening chest CT scan.  Patient intubated with a #7.5 ETT subglottic at 14:20 vented, see settings above and Jerel full strength started at 15:01.    /60   Pulse 74   Temp 98.3  F (36.8  C) (Oral)   Resp (!) 34   Ht 4' 10\" (1.473 m)   Wt 139 lb (63 kg)   SpO2 100%   BMI 29.05 kg/m      Component      Latest Ref Rng & Units 3/11/2020             pH, Arterial      7.37 - 7.44 7.36 (L)   pCO2, Arterial      35 - 45 mm Hg 59 (H)   pO2, Arterial      80 - 90 mm Hg 97 (H)   Bicarbonate, Arterial Calc      23.0 - 29.0 mmol/L 29.9 (H)   O2 Sat, Arterial      96.0 - 97.0 % 98.4 (H)   Oxyhemoglobin      96.0 - 97.0 % 95.6 (L)   POC Base Excess Calc      mmol/L 6.4   Ventilation Mode       AC   Rate      rr/min 16   FIO2       100.00   Peep      cm H2O 10   Sample Stabilized Temperature      degrees C 37.0   Ventilator Tidal Volume      mL 325   EXAM: CTA CHEST PE RUN  LOCATION: [facility] [place]  DATE/TIME: 3/11/2020 1:05 PM     INDICATION: Shortness of breath. Acute respiratory failure. Possible aspiration pneumonitis.  COMPARISON: Radiograph 03/10/2020.  TECHNIQUE: CT angiogram chest during arterial phase injection IV contrast. 2D and 3D MIP reconstructions were performed by the CT technologist. Dose reduction techniques were used.   CONTRAST: Iohexol (Omni) 100 mL.     FINDINGS:  ANGIOGRAM CHEST: Mild right lower lobe subsegmental pulmonary artery embolism. Minimal bilateral pulmonary embolism elsewhere, including middle lobe (series 4, image 206), left upper lobe (images [id number] medially) and left lower lobe (image 232). No   significant pulmonary arterial " enlargement. RV to LV ratio measures 0.9. Slight interventricular septal flattening.     Nonaneurysmal aorta without dissection.     LUNGS AND PLEURA: Bilateral confluent groundglass and opacities throughout all lobes. Mild consolidation within the peripheral left upper and lower lobes, more pronounced in the lower lobe. Mild basilar predominant airway dilatation. Mild septal   thickening. Tiny right and small left pleural effusions. No pneumothorax.     MEDIASTINUM/AXILLAE: Borderline mediastinal and bilateral hilar adenopathy. Example left perihilar node measures 11 x 16 mm (series 4, image 261).     UPPER ABDOMEN: No pericardial effusion.     MUSCULOSKELETAL: Normal.     IMPRESSION:      1.  Mild bilateral pulmonary artery embolism. Borderline RV to LV ratio and slight flattening of the interventricular septum; RV strain not excluded. Pulmonary arteries not enlarged.     2.  Regions of confluent groundglass and opacities throughout all lobes bilaterally. Mild left lung consolidation. Differential includes superimposed edema / ARDS, hemorrhage and alveolar damage, or other nonspecific infectious / inflammatory process   (including a massive aspiration event). These findings make evaluation of pulmonary infarct difficult.     3.  Tiny right and small left pleural effusions.     4.  Borderline adenopathy, presumed reactive.      Continue current and monitor for hypoxemia and respiratory distress, suction PRN and wean a indicated.

## 2021-06-06 NOTE — PLAN OF CARE
Problem: Pain  Goal: Patient's pain/discomfort is manageable  Outcome: Progressing     Problem: Inadequate Gas Exchange  Goal: Patient will achieve/maintain normal respiratory rate/effort  Outcome: Progressing   Pt c/o pain and discomfort through chest and arm, discussed with NP new orders for EKG 12 lead, serial troponins, ABG, and CXR see chart for results. Notable redness and pain in RUE, discussed with NP, new order for US of this arm. Pt continues to experience dry strong cough, given PRN morphine x2 and cough syrup x1, medication somewhat effective. Continues to need 45L at 65% on high flow O2.  at bedside throughout afternoon.

## 2021-06-06 NOTE — PROGRESS NOTES
"   03/04/20 0958 03/04/20 1004 03/04/20 1006   Oxygen Therapy/Pulse Ox   O2 Device None (Room air) None (Room air) Nasal cannula   O2 Flow Rate (L/min)  --  1 L/min 2 L/min   FiO2 (%) 21 %  --   --    SpO2 (!) 87 % (!) 88 % 92 %   SpO2 Activity  R/A at rest O2 at rest O2 at rest      03/04/20 1008 03/04/20 1012   Oxygen Therapy/Pulse Ox   O2 Device Nasal cannula Nasal cannula   O2 Flow Rate (L/min) 2 L/min 2 L/min   FiO2 (%)  --   --    SpO2 90 % 91 %   SpO2 Activity  O2 activity O2 activity     PT on 2lpm NC BS bilateral crackles greater in left lung, remains on qid duo neb with flutter,  No other changes made.    /54 (Patient Position: Lying)   Pulse 77   Temp 98.2  F (36.8  C) (Oral)   Resp 24   Ht 4' 10.5\" (1.486 m)   Wt 147 lb 4.3 oz (66.8 kg)   SpO2 94%   BMI 30.26 kg/m        "

## 2021-06-06 NOTE — PROGRESS NOTES
"Blood pressure 113/61, pulse 96, temperature 97.9  F (36.6  C), temperature source Oral, resp. rate 20, height 4' 10.5\" (1.486 m), weight 147 lb 4.3 oz (66.8 kg), SpO2 90 %, not currently breastfeeding.      Pt is currently on 3 LNC and denies any shortness of breath or difficult breathing. Pt's suplemental oxygen has been augmented to 3 LNC due to low saturations of 89% at rest but improved to 94% post neb tx . BS: clear to auscultations and decreased @ bases.Pt does have strong non-productive cough. RT will follow and assess as needed .   "

## 2021-06-06 NOTE — PLAN OF CARE
Problem: Inadequate Airway Clearance  Goal: Patient will maintain patent airway  Outcome: Progressing     Problem: Inadequate Gas Exchange  Goal: Patient is adequately oxygenated and ventilation is improved  Outcome: Progressing     Problem: Abnormal Respirations  Goal: Patient will maintain/improve baseline respiratory rate/effort  Outcome: Progressing     Patient received ativan 0.5 mg IV prn for restlessness/ frequent coughing. BIPAP from 70% now at 50% FIO2. Patient spitted out some scant yellow pink tinged sputum. Lung sounds were diminished. Respiratory rate ranged 20''s to low 30's while sleeping, 30's -40's with activity. O2 sat would quickly drop to 60's-80% during mouth cares. No breaks from BIPAP this shift. Clustered care to promote sleep. Patient had a better night shift overall.   Potassium protocol addressed.   Will continue to monitor.

## 2021-06-06 NOTE — PROGRESS NOTES
Medicine Progress Note      Assessment & Plan   Principal Problem:    Aspiration pneumonia of left lower lobe due to gastric secretions (H)  Active Problems:    Former smoker - quit in 2014    Anxiety    Hypothyroidism    Metabolic syndrome    Aspiration pneumonia due to inhalation of vomitus (H)    Patient is a 54-year-old female with history of obesity, MDD, anxiety, restless leg syndrome, former smoker , migraine headaches, insomnia admitted from [facility] after failing outpatient treatment with clindamycin for aspiration pneumonia.  Patient aspirated on 2/25 while laying on her left side during a screening colonoscopy.  Reports following prep and NPO.    Aspiration pneumonia Left lower and upper lobes  -Due to gastric secretions during colonoscopy, failed outpatient clindamycin  -Currently on 2 L nasal cannula saturating mid to high 90s  -Nonproductive cough, has codeine  -Requiring supplemental oxygen saturation is less than 90% on room air  -Continue on ceftriaxone and Flagyl with plan to transition to clindamycin or doxycycline due to PCN allergy.   -Expectant management supplemental oxygen and albuterol nebs as needed    Hypothyroidism  -Very poorly controlled TSH, 9.07 on 1/31, previously 28 12/2019  -Rechecked here TSH today today 9.86   -on 112mcg, will increase dose for tomorrow to 125mcg, recheck TSH in 4-6 weeks    Former smoker   -Patient smoke-free for over 5 years   -No needs at this time    Anxiety/MDD  -Stable on home medications continue Wellbutrin and Celexa while inpatient    Restless leg syndrome  -Seems to be controlled on Mirapex continue this.    Metabolic syndrome  -takes phentermine and Glucophage only has medications for the past 1 week  -We will hold these medications for now    Fluids: Stop IV fluids today, encourage p.o.  Diet: Regular  VTE ppx: Low risk encourage ambulation    Barriers to discharge: Improvement in aspiration pneumonia titration off oxygen  Disposition: Home in 2  to 3 days    Subjective  Cc:aspiration pneumonia    Pt is new to me today.  Personally conducted extensive chart review prior to visit including labs, imaging, past provider notes and interventions.    Patient reports nonproductive cough subjective fevers and chills from overnight.  Patient feels better on nasal cannula than without it at this point.    No nausea vomiting body aches diarrhea.    Review of Systems: History obtained from the patient  12 system review completed and negative except for above    Objective    Physical Examination:   General appearance - alert, fair appearing, and in no distress and considerable coughing after deep inspiration.  Looks comfortable on 2 L nasal cannula  Mental status - alert, oriented to person, place, and time, normal mood, behavior, speech, dress, motor activity, and thought processes  HEENT - sclera anicteric, normocephalic atraumatic PERRLA  Neck - supple, no significant adenopathy  Respiratory -rhonchi in the left upper but mainly lower lobe  Cardiac - normal rate, regular rhythm, normal S1, S2, no murmurs, rubs, clicks or gallops, no JVD  Abdomen - soft, nontender, nondistended, no masses or organomegaly no rebound tenderness noted bowel sounds normal, no bladder distension noted  Neurological - alert, oriented, normal speech, no focal findings or movement disorder noted  Musculoskeletal - no joint tenderness, deformity or swelling, full range of motion without pain  Extremities - peripheral pulses normal, no pedal edema,  Skin - normal coloration and turgor, no rashes, no suspicious skin lesions noted    Temp:  [98.3  F (36.8  C)-99.3  F (37.4  C)] 98.3  F (36.8  C)  Heart Rate:  [66-78] 76  Resp:  [18-20] 18  BP: ()/(54-61) 119/59      Intake/Output Summary (Last 24 hours) at 3/2/2020 1108  Last data filed at 3/2/2020 0694  Gross per 24 hour   Intake 695 ml   Output --   Net 695 ml     Recent Results (from the past 12 hour(s))   Comprehensive metabolic panel     Collection Time: 03/02/20  5:35 AM   Result Value Ref Range    Sodium 139 136 - 145 mmol/L    Potassium 3.4 (L) 3.5 - 5.0 mmol/L    Chloride 105 98 - 107 mmol/L    CO2 26 22 - 31 mmol/L    Anion Gap, Calculation 8 5 - 18 mmol/L    Glucose 107 70 - 125 mg/dL    BUN 13 8 - 22 mg/dL    Creatinine 0.78 0.60 - 1.10 mg/dL    GFR MDRD Af Amer >60 >60 mL/min/1.73m2    GFR MDRD Non Af Amer >60 >60 mL/min/1.73m2    Bilirubin, Total 0.5 0.0 - 1.0 mg/dL    Calcium 8.0 (L) 8.5 - 10.5 mg/dL    Protein, Total 5.0 (L) 6.0 - 8.0 g/dL    Albumin 2.3 (L) 3.5 - 5.0 g/dL    Alkaline Phosphatase 253 (H) 45 - 120 U/L    AST 31 0 - 40 U/L    ALT 41 0 - 45 U/L   Procalcitonin    Collection Time: 03/02/20  5:35 AM   Result Value Ref Range    Procalcitonin 0.82 (H) 0.00 - 0.49 ng/mL   Thyroid Stimulating Hormone (TSH)    Collection Time: 03/02/20  5:35 AM   Result Value Ref Range    TSH 9.86 (H) 0.30 - 5.00 uIU/mL   T4, Free    Collection Time: 03/02/20  5:35 AM   Result Value Ref Range    Free T4 0.7 0.7 - 1.8 ng/dL   HM1 (CBC with Diff)    Collection Time: 03/02/20 10:47 AM   Result Value Ref Range    WBC 11.1 (H) 4.0 - 11.0 thou/uL    RBC 3.25 (L) 3.80 - 5.40 mill/uL    Hemoglobin 9.7 (L) 12.0 - 16.0 g/dL    Hematocrit 29.2 (L) 35.0 - 47.0 %    MCV 90 80 - 100 fL    MCH 29.8 27.0 - 34.0 pg    MCHC 33.2 32.0 - 36.0 g/dL    RDW 13.2 11.0 - 14.5 %    Platelets 245 140 - 440 thou/uL    MPV 9.4 8.5 - 12.5 fL    Neutrophils % 78 (H) 50 - 70 %    Lymphocytes % 11 (L) 20 - 40 %    Monocytes % 5 2 - 10 %    Eosinophils % 2 0 - 6 %    Basophils % 0 0 - 2 %    Neutrophils Absolute 8.7 (H) 2.0 - 7.7 thou/uL    Lymphocytes Absolute 1.2 0.8 - 4.4 thou/uL    Monocytes Absolute 0.6 0.0 - 0.9 thou/uL    Eosinophils Absolute 0.2 0.0 - 0.4 thou/uL    Basophils Absolute 0.0 0.0 - 0.2 thou/uL         Results  Lab Results personally reviewed   Imaging Results personally reviewed   Discussed with patient and family  Reviewed old records     Advanced Care  Planning  Discharge Planning discussed with patient and family  Discussed care with patient and family for 45 minutes with greater than 50% of time spent in counseling and coordination of care.

## 2021-06-06 NOTE — PROGRESS NOTES
Clinical Nutrition Therapy Follow Up Note    RD received consult to initiate and manage TF.    Current Nutrition Prescription:   Diet: NPO  IV dextrose or Fluids:dexmedetomidine 400 mcg/100 mL in NS (PRECEDEX) (4mcg/mL), Last Rate: 1 mcg/kg/hr (03/12/20 0752)  epoprostenol 0.5 mg/50 mL (Full Strength) (VELETRI) inhalation solution, Last Rate: 80,000 ng/hr (03/12/20 0845)  fentaNYL 2500 mcg/50 mL (50 mcg/mL), Last Rate: 150 mcg/hr (03/12/20 0755)  heparin, Last Rate: 15 Units/kg/hr (03/12/20 0756)  norepinephrine, Last Rate: 0.04 mcg/kg/min (03/12/20 1043)  propofol, Last Rate: 60 mcg/kg/min (03/12/20 0941)    Current Nutrition Intake:  Propofol at 22.7 mL/hour provides 599 kcals daily.  Pt had OG tube placed yesterday, confirmed gastric.     Anthropometrics:  Admission weight: 147 lb 4.3 oz (66.8 kg) bed  Weight: 139 lb 15.9 oz (63.5 kg)    GI Status/Output:   BM 3/10 per nursing.    Skin/Wound:  No wound was noted.    Medications:  Novolog, levothyroxine    Labs:  Labs reviewed    Estimated Nutrition Needs:  Assessment weight is 50.1 kg, adjusted weight     Energy Needs: 6361-8822 kcals daily, 25-30 kcal/kg  Protein Needs: 60-75 g daily, 1.2-1.5 g/kg.  Fluid Needs: 2931-3139 mls daily, 30-35 mls/kg    Malnutrition: Patient does not meet 2 diagnostic criteria for malnutrition    Nutrition Risk Level: moderate risk    Nutrition dx:  Inadequate oral intake r/t respiratory failure as evidenced by NPO diet order.     Goal Status:  Lose weight-met  Meet estimated nutrition needs-will meet with TF  Diet advance-will meet with TF starting    Intervention:  When Propofol running at high rate:  Start TF using IsoSource 1.5 at 10 mL/hour and increase by 10 mL q 6 hours as tolerated until goal rate of 25 mL/hour is reached. Run over 21 hours d/t levothyroxine. Water flushes should be 300 mL four times a day. This regimen will provide 787 kcals, 35 g protein, 92 g CHO, 31 g fat, 7 g fiber, 401 mL fluid (total 1601 mL between  Slowed affect and speech  1 BM yesterday  Tolerating PO  Recurrent ascites.    Vital Signs Last 24 Hrs  T(C): 37.2 (20 Sep 2018 11:42), Max: 37.2 (19 Sep 2018 23:48)  T(F): 98.9 (20 Sep 2018 11:42), Max: 98.9 (19 Sep 2018 23:48)  HR: 114 (20 Sep 2018 11:42) (95 - 114)  BP: 104/66 (20 Sep 2018 11:42) (96/57 - 113/69)  BP(mean): --  RR: 18 (20 Sep 2018 11:42) (18 - 18)  SpO2: 96% (20 Sep 2018 11:42) (90% - 96%)    PHYSICAL EXAM:    Constitutional: NAD, well-developed  Neck: No LAD, supple  Respiratory: CTA and P  Cardiovascular: S1 and S2, RRR, no M  Gastrointestinal: BS+, soft, ++ ascites  Extremities: decreased edema. erythema resolved  Neuro: No asterixis, flattened affect    MEDICATIONS  (STANDING):  cyanocobalamin 500 MICROGram(s) Oral daily  docusate sodium 100 milliGRAM(s) Oral three times a day  folic acid 1 milliGRAM(s) Oral daily  furosemide    Tablet 40 milliGRAM(s) Oral two times a day  influenza   Vaccine 0.5 milliLiter(s) IntraMuscular once  rifaximin 550 milliGRAM(s) Oral two times a day  senna 2 Tablet(s) Oral at bedtime  spironolactone 100 milliGRAM(s) Oral daily  tamsulosin 0.4 milliGRAM(s) Oral at bedtime  thiamine 100 milliGRAM(s) Oral daily    MEDICATIONS  (PRN):      Allergies    No Known Allergies    Intolerances        LABS:                        9.5    14.32 )-----------( 124      ( 20 Sep 2018 07:43 )             27.8     09-20    129<L>  |  93<L>  |  16  ----------------------------<  96  4.0   |  26  |  0.89    Ca    8.5      20 Sep 2018 05:38                          RADIOLOGY & ADDITIONAL TESTS: formula and flushes).    When Propofol stops or rate significantly decreases:  Goal rate is 45 mL/hour over 21 hour d/t levothyroxine. Water flushes should be 220 mL four times a day. This regimen will provide 1417 kcals, 64g protein, 166 g CHO, 55 g fat, 14 g fiber, 721 mL fluid (total 1601 mL between formula and flushes).    Recommend decreasing IVF rate to avoid volume overload.    Will order 100 mg thiamine daily due to risk of refeeding.    Monitoring/Evaluation:  TF tolerance, weight, labs    Roz Garcia RD, LD

## 2021-06-06 NOTE — PROCEDURES
CENTRAL LINE INSERTION PROCEDURE NOTE  (NON-OR)    Procedure Date: 3/15/2020   Performing Physician: Kirk Rock    Procedure:  Insertion of Central Venous Catheter  Left   INTERNAL  JUGULAR    Indications:  SEPTIC SHOCK, RESPIRATORY FAILURE and DRUG ADMINISTRATION       Estimated Blood Loss:  MINIMAL  ml  Complications: NONE     Findings:  n/a    Procedure Details:   Procedure was done as an emergency : no  The risks, benefits, complications, treatment options, and expected outcomes were discussed with  .  The risks and potential complications of their problem and purposed procedure include but are not limited to infection, bleeding, pain,  lung puncture, the need for additional procedures, creating a complication requiring transfusion or operation, and nerve and vessel injury.  The patient/alternate (see above) concurred with the proposed plan, giving informed consent.  The site of the procedure was properly noted/marked. The patient was identified as Harriett Romero with Date of Birth 1965 and the procedure verified as Insertion of Central Venous Catheter.  A Time Out was held and the above information confirmed.    Under sterile conditions the skin over the  Left   INTERNAL  JUGULAR     was prepped with Chlorhexidine and covered with a sterile drape.  Strict sterile conditions were maintained,  Cap, mask, and sterile gloves were worn by all participants.  5 ml of Lidocaine 1% local anesthetic was infiltrated into the skin and subcutaneous tissues.  A 22-gauge needle was used to identify the vein.  Using Sledinger technique an 18-gauge needle was then inserted into the vein.  A guide wire was then passed easily through the catheter.  There were no arrythmias    .  The catheter was then withdrawn.  A 7.0 Georgian tripple lumen   was then inserted into the vessel over the guide wire.  All ports were flushed with sterile solution and had good non-pulsatile blood return.  The catheter was sutured  into place and an occlusive sterile dressing applied.  The patient tolerated the procedure well with no change in vital signs.     Number of attempts:  1     A chest x-ray was ordered.      Condition: stable      ________________________________________________________________________  Kirk Rock  3/15/905520:08 AM

## 2021-06-06 NOTE — PROGRESS NOTES
[facility] PROGRESS NOTE    Assessment/Plan  Principal Problem:    Aspiration pneumonia of left lower lobe due to gastric secretions (H)  Active Problems:    Hypokalemia    Former smoker - quit in 2014    Anxiety    Hypothyroidism    Metabolic syndrome    Aspiration pneumonia due to inhalation of vomitus (H)    Diarrhea of presumed infectious origin    Hypotension, unspecified hypotension type    Acute respiratory failure with hypoxia (H)    54 y.o. old female with obesity, MDD, anxiety, restless leg syndrome, former smoker , migraine headaches, insomnia admitted from [facility] center after failing outpatient treatment with clindamycin for aspiration pneumonia.  Patient aspirated on 2/25 while laying on her left side during a screening colonoscopy.  Reports following prep and NPO.     Aspiration pneumonia Left lower and upper lobes Due to gastric secretions during colonoscopy, failed outpatient clindamycin took 6 doses   - still requiring O2 and increased need   -  bronchial hygiene with flutter valve and alb neb four times a day and keep scheduled for now and reassess in am, coarse BS and crackles and with low O2 sats  - pleuritic pain improving with  toradol scheduled and as above bronchial hygiene with nebs  - heat and lidocaine ointment   - guaifenesin with codeine   - reviewed with Pulm and aspiration of gastric acid and significant inflammation and irritation will take some time to improve and nothing further to rec.   - IV solumedrol yesterday on 3/4 and 3/5 and changed to PO today   - worsening symptoms and had pulm see patient and reviewed with Dr Rich and change abx regiment and use cefepime and continue levaquin, stop flagyl. initially plan was to continue flagyl and levaquin but on further discussion recs change to cefepime    - lactic normal   - repeat CXR in am   - SLP seen no signs of aspiration   - PPI at at bedtime and HOB > 30   - WBC checked but received IV steroids and now 32 was down to 9 on 3/4 and  steroids were started on this day      Acute resp failure increased O2 needs  - pulm consulted and following along discussed case with Dr. Rich  - continue steroids  - agrees with IV diuretics given 20mg on 3/5 and then 20 mg this am and is giving 60mg x1 now  - echo   - continue bronchial hygiene with flutter valve and alb neb four times a day to see if improvement   - wean to keep sats >90%, Airvo HFNC started at 35L 65%, sats 92%  - also new dx of JANAE and patient still needs to get her CPAP   - likely significant irritation and inflammation from aspiration vs underlying lung disease with h/o smoking   - repeat CXR in am  - sputum cx   -repeat influenza neg  - legionella pending    Hypothyroidism  -Very poorly controlled TSH, 9.07 on 1/31, previously 28 12/2019  -Rechecked here TSH  9.86   -on 112mcg, dose increased to 125mcg, recheck TSH in 4-6 weeks     Former smoker   -Patient smoke-free for over 5 years      Diarrhea having liquid stools and was on clindamycin no further complaints   - sent stool for Cdiff negative and stool cx NGTD  - immodium prn     Anxiety/MDD  -Stable on home medications continue Wellbutrin and Celexa while inpatient     Restless leg syndrome  -Seems to be controlled on Mirapex continue this.     Metabolic syndrome  -takes phentermine and Glucophage only has medications for the past 1 week  -We will hold these medications for now    Hypokalemia   - replaced and now resolved     JANAE  - recommend patient get her CPAP fro her sleep clinic       VTE prophylaxis: SQ Lovenox    Diet: reg  Drains/tubes: none  Weight bearing restrictions: WBAT  Disposition/Barriers to discharge: reassess in am    Full Code  Subjective  requiring  more O2 today placed on Airvo HFNC started at 35L 65%,  Seen with Pulmonology and all questions answered   Objective  Vital signs in last 24 hours  Vitals:    03/06/20 1530   BP: 116/55   Pulse: 78   Resp: 22   Temp: 98.6  F (37  C)   SpO2: 95%     Wt Readings from  Last 1 Encounters:   03/06/20 1435 152 lb 8 oz (69.2 kg)   03/05/20 1908 154 lb (69.9 kg)   03/01/20 1702 147 lb 4.3 oz (66.8 kg)   Weight change:   Body mass index is 31.33 kg/m .  Intake/Output this shift:    Intake/Output Summary (Last 24 hours) at 3/6/2020 1654  Last data filed at 3/6/2020 1645  Gross per 24 hour   Intake 600 ml   Output 1000 ml   Net -400 ml     I/O last 3 completed shifts:  In: 600 [P.O.:600]  Out: 600 [Urine:600]  Physical Exam  General appearance: alert, obese, NAD  HEENT:  Normocephalic without obvious abnormality, atraumatic  Lungs:crackels thought out  Left increased from previous exam and rhonchi on right with course BS   Heart: RRR no M/R/G  Abdomen: soft,  +bowel sounds  no masses,  no organomegaly  Extremities: No E/C/C  Pulses: 2+ and symmetric  Skin: Skin color, texture, turgor normal. No rashes or lesions  Neurologic: Grossly normal  Current Medications    albuterol  2.5 mg Nebulization QID     buPROPion  150 mg Oral BID     cefepime  2 g Intravenous Q8H     citalopram  40 mg Oral QHS     enoxaparin ANTICOAGULANT  40 mg Subcutaneous Q24H     levoFLOXacin  500 mg Oral Daily 0600     levothyroxine  125 mcg Oral Daily 0600     lidocaine   Topical TID     metFORMIN  500 mg Oral DAILY     omeprazole  20 mg Oral QHS     pramipexole  0.75 mg Oral QHS     predniSONE  40 mg Oral Daily with brkfst     sodium chloride  2.5 mL Intravenous Line Care       acetaminophen, acetaminophen, albuterol **AND** Nebulizer treatment intermittent, aluminum-magnesium hydroxide-simethicone, bisacodyL, codeine-guaiFENesin, dextrose 50 % (D50W), glucagon (human recombinant), hydrOXYzine pamoate, loperamide, LORazepam, magnesium hydroxide, melatonin, ondansetron **OR** ondansetron, polyethylene glycol  Pertinent Labs   Results from last 7 days   Lab Units 03/06/20  0613 03/04/20  0604 03/03/20  0434 03/02/20  1047   LN-WHITE BLOOD CELL COUNT thou/uL 32.1* 9.8 10.6 11.1*   LN-HEMOGLOBIN g/dL  --  9.9* 10.1* 9.7*    LN-HEMATOCRIT %  --  30.4* 30.5* 29.2*   LN-PLATELET COUNT thou/uL  --  319 280 245       Results from last 7 days   Lab Units 03/05/20  0850 03/04/20  0604 03/03/20  0649  03/02/20  0535   LN-SODIUM mmol/L 138 139  --   --  139   LN-POTASSIUM mmol/L 3.8 4.0 4.1   < > 3.4*   LN-CHLORIDE mmol/L 104 107  --   --  105   LN-CO2 mmol/L 26 24  --   --  26   LN-BLOOD UREA NITROGEN mg/dL 14 11  --   --  13   LN-CREATININE mg/dL 0.72 0.72  --   --  0.78   LN-CALCIUM mg/dL 8.8 8.7  --   --  8.0*   LN-ALBUMIN g/dL  --   --   --   --  2.3*   LN-PROTEIN TOTAL g/dL  --   --   --   --  5.0*   LN-BILIRUBIN TOTAL mg/dL  --   --   --   --  0.5   LN-ALKALINE PHOSPHATASE U/L  --   --   --   --  253*   LN-ALT (SGPT) U/L  --   --   --   --  41   LN-AST (SGOT) U/L  --   --   --   --  31    < > = values in this interval not displayed.             Total time for this visit is 35 minutes with greater than 50% of time spent in counseling and coordination of care with patient/family, discussion with RN, consultants, reviewing labs and chart.    Destini Trevino MD.   [facility]   [phone]   Pager [phone]

## 2021-06-06 NOTE — PLAN OF CARE
Problem: Inadequate Gas Exchange  Goal: Patient will achieve/maintain normal respiratory rate/effort  Outcome: Progressing  Goal: Patient is adequately oxygenated and ventilation is improved  Outcome: Progressing     Problem: Nutrition Care Problem  Goal: Nutritional status is improving  Outcome: Progressing     Problem: Excessive Fluid Volume  Goal: Patient will achieve/maintain normal respiratory rate/effort  Outcome: Progressing

## 2021-06-06 NOTE — PLAN OF CARE
Problem: Inadequate Gas Exchange  Goal: Patient will achieve/maintain normal respiratory rate/effort  Outcome: Progressing  Tachypneic all shift med x1 with MS 2mg and x1 with Ativan .5 mg  On BIPAP except for very short break for oral cares.  Pt slept after ativan and FIO2 was able to  decrease to 70%  RR continues 35-40  and shallow    Problem: Excessive Fluid Volume  Goal: Patient will achieve/maintain normal respiratory rate/effort  Outcome: Progressing   Urine output 700 cc this shift  diuril given at 130

## 2021-06-06 NOTE — PLAN OF CARE
Problem: Inadequate Airway Clearance  Goal: Patient will maintain patent airway  Outcome: Progressing  Goal: Patient will achieve/maintain normal respiratory rate/effort  Outcome: Progressing     Problem: Inadequate Gas Exchange  Goal: Patient will achieve/maintain normal respiratory rate/effort  Outcome: Progressing   Albuterol nebs given as scheduled with flutter valve.  HFNC( green [place]erry) at 12L, sats 85 -90%.  Breath sounds with crackles at bases.  Airvo HFNC started at 35L 65%, sats 92%  Strong harsh cough.

## 2021-06-06 NOTE — PROGRESS NOTES
Assessment/Plan:    Harriett was seen today for establish care, cough and letter for school/work.    Diagnoses and all orders for this visit:    Aspiration pneumonia: Patient has onset of bothersome cough following colonoscopy earlier this week.  No prodrome of sinus congestion or irritation.  Chest x-ray consistent with left-sided bronchopneumonia.  Vital signs and physical appearance at this time actually appear quite normal.  She has a history of hive-like reaction to penicillins.  Clindamycin prescribed to cover for anaerobic organisms.  We discussed the potential for C. difficile colitis.  I also considered a respiratory fluoroquinolone but was concerned that this would not have adequate coverage for anaerobic organisms.  The patient's belly is soft with normal sounding bowel sounds.  I am not concerned that the colonoscopy resulted in leaking abdominal contents into the thoracic cavity.      History of Positive colorectal cancer screening using Cologuard test  Colonoscopy completed February 2020.  Study result was completely normal.  10-year interval was recommended for repeat.    Return in about 1 week (around 3/6/2020) for recheck if not improving.    Zain Cuevas MD  _______________________________    Chief Complaint   Patient presents with     Establish Care     Cough     x 3 days      Letter for School/Work     pt would like a work note      Subjective: Harriett Romero is a 54 y.o. year old female who I have seen in clinic before who presents with the following acute complaint(s):    cough:   - started after colonoscopy.    - colonoscopy was reported as normal.     - she says that she coughs non-stop.  She has a burning sensation in her chest.     - palliative: dayquil   - tylenol for headaches   - no fevers.  Chills.   - she also has some body aches.      ROS: Complete review of systems obtained.  Pertinent items are listed above.     The following portions of the patient's history were reviewed  "and updated as appropriate: allergies, current medications, past medical history and problem list.     Objective:   /64 (Patient Site: Left Arm, Patient Position: Sitting, Cuff Size: Adult Regular)   Pulse 76   Temp 98.5  F (36.9  C) (Oral)   Resp 16   Ht 4' 10.5\" (1.486 m)   Wt 143 lb (64.9 kg)   SpO2 97% Comment: room air  Breastfeeding No   BMI 29.38 kg/m    Gen.: No acute distress  HEENT: Tympanic membranes bilaterally are gray and glistening.  Mild maxillary tenderness bilaterally.  Mild anterior cervical lymphadenopathy.  Posterior pharynx without erythema or tonsillar exudate.  Cardiac: Regular rate and rhythm, normal S1/S2, no murmurs or gallops  Respiratory: Clear to auscultation bilaterally.    Chest x-ray: My personal interpretation- left-sided infiltrate which is quite extensive.  Lingula affected.  Bronchopneumonia.  (My interpretation.)  Result was discussed with radiology who called given the chest x-ray results    No results found for this or any previous visit (from the past 24 hour(s)).  Xr Chest 2 Views    Result Date: 2/28/2020  EXAM: XR CHEST 2 VIEWS LOCATION: [facility] DATE/TIME: 2/28/2020 2:50 PM INDICATION: Cough COMPARISON: 11/06/2019 and older studies.     Diffuse airspace opacities are seen in the left upper lobe and lingula consistent with a large area bronchopneumonia. No effusion. Right lung is clear. Heart and pulmonary vascularity are normal. QC to call resutls. NOTE: ABNORMAL REPORT THE DICTATION ABOVE DESCRIBES AN ABNORMALITY FOR WHICH FOLLOW-UP IS NEEDED.       Additional History from Old Records Summarized (2): no  Decision to Obtain Records (1): no  Radiology Tests Summarized or Ordered (1): yes  Labs Reviewed or Ordered (1): no  Medicine Test Summarized or Ordered (1): yes  Independent Review of EKG or X-RAY(2 each): yes    This note has been dictated using voice recognition software. Any grammatical or context distortions are unintentional and inherent to " the software

## 2021-06-06 NOTE — SIGNIFICANT EVENT
"House staff was called by the patient's nurse due to hypotension. Briefly, the patient was admitted for aspiration pneumonia.    BP (!) 84/48 (Patient Position: Semi-lakhani)   Pulse 66   Temp 97.9  F (36.6  C) (Oral)   Resp 20   Ht 4' 10.5\" (1.486 m)   Wt 147 lb 4.3 oz (66.8 kg)   SpO2 92%   BMI 30.26 kg/m      The patient is currently feeling at her baseline otherwise.  It looks like she has been running a little soft today.  We will give her a small bolus of normal saline to see if this helps her pressure.  We will then recheck her BP in two hours.      This note was routed to the hospitalist service.    Pilo Coughlin MD  [facility], PGY-1  Pager # [phone]      4:00 AM    House officer paged again that patient having low BP to MAP of 63 at approximately 4 AM. Not symptomatic with no dizziness, confusion.  I see no lactic acid drawn so will check that and give an additional 1 L NS and recheck BP 1 hr after. COuld consider blood culture though no fevers here.      Dr. Garfield Lyles    5:00 AM    Lactic acid returned normal. BP improving with MAP of 70.    Dr. Garfield Lyles  "

## 2021-06-06 NOTE — PROGRESS NOTES
Care Provided: Follow-up with Harriett's family. Consulted with Breanna Harriett's nurse beforehand. Spoke with Harriett's parents and sister in the family bekah. They state that they are tired today, but glad to hear that Harriett is tolerating cares better today. They share that they are sleeping well, but that Brian went home because he is feeling fatigued. Encouraged self-care.     Plan of Care: Will remain available for further support as patient/family needs/desires.    Larissa Montero M.Div.  Staff   [phone]

## 2021-06-06 NOTE — PROGRESS NOTES
Results for FIDEL SOLER (MRN [id number]) as of 3/16/2020 02:09   Ref. Range 3/15/2020 23:51   pH, Arterial Latest Ref Range: 7.37 - 7.44  7.16 (LL)   pCO2, Arterial Latest Ref Range: 35 - 45 mm Hg 93 (HH)   pO2, Arterial Latest Ref Range: 80 - 90 mm Hg 106 (H)   Bicarbonate, Arterial Calc Latest Ref Range: 23.0 - 29.0 mmol/L 27.1   O2 Sat, Arterial Latest Ref Range: 96.0 - 97.0 % 99.3 (H)   Oxyhemoglobin Latest Ref Range: 96.0 - 97.0 % 96.2   POC Base Excess Calc Latest Units: mmol/L 2.8   Ventilation Mode Unknown PCV/VG   Peep Latest Units: cm H2O 14   Sample Stabilized Temperature Latest Units: degrees C 37.0   Rate Latest Units: rr/min 35   FIO2 Unknown 50.00   md notified see vent changed TV now 225.

## 2021-06-06 NOTE — PROGRESS NOTES
"Critical Care Progress Note     Admit Date: 3/1/2020  Moved to ICU on 3/7 from P4     Code Status: full code    CC: SOB    HPI: 52-year-old female, former smoker, with past medical history significant for obesity, restless leg syndrome who apparently underwent colonoscopy back on 2/25/2020 and had an aspiration event.  Patient was started on clindamycin several days after that event.  There was no improvement with her symptoms and therefore she was admitted to the hospital.  Patient is allergic to penicillin and therefore she was started on ceftriaxone and Flagyl.  She was then transitioned to Augmentin. See below for complicated hospitla course    Interval events:  3/01/20: Admitted to P4 for shortness of breath   Aspiration PNA  3/07/20:Moved to ICU for increased work of breathing and requirement for BiPAP.   Steroids started   3/09/20:  HFNC most of day  3/10/20: still on high flow oxygen   DVT  RUE around PICC and in subclavian vein    PICC removed               Heparin drip started    Steroids stopped  3/11/20: CTA + b/l PE, GGO   Intubated   bronched   ARDS   Veletri started    Major events over the last 24 hours: on ventilator, Peep 12    Subjective: in bed sedated on ventilator   ROS: unable to do    Drips: precedex, propofol, levophed heparin  veletri    Ventilator: Mechanical Ventilation Day: #2        Settings: PCV-VG  20/ 300/ 12  70%    /67   Pulse 84   Temp 99.9  F (37.7  C) (Axillary)   Resp 20   Ht 4' 10\" (1.473 m)   Wt 139 lb 15.9 oz (63.5 kg)   SpO2 99%   BMI 29.26 kg/m       I/O last 3 completed shifts:  In: 2339.4 [I.V.:1203.4; NG/GT:60; IV Piggyback:1076]  Out: 2335 [Urine:2135; Emesis/NG output:200]  Weight change: 15.9 oz (0.45 kg)  Vent Mode: VCV  FiO2 (%):  [60 %-100 %] 75 %  S RR:  [16-20] 20  S VT:  [300 mL-325 mL] 300 mL  PEEP/CPAP (cm H2O):  [10 cm H2O-12 cm H2O] 12 cm H2O  Minute Ventilation (L/min):  [5.4 L/min-9.9 L/min] 6.2 L/min  PIP:  [15 cm H2O-38 cm H2O] 30 cm " H2O  MAP (cm H2O):  [10-15] 14      EXAM:   Mental status: in bed sedated  HEENT: NC PERRL OETT, OGT  Resp: some anterior rhonchi    Peak AP 35  PLT 30  Cardiovascular: RRR  Abdominal: soft + bs  Extremeties: No e/c/c(right arm swollen 3/10, not now after OICC out)  Neurology: RASS -4   heavily sedated for ARDS, vent synchrony       Lines:  Tammy 3/11/20  Central line 3/11/20  ETT 3/11/20  Clark 3/11/20  OG 3/12/20    Labs Personally reviewed: yes    RADHA 0.3  HIV neg  RF <15.0    Results from last 7 days   Lab Units 03/12/20  0453 03/11/20  0742 03/10/20  1649   LN-WHITE BLOOD CELL COUNT thou/uL 27.9* 14.8* 14.7*   LN-HEMOGLOBIN g/dL 11.1* 10.9* 10.5*   LN-HEMATOCRIT % 35.6 33.9* 32.6*   LN-PLATELET COUNT thou/uL 175 132* 124*     Results from last 7 days   Lab Units 03/12/20  0453 03/11/20  1814 03/11/20  0742 03/10/20  1330   LN-SODIUM mmol/L 139  --  139 139   LN-POTASSIUM mmol/L 4.2 4.3 3.5 3.7   LN-CHLORIDE mmol/L 103  --  99 96*   LN-CO2 mmol/L 27  --  32* 35*   LN-BLOOD UREA NITROGEN mg/dL 23*  --  25* 28*   LN-CREATININE mg/dL 1.09  --  0.78 0.90   LN-CALCIUM mg/dL 8.6  --  8.4* 8.7     Results from last 7 days   Lab Units 03/10/20  1649   LN-INR  1.34*   LN-PARTIAL THROMBOPLASTIN TIME seconds 38*     abg 0450 3/12/20  7.29/ 61/58  This was before vec given      Microbiology: cultures form BAL pending  NGTD  Imaging (all imaging is personally reviewed):     pcxr 3/12/20-Interval retraction of the endotracheal tube now in good position in the distal trachea. Enteric tube extending below the level of the EG junction off the inferior aspect of the image within the distal stomach. Stable consolidation left lung   base. Hazy ill-defined areas of consolidation left upper lung. Interval increase in amount of infiltrate right lung base. Pulmonary vascular congestion with mild cardiac enlargement. Minimal bilateral pleural fluid collections, greater on the left    CTA PE 3/11/20-1.  Mild bilateral pulmonary artery  embolism. Borderline RV to LV ratio and slight flattening of the interventricular septum; RV strain not excluded. Pulmonary arteries not enlarged.   2.  Regions of confluent groundglass and opacities throughout all lobes bilaterally. Mild left lung consolidation. Differential includes superimposed edema / ARDS, hemorrhage and alveolar damage, or other nonspecific infectious / inflammatory process   (including a massive aspiration event). These findings make evaluation of pulmonary infarct difficult.  3.  Tiny right and small left pleural effusions.   4.  Borderline adenopathy, presumed reactive.    Impression:  1. Acute hypoxic respiratory failure  2. ARDS  3. PE/RUE DVT  4. B/l GGO  5. Aspiration pneumonia of left lower lobe due to gastric secretions   6. Former smoker   Quit 2011  7. Recent JANAE dx   Has not picked up CPAP yet due to this hospitalization     PLAN:   1. Full vent support, low VT  2. Adjust fio2 as able, keep peep at 12 today  3. Full strength veletri  4. abgs q 6 hours  5. Steroids started today per Dr Duarte   6. Place OG for TF  7. Precedex, propopfol, prn versed and vec for now  RASS goal -4  8. Continue Maxipime, vanco  9. Hold  dapsone for JPJ prophylaxis(allergic to sulfa)  10. Hold lasix for now(IVC flat on bedside US last pm)  11. Follow PIP and plateaus   12. Continue Heparin drip for VTE(RUE DVT and b/l PE)  13. MAP goal > 65-presently on levophed     ICU DAILY CHECKLIST                           Can patient transfer out of ICU? no    FAST HUG:    Feeding:  Feeding: Yes.  Patient is receiving NPO and TUBE FEEDS    Clark:Yes  Analgesia/Sedation:Yes propofol and precedex  Thromboembolic prophylaxis: yes; Mode:  heparin drip  HOB>30:  Yes  Stress Ulcer Protocol Active: yes; Mode: PPI  Glycemic Control: Any glucose > 180 no; Mode of Insulin Therapy: Sliding Scale Insulin    INTUBATED:  Can patient have daily waking:  no  Can patient have spontaneous breathing trial:  no    Restraints?  yes    PHYSICAL THERAPY AND MOBILITY:  Can patient have PT and mobility trial: no  Activity: Bed Rest    transfer/discharge plans: ICU critically ill    The patient is critically ill with impairment in organ system and high risk of life threatening deterioration.     Total CCT spent 60 minutes thus far today.   I discussed today's plan with Pt's  Brian at bedside    Jenni HARDY, [phone]  [facility] Pulmonary and Critical Care Service

## 2021-06-06 NOTE — PLAN OF CARE
Problem: Inadequate Airway Clearance  Goal: Patient will achieve/maintain normal respiratory rate/effort  Outcome: Progressing     Problem: Inadequate Gas Exchange  Goal: Patient will achieve/maintain normal respiratory rate/effort  Outcome: Progressing     Problem: Inadequate Gas Exchange  Goal: Patient is adequately oxygenated and ventilation is improved  Outcome: Progressing

## 2021-06-06 NOTE — PROGRESS NOTES
Hospitalist Progress Note        Assessment and Plan  Principal Problem:    Aspiration pneumonia of left lower lobe due to gastric secretions (H)  Active Problems:    Hypokalemia    Former smoker - quit in 2014    Anxiety    Hypothyroidism    Metabolic syndrome    Aspiration pneumonia due to inhalation of vomitus (H)    Diarrhea of presumed infectious origin    Hypotension, unspecified hypotension type    Acute respiratory failure with hypoxia (H)    Cough    Chest pain at rest    Deep vein thrombosis (DVT) of upper extremity (H)    Pulmonary embolism (H)    Acute respiratory distress syndrome (ARDS) (H)    Diffuse interstitial pulmonary disease (H)    Acute kidney failure, unspecified (H)      Acute respiratory failure  -  Intubated and undergoing mechanical ventilation  -  Bronchodilator therapy  -  IV methylprednisolone    Anxiety and depression  -  Continue bupropion  -  Continue citalopram    Hypothyroidism  -  Continue levothyroxine    Primary hypertension  -  BP elevated  -  Continue metoprolol tartrate  -  Monitoring BP      VTE risk reduction.  [facility]              Brief Summary  53 y/o female with hx of MDD, anxiety, RLS, former smoker, migraines, presented here from Urgent Care for concern aspiration pneumonia that had failed outpatient treatment. She actually had a colonoscopy on 2/25/20 and had an aspiration event then, was then placed on clinda and continue to get worse and was admitted here to the floor. Unfortunately, she continued to decline in her resp status, required moving to ICU and was intubated on 3/11/20. She was also found to have arm DVT, bilateral Pe's and is on hep gtt too. She remains in the ICU vented and sedated still        Summary of Care     Prior to hospital     2/25/20--colonoscopy with aspiration event, treated as outpt with clinda        [facility]     3/1/20--Urgent care --for resp issues, sent to [facility] for admit. Placed on iv ceftriaxone +flagyl  3/2/20--wbc  11  3/3/20--3/5/20--on floor , antibiotics  3/6/20--increasing need o2, up to 6 liters, wbc 32,now on Iv Cefepime, Pulm consulted  3/7/20--Picc placed, EF on echo 63%, transferred to ICU for bipap  3/8/20--stopped levofloxacin  3/9/20--on high flow O2  3/10/20--on high flow O2, DVT right arm, picc removed, hep gtt started  3/11/20--ARDS, broch and intubated, WBC 14, pressors. CTA showed bilateral Pe's  3/12/20--still vented,high dose iv steroids, Veletri, OG for TF  3/13/20--still vented terry dose steroids, increased Peep, dc precedex  3/14/20--still on low dose pressors-weaning, still vented, FiO2 50%, high dose steroids, TF residuals, now trickle feeds, creat up 1.33, wbc 27  3/15/20--still vented, bronch done today--diffuse airway inflammation, friable-BAL, increased Jerel, still iv steroids. Dc Cefepime iv, new left IJ placed  3/16/20--still vented, Fio2 50%, ABG this am improved with PH 7.3,pCO2 59, wbc 20      Subjective and Interim Events  Intubated, undergoing mechanical ventilation    Physical Examination    Vital signs in last 24 hours  Heart Rate:  [] 112  Resp:  [34-36] 35  BP: (143-192)/(72-88) 175/80  Arterial Line BP: (108-177)/(42-90) 174/84  FiO2 (%):  [45 %-50 %] 45 % 96% O2 Device: Ventilator O2 Flow Rate (L/min): 45 L/min  Weight:   142 lb 13.7 oz (64.8 kg) Weight change: -4 lb 7.1 oz (-2.015 kg)    General: Intubated, undergoing mechanical ventilation  HEENT: Sclera white.  No redness or jaundice.   Cardiac: RRR, no abnormal heart sounds   Pulmonary: Intubated, undergoing mechanical ventilation  Abdomen: Nondistended.  Nontender to palpation.   Musculoskeletal: No joint abnormalities noted  Skin: Normally turgid   Neurologic: Lethargic  Psychiatric: Calm      Intake/Output last 3 shifts  I/O last 3 completed shifts:  In: 1309.9 [I.V.:884.9; NG/GT:360; IV Piggyback:65]  Out: 1595 [Urine:1595]  Body mass index is 29.86 kg/m .        Pertinent Labs   Lab Results: personally reviewed.    Results from last 7 days   Lab Units 03/17/20  0358 03/16/20  0605 03/15/20  0623  03/13/20  0500   LN-SODIUM mmol/L 143 141 138   < > 141   LN-POTASSIUM mmol/L 4.8 4.7 5.1*   < > 4.8   LN-CHLORIDE mmol/L 102 103 101   < > 106   LN-CO2 mmol/L 35* 31 28   < > 29   LN-BLOOD UREA NITROGEN mg/dL 62* 59* 55*   < > 21   LN-CREATININE mg/dL 0.95 1.20* 1.66*   < > 0.97   LN-CALCIUM mg/dL 8.8 8.5 9.3   < > 8.7   LN-ALBUMIN g/dL  --   --   --   --  2.0*   LN-PROTEIN TOTAL g/dL  --   --   --   --  6.2   LN-BILIRUBIN TOTAL mg/dL  --   --   --   --  0.2   LN-ALKALINE PHOSPHATASE U/L  --   --   --   --  119   LN-ALT (SGPT) U/L  --   --   --   --  15   LN-AST (SGOT) U/L  --   --   --   --  13   LN-MAGNESIUM mg/dL 2.7*  --   --   --   --     < > = values in this interval not displayed.     Results from last 7 days   Lab Units 03/17/20  0359 03/16/20  0605 03/15/20  0623 03/14/20  0511   LN-WHITE BLOOD CELL COUNT thou/uL 29.4* 20.2* 25.1* 27.0*   LN-HEMOGLOBIN g/dL 9.1* 8.3* 9.1* 9.0*   LN-HEMATOCRIT % 29.1* 26.8* 29.9* 29.1*   LN-PLATELET COUNT thou/uL 241 165 199 190   LN-NEUTROPHILS RELATIVE PERCENT %  --   --   --  87*   LN-MONOCYTES RELATIVE PERCENT %  --   --   --  3     Results from last 7 days   Lab Units 03/13/20  0500 03/11/20  0742 03/11/20  0005 03/10/20  1649   LN-CREATINE KINASE TOTAL U/L 31  --   --   --    LN-TROPONIN I ng/mL  --  0.13 0.14 0.13     Results from last 7 days   Lab Units 03/17/20  1508 03/17/20  1313 03/17/20  1102 03/17/20  1007 03/17/20  0902 03/17/20  0655 03/17/20  0503 03/17/20  0400 03/17/20  0300 03/17/20  0159   LN-POC GLUCOSE FINGERSTICK- HE mg/dL 119 104 130 118 110 139 123 131 134 123         Pertinent Radiology   Radiology Results: Personally reviewed impression/s     EXAM: XR CHEST 1 VIEW PORTABLE  LOCATION: [facility]  DATE/TIME: 3/15/2020 12:37 PM     INDICATION: s/p left IJ TLC placement confirm location, r/o PTX  COMPARISON: 03/15/2020 at 0602 hours     IMPRESSION:   New  left IJ central venous catheter tip is in the right atrium. No pneumothorax. NG tube courses below the diaphragm. Endotracheal tube remains above the johnnie. Bilateral pulmonary infiltrates have not appreciably changed.          EKG Results: Personally reviewed ECG tracing.  3/10/20 ECG demonstrated NSR.                  Total visit time 40 minutes; more than 50% of time spent counseling and coordinating patient care.  25 minutes of face-to-face care.  Care included review of treatment, prognosis and today's changes of the treatment plan.                                        Denzel Avina MD, MS  Internal Medicine Hospitalist  3/17/2020

## 2021-06-06 NOTE — PLAN OF CARE
Around 0220 am pt. Requested a nebulizer and 2.5mg Albuterol was given. Pt. Found on 2 LNC sating 91% and little tachypnea RR 24-26 bpm. After neb tx pt. Stated that not feeling any better and RT notified RN. RN is trying to check if she can give her any antianxiety medication. Continue monitoring closely.

## 2021-06-06 NOTE — PLAN OF CARE
Problem: Glucose Imbalance  Goal: Achieve optimal glucose control  Outcome: Progressing  Insulin gtt per protocol    Problem: Nutrition Care Problem  Goal: Nutritional status is improving  Outcome: Progressing  Pt remains NPO at this time, did have a large BM     Problem: Safety  Goal: Patient will be injury free during hospitalization  Outcome: Progressing  Titration of propofol gtt per BIS reading to ensure adequate sedation while on paralytic    Will continue to monitor    Ruma Carrasquillo

## 2021-06-06 NOTE — CONSULTS
"Pulmonary Consult Note  Date of Service: 3/6/2020    Reason for Consultation: Pneumonia    History:     HPI: Patient is a 52-year-old female, former smoker, with past medical history significant for obesity, restless leg syndrome who apparently underwent colonoscopy back on 2/25/2020 and had an aspiration event.  Patient was started on clindamycin several days after that event.  There was no improvement with her symptoms and therefore she was admitted to the hospital.  Patient is allergic to penicillin and therefore she was started on ceftriaxone and Flagyl.  She was then transitioned to Augmentin.    PMHx/PSHx:  Anxiety  Endometriosis  Graves' disease  Hypothyroidism  Nephrolithiasis  History of seizure disorder  JANAE-recently diagnosed  For psoriasis  Trigeminal neuralgia  Vitamin D deficiency  Hypothyroidism  Total abdominal hysterectomy  Bilateral nephrectomy  Thyroidectomy    History:    Former smoker.  Quit in 2014.  She works at the department of [employer]  Lives at home.  Has a dog.  No birds.      Review of Systems - 10 point review of system negative except for what is mentioned in the HPI.    Exam/Data:   /56 (Patient Position: Semi-lakhani)   Pulse 94   Temp 97.7  F (36.5  C) (Oral)   Resp 18   Ht 4' 10.5\" (1.486 m)   Wt 154 lb (69.9 kg)   SpO2 (!) 85% Comment: RN notified; post neb tx  BMI 31.64 kg/m      EXAM:  GEN: comfortable, NAD  HEENT: NCAT, EMOI, mmm  LN: no cervical LAD   CVS: S1S2, RRR  Lung: crackles on the left side, fair air entry on the right  Abd: soft, nt, + BS. No masses  Ext: no c/c/e  Neuro: nonfocal  Skin: no visible rash  Musculoskeletal: FROM all extremities  Psych: appropriate    DATA    IMAGING: personally reviewed images. Formal radiology interpretation noted below.  Xr Chest 1 View Portable    Result Date: 3/6/2020  EXAM: XR CHEST 1 VIEW PORTABLE LOCATION: [facility] DATE/TIME: 3/6/2020 3:43 AM INDICATION: Increasing O2 COMPARISON: 03/01/2020. "     Heart size magnified. Extensive airspace opacification left lung with consolidation and interval worsening. Airspace opacification right mid and right lower lung. Trace bilateral effusions. Stomach mildly distended with air. Interval worsening.    Xr Chest 2 Views    Result Date: 2/28/2020  EXAM: XR CHEST 2 VIEWS LOCATION: [facility] DATE/TIME: 2/28/2020 2:50 PM INDICATION: Cough COMPARISON: 11/06/2019 and older studies.     Diffuse airspace opacities are seen in the left upper lobe and lingula consistent with a large area bronchopneumonia. No effusion. Right lung is clear. Heart and pulmonary vascularity are normal. QC to call resutls. NOTE: ABNORMAL REPORT THE DICTATION ABOVE DESCRIBES AN ABNORMALITY FOR WHICH FOLLOW-UP IS NEEDED.     Xr External Imaging Chest      Assessment/Plan:   Harriett Romero is a 54 y.o. female, respiratory, who was admitted with possible aspiration after colonoscopy.  She was treated with clindamycin as outpatient was started on ceftriaxone and Flagyl here.  She had a chest x-ray that showed left-sided infiltrate however over the past several days her chest x-ray has progressively worsened.  Her oxygen requirements have been increasing.  Patient is about 6 L up since admission.    Recommendations:  Repeat chest x-ray in a.m.  Check BNP  limited echocardiogram to assess EF  Lasix 60 mg x 1  F/u sputum cultures  Titrate FiO2  Flutter valve  Okay with steroid burst  ezpap  Okay with choice of abx    TTS greater than 60 min, more than 50% on counseling and coordination of care    I appreciate the opportunity to participate in the care of Harriett Romero.  Please feel free to contact me at any time.    Wilmar Rich MD  Pulmonary and Critical Care Medicine  3/6/2020      Family History   Problem Relation Age of Onset     Von Hippel-Lindau syndrome Sister      Von Hippel-Lindau syndrome Mother      Breast cancer Paternal Aunt         65     Diabetes Neg Hx      Heart disease  Neg Hx      Colon cancer Neg Hx      Allergies   Allergen Reactions     Macrobid [Nitrofurantoin Monohyd/M-Cryst] Nausea And Vomiting     Penicillins Hives     3/1/2020 tolerated ceftriaxone      Sulfa (Sulfonamide Antibiotics) Hives       Medications:     Current Facility-Administered Medications   Medication Dose Route Frequency Provider Last Rate Last Dose     acetaminophen suppository 650 mg (TYLENOL)  650 mg Rectal Q4H PRN Sulema Ballard MD         acetaminophen tablet 500-1,000 mg (TYLENOL)  500-1,000 mg Oral Q4H PRN Sulema Ballard MD   1,000 mg at 03/06/20 0612     albuterol nebulizer solution 2.5 mg (PROVENTIL)  2.5 mg Nebulization Q4H PRN Destini Trevino MD   2.5 mg at 03/06/20 0221     albuterol nebulizer solution 2.5 mg (PROVENTIL)  2.5 mg Nebulization QID Destini Trevino MD   2.5 mg at 03/06/20 1106     aluminum-magnesium hydroxide-simethicone 200-200-20 mg/5 mL suspension 30 mL (MAALOX ADVANCED)  30 mL Oral Q4H PRN Sulema Ballard MD   30 mL at 03/01/20 2110     bisacodyL suppository 10 mg (DULCOLAX)  10 mg Rectal Daily PRN Sulema Ballard MD         buPROPion 12 hr tablet 150 mg (WELLBUTRIN SR)  150 mg Oral BID Sulema Ballard MD   150 mg at 03/06/20 0837     citalopram tablet 40 mg (celeXA)  40 mg Oral QHS Sulema Ballard MD   40 mg at 03/05/20 2058     codeine-guaiFENesin  mg/5 mL liquid 10 mL (GUAIFENESIN AC)  10 mL Oral Q4H PRN Sulema Ballard MD   10 mL at 03/06/20 1309     dextrose 50 % (D50W) syringe 20-50 mL  20-50 mL Intravenous Q15 Min PRN Destini Trevino MD         enoxaparin ANTICOAGULANT syringe 40 mg (LOVENOX)  40 mg Subcutaneous Q24H Destini Trevino MD   40 mg at 03/06/20 1040     glucagon (human recombinant) injection 1 mg  1 mg Subcutaneous Q15 Min PRN Destini Trevino MD         hydrOXYzine pamoate capsule 25-50 mg (VISTARIL)  25-50 mg Oral Q4H PRN Destini Trevino MD   25 mg at 03/04/20 1901     levoFLOXacin tablet  500 mg (LEVAQUIN)  500 mg Oral Daily 0600 eDstini Trevino MD   500 mg at 03/06/20 0610     levothyroxine (SYNTHROID, LEVOTHROID) tablet 125 mcg  125 mcg Oral Daily 0600 Chilango Zavaleta II, MD   125 mcg at 03/06/20 0611     lidocaine 5 % ointment (XYLOCAINE)   Topical TID Destini Trevino MD         loperamide capsule 2 mg (IMODIUM)  2 mg Oral QID PRN Destini Trevino MD         LORazepam tablet 0.25 mg (ATIVAN)  0.25 mg Oral Q6H PRN Destini Trevino MD   0.25 mg at 03/06/20 0749     magnesium hydroxide suspension 30 mL (MILK OF MAG)  30 mL Oral Daily PRN Sulema Ballard MD         melatonin tablet 3 mg  3 mg Oral Bedtime PRN Sulema Ballard MD   3 mg at 03/02/20 0845     metFORMIN 24 hr tablet 500 mg (GLUCOPHAGE-XR)  500 mg Oral DAILY Destini Trevino MD   500 mg at 03/06/20 0837     metroNIDAZOLE tablet 500 mg (FLAGYL)  500 mg Oral TID Destini Trevino MD   500 mg at 03/06/20 1040     omeprazole capsule 20 mg (PriLOSEC)  20 mg Oral QHS Destini Trevino MD         ondansetron injection 4 mg (ZOFRAN)  4 mg Intravenous Q4H PRN Sulema Ballard MD   4 mg at 03/03/20 2238    Or     ondansetron tablet 8 mg (ZOFRAN)  8 mg Oral Q8H PRN Sulema Ballard MD         polyethylene glycol packet 17 g (MIRALAX)  17 g Oral BID PRN Sulema Ballard MD         pramipexole (MIRAPEX) tablet 0.75 mg  0.75 mg Oral QHS Sulema Ballard MD   0.75 mg at 03/05/20 2058     predniSONE tablet 40 mg (DELTASONE)  40 mg Oral Daily with brkfst Destini Trevino MD   40 mg at 03/06/20 0837     sodium chloride flush 2.5 mL (NS)  2.5 mL Intravenous Line Care Sulema Ballard MD   10 mL at 03/06/20 0848       Much or all of the text in this note was generated through the use of the Dragon Dictate voice-to-text software. Errors in spelling or words which seem out of context are unintentional. Sound alike errors, in particular, may have escaped editing.

## 2021-06-06 NOTE — PLAN OF CARE
Problem: Inadequate Airway Clearance  Goal: Patient will maintain patent airway  Outcome: Progressing     Problem: Mechanical Ventilation  Goal: Patient will maintain patent airway  Outcome: Progressing  Goal: ET tube will be managed safely  Outcome: Progressing     Vent Mode: PCV/VG  FiO2 (%):  [70 %-100 %] 70 %  S RR:  [16-20] 20  S VT:  [300 mL-325 mL] 300 mL  PEEP/CPAP (cm H2O):  [10 cm H2O-12 cm H2O] 12 cm H2O  Minute Ventilation (L/min):  [5.4 L/min-9.9 L/min] 6.9 L/min  PIP:  [15 cm H2O-38 cm H2O] 34 cm H2O  MAP (cm H2O):  [10-15] 15     Pt remains on full vent support, settings above, ETT 7.5 21@teeth. BS coarse/ crackles, suctioning small to moderate amount of pink secretions via ETT. Pt. remains on full strength Veletri, PIP 34, plat 29-30, oxygen titrated down to 70% sats 96%, RT following    ALBERT HartT

## 2021-06-06 NOTE — PLAN OF CARE
Problem: Inadequate Airway Clearance  Goal: Patient will achieve/maintain normal respiratory rate/effort  Outcome: Not Progressing     Problem: Inadequate Gas Exchange  Goal: Patient will achieve/maintain normal respiratory rate/effort  Outcome: Not Progressing  Goal: Patient is adequately oxygenated and ventilation is improved  Outcome: Not Progressing     Problem: Excessive Fluid Volume  Goal: Patient will achieve/maintain normal respiratory rate/effort  Outcome: Not Progressing     Patient continues on High Flow O2/Bipap. Dry, nonproductive cough is frequent and interfering with sleep. Patient refused cough syrup as it has not been helping. PRN Morphine given with some effect. Will continue to monitor.

## 2021-06-06 NOTE — PROCEDURES
"Procedure Note - Bronchoscopy    Procedure:  Bronchoscopy, Diagnostic  Bronchoalveolar lavage, BAL    Pre-Operative Diagnosis: respiratory failure, aspiration, diffuse pulmonary infiltrates    Post-Operative Diagnosis: Same    Indication: as above    Anesthesia/Sedation: propofol deep sedation    Risk assessment for airborne pathogens: The clinical data was carefully considered for risk of airborne pathogens, including tuberculosis. Clinical suspicion was sufficently low, that this procedure could be safely performed in a room without negative-pressure flow    Procedure Details: The intended procedure, risks, and alternatives were discussed with the patient's  and informed consent was obtained. \"Pause for the cause\" was utilized to identify correct patient and intended procedure. The bronchocope was inserted into the main airway via the endotracheal tube.Continuous oximetry monitoring throughout the procedure.    Findings: ET tube at johnnie, pulled back 2cm with improvement to 21cm at the teeth  Main johnnie normal.  Mild erythema of the RML airway  BAL performed with 50cc instilled and approx 30cc slightly cloudy fluid with a few mucus plugs returned   Left lung airways were normal  BAL in LLL with 50cc instilled, approx 20cc slightly pink-tinged fluid returned.     Specimens: Sent RML and LLL BAL fluid separately for cell count, gram stain/culture, fungal cx, KOH prep, AFB stain/cx, cytology, legionella and viral testing.     Patient tolerated the procedure well.  Kirk Rock MD    "

## 2021-06-06 NOTE — PROGRESS NOTES
"Spiritual Care Note    Spiritual Assessment:      visited patient due to length of stay.  Patient shared about medical condition and history.    Patient shared about family/life history. 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 She stated that she is a \"workaholic\" and that this illness is causing her to reflect on priorities. She is hoping to have more balance in her life. She has a younger sister who has had many health challenges in her life and Harriett is a source of support for her.     Patient comes from [place] background and derives meaning, purpose, and comfort from gloria.     Care Provided:     Introduced self and role of Spiritual Care     Empathic listening and presence    Helped patient in processing of emotions     Facilitated storytelling and life review     Offered prayer     Plan of Care: A  will continue to visit as able or per request by patient/family/staff.      Chaplain Greg Brody MDIV, [facility]   "

## 2021-06-06 NOTE — SIGNIFICANT EVENT
"House Officer Note    House officer called d/t increasing O2 needs.     S: Patient admitted for aspiration PNA. Day team aware of increasing O2 need today. Uriel spoke with pulmonary and with aspiration of gastric will take time. On IV steroids.     Per nursing patient was extremely anxious.     Talking with patient after ativan she is feeling and breathing better. Many questions answered. She is not using the flutter valve.       Exam: /56 (Patient Position: Semi-lakhani)   Pulse 77   Temp 98.3  F (36.8  C)   Resp 22   Ht 4' 10.5\" (1.486 m)   Wt 154 lb (69.9 kg)   SpO2 91%   BMI 31.64 kg/m      Lying comfortable in bed  Talking in short sentences      A/P: Likely anxiety worsening symptoms - feeling better after 0.5mg ativan. Possible this will continue to get worse before better - per pulmonary only time will tell.     Repeat CXR does show worsening on left side. Continue to titrate O2 as needed.         Joe Villegas MD  [place] House Officer  [phone]            "

## 2021-06-06 NOTE — PLAN OF CARE
Problem: Pain  Goal: Patient's pain/discomfort is manageable  Outcome: Progressing   Pt on Fentanyl drip at 125 mcg    Problem: Potential for Compromised Skin Integrity  Goal: Nutritional status is improving  Outcome: Progressing   Restarted mary TF    Problem: Glucose Imbalance  Goal: Achieve optimal glucose control  Outcome: Progressing   Pt on insulin gtt    BIS monitoring for sedation goal of 40-60. Pt has been in the high 30's most of shift. Titrated down both propofol and fentanyl. See MAR    Nimbex gtt titrated per TOF to goal of 2/4. Difficult to assess as monitor does not match mini stim.

## 2021-06-06 NOTE — PLAN OF CARE
Problem: Speech Therapy  Goal: SLP Goals  Description  Goal entered on 3/6/2020 by Kristy Haider MA, CCC-SLP:  Frequency: N/A (Eval only)  1) Patient will participate in a Bedside Swallow Study to evaluate swallow function and determine safety/appropriateness of current diet. -MET (3/6/20)     Outcome: Completed

## 2021-06-06 NOTE — PROGRESS NOTES
CRITICAL CARE PROGRESS NOTE    Date / Time of Admission:  3/1/2020  4:51 PM  Date of service: 3/11/2020      Assessment:   Principal Problem:    Aspiration pneumonia of left lower lobe due to gastric secretions (H)  Active Problems:    Hypokalemia    Former smoker - quit in 2014    Anxiety    Hypothyroidism    Metabolic syndrome    Aspiration pneumonia due to inhalation of vomitus (H)    Diarrhea of presumed infectious origin    Hypotension, unspecified hypotension type    Acute respiratory failure with hypoxia (H)    Cough    Chest pain at rest    Deep vein thrombosis (DVT) of upper extremity (H)  DVR RUE      55 yo female, former smoker, who was admitted with possible aspiration after colonoscopy.  She was treated with clindamycin as outpatient was started on ceftriaxone and Flagyl here.  She had a chest x-ray that showed left-sided infiltrate however over the past several days her chest x-ray has progressively worsened.  Her oxygen requirements have been increasing.  pt was transferred to the ICU on    3/7/20 with increased work of breathing and requirement for BiPAP.  3/9/20  HFNC most of day  3/10/20: DVT  RUE around PICC and in subclavian vein    Heparin drip started     Last 24 hours: cough continues increased o2 needs with turning       Plan:     Neuro: History of seizure disorder  -Alert awake oriented x3    Pulmonary: Acute respiratory failure felt to be related to aspiration pneumonitis.  She was treated with clindamycin as outpt, then ceftriaxone and Flagyl while inpatient.    -on HFNC  -BIPAP prn  -On cefepime and Levaquin.  -Scheduled and PRN nebs  -steroids stopped 3/10  - at risk for needing intubation  I did d/w pt and her   -bubble echo to ro shunting  -CTAPE run  -cough syrup with codiene    JANAE   Recent PSG   Was to get home NCPAP, did not happen due to this admission     Cardiovascular:   - trops neg  -bubble echo to ro shunt  -  -lasix BID    GI: recent colonoscopy  -N.p.o. for  "now  -Prophylaxis with PPI  -Can start clears once patient is off BiPAP    ID:   -on cefepime and levaquin for aspiration pneumonia  -We will de-escalate antibiotics as she improves.  -CRP trending down  -f/u cultures    Heme/Onc:   -Prophylaxis with enoxaparin     Endocrine: Hypothyroidism  -On thyroid replacement  - Insulin as needed to keep glucose <180. Avoid hypoglycemia.    Advance Directives: Full Code     The patient is critically ill with impairment in organ system and high risk of life threatening deterioration.     Critical Care Time : 55  Minutes    Jenni Quinteros CNP  [facility] Pulmonary and Critical Care services       Subjective:   HPI:  Harriett Romero is a 54 y.o. female, former smoker, with past medical history significant for obesity, restless leg syndrome who apparently underwent colonoscopy back on 2/25/2020 and had an aspiration event.  Patient was started on clindamycin several days after that event.  There was no improvement with her symptoms and therefore she was admitted to the hospital.  Patient is allergic to penicillin and therefore she was started on ceftriaxone and Flagyl.  She was then transitioned to Augmentin.      Objective:   VITALS:  /57   Pulse 82   Temp 99.1  F (37.3  C) (Oral)   Resp (!) 34   Ht 4' 10\" (1.473 m)   Wt 139 lb (63 kg)   SpO2 (!) 85% Comment: washing up  BMI 29.05 kg/m    I&O:      Intake/Output Summary (Last 24 hours) at 3/11/2020 1035  Last data filed at 3/11/2020 0600  Gross per 24 hour   Intake 1645 ml   Output 800 ml   Net 845 ml       EXAM:   General: Alert, cooperative, appears stated age  HEENT: NC PERRL  CVS:S1S2, RRR c/p cp  Lungs: crackles L > right, has loose coughing with deep inspirations   Presently on HFNC  Abd: soft, NT, + BS. No masses  Ext: No c/c/e  Skin: no rashes or lesions  Neuro: CN 2 to 12 intact      Data Review:  CXR on 3/7/2020:  Stable heart size. Bilateral airspace opacities left greater than right. Slight " interval improvement in aeration left lung. Trace left effusion. Bony thorax unremarkable. Monitor electrodes.    CXR on 3/8: shows improvement    Allergies: Macrobid [nitrofurantoin monohyd/m-cryst]; Penicillins; and Sulfa (sulfonamide antibiotics)     MEDS:  Scheduled Meds:    albuterol  2.5 mg Nebulization QID     buPROPion  150 mg Oral BID     cefepime  2 g Intravenous Q12H     citalopram  40 mg Oral QHS     furosemide  40 mg Intravenous BID - diuretic     insulin aspart (NovoLOG) injection   Subcutaneous TID with meals     insulin aspart (NovoLOG) injection   Subcutaneous QHS     levothyroxine  125 mcg Oral Daily 0600     lidocaine   Topical TID     [Held by provider] metFORMIN  500 mg Oral DAILY     pantoprazole  40 mg Intravenous Q24H     pramipexole  0.75 mg Oral QHS     sodium chloride  2.5 mL Intravenous Line Care     Continuous Infusions:    heparin 15 Units/kg/hr (03/11/20 0700)     PRN Meds:.acetaminophen, acetaminophen, albuterol **AND** Nebulizer treatment intermittent, aluminum-magnesium hydroxide-simethicone, benzonatate, bisacodyL, codeine-guaiFENesin, dextrose 50 % (D50W), diphenhydrAMINE, glucagon (human recombinant), hydrOXYzine pamoate, lidocaine (PF), loperamide, LORazepam, magnesium hydroxide, melatonin, morphine  injection, naloxone **OR** naloxone, ondansetron **OR** ondansetron, oxymetazoline, polyethylene glycol, sodium chloride bacteriostatic, sodium chloride, sodium chloride, sodium chloride

## 2021-06-06 NOTE — PROGRESS NOTES
OCCUPATIONAL THERAPY: OT evaluation deferred. Pt intubated. OT orders will be cancelled per RN request.  Please re order OT as appropriate.  Thank you.  Mary Reyes, OTR/L  3/12/2020

## 2021-06-06 NOTE — PROGRESS NOTES
Clinical Nutrition Therapy TF Follow Up Note    IV dextrose or Fluids:dextrose 10%  epoprostenol 0.5 mg/50 mL (Full Strength) (VELETRI) inhalation solution, Last Rate: 80,000 ng/hr (03/18/20 1030)  fentaNYL 2500 mcg/50 mL (50 mcg/mL), Last Rate: 175 mcg/hr (03/18/20 0804)  heparin, Last Rate: 14 Units/kg/hr (03/18/20 0804)  insulin infusion (1 unit/mL), Last Rate: 1.5 Units/hr (03/18/20 1110)  norepinephrine, Last Rate: Stopped (03/17/20 0900)  propofol, Last Rate: 60 mcg/kg/min (03/18/20 0805)  sodium chloride 0.9%, Last Rate: 10 mL/hr (03/18/20 0805)      Current Nutrition Prescription:   Diet: NPO  TF: Trickle feeds (10 ml/hr):  Replete with fiber   Getting prosource (no carb) three times a day  Reglan Rx has helped to decrease GVRs)  H2O flushes recently changed to 100 cc q 4 hours  Propofol running at 22.7 ml/hr (This provides 600 Calories daily)    3/18/20 ~  Per MD: Advance tube feeding to goal as tolerated now that cisatracurium off and started on  scheduled metoclopramide  Replete w/fiber advanced  to  25 ml/hr     Carb Protein Fat Water Calories   TF at 25 mls/hour 68 41 20 504 600   ProSource TID  45   180   Propofol @22/hour   66  600   Water Flushes    600    TOTAL 68 g 86 g 86 g 1104 1380       21 kcal/kg actual weight    28 kcal/kg adjusted weight    32 kcals/kg IBW    Anthropometrics:  Admission weight: 147 to 154 lbs  Weight: 140 lb 11.2 oz (63.8 kg)    GI Status/Output:   GI symptoms include: WDL per nurse  Bowel Sounds hypoactive per nurse  Last BM: 3/17 Incontinent stool recorded per nurse    Labs: 3/18  Glucose 135 High    Goals:    Patient will tolerate TFas evidenced by RV < 500 mls, MET With Reglan Rx, RV < 100 mls    BG < 180 mg/dL, MET    No s&s aspiration MET    Maintain weight MET  Weights 140 to 147 lbs    Bowel function WNL    Intervention / Monitoring:    Follow TF tolerance, weight, labs

## 2021-06-06 NOTE — PROGRESS NOTES
CRITICAL CARE PROGRESS NOTE    Date / Time of Admission:  3/1/2020  4:51 PM  Date of service: 3/9/2020      Assessment:   Principal Problem:    Aspiration pneumonia of left lower lobe due to gastric secretions (H)  Active Problems:    Hypokalemia    Former smoker - quit in 2014    Anxiety    Hypothyroidism    Metabolic syndrome    Aspiration pneumonia due to inhalation of vomitus (H)    Diarrhea of presumed infectious origin    Hypotension, unspecified hypotension type    Acute respiratory failure with hypoxia (H)    55 yo female, former smoker, who was admitted with possible aspiration after colonoscopy.  She was treated with clindamycin as outpatient was started on ceftriaxone and Flagyl here.  She had a chest x-ray that showed left-sided infiltrate however over the past several days her chest x-ray has progressively worsened.  Her oxygen requirements have been increasing.  pt was transferred to the ICU on 3/7/2020 with increased work of breathing and requirement for BiPAP.      Plan:     Neuro: History of seizure disorder  -Alert awake oriented x3    Pulmonary: Acute respiratory failure felt to be related to aspiration pneumonitis.  She was treated with clindamycin as outpt, then ceftriaxone and Flagyl while inpatient.  Now, she is on cefepime and Levaquin as well as steroids  -on HFNC  -BIPAP prn  -I will order hospital CPAP auto 5-20 when pt is on les o2  -On cefepime and Levaquin.  -Scheduled and PRN nebs  -Was started on prednisone.  Benefit is questionable in the setting of aspiration pneumonitis, however, no objection to continuing for now given that patient is improving.  Would not give more than 5 days  JANAE   Recent PSG   Was to get home NCPAP, did not happen due to this admission     Cardiovascular: hemodynamically stable  -echo shows preserved EF  -lasix/Diuril for volume overload on 3/7.  -give lasix 80 mg x 1 today    GI: recent colonoscopy  -N.p.o. for now  -Prophylaxis with PPI  -Can start clears  "once patient is off BiPAP    ID:   -on cefepime and levaquin for aspiration pneumonia  -We will de-escalate antibiotics as she improves.  -CRP trending down  -f/u cultures    Heme/Onc:   -Prophylaxis with enoxaparin     Endocrine: Hypothyroidism  -On thyroid replacement  - Insulin as needed to keep glucose <180. Avoid hypoglycemia.    Advance Directives: Full Code     The patient is critically ill with impairment in organ system and high risk of life threatening deterioration.     Critical Care Time (CCT) greater than: 33  Minutes    Jenni Quinteros CNP  M [facility] Pulmonary and Critical Care services       Subjective:   HPI:  Harriett Romero is a 54 y.o. female, former smoker, with past medical history significant for obesity, restless leg syndrome who apparently underwent colonoscopy back on 2/25/2020 and had an aspiration event.  Patient was started on clindamycin several days after that event.  There was no improvement with her symptoms and therefore she was admitted to the hospital.  Patient is allergic to penicillin and therefore she was started on ceftriaxone and Flagyl.  She was then transitioned to Augmentin.      Objective:   VITALS:  BP 91/54 (Patient Position: Lying)   Pulse 88   Temp 99  F (37.2  C) (Oral)   Resp (!) 30   Ht 4' 10\" (1.473 m)   Wt 151 lb 14.4 oz (68.9 kg)   SpO2 (!) 86%   BMI 31.75 kg/m    I&O:      Intake/Output Summary (Last 24 hours) at 3/9/2020 1123  Last data filed at 3/9/2020 1038  Gross per 24 hour   Intake 420 ml   Output 2535 ml   Net -2115 ml       EXAM:   General: Alert, cooperative, appears stated age  HEENT: NC PERRL  CVS:S1S2, RRR  Lungs: crackles L > right has loose coughing with deep inspirations   Presently on HFNC  Abd: soft, NT, + BS. No masses  Ext: No c/c/e  Skin: no rashes or lesions  Neuro: CN 2 to 12 intact      Data Review:  CXR on 3/7/2020:  Stable heart size. Bilateral airspace opacities left greater than right. Slight interval improvement in " aeration left lung. Trace left effusion. Bony thorax unremarkable. Monitor electrodes.    CXR on 3/8: shows improvement    Allergies: Macrobid [nitrofurantoin monohyd/m-cryst]; Penicillins; and Sulfa (sulfonamide antibiotics)     MEDS:  Scheduled Meds:    albuterol  2.5 mg Nebulization QID     buPROPion  150 mg Oral BID     cefepime  2 g Intravenous Q8H     citalopram  40 mg Oral QHS     enoxaparin ANTICOAGULANT  40 mg Subcutaneous Q24H     insulin aspart (NovoLOG) injection   Subcutaneous Q6H FIXED TIMES     levothyroxine  125 mcg Oral Daily 0600     lidocaine   Topical TID     [Held by provider] metFORMIN  500 mg Oral DAILY     methylPREDNISolone sodium succinate  40 mg Intravenous Q24H     pantoprazole  40 mg Intravenous Q24H     potassium chloride  10 mEq Intravenous Q1H     pramipexole  0.75 mg Oral QHS     sodium chloride  10-30 mL Intravenous Q8H FIXED TIMES     sodium chloride  2.5 mL Intravenous Line Care     Continuous Infusions:  PRN Meds:.acetaminophen, acetaminophen, albuterol **AND** Nebulizer treatment intermittent, aluminum-magnesium hydroxide-simethicone, benzonatate, bisacodyL, codeine-guaiFENesin, dextrose 50 % (D50W), glucagon (human recombinant), hydrOXYzine pamoate, lidocaine (PF), loperamide, LORazepam, magnesium hydroxide, melatonin, morphine  injection, naloxone **OR** naloxone, ondansetron **OR** ondansetron, polyethylene glycol, sodium chloride bacteriostatic, sodium chloride, sodium chloride, sodium chloride

## 2021-06-06 NOTE — PROGRESS NOTES
I spoke with the surgeon who did her colonoscopy to ask about possibility of perforation and instrumentation into the thorax.  Given the physical exam today as well as the duration of time between the procedure and today's exam, he he felt that this possibility is remote he did not believe that the presentation today is related to her colonoscopy in the sedation she had at that time.

## 2021-06-06 NOTE — PROGRESS NOTES
Spiritual Care Note    Spiritual Assessment:  attempted visit hoping to connect with family. Patient remains intubated.  arrives to be informed patient's  had gone home and will return later. NP updated  regarding patient's condition.  informed other family in the waiting room. When  arrives no family is present.     Care Provided: No direct spiritual care offered at this time.    Plan of Care: Spiritual care will continue to follow as part of patient's care team.     GIN Cabrera, BCC

## 2021-06-06 NOTE — PROGRESS NOTES
"Speech Language/Pathology  Bedside Swallow Evaluation    Problem:  Patient Active Problem List   Diagnosis     Obesity due to excess calories with serious comorbidity     Hypokalemia     Menopausal hot flushes     Migraine headache     Former smoker - quit in 2014     Insomnia     RLS (restless legs syndrome)     Anxiety     Hypothyroidism     Metabolic syndrome     Dyslipidemia     Vitamin D deficiency     At high risk for caregiver role strain     Major depression in complete remission (H)     History of Positive colorectal cancer screening using Cologuard test     Aspiration pneumonia of left lower lobe due to gastric secretions (H)     Aspiration pneumonia due to inhalation of vomitus (H)     Diarrhea of presumed infectious origin     Hypotension, unspecified hypotension type     Acute respiratory failure with hypoxia (H)       Onset Date: 3/1/2020  Reason for Evaluation: Concern for aspiration  Pertinent History: As listed above. Per Dr. Ballard's H&P note (dated 3/1/20), \"Patient had screening colonoscopy on 2/25, and vomited was waking up from anesthesia.  Since then she has been feeling weak, with generalized malaise, low-grade fever, and productive cough.\" CXR from outside facility showed consolidation in left upper lobe and left lower lobe. Repeat CXR this AM: \"Heart size magnified. Extensive airspace opacification left lung with consolidation and interval worsening. Airspace opacification right mid and right lower lung. Trace bilateral effusions. Stomach mildly distended with air. Interval worsening.\"  Patient denies any history of dysphagia/aspiration.  Current Diet: Regular textures and thin liquids  Baseline Diet: Regular textures and thin liquids    Assessment:    Patient presents with no overt clinical signs or symptoms of aspiration with thin liquids.    Patient demonstrated no oral dysphagia.    Pharyngeal dysphagia is not suspected due to absence of clinical signs and symptoms of aspiration and " no prior history of dysphagia.    A Video Swallow Study is not recommended at this time, but may be indicated if aspiration concerns persist.    Recommendations/Plan:    Continue current diet of Regular textures and thin liquids    Patient may take oral medications with liquids    Strategies: Patient to follow standard safe swallowing precautions, including sitting fully upright for all intake, eating slowly, and taking one small bite or sip at a time.    Speech Therapy is not recommended at this time, however, please re-consult Speech if new concerns regarding swallow arise.      Referrals: Consider GI consult. Patient reports that she has acid reflux but does not take a PPI for this.  She reports that she sometimes wakes up with a sour/acidic taste in the back of her throat.  This is suggestive of poorly controlled GERD, which could be contributing to her symptoms.    Subjective:    Patient presents as alert and cooperative during this session.  She was anxious regarding her breathing.  Her , Brian, was present.  Patient was noted to have a frequent dry cough throughout session.    An  was not applicable    Patient was observed with sips of thin liquid only.  She declined to trial a solid during this session.  She is able to self feed/drink.    Of note, patient reports having eaten Chinese toast for breakfast.  She denied any difficulty chewing or swallowing this.  She also denies any coughing with liquids.    Objective:    Dentition/Oral hygiene: Dentition is intact.  Oral hygiene was good.    Oral motor function was not impaired.     Bolus prep and oral control were not impaired.  Mastication was not assessed, as patient was not given a solid.    Anterior-Posterior transit was not impaired.    Thin: Patient trialed ~2 ounces by straw and presented with no s/s of aspiration.  Initiation of swallow appeared timely.     Hyolaryngeal movement was observed with each swallow.    21 dysphagia minutes      Kristy Haider MA, CCC-SLP

## 2021-06-06 NOTE — PROGRESS NOTES
Physical Therapy     03/10/20 1041   Visit Specifics   Eval Type Initial eval   Inital PT Consult 03/10/20   Bed/Tabs/Pad Alarm Applied Not applicable  (ICU supervision)   General   Onset date 03/04/20   Chart Reviewed Yes   PT/OT Patient/Caregiver Stated Goals none stated   Family/Caregiver Present No   Precautions   Weight Bearing Status wbat   General Precautions   (high flow O2)   Home Living   Type of Home Apartment   Home Layout Multi-level  (17 stairs)   Bathroom Shower/Tub Tub/shower unit  (has both)   Bathroom Toilet Standard   Bathroom Equipment Vanity near toilet;Built-in shower seat   Mobility Equipment   (none)   Prior Status   Independent With All ADL's/IADL's;Driving   Lives With Spouse   Receives Help From Family   Vocational Full time employment   Device Used No   Cognition   Overall Cognitive Status WFL   RLE Assessment   RLE Assessment WFL   LLE Assessment   LLE Assessment WFL   Bed Mobility   Supine to Sit Stand by assistance;Modified independent (Device);HOB elevated   Sit to Supine Stand by assistance;Modified independent (Device);HOB elevated   Bed Mobility Comments pt feeling fatigued with transfers, O2 drops to 80s during transfers.   Transfer    Sit To Stand Stand by assistance   Stand To Sit Stand by assistance   Transfer Comments cues for safety and breathing techniques. O2 drops to 80s   Ambulation    Weight Bearing Status wbat   Distance (ft)  3 steps from EOB <> chair   Assistance CGA;Close supervision   Assistive Device None   Verbal Cues Safety;Breathing techniques   Quality of Gait/Comment on 45L 70% high flow O2, O2 dips down to 80s with mobility. Pt unable to sit up in chair d/t inc chest/rib/lung pain with sitting   Endurance Deficit   Endurance Deficit Yes   Endurance Deficit Description O2 dips significantly with mobility, inc pain   Fall Risk   Fall Risk Low   Plan   Treatment/Interventions Functional transfer training;Gait/stair training;Home exercise program;Neuromuscular  re-ed;Strengthening/ROM;TENS/NMES/Modalitues   PT Frequency Daily   Assessment   Prognosis Good   Problem List Decreased mobility   Barriers to Discharge Inaccessible home environment   Recommendation   PT Discharge Recommendation Home with assist  (pending progress)   PT Equipment Recommendation   (none currently)   Treatment Suggestions for Next Session check for how much O2 pt on, trial to chair again, endurance, LE ex   PT Care Plan REVIEWED DAILY Yes, goals remain appropriate     Rachel Walker DPT  3/10/2020

## 2021-06-06 NOTE — PROGRESS NOTES
"Spiritual Care Note    Spiritual Assessment:     Follow up visit with patient and her , Brian. Harriett shared that she feels concerned about the possibility of being intubated, but that she will do whatever she needs to get better.     She articulated some emotional/existential distress saying that she wants to get well enough and have this experience change her life for the better. She stated that she feels \"everything happens for a reason\" and she hopes that this hospital stay will allow her to help other people.     Patient appreciates prayer and welcomes spiritual care visits     Care Provided:     Introduced self and role of Spiritual Care     Empathic listening and presence     Helped patient in processing of emotions    Offered prayer     Plan of Care: A  will continue to visit as able or per request by patient/family/staff.       Greg Brody MDIV, Psychiatric   "

## 2021-06-06 NOTE — PLAN OF CARE
Problem: Pain  Goal: Patient's pain/discomfort is manageable  Outcome: Progressing   Pts generalized pain r/t coughing controlled with PRN meds. See MAR admin.    Problem: Knowledge Deficit  Goal: Patient/family/caregiver demonstrates understanding of disease process, treatment plan, medications, and discharge instructions  Outcome: Progressing   Educated pt on treatment plan, pt voiced understanding.    Problem: Inadequate Airway Clearance  Goal: Patient will maintain patent airway  Outcome: Progressing   Pt weaned down to 1L 02 via N/C. 02 sats. 92%. 0 s/s resp distress.

## 2021-06-06 NOTE — PROGRESS NOTES
Spiritual Assessment:     Brian is anxious and frustrated, but coping the best he can  Experiencing some fear due to uncertainty    Reports good sleep and good physical health    Practicing self-care     Care Provided:   Empathic listening and presence  Helped Brian in processing of emotions  Normalized/validated his feelings of concern, fear, helplessness and frustration  Encouraged self-care    Spiritual Care Note: Called and spoke with Harriett Torres's  to offer support. Brian states that he is doing the best he can right now. He expresses frustration that he cannot be with Harriett right now, stating that his greatest fear is that she will wake up on a trach and feel panicked. He states that he takes some measure of comfort right now in knowing that Harriett is sedated. He states that he hopes that some arrangements could be made to allow him to come in before she wakes up, so that she won't be alone.     When asked about self-care and coping, Brian says that is taking walks, reading books and catching up on his sleep. He states that he is also talking with Harriett's parents, sister, and brother-in-law. He shares that his son is in [place] and may have to deploy soon and that he has a daughter-in-law in [place] right now. I acknowledged how stressful all these different situations are in light of current events, and encouraged him to continue doing what he is already doing. I also offered that I will continue to pray for him and Harriett.     Plan of Care: Will remain available for further support as patient/family needs/desires.    Larissa Montero M.Div.  Staff   [phone]

## 2021-06-06 NOTE — TELEPHONE ENCOUNTER
Pt's  called on behalf of pt with questions after Harriett's colonoscopy today. Discussed that it is normal to feel bloated and pass gas for several hours after procedure and air is cleared from the colon. Also discussed that she may notice a small amount of blood in her stool if any biopsies were taken. Reassured her that this is normal. Advised her to call me back tomorrow if she is not feeling better.

## 2021-06-06 NOTE — PROGRESS NOTES
Pt electively  intubated at 1400 after return from CT scan.  Tammy and triple lumen  Placed bronchoscopy done at bedside  Med with fentanyl etomidate and succs for intubation  Propofol gtt started  Levophed started and heparin gtt continues.   updated.

## 2021-06-06 NOTE — PLAN OF CARE
Problem: Inadequate Airway Clearance  Goal: Patient will maintain patent airway  Outcome: Not Progressing     Problem: Inadequate Gas Exchange  Goal: Patient is adequately oxygenated and ventilation is improved  Outcome: Not Progressing  Patient has had increased 02 needs and shortness of breath  throughout the shift, last Hi belkis titration was 35 LPM [id number]% per RT, patient was still de-sating to 80% at that time, cough noted with pink tin pink tinged phlegm, benzonatate given. Patient has been very anxious, PRN ativan was given earlier in the shift. Tylenol was given for cough related ribcage pain.    House officer called for further evaluation, new orders to transfer patient to ICU under cardiac tele status, report given to Troy PHAM, message left for  regarding transfer.

## 2021-06-06 NOTE — PROGRESS NOTES
Addendum     Multiple visits since this am note  additional CCT 60 min + procedures    Chest CT    + b/l PE   B/l GGO  Echo with echo results pending    Will   Intubate   Place pushpa, central line   Bronch    May need veletri and proning    Broaden antibiotics    Send RADHA, HIV, ANCA RF   Consider Steroids  60 mg q 6 if BAL cultures neg   Propofol   May need paralytics     Pt's  updated prior to procedures

## 2021-06-06 NOTE — PROGRESS NOTES
CRITICAL CARE PROGRESS NOTE:    Assessment/Plan:  54 year old female, former smoker, with past medical history significant for restless leg syndrome with aspiration pneumonitis after vomiting while recovering from anesthesia for elective colonoscopy on 2/25/2020, with delayed presentation of severe fibroproliferative ARDS and bilateral pulmonary emboli    NEURO:  Therapeutic sedationon vent. Had normal mental status prior to intubation. Became quite hypertensive 3/17 despite BIS 30-50, improved with increased sedation.    Cont propofol, fentanyl    Needing prn midazolam for discomfort (see above)    RASS goal -4 for now    Tylenol prn pain    Avoid benzos if possible    Discontinue cisatracurium and BIS monitoring    Propofol labs OK 3/13, normal CK, LA and TG    CV:  Circulatory shock, now hypertensive: Likely hypovolemic vs vasodilatory from sepsis. No positive cultures. Off pressors. On 3/17 HTN with SBP 160s-170s. Despite BIS 30-50, improved with increased sedation. Likely BIS not accurately reading level of alertness.    MAP >65, currently off pressors    Lactate normal no need to trend    Echo 3/7 EF 63%, normal RV size/funcion, no pulm HTN, no RV strain, no shunt study done    Hold home triamterene-HCTZ for now    Continue sedation and analgesia    RESP:  Acute resp failure with hypoxemia, ARDS, pulmonary emboli: Intubated 3/11 for worsening CT scan c/w ARDS. Presumed massive aspiration event sometime after colonoscopy 2/25, with progressive inlammation and pneumonitis, likely now fibroproliferative stage. No positive cultures. Bronch 3/11 no mucus plugs. Bronch 3/15 no mucus plugs, diffuse airway inflammation/friabiliaty, BAL done in [facility] x2 no evidence of DAH. RADHA, ANCA, RF and HIV negative, no e/o interstitial lung disease.    Continue PCV-VG 35/225    FiO2 down to 45%    Decrease PEEP from 14 to 12 on 3/17, tolerating    Trial off cisatracurium today    Pplat down below 30 for the first time on 3/17,  remains 28 today    Driving pressure 16    ABG q 8 hrs    Permissive hypercapnia, pH is acceptable    Veletri still full strength    Have held off on proning as she is way out from initial lung injury (>36 hours)    IV steroids; titrated down, now at 40 mg two times a day    GI:  Had high residuals, started trophic tube feeding at 10 mL/hr on 3/17 on cisatracurium after starting metoclopramide; residuals <100.    Bowel regimen    PPI    Advance tube feeding to goal as tolerated now that cisatracurium off and started scheduled metoclopramide    RENAL:  Acute kidney injury: Likely ATN due to shock and aggressive diuresis. Cr normalized but BUN 60. Net negative >5liters    Continue Lasix 40mg IV daily to keep on dry side.     Propofol labs (CK, TG, LA) nomal on 3/13, no e/o rhabdo/PIS    Clark in place    Avoid nephrotixins    ID:  Presumed aspiration pneumonia with ARDS, has gotten many days of abx, cultures negative on BAL 3/11. Bronch 3/15 (2nd one) c/w diffuse infectious/inflammatory process with some secretions, no mucus plugs, secretions were not thick or copious but airways friable and erythematous. Persistent leukocytosis though may be steroid-induced    Stopping cefepime, off all abx 3/15; has gotten almost 2 weeks of IV abx    HEMATOLOGIC:  LUE DVT around PICC (since removed), right subclavian thrombus and looked like there was a thrombus in RIJ on bedside US today (was doing an US prior to line palcement). Bilateral PE without RH strain.    Continue UFH drip    plt OK, cont to follow    hgb goal >7    ENDOCRINE:  Hyperglycemia due to high dose IV steroids and critical illness.    FSBG checks, insulin SS/drip per ICU protocol    Stop Lantus, ISS for now use insulin drip protocol.    Cont synthroid    ICU PROPHYLAXIS:    UFH drip    PPI    peridex    HOB elevation    DISPO/CODE STATUS: Full code. Critically ill requiring invasive mechanical ventilation.    FAMILY COMMUNICATION: Left  Hugo a voicemail  "on 3/18.    Lines/Drains/Tubes:  ETT placed 3/11/20  OG tube placed 3/11/20  Moreno changed 3/17/20 after some bloody return from prior moreno requiring flushing to clear  Right brachial arterial line placed 3/11/20  Right femoral TLC placed 3/11/20, removed 3/15/20  Left IJ TLC placed 3/15/20  PICC LUE assoc w/ DVT was removed    Restraints  Progress Note  Restraint Application    I recognize that restraints are physical and/or chemical interventions intended to restrict a person's movements. Restraints are currently needed to ensure the safety of this patient and/or others. My clinical rationale appears below.    Category/Type of Restraint     Non Violent:  Soft limb restraint x2  --  Behavior  Pulling at tubes/lines  --  Root Cause of the Behavior  Sedation/intubation  --  Less-Restrictive Measures that Failed  Non Violent Measures:  Close Observation  --  Response to the Restraint  Patient unable to pull at tubes/lines  --  Criteria for Release from the Restraint  Patient calm and off sedation    Tae Schmidt MD  Pulmonary and Critical Care Medicine  [facility]  Cell [phone]  Office [phone]  Pager [phone]    Overnight events:  Tolerated PEEP down to 12. Hypertensive despite BIS 30-60, HTN improved with increased sedation. Tolerated trophic tube feeding.    Subjective:  Unable to assess    Objective:  Physical Exam:  Vent settings for last 24 hours:  Vent Mode: PCV/VG  FiO2 (%):  [40 %-50 %] 45 %  S RR:  [35] 35  S VT:  [225 mL] 225 mL  PEEP/CPAP (cm H2O):  [12 cm H2O-14 cm H2O] 12 cm H2O  Minute Ventilation (L/min):  [6.3 L/min-8.9 L/min] 8.7 L/min  PIP:  [33 cm H2O-40 cm H2O] 33 cm H2O  MAP (cm H2O):  [19-23] 19    /74   Pulse 66   Temp 98.3  F (36.8  C)   Resp (!) 36   Ht 4' 10\" (1.473 m)   Wt 140 lb 11.2 oz (63.8 kg)   SpO2 95%   BMI 29.41 kg/m      Intake/Output last 3 shifts:  I/O last 3 completed shifts:  In: 1801.5 [I.V.:844.5; NG/GT:892; IV Piggyback:65]  Out: 1034 " [Urine:1445]  Intake/Output this shift:  I/O this shift:  In: 102.5 [I.V.:2.5; NG/GT:100]  Out: 115 [Urine:115]    Physical Exam  Gen: intubated, sedated  HEENT: no OP lesions, no JESSICA  CV: RRR, no m/g/r  Resp: coarse crackles bilaterally  Abd: soft, nontender, BS+  Neuro: PERRL, unable to assess motor, sedated/paralyzed  Ext: no significant edema    LAB:  Results from last 7 days   Lab Units 03/18/20  0439 03/18/20 0439 03/17/20  0359   LN-WHITE BLOOD CELL COUNT thou/uL  --   --  29.4*   LN-HEMOGLOBIN g/dL 8.8*  --  9.1*   LN-HEMATOCRIT %  --   --  29.1*   LN-PLATELET COUNT thou/uL  --  193 241     Results from last 7 days   Lab Units 03/18/20  0439 03/17/20  0358 03/16/20  0605  03/13/20  0500   LN-SODIUM mmol/L 145 143 141   < > 141   LN-POTASSIUM mmol/L 5.0 4.8 4.7   < > 4.8   LN-CHLORIDE mmol/L 102 102 103   < > 106   LN-CO2 mmol/L 35* 35* 31   < > 29   LN-BLOOD UREA NITROGEN mg/dL 60* 62* 59*   < > 21   LN-CREATININE mg/dL 0.78 0.95 1.20*   < > 0.97   LN-CALCIUM mg/dL 8.9 8.8 8.5   < > 8.7   LN-PROTEIN TOTAL g/dL  --   --   --   --  6.2   LN-BILIRUBIN TOTAL mg/dL  --   --   --   --  0.2   LN-ALKALINE PHOSPHATASE U/L  --   --   --   --  119   LN-ALT (SGPT) U/L  --   --   --   --  15   LN-AST (SGOT) U/L  --   --   --   --  13    < > = values in this interval not displayed.       Micro  PCT 0.82 on 3/2 -> 0.66 3/14  Bronch/BAL cultures all negative, fungal/AFB NGTD  Flu swab neg 3/6  C diff neg  Legionella neg    HIV neg  RADHA normal  RF neg  ANCA <1:20  ESR 58  CRP 29 -> 22 -> 10 -> 13    Cytology from BAL 3/11  Final Diagnosis    A) LUNG, LEFT LOWER LOBE, BRONCHIOALVEOLAR LAVAGE:        1)  MILD CHRONIC INFLAMMATION WITHOUT INCREASED ACUTE INFLAMMATION        2)  NUMEROUS RESPIRATORY EPITHELIAL CELLS        3)  NEGATIVE FOR VIRAL INCLUSIONS, DYSPLASTIC AND MALIGNANT CELLS        4)  GMS STAINED SUREPATH SMEAR DEMONSTRATES OCCASIONAL BUDDING YEAST             AND PSEUDOHYPHAE CONSISTENT WITH ROSY SPECIES  (POSSIBLE OROPHARYNGEAL             CONTAMINANT); NEGATIVE FOR PNEUMOCYSTIS     B) LUNG, RIGHT MIDDLE LOBE, BRONCHIOALVEOLAR LAVAGE:        1)  ABUNDANT ALVEOLAR MACROPHAGES, RESPIRATORY EPITHELIAL CELLS, MILD             BACKGROUND CHRONIC INFLAMMATION AND OCCASIONAL SQUAMOUS EPITHELIAL             CELLS        2)  NEGATIVE FOR VIRAL INCLUSIONS, DYSPLASTIC AND MALIGNANT CELLS        3)  CELL BLOCK SECTIONS AND GMS STAINED SMEARS DEMONSTRATE BUDDING YEAST             AND PSEUDOHYPHAE CONSISTENT WITH ROSY SPECIES (POSSIBLE OROPHARYNGEAL             CONTAMINANT); NEGATIVE FOR PNEUMOCYSTIS ORGANISMS          Current Facility-Administered Medications   Medication Dose Route Frequency Provider Last Rate Last Dose     acetaminophen solution 650 mg (TYLENOL)  650 mg Enteral Tube Q6H PRN Babar Leigh MD   650 mg at 03/12/20 1734     acetaminophen suppository 650 mg (TYLENOL)  650 mg Rectal Q4H PRN Meredith Enriquez, CNP   650 mg at 03/13/20 0205     albuterol nebulizer solution 2.5 mg (PROVENTIL)  2.5 mg Nebulization Q4H PRN Meredith Enriquez, CNP   2.5 mg at 03/14/20 0224     albuterol nebulizer solution 2.5 mg (PROVENTIL)  2.5 mg Nebulization QID Meredith Enriquez, CNP   2.5 mg at 03/18/20 0838     aluminum-magnesium hydroxide-simethicone 200-200-20 mg/5 mL suspension 30 mL (MAALOX ADVANCED)  30 mL Enteral Tube Q4H PRN Babar Leigh MD         amino acids-protein hydrolys 15-60 gram-kcal/30 mL packet 1 packet (ProSource No Carb)  1 packet Oral TID Kirk Rock MD   1 packet at 03/18/20 0917     bisacodyL suppository 10 mg (DULCOLAX)  10 mg Rectal Daily PRN Meredith Enriquez CNP   10 mg at 03/13/20 1255     buPROPion tablet 150 mg (WELLBUTRIN)  150 mg Enteral Tube BID Alba York CNP   150 mg at 03/18/20 0917     chlorhexidine 0.12 % solution 15 mL (PERIDEX)  15 mL Topical Q12H Kirk Rock MD   15 mL at 03/18/20 0400     cisatracurium (NIMBEX) 100 mg/50 mL (2 mg/mL)  infusion  0.5-8 mcg/kg/min Intravenous Continuous Kirk Rock MD   Stopped at 03/18/20 0900     citalopram tablet 40 mg (celeXA)  40 mg Enteral Tube QHS Babar Leigh MD   40 mg at 03/17/20 2111     dextrose 10%  15 mL/hr Intravenous Continuous PRN Kirk Rock MD         dextrose 50 % (D50W) syringe 20-50 mL  20-50 mL Intravenous Q15 Min PRN Meredith Enriquez CNP         diphenhydrAMINE injection 25 mg (BENADRYL)  25 mg Intravenous Q6H PRN Alba York CNP   25 mg at 03/10/20 0014     docusate sodium 50 mg/5 mL oral liquid 100 mg (COLACE)  100 mg Enteral Tube BID Alba York CNP   100 mg at 03/18/20 0917     epoprostenol 0.5 mg/50 mL (Full Strength) (VELETRI) inhalation solution  80,000 ng/hr Nebulization Continuous Kirk Rock MD 8 mL/hr at 03/18/20 0434 80,000 ng/hr at 03/18/20 0434     fentaNYL - BOLUS DOSE from infusion 50 mcg  50 mcg Intravenous Q1H PRN Kirk Rock MD   50 mcg at 03/18/20 0235     fentaNYL 2500 mcg/50 mL CADD infusion (50 mcg/mL)   mcg/hr Intravenous Continuous Kirk Rock MD 3.5 mL/hr at 03/18/20 0804 175 mcg/hr at 03/18/20 0804     fentaNYL pf injection 100 mcg (SUBLIMAZE)  100 mcg Intravenous Once Kirk Rock MD         glucagon (human recombinant) injection 1 mg  1 mg Subcutaneous Q15 Min PRN Meredith Enriquez CNP         heparin 25,000 units in 0.45% sodium chloride (100 units/ml) 250 mL ANTICOAGULANT infusion  1-50 Units/kg/hr Intravenous Continuous Kirk Rock MD 8.7 mL/hr at 03/18/20 0804 14 Units/kg/hr at 03/18/20 0804     hydrALAZINE injection 10-20 mg (APRESOLINE)  10-20 mg Intravenous Q4H PRN Tae Schmidt MD         hydrOXYzine pamoate capsule 25-50 mg (VISTARIL)  25-50 mg Enteral Tube Q4H PRN Babar Leigh MD         insulin regular 1 Units/mL in sodium chloride 0.9% 250 mL  0-24 Units/hr Intravenous Continuous Meredith Enriquez, CNP 1.5 mL/hr at 03/18/20 0907 1.5 Units/hr at 03/18/20 0907      levothyroxine tablet 125 mcg (SYNTHROID, LEVOTHROID)  125 mcg Enteral Tube Daily 0600 Babar Leigh MD   125 mcg at 03/18/20 0614     lidocaine (PF) 10 mg/mL (1 %) injection 1-5 mL (XYLOCAINE-MPF)  1-5 mL Intradermal Once PRN Wilmar Rich MD         lidocaine 5 % ointment (XYLOCAINE)   Topical TID PRN Jenni Quinteros CNP         lipase-protease-amylase 5,000-17,000- 24,000 unit capsule 2 capsule (ZENPEP)  2 capsule Enteral Tube PRN Destini Trevino MD        And     sodium bicarbonate tablet 325 mg  325 mg Enteral Tube PRN Destini Trevino MD         LORazepam 0.5 mg injection (diluted concentration)  0.5 mg Intravenous Q4H PRN Wilmar Rich MD   0.5 mg at 03/08/20 2004     magnesium hydroxide suspension 30 mL (MILK OF MAG)  30 mL Enteral Tube Daily PRN Babar Leigh MD         melatonin tablet 3 mg  3 mg Enteral Tube Bedtime PRN Babar Leigh MD         methylPREDNISolone sod suc(PF) injection 40 mg (SOLU-MEDROL)  40 mg Intravenous Q12H Tae Schmidt MD   40 mg at 03/18/20 0105     metoclopramide injection 5 mg (REGLAN)  5 mg Intravenous Q6H Tae Schmidt MD   5 mg at 03/18/20 0739     metoprolol tartrate tablet 12.5 mg (LOPRESSOR)  12.5 mg Enteral Tube BID Tae Schmidt MD   12.5 mg at 03/17/20 1446     midazolam (PF) injection 2 mg (VERSED)  2 mg Intravenous Q1H PRN Jenni Quinteros CNP   2 mg at 03/18/20 0621     morphine injection 2 mg  2 mg Intravenous Q4H PRN Wilmar Rich MD   2 mg at 03/11/20 1317     naloxone injection 0.2-0.4 mg (NARCAN)  0.2-0.4 mg Intravenous PRN Meredith Enriquez CNP        Or     naloxone injection 0.2-0.4 mg (NARCAN)  0.2-0.4 mg Intramuscular PRN Meredith Enriquez, AME         norepinephrine 4 mg/250 ml in NS (0.016 mg/ml)  0.01-0.4 mcg/kg/min Intravenous Continuous Kirk Rock MD   Stopped at 03/17/20 0900     omeprazole suspension 20 mg (PriLOSEC)  20 mg Oral QHS Tae Schmidt MD   20 mg  at 03/17/20 2112     ondansetron injection 4 mg (ZOFRAN)  4 mg Intravenous Q4H PRN Meredith Enriquez CNP   4 mg at 03/07/20 0354    Or     ondansetron tablet 8 mg (ZOFRAN)  8 mg Oral Q8H PRN Meredith Enriquez, AME         oxymetazoline 0.05 % nasal spray 2 spray (AFRIN)  2 spray Each Nare BID PRN Maddi Hernandez MD         polyethylene glycol packet 17 g (MIRALAX)  17 g Enteral Tube DAILY Tae Schmidt MD   17 g at 03/18/20 0916     polyvinyl alcohol 1.4 % ophthalmic solution 1 drop (LIQUIFILM TEARS)  1 drop Both Eyes TID PRN Jenni Quinteros CNP   1 drop at 03/16/20 1710     pramipexole (MIRAPEX) tablet 0.75 mg  0.75 mg Enteral Tube QHS Babar Leigh MD   0.75 mg at 03/17/20 2111     propofoL injection (DIPRIVAN)  5-75 mcg/kg/min Intravenous Continuous Kirk Rock MD 22.7 mL/hr at 03/18/20 0805 60 mcg/kg/min at 03/18/20 0805     sennosides syrup 8.8 mg (for SENOKOT)  8.8 mg Enteral Tube BID Tae Schmidt MD   8.8 mg at 03/18/20 0917     sodium chloride 0.9%  10 mL/hr Intravenous Continuous Kirk Rock MD 10 mL/hr at 03/18/20 0805 10 mL/hr at 03/18/20 0805     sodium chloride bacteriostatic 0.9 % injection 1-5 mL  1-5 mL Intradermal Once PRN Wilmar Rich MD         sodium chloride flush 10-20 mL (NS)  10-20 mL Intravenous PRN Wilmar Rich MD   20 mL at 03/14/20 0032     sodium chloride flush 10-30 mL (NS)  10-30 mL Intravenous PRN Wilmar Rich MD         sodium chloride flush 2.5 mL (NS)  2.5 mL Intravenous Line Care Meredith Enriquez CNP   2.5 mL at 03/18/20 0918     sodium chloride flush 20 mL (NS)  20 mL Intravenous PRN Wilmar Rich MD         thiamine 100 mg in sodium chloride 0.9% 50 mL (vitamin B1)  100 mg Intravenous DAILY Destini Trevino  mL/hr at 03/18/20 0917 100 mg at 03/18/20 0917       Critical care attestation: 45 minutes spent managing the following issues: acute respiratory failure requiring intubation/IMV, ARDS due to massive  aspiration with pneumonia and pneumonitis, circulatory shock requiring continuous vasopressor infusions, acute kidney injury. High risk for organ deterioration and death requiring ICU level care.

## 2021-06-06 NOTE — SIGNIFICANT EVENT
"House staff was called by the patient's nurse due to increasing O2 needs. Pt admitted due to aspiration PNA. Dr. Trevino aware of increasing O2 needs last night and today, and had discussed with pulm.     I went to evaluate the patient and found her quite anxious, and breathing rapid/shallow. Throughout the night, RT continued to increase O2, starting at 35lpm at 70% to 45lpm at 100%. Her O2 sat remained 90-85%.     /57 (Patient Position: Semi-lakhani)   Pulse 83   Temp 98.6  F (37  C)   Resp 28   Ht 4' 10.5\" (1.486 m)   Wt 152 lb 8 oz (69.2 kg) Comment: bed scale  SpO2 (!) 85%   BMI 31.33 kg/m      Will transfer pt to cardiac tellemetry for BiPAP. Will also obtain CXR and pretreat with ativan prior to BiPAP for anxiety.     This note was routed to the hospitalist service.    Garfield Pa DO, MBA (PGY1)  [facility] Medicine Resident  Pager: [phone]    "

## 2021-06-06 NOTE — PLAN OF CARE
Problem: Pain  Goal: Patient's pain/discomfort is manageable  Outcome: Progressing   Given PRN IV morphine for pain r/t coughing, medication effective.   Problem: Inadequate Gas Exchange  Goal: Patient is adequately oxygenated and ventilation is improved  Outcome: Progressing   Tolerating hiflow NC throughout shift, remains on 45L at 70%, back to bipap for short time due to needing a break from the flow of the high flow into the nose.   Problem: Activity Intolerance/Impaired Mobility  Goal: Mobility/activity is maintained at optimum level for patient  Outcome: Progressing   OOB to chair this afternoon, tolerated well, remains in chair visiting with family.   Problem: Urinary Incontinence  Goal: Perineal skin integrity is maintained or improved  Outcome: Progressing   Urinary catheter discontinued this afternoon, will continue to monitor.

## 2021-06-06 NOTE — PROGRESS NOTES
"RESPIRATORY CARE NOTE     Patient Name: Harriett Romero  Today's Date: 3/12/2020     Pt continues on the following settings:  Vent Mode: VCV  FiO2 (%):  [60 %-100 %] 90 %  S RR:  [16-20] 20  S VT:  [300 mL-325 mL] 300 mL  PEEP/CPAP (cm H2O):  [10 cm H2O-12 cm H2O] 12 cm H2O  Minute Ventilation (L/min):  [5.4 L/min-9.9 L/min] 7.4 L/min  PIP:  [15 cm H2O-38 cm H2O] 15 cm H2O  MAP (cm H2O):  [10-15] 12   Plateau pressure: 34 cm H2O     Pt is intubated with  # 7.5 ETT secured  21 at the teeth.  BS are coarse crackles pre and post neb tx. Pt has a moderate cough with suction. RT suctioned pt for small pink tinged/bloody. Pt remains on full strength veletri. Pt's respiratory status is stable. RT will continue to follow per MD's orders.     /67   Pulse 84   Temp 100.4  F (38  C) (Axillary)   Resp (!) 32   Ht 4' 10\" (1.473 m)   Wt 139 lb 15.9 oz (63.5 kg)   SpO2 90%   BMI 29.26 kg/m        Paris Weaver, LRT  "

## 2021-06-06 NOTE — PLAN OF CARE
Problem: Activity Intolerance/Impaired Mobility  Goal: Mobility/activity is maintained at optimum level for patient  Outcome: Progressing     Problem: Impaired Gas Exchange  Goal: Demonstrate improved ventilation and adequate oxygenation of tissues as evidenced by absence of respiratory distress  Outcome: Progressing     Problem: Mechanical Ventilation  Goal: Patient will maintain patent airway  Outcome: Progressing  Goal: ET tube will be managed safely  Outcome: Progressing   Pt paralyzed, BIS had been in the 30s despite titrating propofol from 65 to 40. TOF continues to be 0/4, nimbex at 1.5.

## 2021-06-06 NOTE — PLAN OF CARE
Problem: Pain  Goal: Patient's pain/discomfort is manageable  Outcome: Progressing   On fentanyl gtt, propofol gtt and precedex gtt keeping RASS goal of -4. Versed IV 2mg bolus given once this shift for occasional stacking breaths/unsynchronized breathing.     Problem: Inadequate Airway Clearance  Goal: Patient will maintain patent airway  Outcome: Progressing  Goal: Patient will achieve/maintain normal respiratory rate/effort  Outcome: Progressing     Problem: Inadequate Gas Exchange  Goal: Patient will achieve/maintain normal respiratory rate/effort  Outcome: Progressing  Goal: Patient is adequately oxygenated and ventilation is improved  Outcome: Progressing   ABG monitoring Q 6 hrs, latest result  At 0500 with pH 7.33, PCO2 56, PO2 69. Peep decreased from 12 to 10 this am.  O2 sats mostly 92-94% on 65% FIO2.    Problem: Infection  Goal: Signs and symptoms of infections are decreased or avoided  Outcome: Progressing   Highest temp of 100.6F, medicated with tylenol suppository, effective.    Problem: Nutrition Care Problem  Goal: Nutritional status is improving  Outcome: Not Progressing   TF was put on hold for residual of 425 cc and TF colored liquid coming out of mouth. Resumed TF at 0645 after lower residual at 0400 and 2 hrs post levothyroxine dose.     Patient started to become bradycardic at 0510, 58-59 bts/min, informed CNP, went down on precedex gtt to 0.7.   Still on levophed gtt at 0.01 mcg keeping MAP >65.   Drop in hgb this am could be related from bloody urine output from moreno.    Will continue to monitor.

## 2021-06-06 NOTE — ANESTHESIA CARE TRANSFER NOTE
Last vitals:   Vitals:    02/25/20 0828   BP: 110/63   Pulse: 75   Resp: 16   Temp: 36.4  C (97.6  F)   SpO2: 97%     Patient's level of consciousness is drowsy  Spontaneous respirations: yes  Maintains airway independently: yes  Dentition unchanged: yes  Oropharynx: oropharynx clear of all foreign objects    QCDR Measures:  ASA# 20 - Surgical Safety Checklist: WHO surgical safety checklist completed prior to induction    PQRS# 430 - Adult PONV Prevention: 4558F - Pt received => 2 anti-emetic agents (different classes) preop & intraop  ASA# 8 - Peds PONV Prevention: NA - Not pediatric patient, not GA or 2 or more risk factors NOT present  PQRS# 424 - Anna-op Temp Management: 4559F - At least one body temp DOCUMENTED => 35.5C or 95.9F within required timeframe  PQRS# 426 - PACU Transfer Protocol: - Transfer of care checklist used  ASA# 14 - Acute Post-op Pain: ASA14B - Patient did NOT experience pain >= 7 out of 10

## 2021-06-06 NOTE — PLAN OF CARE
Problem: Pain  Goal: Patient's pain/discomfort is manageable  Outcome: Progressing     Problem: Activity Intolerance/Impaired Mobility  Goal: Mobility/activity is maintained at optimum level for patient  Outcome: Progressing   Pain managed with PRN Morphine, patient described pain spikes in the 6/10 - 7/10 (0-10 pain scale) range when coughing, the Morphine would bring the pain down to 3/10.    Patient was up to chair at the start of shift and up to the commode 3 times with SBA cord management for assistance level.

## 2021-06-06 NOTE — PLAN OF CARE
Problem: Pain  Goal: Patient's pain/discomfort is manageable  Outcome: Progressing  Patient reports pain reduced with PRN Ibuprofen Q6H. Pain is generalized from strong, persistent coughing.     Problem: Safety  Goal: Patient will be injury free during hospitalization  Outcome: Progressing  Patient remains free from fall and injury, independent in room with activities.     Problem: Inadequate Gas Exchange  Goal: Patient is adequately oxygenated and ventilation is improved  Outcome: Progressing  Patient is on 1L of oxygen via nasal cannula and maintains saturation greater than 92%, lung sounds diminished but clear.

## 2021-06-06 NOTE — PROGRESS NOTES
53 y/o female with hx of MDD, anxiety, RLS, former smoker, migraines, presented here from Urgent Care for concern aspiration pneumonia that had failed outpatient treatment. She actually had a colonoscopy on 2/25/20 and had an aspiration event then, was then placed on clinda and continue to get worse and was admitted here to the floor. Unfortunately, she continued to decline in her resp status, required moving to ICU and was intubated on 3/11/20. She was also found to have arm DVT, bilateral Pe's and is on hep gtt too. She remains in the ICU vented and sedated still        Summary of Care     Prior to hospital     2/25/20--colonoscopy with aspiration event, treated as outpt with clinda        [facility]     3/1/20--Urgent care --for resp issues, sent to [facility] for admit. Placed on iv ceftriaxone +flagyl  3/2/20--wbc 11  3/3/20--3/5/20--on floor , antibiotics  3/6/20--increasing need o2, up to 6 liters, wbc 32,now on Iv Cefepime, Pulm consulted  3/7/20--Picc placed, EF on echo 63%, transferred to ICU for bipap  3/8/20--stopped levofloxacin  3/9/20--on high flow O2  3/10/20--on high flow O2, DVT right arm, picc removed, hep gtt started  3/11/20--ARDS, broch and intubated, WBC 14, pressors. CTA showed bilateral Pe's  3/12/20--still vented,high dose iv steroids, Veletri, OG for TF  3/13/20--still vented terry dose steroids, increased Peep, dc precedex  3/14/20--still on low dose pressors-weaning, still vented, FiO2 50%, high dose steroids, TF residuals, now trickle feeds, creat up 1.33, wbc 27  3/15/20--still vented, bronch done today--diffuse airway inflammation, friable-BAL, increased Jerel, still iv steroids. Dc Cefepime iv, new left IJ placed  3/16/20--still vented, Fio2 50%, ABG this am improved with PH 7.3,pCO2 59, wbc 20    HMS will continue to defer all management to ICU team

## 2021-06-06 NOTE — PLAN OF CARE
Problem: Nutrition Care Problem  Goal: Nutritional status is improving  Outcome: Progressing   TF started today. Up to goal of 25.    Problem: Abnormal Respirations  Goal: Patient will maintain/improve baseline respiratory rate/effort  Outcome: Progressing   Occasionally not synchronized with vent  Versed 2mg PRN given    Problem: Mechanical Ventilation  Goal: Patient will maintain patent airway  Outcome: Progressing  Goal: ET tube will be managed safely  Outcome: Progressing     Problem: Pain  Goal: Patient's pain/discomfort is manageable  Outcome: Progressing   Fentanyl gtt.    Will continue to monitor.

## 2021-06-06 NOTE — PROGRESS NOTES
Moved patient from  to . Patient tolerated transport well on BiPap settings of 14/6, 16, 80% FiO2. Patient given Albuterol nebulizer's x 3. BS clear diminished throughout before and after. No change with treatment. Patient tolerated treatment well with no adverse reaction. Patient's nose looks good. No breakdown or redness noted by myself or RN.

## 2021-06-06 NOTE — PLAN OF CARE
Problem: Discharge Barriers  Goal: Patient's discharge needs are met  Outcome: Progressing   Goal: Patient s discharge needs are met.  Outcome: Care Progression reviewed in ICU rounds with Intensitivst, RN, RD, RT, Pharm, Spiritual Care, Care Management  Discharge Disposition: Discussed and plan to discharge to: home anticipated   Planned Discharge Date: 2+ days  Problem: Barriers to discharge include: high flow O2, IV abx, advance diet   Transportation needs/Ride Time:  family

## 2021-06-06 NOTE — PLAN OF CARE
Problem: Inadequate Airway Clearance  Goal: Patient will maintain patent airway  Outcome: Progressing  Goal: Patient will achieve/maintain normal respiratory rate/effort  Outcome: Progressing     Problem: Inadequate Gas Exchange  Goal: Patient will achieve/maintain normal respiratory rate/effort  Outcome: Progressing  Goal: Patient is adequately oxygenated and ventilation is improved  Outcome: Progressing     Problem: Infection  Goal: Signs and symptoms of infections are decreased or avoided  Outcome: Not Progressing     Patient is a transfer from P4 to ICU for BIPAP d/t increasing O2 needs, tachypnea with rate ranging 40-60's cycle/ min. Ativan 0.5 mg IV and Morphine 2 mg IV were given separately but did not do much to slow down breathing. BIPAP from 60% FIO2 is currently at 90%. RT gave albuterol neb, not helpful. CXR done early, better per MD. Clark catheter inserted and had 100 cc urine output right away.  Lasix 60 mg IV given. Lab works were done this am, please see lab results. P4 RN left voice message to family regarding transfer. Will continue to monitor.

## 2021-06-06 NOTE — PLAN OF CARE
Problem: Pain  Goal: Patient's pain/discomfort is manageable  Outcome: Progressing   Fentanyl gtt at 150 mcg, 50 mcg IV bolus given x 1.     Problem: Inadequate Gas Exchange  Goal: Patient will achieve/maintain normal respiratory rate/effort  Outcome: Progressing  Goal: Patient is adequately oxygenated and ventilation is improved  Outcome: Progressing     Problem: Excessive Fluid Volume  Goal: Patient will achieve/maintain normal respiratory rate/effort  Outcome: Progressing     Problem: Abnormal Respirations  Goal: Patient will maintain/improve baseline respiratory rate/effort  Outcome: Progressing   Patient had been double triggering the vent despite increasing with sedation gtts plus giving addional IV sedation boluses. O2 sats  87-92% with FIO2 between 75%-100% all night Keeping sats >88%. Patient on veletri. Albuterol neb given once.   After an IV bolus of vecoronium 6 mg at 0528, patient's became in sync with the vent, improved ventilation and oxygenation, O2 sats 100% at 75% FIO2 this time.   Problem: Infection  Goal: Signs and symptoms of infections are decreased or avoided  Outcome: Not Progressing   WBC at 7.9 this am. Patient on levophed gtt currently at 0.12 mcg.     Patient very sensitive with turns.   Will continue to monitor closely.

## 2021-06-07 NOTE — PROGRESS NOTES
Critical Care Medicine Progress Note  3/30/2020    ASSESSMENT/PLAN:  54 y.o. former smoker with most pertinent history of hypothyroidism, migraines, depression and anxiety, who was admitted 3/1/2020 with acute hypoxic respiratory failure secondary to an aspiration event during colonoscopy 2/25/2020.  She had a prolonged hospitalization during which she was diagnosed with bilateral PEs and was subsequently intubated for progressively worsening hypoxic respiratory failure with ARDS on 3/11/2020.  She underwent a tracheostomy 3/25. Her pulmonary status deteriorated 3/27 & she is being treated for VAP. On 3/29 discovered to have an acute/subacute L MCA CVA w/ a very poor prognosis. Her code status was changed to DNR & family is planning to transition to comfort cares 3/31.     New events:    DNR    TF on hold    Comfort cares 3/31    Neuro/Psych:   #. Acute on subacute likely embolic L MCA CVA w/ edema & small associated IPH. Timeline for the CVA development unknown. Dismal neurologic prognosis   #. Analgesia/Sedation: Goal RASS 0 to -1.  Regimen escalated to propofol + midazolam + dilaudid drips.  #.  Potential serotonin syndrome, transitioned from fentanyl to Dilaudid on 3/26.    Pulmonary:   #.  Acute hypoxic and hypercarbic respiratory failure with intubation 3/11 in setting of aspiration pneumonia with ARDS and bilateral PEs.  Bronchoscopy 3/11 and 3/15, thus far no pathogens identified; in this setting Candida tends to be a colonizing organism. Clinical deterioration 3/27 is concerning for new VAP (see ID).   #.  Tracheostomy 3/25, #8 Shiley in place.  At this point in time continue with full ventilator support.    Cardiovascular: having episodes of intermittent hypotension, which is concerning for sepsis d/t new VAP + s/e of sedation.   Infectious: VAP, started on empiric antibiotics 3/27. Continue w/ linezolid (concern for rash w/ vanc) + meropenem at this time.  Renal: No acute issues.  GI: persistent high  residuals concerning for functional ileus (no mechanical obstruction seen on CT abdomen). TF on hold.   Heme: Bilateral PEs and LUE PICC-associated DVT. Has been on therapeutic enoxaparin, but w/ the new finding of CVA w/ petechial bleeding, enoxaparin is on hold.  Endo: Thyroidism on PTA levothyroxine.     Access/Lines/Tubes: required and necessary for continued patient cares  Tracheostomy, Holland, #8  CVC, L IJ  Clark catheter  Rectal tube  PEG tube    ICU prophylaxis:   #. VAP ppx: HOB 30 degrees, chlorhexidine rinses  #. Stress ulcer: PPI  #. Diet: NPO d/t high residuals  #. VTE: NONE -- enoxaparin on hold d/t CVA  #. Restraints: required and necessary for continued patient cares    CODE: DNR    Dispo: ICU for sedation, ventilator management, CVA management    Patient remains critically ill with persistent & worsened hypoxic respiratory failure requiring mechanical ventilation and acute/subacute L MCA CVA w/ edema & small IPH; both have a high risk of rapid and profound deterioration requiring critical time billing. Total CCT spent 36 minutes thus far today.     This report was prepared using speech recognition software.  Any typographical errors are unintentional.  Please, contact me directly for any clarifications of my report.    Casandra Franklin MD  Pulmonary and Critical Care     =================================================================    Interval history: afebrile. HD stable. TF on hold, discontinued all enteral medications. Patient's code status was changed to DNR on 3/29 after a conversation w/ her . Family planning to transition to comfort cares on 3/31 at 1000 hrs. Nursing notes reviewed.     Vitals: Temp:  [98.2  F (36.8  C)-99.9  F (37.7  C)] 99.9  F (37.7  C)  Heart Rate:  [] 92  Resp:  [24-56] 33  BP: ()/(52-86) 117/62  FiO2 (%):  [35 %-60 %] 35 %  Vent:   Vent Mode: PCV/VG  FiO2 (%):  [35 %-60 %] 35 %  S RR:  [18] 18  S VT:  [300 mL] 300 mL  PEEP/CPAP (cm H2O):  [5 cm H2O]  5 cm H2O  Minute Ventilation (L/min):  [7.2 L/min-14.9 L/min] 8 L/min  PIP:  [21 cm H2O-46 cm H2O] 35 cm H2O  MAP (cm H2O):  [7-17] 12    Physical Exam:   General: petite  woman, lying in bed, NAD  HEENT: trach midline, sutures in place, site is clean/dry, PERRL, MMM   CV: RRR, no M/R/G; extremities adequately perfused  Pulm: persistently tachypneic, equal coarse mechanical bilateral breath sounds, no wheezing, no rhonchi, bibasilar crackles, no cough  Abd: soft, ND, NT, hypoactive bowel sounds  Msk: warm to touch, no BLE edema  Neuro: non-interactive, no longer exhibiting independent intermittent movement of extremities  Psych: non-interactive    Labs: personally reviewed in EMR. Electrolytes are being replaced per protocol.     Imaging: personally reviewed in EMR. Formal Radiology impression below:  #. Head MRI/MRA:  HEAD MRI:   1.  3.4 x 8.6 x 6.7 cm area of mildly restricted diffusion, T2 prolongation and gyriform enhancement involving the left temporal lobe and the left parietal lobe and to a lesser extent the left occipital lobe. This is suspicious for an area of subacute ischemia. Repeating the exam in four weeks to evaluate for interval expected evolution and resolution of the enhancement would be valuable.  2.  Couple of small foci of mildly restricted diffusion in the posterior left frontal lobe, likely due to foci of subacute ischemia.   3.  Punctate focus of hemorrhages in the left posterior temporal lobe.   4.  There is lack of FLAIR suppression within the sulci of the left temporal lobe and the left parietal lobe. No corresponding hemorrhages within the subarachnoid spaces on the prior head CT. These could be related to enhancement, venous congestion   versus less likely hemorrhage.     HEAD MRA:   1.  No vessel cut off of the anterior circulation arteries.   2.  There are areas of moderate decrease of flow related enhancement of the M2 and M3 branches of the left middle cerebral artery.       NECK MRA:  1.  No hemodynamically significant stenosis of the internal carotid arteries bilaterally based on the NASCET criteria.  2.  The vertebral arteries are patent throughout their course in the neck.

## 2021-06-07 NOTE — PROGRESS NOTES
MD RESTRAINT FOR NON-VIOLENT BEHAVIOR FACE TO FACE EVALUATION  Progress Note  Restraint Application    I recognize that restraints are physical and/or chemical interventions intended to restrict a person's movements. Restraints are currently needed to ensure the safety of this patient and/or others. My clinical rationale appears below.    PATIENT EVALUATION  Patient evaluated on 3/21/2020 at 7:39 AM to confirm the need for medical restraints.     --  Category/Type of Restraint:     Non Violent:  Soft limb restraint x2  --  Patient's Immediate Situation:  Patient demonstrated the following behaviors: Pulling/tugging at invasive lines or tubes and does not respond to verbal/non-verbal redirection  --  Patient's Reaction to the intervention:  Does patient understand the reason for restraint/seclusion? Unable to Express  --  Medical Condition:  Is there any evidence of compromise of Skin integrity, Respiratory, Cardiovascular, Musculoskeletal, Hydration? Yes:  Skin integrity, Respiratory and Cardiovascular  --  Root Cause of the Behavior:  Acute respiratory failure,acute encephalopathy related to therapeutic sedation  --  Behavioral Condition:  In consultation with the RN, is there a need to continue this restraint or seclusion? Yes  --  Criteria for Release from the Restraint:  Patient is calm, listens to verbal redirection, and stops attempting to pull out lines/tubes    See Restraint Flowsheet for complete restraint documentation and assessment.    Nathaly Pryor MD, MPH  Pulmonary & Critical Care Medicine  Pager: [phone]  3/21/2020  7:39 AM

## 2021-06-07 NOTE — PROCEDURES
[facility]    Procedure: Bronchoscopy    Date/Time: 3/25/2020 9:33 AM  Performed by: Kirk Rock MD  Authorized by: Kirk Rock MD       [place] Protocol    Site marked: NA    Prior images obtained and reviewed: Yes    Required items: required blood products, implants, devices, and special equipment available    Patient identity confirmed: arm band, provided demographic data, hospital-assigned identification number and verbally with patient    Reevaluation: Patient was reevaluated immediately before administering moderate or deep sedation or anesthesia    Confirmation checklist: patient's identity using two indicators, relevant allergies, procedure was appropriate and matched the consent or emergent situation and correct equipment/implants were available    Time out: Immediately prior to procedure a time out was called to verify the correct patient, procedure, equipment, support staff and site/side marked as required    Universal Protocol: Joint Commission Universal Protocol was followed    Preparation: Patient was prepped and draped in the usual sterile fashion    ESBL (mL): 0    Anesthesia  Local anesthesia used?: No    Sedation    Patient sedation: Yes    Sedation: fentanyl, midazolam and propofol    Vital signs: Vital signs monitored during sedation    Post-procedure    Description of procedure: Diagnostic bronchoscopy performed through ET tube to assist with percutaneous tracheostomy placement.  ET tube tip in good position. The airways appeared normal without significant inflammation. No secretions or mucus plugs were noted. Overall the airways appeared significantly improved compared to the prior bronchoscopy. No samples were taken.  I then assisted Dr. Patel with percutaneous tracheostomy placement, which was accomplished without issues. See his operative note for details of that procedure.   After the tracheostomy placement was complete, the bronchoscope was passed through the trach  lumen. The trip of the trach was in good position with good visualization of the main johnnie. No bleeding was noted.     Patient tolerance: Patient tolerated the procedure well with no immediate complications   Length of time physician present for 1:1 monitoring during sedation: 20

## 2021-06-07 NOTE — PLAN OF CARE
Goal: Patient s discharge needs are met.  Outcome: Care Progression reviewed in ICU rounds with Intensitivst, RN, RD, RT, Pharm, Care Management  Discharge Disposition: Discussed and plan to discharge to:  LTACH  Planned Discharge Date:  3/30 ?  Problem: Barriers to discharge include: ICU cares, full vent support, rule out Covid-19, IV gtts, Pain Management consult  Transportation needs/Ride Time:  TBD    Chart reviewed and updates with care team.  CM is following and will assist as needed with discharge care coordination.  LTACH referrals previously sent and are following patient clinical progression.     Ileana Zacarias RN, BSN, Care Manager  3/26/2020  1:45 PM

## 2021-06-07 NOTE — PROGRESS NOTES
Quick Palliative Care Chart Note:  Called and spoke with  Brian early this morning to introduce ourselves/palliative care consult.  He was aware of the consult.  Arranged to call him back between 915 and 930 after getting an update from ICU rounds.  Did then call him and talk by telephone, reviewing spouse's clinical status, plan of care for the day based off of ICU rounds discussion (reducing sedation, removing art line, goals of care discussion, optimally having patient more alert and able to interact with us).   reports that she has not done an advanced directive.  He does believe that she would not want to be on a machine long-term.  He has had discussions regarding tracheostomy, believes that Wednesday will be day #14 on the ventilator.  He understands the potential benefits of it, however is also aware that it could be a very long slow journey.  He does worry a little bit about the logistics of where she would be if she went forward with 1.  He is struggling with this current situation, acknowledges how unexpected this event has been.  Would value an update from ICU physician who has met patient and  before.  Is open to follow-up communication tomorrow from palliative care as well as palliative care  to support him with very challenging situation.   does have our contact information.  He does wonder if he could come by to get some clothing in her room.  Reviewed this with RN and asked her to follow-up with him.   Full note to follow.  Parth Castillo CNP, Palliative Care

## 2021-06-07 NOTE — PLAN OF CARE
"  Problem: Inadequate Gas Exchange  Goal: Patient will achieve/maintain normal respiratory rate/effort  Outcome: Progressing  PT continues on Q6 Albuterol, bs Coarse no vent changes today. Sputum sent.    ....../70   Pulse 78   Temp 98.9  F (37.2  C) (Oral)   Resp 20   Ht 4' 10\" (1.473 m)   Wt 134 lb 4.8 oz (60.9 kg)   SpO2 96%   BMI 28.07 kg/m      "

## 2021-06-07 NOTE — PROGRESS NOTES
"RESPIRATORY CARE NOTE     Patient Name: Harriett Romero  Today's Date: 3/27/2020     Pt continues on the following settings:  Vent Mode: VCV  FiO2 (%):  [35 %-40 %] 40 %  S RR:  [24] 24  S VT:  [225 mL] 225 mL  PEEP/CPAP (cm H2O):  [5 cm H2O] 5 cm H2O  Minute Ventilation (L/min):  [5.3 L/min-12 L/min] 12 L/min  PIP:  [24 cm H2O-47 cm H2O] 47 cm H2O  MAP (cm H2O):  [8-11] 11   Plateau pressure: 30 cm H2O     Pt remains on ventilator and shows no sign of any respiratory distress. Tracheal suctioning done x1 for mod thick Tan/white to pale yellow secretions. RT will continue to monitor and assess as needed.     /66   Pulse 92   Temp 99.8  F (37.7  C) (Oral)   Resp (!) 36   Ht 4' 10\" (1.473 m)   Wt 134 lb 14.7 oz (61.2 kg)   SpO2 90%   BMI 28.20 kg/m        Montrell Montoya, LRT  "

## 2021-06-07 NOTE — PLAN OF CARE
"Goal: Patient s discharge needs are met.  Outcome: Care Progression reviewed with Hospitalist, Care Manager.  Discharge Disposition: Discussed and plan to discharge to: TBD- LTACH vs TCU vs home with HC services  Planned Discharge Date: days  Problem: Barriers to discharge include: medical ICU status, intubated, anticipate trach/PEG placement possibly tomorrow    Transportation needs/Ride Time: TBD    CM spoke to  Hugo ([phone]) who reports he has been updated by treatment team and he is aware of plan of care.  confirms that patient does not have a HCD.  emphasizes that patient has made it known in the past that she would \"not want to live if she had to be on a machine for the rest of her life, she would not want to just put bandaids on\".  He reports having connections at the [facility] as he is a bilateral lung transplant recipient in March 2017- states he spoke with his  there yesterday who was very supportive- \"they are my care family\". He expresses some frustrations regarding not being able to visit, however also acknowledges the greater concern for his own and others well-being. His biggest fear is her waking up alone and not knowing what is going on- he mentions the \"critical care speech made by the governor\" and \"compassionate care\". He is encouraged to call in as frequently as he feels necessary to get updates on patient status. He discusses feeling patient is in good hands and the only alternate option he would consider would be transfer to [facility].  He is not comfortable with making any decision on LTACH placement at this time- wants CM updates and to be involved in any discharge planning. Would consider TCU placement near [place] or home with HC services if an option as he is concerned about COVID exposure and not being able to visit patient.         "

## 2021-06-07 NOTE — PROGRESS NOTES
"RESPIRATORY CARE NOTE     Patient Name: Harriett Rmoero  Today's Date: 3/21/2020     Pt continues on the following settings:  Vent Mode: PCV/VG  FiO2 (%):  [45 %-100 %] 45 %  S RR:  [30-32] 30  S VT:  [225 mL] 225 mL  PEEP/CPAP (cm H2O):  [12 cm H2O] 12 cm H2O  Minute Ventilation (L/min):  [7 L/min-8.4 L/min] 8.2 L/min  PIP:  [32 cm H2O-35 cm H2O] 34 cm H2O  MAP (cm H2O):  [15-19] 17   Plateau pressure: 30 cm H2O     Pt is intubated with  # 7.5 ETT secured  21 at the teeth.  Pt receives Q6 Alb and veletri half strength. BS are course pre and post neb tx. Pt has a  RT suctioned pt for large white. Pt's respiratory status is stable. RT will continue to follow per MD's orders.     /65   Pulse 62   Temp 99  F (37.2  C) (Oral)   Resp (!) 30   Ht 4' 10\" (1.473 m)   Wt 142 lb 13.7 oz (64.8 kg)   SpO2 99%   BMI 29.86 kg/m        Nadya Lee LRT  "

## 2021-06-07 NOTE — PROGRESS NOTES
Palliative Care Progress Note  NAME: Harriett Romero, : 1965,   MRN: [id number] Date: 2020  Problem List:  Active Problems   Principal Problem:    Aspiration pneumonia of left lower lobe due to gastric secretions (H)  Active Problems:    Hypokalemia    Former smoker - quit in     Anxiety    Hypothyroidism    Metabolic syndrome    Aspiration pneumonia due to inhalation of vomitus (H)    Diarrhea of presumed infectious origin    Hypotension, unspecified hypotension type    Acute respiratory failure with hypoxemia (H)    Cough    Chest pain at rest    Deep vein thrombosis (DVT) of upper extremity (H)    Pulmonary embolism (H)    Acute respiratory distress syndrome (ARDS) (H)    Diffuse interstitial pulmonary disease (H)    Acute kidney failure, unspecified (H)    Acute encephalopathy    Encounter for palliative care    Goals of care, counseling/discussion    Intensive care (ICU) myopathy    Altered mental status, unspecified altered mental status type    Attention to tracheostomy (H)    VAP (ventilator-associated pneumonia) (H)    Cerebrovascular accident (CVA), unspecified mechanism (H)      Assessment:   54 y.o. former smoker with most pertinent history of hypothyroidism, migraines, depression and anxiety, who was admitted 3/1/2020 with acute hypoxic respiratory failure secondary to an aspiration event during colonoscopy 2020.  She had a prolonged hospitalization during which she was diagnosed with bilateral PEs and was subsequently intubated for progressively worsening hypoxic respiratory failure with ARDS on 3/11/2020.  She underwent a tracheostomy 3/25. Her pulmonary status deteriorated 3/27 & she is being treated for VAP. On 3/29 discovered to have an acute/subacute L MCA CVA w/ a very poor prognosis. Her code status was changed to DNR. Transitioned to comfort cares on 3/31.     Recommendations:   Dyspnea: Due to acute hypoxic and hypercarbic respiratory failure with intubation 3/11 in  setting of aspiration pneumonia with ARDS and bilateral PEs. S/p trachesotomy on 3/25. Now with VAP and complicated by acute/subacute CVA.  - Ventilator discontinued with transition to comfort care 3/31. Supplemental oxygen via trach mask.  - Spoke with Dr. Parada regarding her comfort care medications. Will make the following adjustments:  - Discontinue dilaudid PCA and rotate to another opioid given concern for increased tolerance and minimizing IVF as well as longer action of oral medications  -Start Morphine 15 mg concentrated solution 15 mg SL every 2 hours with additional 15 mg SL q1h prn. Will also have morphine 5-10 mg IV q1h prn as back up    Oral secretions:  - Discontinue dilaudid PCA and Midazolam infusions as may be contributing to fluid overload  - Will schedule robinul 0.2mg iv q4h for next 24 hours, then prn  - Continue atropine prn    Agitation:  - Will schedule Haldol 2mg SL q4h and Ativan 2mg SL q3h. Ok to give via enteral tube if not well absorbed due to profuse oral secretions  - Reduce Midazolam infusion from 8mg/h to 4mg/h, give above oral medications and then discontinue Midazolam 30 minutes later.    Patient/Family support: ongoing   and social work involvment    Decision making capacity: None   - Advance directive on file: No.  Brian is surrogate decision maker.    Code status: DNR    Note: Stayed in ICU and closely monitored patient throughout medication adjustments. Patient appeared peaceful and sedated. No agitation. Pallor became ashen. Began having Cheyne-sen respirations. Paged palliative SW and  to be present as patient appeared to be actively dying. Patient pronounced dead at 11:31. Prayer held by  with family after patient . At Brian's request, I did provide him with his lab results from his ED visit overnight. Recommended son's call the rapid access ambulatory clinic at the number provided to see if patient could follow up there or in the  "ED today for another troponin draw as per his AVS instructions. Brian and sons voiced agreement.    =========================================================  Current Medical Status:  ICU NP was called overnight as patient reportedly \"grimacing, frowning, diaphoretic, tachypneic and restless despite dilaudid 2mg and Ativan 2 mg within 90 minutes in addition to dilaudid basal rate and midazolam infusions. Dilaudid iv prn increased to 1-2 mg q15h minutes with basal rate increased from 0.2mg/h to 0.5mg/h. Versed infusion increased to 8mg/h. Patient is now resting comfortably with no spontaneous movement. She does have some brief periods of apnea.  experienced chest pain during the night and was evaluated in ED. Recommended admission but refused as wanted to be by patient's bedside. RN reported  did not want oxygen titrated down last night but son's were agreeable to this in his absence.  now in room with both sons after ICU RN manager approved visitation. Per RN report, patient is having stool output. Clark in place with clear yellow urine. Appears afebrile.    Symptom-Focused ROS:  Unable to obtain ROS as patient sedated    Palliative Care Focused Medications:  See MAR      PERTINENT PHYSICAL EXAMINATION:  Vital Signs: Blood pressure 109/57, pulse (!) 132, temperature 98.7  F (37.1  C), temperature source Axillary, resp. rate (!) 29, height 4' 10\" (1.473 m), weight 136 lb 0.4 oz (61.7 kg), SpO2 (!) 54 %, not currently breastfeeding.   Gen: Resting comfortably, in NAD  SKIN: Warm and mildly diaphoretic. No mottling of extremities  HEENT: Small amount or oral secretions  LUNGS: Brief 2-3 seconds of apnea   CARDIAC: RRR, w/o m/r/g   ABDOMINAL: BS (+), soft, non distended, non tender. Rectal tube with brown loose stool output  EXTREMITIES: No edema or mottling. Hands and feet are warm.  NEUROLOGIC: Obtunded    I have reviewed labs and imaging in EPIC "     ----------------------------------------------------  TT: I have personally spent a total of 120 minutes  today on the unit in review of medical record, consultation with the medical providers and assessment of patient today, with more than 50% of this time spent in counseling, coordination of care, and discussion re:diagnostic results, prognosis, symptom management, risks and benefits of management options, and development of plan of care.    Cindy Subramanian PA-C  [facility]  Palliative Medicine   Office: [phone]

## 2021-06-07 NOTE — PROGRESS NOTES
PROVIDER RESTRAINT FOR NON-VIOLENT BEHAVIOR FACE TO FACE EVALUATION    Patient's Immediate Situation:  Patient demonstrated the following behaviors: Pulling/tugging at invasive lines or tubes and does not respond to verbal/non-verbal redirection    Patient's Reaction to the intervention:  Does patient understand the reason for restraint/seclusion? Unable to Express    Medical Condition:  Is there any evidence of compromise of Skin integrity, Respiratory, Cardiovascular, Musculoskeletal, Hydration? No    Behavioral Condition:  In consultation with the RN, is there a need to continue this restraint or seclusion? Yes    See Restraint Flowsheet for complete restraint documentation and assessment.    Casandra Franklin MD

## 2021-06-07 NOTE — PROGRESS NOTES
Clinical Nutrition Therapy Follow Up Note    TF to restart via new PEG after Noon today, per Rounds.   Pt will be started on precedex and propofol will d/c MD note and Rounds.    Current Nutrition Prescription:   Diet: NPO  TF: on hold for 24 hours after PEG placement  Pt was tolerating Peptamen AF at 30 mL/hr via OGT  Diet Supplements:  Prosource three times a day, 2 pkts Nutrisource two times a day via tube  dextrose or Fluids:dexmedetomidine infusion orderable (PRECEDEX), Last Rate: 0.2 mcg/kg/hr (03/26/20 1123)  dextrose 10%  dextrose 10%, Last Rate: 35 mL/hr (03/26/20 7669)  dextrose 10%  HYDROmorphone in 0.9 % NaCl  norepinephrine  propofol, Last Rate: 45 mcg/kg/min (03/26/20 1140)    Current Nutrition Intake:  TF has been on hold    Anthropometrics:  Admission weight: 147 lb 4.3 oz (66.8 kg) bed  Weight: 133 lb 2.5 oz (60.4 kg) patient is fluid down per I/Os, +1 edema,     GI Status/Output:   GI symptoms include: loose per nurse - recorded 4 watery yesterday  Bowel Sounds present per nurse  Last BM: 3/26/20 per nurse (Rectal tube)    Skin/Wound:  No wound was noted.    Medications:  Lantus, ssi, levothyroxine, multivitamin with fe, prilosec, prednisone, thiamine    Labs:  BUN 23 sl elevated    Estimated nutrition needs:   Assessment weight is 49.0 Kg, adjusted weight     Energy needs: 6552-1821 Calories/day (25-30 Jean/Kg)  Protein needs: 59-74 g daily (1.2-1.5 g/Kg)   Fluid needs: 4489-1092 ml/day (30-35 ml/Kg)    Malnutrition: Noted previously as severe    Nutrition Risk Level: high risk    Nutrition dx:  Inadequate oral intake r/t respiratory failure as evidenced by need for nutrition support.     Malnutrition r/t aspiration PNA and acute Respiratory failure as evidenced by 3.2% wt loss in 1 week, moderate muscle loss, and fluid accumulation.    Goal Status:    Patient will tolerate TF -     BG < 180 mg/dL-     No s&s aspiration was MET    Maintain weight  - fluctuates    Bowel  function WNL-progressing    Stools < 3/day- progressing slowly    Intervention:  When restarting TF via PEG, start at 30 mL/hr increase by 10 mL every 8 hours as tolerated, until goal of 60 mL/hr achieved. (ajdusted for med hold).  Flush with 120 mL water every six hours.    D/c prosource  Decrease Nutrisource Fiber to 1 pkt two times a day     Goal TF with Nutrisource Fiber provides: 1260 mL, 1544 kcal, 95 gm protein, 141 gm cho, 14 gm fiber, 1018  ML free water + 480 mL water flushes = 1498 mL water    Monitoring/Evaluation:  TF tolerance, stooling, labs, wt

## 2021-06-07 NOTE — PLAN OF CARE
Goal: Patient s discharge needs are met.  Outcome: Care Progression reviewed with Hospitalist, Care Manager.  Discharge Disposition: Discussed and plan to discharge to: LTACH  Planned Discharge Date: next week, possibly Monday 3/30  Problem: Barriers to discharge include: acute respiratory failure with ARDS, intubated and sedated, EGD with PEG tube placement and bronchoscopy with Trach tube placement today,   Transportation needs/Ride Time: TBD    Brennen Arias [phone] updated today by Palliative Care    Referrals have been sent to both [place] and [facility].

## 2021-06-07 NOTE — PROGRESS NOTES
"RESPIRATORY CARE NOTE     Patient Name: Harriett Romero  Today's Date: 3/30/2020     Pt continues on the following settings:  Vent Mode: PCV/VG  FiO2 (%):  [35 %-60 %] 35 %  S RR:  [18] 18  S VT:  [300 mL] 300 mL  PEEP/CPAP (cm H2O):  [5 cm H2O] 5 cm H2O  Minute Ventilation (L/min):  [7.2 L/min-14.9 L/min] 8 L/min  PIP:  [17 cm H2O-46 cm H2O] 35 cm H2O  MAP (cm H2O):  [7-17] 12   Plateau pressure: 30 cm H2O     Pt continue on vent full support and stable on above mentioned settings. BS: coarse rhonchi and decreasedd @ bases. Tracheal suctioning done x2 for small thick white secretions. Pt did receive Albuterol neb x 2 as scheduled.    Pt's respiratory status is stable. RT will continue to follow per MD's orders.     BP 90/52   Pulse 89   Temp 99.6  F (37.6  C) (Axillary)   Resp 25   Ht 4' 10\" (1.473 m)   Wt 138 lb 0.1 oz (62.6 kg)   SpO2 98%   BMI 28.84 kg/m        Montrell Montoya, LRT  "

## 2021-06-07 NOTE — PLAN OF CARE
Problem: Mechanical Ventilation  Goal: Patient will maintain patent airway  Outcome: Progressing     Problem: Tracheostomy Tube Management  Goal: Tracheostomy will be managed safely  Outcome: Progressing   Vent Mode: PCV/VG  FiO2 (%):  [30 %-35 %] 30 %  S RR:  [18] 18  S VT:  [300 mL] 300 mL  PEEP/CPAP (cm H2O):  [5 cm H2O] 5 cm H2O  Minute Ventilation (L/min):  [7.5 L/min-10.4 L/min] 10.4 L/min  PIP:  [27 cm H2O-40 cm H2O] 38 cm H2O  MAP (cm H2O):  [13-15] 15   #8 shiley trach in place.  Albuterol neb given x 2, breath sounds coarse.  Suctioning for white secretions.  Plan: comfort cares at 1600.

## 2021-06-07 NOTE — PROGRESS NOTES
Being moved to a different room due to Cobin 19 precautions and testing.  Report from the floor nurse let me know that she believes that the wounds look good.  The trach is a little tight and had a little bit of bloody drainage around it but overall okay and no concerns.  Planning on using G-tube this afternoon for starting tube feeds.  Did a chart check and she continues to remain on full vent support with moderate tan secretions and receiving nebulizers as scheduled.  No exam done at this time.  Will defer at this moment.  Please call if there is any concerns  Louis Carranza PA-C [phone]

## 2021-06-07 NOTE — PROGRESS NOTES
Critical Care Medicine Progress Note  3/29/2020    ASSESSMENT/PLAN:  54 y.o. former smoker with most pertinent history of hypothyroidism, migraines, depression and anxiety, who was admitted 3/1/2020 with acute hypoxic respiratory failure secondary to an aspiration event during colonoscopy 2/25/2020.  She had a prolonged hospitalization during which she was diagnosed with bilateral PEs and was subsequently intubated for progressively worsening hypoxic respiratory failure with ARDS on 3/11/2020.  She underwent a tracheostomy 3/25. Her pulmonary status deteriorated 3/27 & she is being treated for a new VAP.     New events:    COVID-19 PCR NEGATIVE    Sedation w/ propofol, midazolam, dilaudid drips    Blood cultures, sputum culture, urine culture    Cefepime switched to meropenem    Neuro/Psych:   #. Analgesia/Sedation: Goal RASS 0 to -1.  Regimen escalated to propofol + midazolam + dilaudid drips. Discontinue Precedex.   #.  Potential serotonin syndrome, transitioned from fentanyl to Dilaudid.  #. Encephalopathy, protracted, non-focal. Will consider CT head once we know patient's COVID-19 status. Will consult Neurology once we have imaging available.     Pulmonary:   #.  Acute hypoxic and hypercarbic respiratory failure with intubation 3/11 in setting of aspiration pneumonia with ARDS and bilateral PEs.  Bronchoscopy 3/11 and 3/15, thus far no pathogens identified; in this setting Candida tends to be a colonizing organism. Clinical deterioration 3/27 is concerning for new VAP (see ID).   #.  Tracheostomy 3/25, #8 Shiley in place.  At this point in time continue with full ventilator support.    Cardiovascular: having episodes of intermittent hypotension, which is concerning for sepsis d/t new VAP. Managing w/ PRN LR boluses. Strict I/O.    Infectious:  Antibiotics: vancomycin (started 3/27), meropenem (started 3/28), cefepime (given 3/27 & 3/28)  #.  COVID-19 rule out initiated 3/26 in setting of persistent respiratory  failure and fever.  My concern would be for community-acquired infection. PCR pending. Trying to minimize advanced imaging until result is available to minimize exposure/contramination risks.   #. VAP, new opacities on imaging 3/27 w/ acute clinical deterioration. Started on broad empiric antibiotics, sputum culture pending.     Renal: No acute issues. Improving HCO3 w/ some volume repletion.   GI: No acute issues.  PEG 3/25, TF restarted 3/26. Continue increasing the rate until at goal.    Heme:   #.  Bilateral PEs and LUE PIC associated DVT,will require 3 months of anticoagulation.  Currently on therapeutic enoxaparin -- in light of clinical deterioration, I am holding off on transitioning to a DOAC in case an urgent invasive intervention is required.     Endo: hypothyroidism, currently on PTA levothyroxine.     Access/Lines/Tubes: required and necessary for continued patient cares  Tracheostomy, [place], #8  CVC, L IJ  Clark catheter  Rectal tube  PEG tube    ICU prophylaxis:   #. VAP ppx: HOB 30 degrees, chlorhexidine rinses  #. Stress ulcer: PPI  #. Diet: starting TF via PEG 3/26  #. VTE: enoxaparin  #. Restraints: required and necessary for continued patient cares    CODE: FULL    Dispo: ICU for sedation, ventilator management    Patient is critically ill with persistent & worsened hypoxic respiratory failure requiring mechanical ventilation and encephalopathy with concern for serotonin syndrome; both have a high risk of rapid and profound deterioration requiring critical time billing. Total CCT spent 67 minutes thus far today.     This report was prepared using speech recognition software.  Any typographical errors are unintentional.  Please, contact me directly for any clarifications of my report.    Casandra Franklin MD  Pulmonary and Critical Care     =================================================================    Interval history: remains intermittently febrile. Started on empiric antibiotics for VAP 3/27.  Markedly tachypneic over the last 24 hours -- propofol started overnight, she has been requiring a lot of PRN medications. Nursing notes reviewed.     Vitals: Temp:  [98.4  F (36.9  C)-100  F (37.8  C)] 99.1  F (37.3  C)  Heart Rate:  [] 86  Resp:  [25-63] 29  BP: ()/(46-77) 84/48  FiO2 (%):  [50 %-100 %] 50 %  Vent:   Vent Mode: PCV/VG  FiO2 (%):  [50 %-100 %] 50 %  S RR:  [18] 18  S VT:  [300 mL] 300 mL  PEEP/CPAP (cm H2O):  [5 cm H2O] 5 cm H2O  Minute Ventilation (L/min):  [7.9 L/min-15 L/min] 10.7 L/min  PIP:  [21 cm H2O-38 cm H2O] 21 cm H2O  MAP (cm H2O):  [10-19] 10    Physical Exam:   General: petite  woman, lying in bed, NAD  HEENT: trach midline, sutures in place, site is clean/dry, PERRL, MMM   CV: RRR, no M/R/G; extremities adequately perfused  Pulm: markedly tachypneic, equal coarse mechanical bilateral breath sounds, no wheezing, no rhonchi, bibasilar crackles, no cough  Abd: soft, ND, NT, hypoactive bowel sounds  Msk: warm to touch, no BLE edema  Derm: no acute lesions or rashes on limited exam  Neuro: somnolent at the time of the exam; BUE have normal joint movement; BLE rigid vs simply resisting any attempt to manipulate the joints; no clonus  Psych: sedated    Labs: personally reviewed in EMR. Most pertinent for HCO3 of 27. Mg 1.5 & is being replaced. Calcium 7.9 & is being replaced. VBG 7.48/39/--    Imaging: personally reviewed in EMR. Formal Radiology impression below:  #. CXR, 3/27:  Increased airspace opacity at the right lung base. There is new silhouetting of the left hemidiaphragm consistent with increased airspace consolidation at the left base as well. Heart size and pulmonary vascularity are normal. Tracheostomy tube and central venous catheter are unchanged. No pneumothorax or pleural effusion. Gastrostomy tube in stomach.

## 2021-06-07 NOTE — PLAN OF CARE
Problem: Pain  Goal: Patient's pain/discomfort is manageable  3/30/2020 2346 by Giana Yuen, RN  Outcome: Progressing  Initial RASS score -4/CPOT score 0. Propofol and Versed drips decreased with RASS score now -1/CPOT score 0. Spontaneous eye opening, closes lids against bright light. Small jaw motion with oral suction, cough and some shoulder movement with trach suctioning. Observed weak spontaneous movement left hand and left toes, no corresponding movement on right side.

## 2021-06-07 NOTE — PLAN OF CARE
Goal: Patient s discharge needs are met.  Outcome: Care Progression reviewed with Hospitalist, Care Manager.  Discharge Disposition: Discussed and plan to discharge to: LTACH possible vs TCU (critically ill)  Planned Discharge Date: several days  Problem: Barriers to discharge include: Sedated & intubated, tube feedings with high residuals, Heparin gtt, Insulin gtt, Versed gtt, Propofol gtt, Fentanyl gtt, plan for trach next week  Transportation needs/Ride Time: TBD    Call placed to [facility] to check on status of referral. Spoke with Anali in Admissions ([phone]). She states she is waiting for insurance verification of network status. She will call back once that is received.   Call placed to pt's spouse to discuss referral to [facility] and potential needs with d/c planning.  He states he would prefer for pt to be placed in a TCU near [place] if she improves and can be placed in a TCU rather than LTACH. Care Management will f/u with spouse next week with care progression updates.

## 2021-06-07 NOTE — PROGRESS NOTES
".  Pharmacy Consult: Vancomycin Dosing    Pharmacist consulted to dose vancomycin for Harriett Romero, a 54 y.o. female.    Ordering provider: Dr. Franklin    Indication for vancomycin therapy: Ventilator Associated Pneumonia    Goal Trough Range:  15-20 mcg/mL based on indication    Other current antimicrobials              meropenem 1 g in NaCl 0.9 % 50 mL (MINI-BAG Plus) (MERREM)  Every 8 hours          vancomycin 750 mg in sodium chloride 0.9% 250 mL (VANCOCIN)  Every 12 hours                   Subjective/Objective:    Patient was admitted for Aspiration pneumonia of left lower lobe due to gastric secretions (H) on 3/1/2020    Height: 4' 10\" (1.473 m)    Actual Body Weight (ABW): 63.5 kg (139 lb 15.9 oz)    Patient must be at least 60 in tall to calculate ideal body weight    BMI: Body mass index is 29.26 kg/m .    Allergies   Allergen Reactions     Macrobid [Nitrofurantoin Monohyd/M-Cryst] Nausea And Vomiting     Penicillins Hives     3/1/2020 tolerated ceftriaxone      Sulfa (Sulfonamide Antibiotics) Hives       Patient Active Problem List   Diagnosis     Obesity due to excess calories with serious comorbidity     Hypokalemia     Menopausal hot flushes     Migraine headache     Former smoker - quit in 2014     Insomnia     RLS (restless legs syndrome)     Anxiety     Hypothyroidism     Metabolic syndrome     Dyslipidemia     Vitamin D deficiency     At high risk for caregiver role strain     Major depression in complete remission (H)     History of Positive colorectal cancer screening using Cologuard test     Aspiration pneumonia of left lower lobe due to gastric secretions (H)     Aspiration pneumonia due to inhalation of vomitus (H)     Diarrhea of presumed infectious origin     Hypotension, unspecified hypotension type     Acute respiratory failure with hypoxemia (H)     Cough     Chest pain at rest     Deep vein thrombosis (DVT) of upper extremity (H)     Pulmonary embolism (H)     Acute respiratory " distress syndrome (ARDS) (H)     Diffuse interstitial pulmonary disease (H)     Acute kidney failure, unspecified (H)     Acute encephalopathy     Encounter for palliative care     Goals of care, counseling/discussion     Intensive care (ICU) myopathy     Altered mental status, unspecified altered mental status type     Attention to tracheostomy (H)     VAP (ventilator-associated pneumonia) (H)     Cerebrovascular accident (CVA), unspecified mechanism (H)    Past Medical History:   Diagnosis Date     10 year risk of MI or stroke 1.0% in 2017      Anxiety      Aspiration pneumonia of left lower lobe due to gastric secretions (H) 2/28/2020     Cerebrovascular accident (CVA), unspecified mechanism (H)      Current use of estrogen therapy      Dysthymia      Endometriosis     History - had hysterectomy.      Former smoker      Graves disease      Hypothyroidism      Insomnia      Migraine headache      Nephrolithiasis      Seizures (H)     5+ years ago, no medications     Sleep apnea     Recently diagnosed, awaiting CPAP     Trigeminal neuralgia      UTI (lower urinary tract infection)      Vitamin D deficiency         Temp Readings from Current Encounter:     03/29/20 1200 03/29/20 1400 03/29/20 1600   Temp: 98.6  F (37  C) 98.5  F (36.9  C) 99.3  F (37.4  C)     Net Intake/Output (last 24 hours):  I/O last 3 completed shifts:  In: 2673.7 [I.V.:850.7; NG/GT:1080; IV Piggyback:743]  Out: 3305 [Urine:3055; Stool:250]    Recent Labs     03/27/20  0331 03/28/20  0526 03/28/20  0826 03/28/20  0826 03/29/20  0745   WBC  --   --  12.7*  --  12.4*   NEUTROABS  --   --  10.2*  --   --    LACTICACID  --   --   --  0.6  --    BUN 21 20  --   --  16   CREATININE 0.67 0.61  --   --  0.57*     Estimated Creatinine Clearance: 115.7 mL/min (A) (by C-G formula based on SCr of 0.57 mg/dL (L)).    Recent Labs     03/27/20  2104   CULTURE Usual Cora       Results for orders placed or performed during the hospital encounter of 03/01/20    Culture/Gram Stain: Sputum    Collection Time: 03/27/20  9:04 PM    Specimen: Sputum, Respiratory Collected   Result Value Status    Culture Usual Sarita Final   Sputum culture    Collection Time: 03/24/20 11:49 AM    Specimen: Respiratory   Result Value Status    Culture Usual Sarita Final   Urine culture    Collection Time: 03/24/20 10:23 AM    Specimen: Urine   Result Value Status    Culture 50,000-100,000 col/ml Candida albicans (!) Final   Culture/Gram Stain: Bronchial    Collection Time: 03/15/20  9:51 AM    Specimen: Respiratory   Result Value Status    Culture Usual Sarita Final   Culture, Fungus, Respiratory    Collection Time: 03/15/20  9:51 AM    Specimen: Respiratory   Result Value Status    Culture Candida albicans (!) Preliminary   Culture, Fungus, Respiratory    Collection Time: 03/11/20  3:40 PM    Specimen: Respiratory   Result Value Status    Culture 1+ Candida albicans (!) Preliminary   Culture/Gram Stain: Bronchial    Collection Time: 03/11/20  3:40 PM    Specimen: Respiratory   Result Value Status    Culture Usual Sarita Final   Culture, Stool    Collection Time: 03/03/20 10:28 AM    Specimen: Stool   Result Value Status    Culture No gram negative sarita isolated Final       Recent labs: (last 7 days)     03/29/20  1614   VANCOMYCIN 18.9       Vancomycin administrations: (last 120 hours)     Date/Time Action Medication Dose Rate    03/29/20 1615 New Bag    vancomycin 750 mg in sodium chloride 0.9% 250 mL (VANCOCIN) 750 mg 132.5 mL/hr    03/29/20 0404 New Bag    vancomycin 750 mg in sodium chloride 0.9% 250 mL (VANCOCIN) 750 mg 132.5 mL/hr    03/28/20 1533 New Bag    vancomycin 750 mg in sodium chloride 0.9% 250 mL (VANCOCIN) 750 mg 132.5 mL/hr    03/28/20 0312 New Bag    vancomycin 750 mg in sodium chloride 0.9% 250 mL (VANCOCIN) 750 mg 132.5 mL/hr    03/27/20 1621 New Bag    vancomycin 1,000 mg in sodium chloride 0.9% 250 mL (VANCOCIN) 1,000 mg 135 mL/hr        Assessment/Plan:    Pharmacist  consulted to dose vancomycin for Ventilator Associated Pneumonia, goal trough range 15-20 mcg/mL.  1. Continue vancomycin 750mg IV every 12 hours (11.8 mg/kg actual body weight).  2. Vancomycin trough level of 18.9 mcg/mL was within the goal trough range. This trough level was drawn at steady state.  3. Pharmacist will plan to re-check a vancomycin trough level in 3-4 days.  4. Pharmacist will continue to follow.    Thank you for the consult.  Jeremy Behl, PharmD 3/29/2020 5:31 PM

## 2021-06-07 NOTE — PROGRESS NOTES
Critical Care Medicine Progress Note  3/26/2020    ASSESSMENT/PLAN:  54 y.o. former smoker with most pertinent history of hypothyroidism, migraines, depression and anxiety, who was admitted 3/1/2020 with acute hypoxic respiratory failure secondary to an aspiration event during colonoscopy 2/25/2020.  She had a prolonged hospitalization during which she was diagnosed with bilateral PEs and was subsequently intubated for progressively worsening hypoxic respiratory failure with ARDS on 3/11/2020.  She underwent a tracheostomy 3/25.    New events:    COVID-19 PCR STAT    Switching fentanyl to Dilaudid in setting of potential serotonin syndrome    Pain consult    Try to transition to Precedex drip    Neuro/Psych:   #. Analgesia/Sedation: Goal RASS 0 to -1.  Plan to transition from propofol to Precedex drip.  #.  Potential serotonin syndrome, transition from fentanyl drip to Dilaudid PCA with a basal rate.  Pain consult placed to help with long-term management.    Pulmonary:   #.  Acute hypoxic and hypercarbic respiratory failure with intubation 3/11 in setting of aspiration pneumonia with ARDS and bilateral PEs.  Bronchoscopy 3/11 and 3/15, thus far no pathogens identified; in this setting Candida tends to be a colonizing organism.  Considering patient's presentation and severity of her condition despite her age and lack of other clear underlying medical conditions, I think it would be wise to check for COVID-19 (see ID).  #.  Tracheostomy 3/25, #8 Shiley in place.  At this point in time continue with full ventilator support.    Cardiovascular: No acute issues.    Infectious:  Antibiotics: Not currently on antibiotics; completed broad-spectrum coverage 3/14  #.  COVID-19 rule out initiated 3/26 in setting of persistent respiratory failure and fever.  My concern would be for community-acquired infection. Placed in airborne/droplet/contact isolation.     Renal: No acute issues, elevated HCO3 on BMP is likely secondary to  contraction alkalosis.    GI: No acute issues.  PEG 3/25, restart tube feeds afternoon of 3/26.    Heme:   #.  Bilateral PEs and LUE PIC associated DVT.  Transitioned from heparin drip to therapeutic enoxaparin.  Will require 3 months of anticoagulation (VTE presumably provoked by prolonged immobility and inflammatory state).  Plan to transition to DOAC if no new issues arise.     Endo: Thyroidism, currently on PTA levothyroxine.     Access/Lines/Tubes: required and necessary for continued patient cares  Tracheostomy, Holland, #8  CVC, L IJ  Clark catheter  Rectal tube  PEG tube    ICU prophylaxis:   #. VAP ppx: HOB 30 degrees, chlorhexidine rinses  #. Stress ulcer: PPI  #. Diet: starting TF via PEG 3/26  #. VTE: enoxaparin  #. Restraints: required and necessary for continued patient cares    CODE: FULL    Dispo: ICU for sedation weaning, ventilator management    Patient is critically ill with persistent hypoxic respiratory failure requiring mechanical ventilation and encephalopathy with concern for serotonin syndrome; both have a high risk of rapid and profound deterioration requiring critical time billing. Total CCT spent 64 minutes thus far today.     This report was prepared using speech recognition software.  Any typographical errors are unintentional.  Please, contact me directly for any clarifications of my report.    Casandra Franklin MD  Pulmonary and Critical Care     =================================================================    Interval history: No acute events overnight.  Still concerning for rigid extremities.  Significant agitation when sedation is weaned.  Continues to have tachypnea during sedation holidays and weaning trials.  She was febrile overnight to 100.5. Nursing notes reviewed.     Vitals: Temp:  [98.6  F (37  C)-100.3  F (37.9  C)] 99.4  F (37.4  C)  Heart Rate:  [63-98] 79  Resp:  [24-42] 24  BP: ()/(50-75) 93/54  FiO2 (%):  [35 %] 35 %  Vent:   Vent Mode: VCV  FiO2 (%):  [35 %] 35  %  S RR:  [24] 24  S VT:  [225 mL] 225 mL  PEEP/CPAP (cm H2O):  [5 cm H2O] 5 cm H2O  Minute Ventilation (L/min):  [5.2 L/min-6.9 L/min] 5.3 L/min  PIP:  [27 cm H2O-37 cm H2O] 30 cm H2O  MAP (cm H2O):  [8-9] 8    Physical Exam:   General: petite  woman, lying in bed, NAD  HEENT: trach midline, sutures in place, site is clean/dry, PERRL, MMM   CV: RRR, no M/R/G; extremities adequately perfused  Pulm: equal bilateral breath sounds, no wheezing, no rhonchi, bibasilar crackles, no cough  Abd: soft, ND, NT, hypoactive bowel sounds  Msk: warm to touch,trace BLE edema extending to both knees  Derm: no acute lesions or rashes on limited exam  Neuro: sedated; no report of focal deficits during attempts   Psych: sedated    Labs: personally reviewed in EMR. Most pertinent for HCO3 of 34 without any other significant electrolyte abnormalities.  WBC 15.1.  Urine with 50K-100K cfu/mL C.albicans. C.albicans in bronchial wash fluid.     Imaging: personally reviewed in EMR. Formal Radiology interpretation listed below.  #. CXR, 3/25:   Tracheostomy tip in mid trachea. Left IJ central line tip is somewhat low in position in the mid right atrium. This could be pulled back 4 cm to be at the cavoatrial junction. Heart size magnified in AP projection with normal vascularity. Bilateral patchy interstitial and airspace opacities are not significantly changed, right greater left. Small bilateral pleural effusions. No pneumothorax.    #. CTA chest, 3/11:   Mild bilateral pulmonary artery embolism. Borderline RV to LV ratio and slight flattening of the interventricular septum; RV strain not excluded. Pulmonary arteries not enlarged.  Regions of confluent groundglass and opacities throughout all lobes bilaterally. Mild left lung consolidation. Differential includes superimposed edema / ARDS, hemorrhage and alveolar damage, or other nonspecific infectious / inflammatory process (including a massive aspiration event). These findings make  evaluation of pulmonary infarct difficult.  Tiny right and small left pleural effusions.  Borderline adenopathy, presumed reactive.    #. Limited ECHO w/ bubble, 3/11:   Normal left ventricular size and systolic performance with a visually estimated ejection fraction of 60-65%.   Normal right ventricular size and systolic performance.   Echo contrast examination was performed using agitated NS as contrast agent. The right heart opacity was adequate and the left heart was adequately visualized. There was no evidence of right to left shunting.

## 2021-06-07 NOTE — PLAN OF CARE
Patient appears comfortable, and her RASS has stayed at -4 as ordered with Versed, Propofol and Fentanyl drips.  Vital signs stable and oxygen saturation within normal limits on vent settings of PVC-VG 32, , RR 32 and PEEP +12.  Tube feedings continue at 25 cc/hour with residuals noted at 85 and 155.  She also continues to have dark brown watery stools which totaled 450cc this shift.    Susanne Flores RN

## 2021-06-07 NOTE — PLAN OF CARE
Problem: Impaired Gas Exchange  Goal: Demonstrate improved ventilation and adequate oxygenation of tissues as evidenced by absence of respiratory distress  Outcome: Progressing     Problem: Mechanical Ventilation  Goal: ET tube will be managed safely  Outcome: Progressing   Vent Mode: PCV/VG  FiO2 (%):  [45 %-100 %] 45 %  S RR:  [30] 30  S VT:  [225 mL] 225 mL  PEEP/CPAP (cm H2O):  [12 cm H2O] 12 cm H2O  Minute Ventilation (L/min):  [7 L/min-8.4 L/min] 7.9 L/min  PIP:  [32 cm H2O-35 cm H2O] 34 cm H2O  MAP (cm H2O):  [15-19] 19  Ett 7.5 21 at teeth at teeth  veletri on continously, changed to 1/4 strength at 0900.  Albuterol given x 2, breath sounds coarse.  Suction creamy yellow secretions.  No weans

## 2021-06-07 NOTE — PLAN OF CARE
Problem: Pain  Goal: Patient's pain/discomfort is manageable  Outcome: Progressing  PRN 50 mcg bolus fentanyl given due to stacking of breaths and discomfort in bed, also repositioned the patient and it help settle down and relax.     Problem: Safety  Goal: Patient will be injury free during hospitalization  Outcome: Progressing  Patient remains on soft limb x 2 restraints for patient's safety.     Problem: Inadequate Airway Clearance  Goal: Patient will achieve/maintain normal respiratory rate/effort  Outcome: Progressing     Problem: Nutrition Care Problem  Goal: Nutritional status is improving  Outcome: Progressing  Patient is NPO, ordered D10 running at 35ml/hr due to concern that blood sugar might go down on this shift.        Problem: Excessive Fluid Volume  Goal: Patient will achieve/maintain normal respiratory rate/effort  Outcome: Progressing  - Good amount of urine output on this shift.     Problem: Glucose Imbalance  Goal: Achieve optimal glucose control  Outcome: Progressing  Blood sugar has been on normal limits. Also patient is on D10 to control her blood sugar on this shift.

## 2021-06-07 NOTE — PROGRESS NOTES
Intensivist update -- seen at bedside.  3/27/2020       Increasing tachypnea & worsening oxygenation this afternoon.   Tv decreased, which reportedly helped w/ tachycardia.  On exam no wheezing, no clear rhonchi, good air movement b/l w/ coarse mechanical breath sounds.   RR in 30-40 range.       Given an albuterol NEB.    ABG done, notable for alkalosis -- no respiratory component, likely reflecting the metabolic alkalosis suggested by AM labs.    ABG & SpO2 notable for increasing supplemental O2 requirements    CXR w/ new RLL/RML opacity concerning for VAP.    Started vancomycin & cefepime.    Ordered a STAT sputum stain/culture.    Additional 30 minutes of critical care time devoted to direct bedside evaluation, triage, and care.     Casandra Franklin MD  Pulmonary and Critical Care

## 2021-06-07 NOTE — PLAN OF CARE
Problem: Pain  Goal: Patient's pain/discomfort is manageable  Outcome: Progressing   Patient has dilaudid pca infusing and can receive boluses as needed.    Problem: Psychosocial Needs  Goal: Collaborate with patient/family/caregiver to identify patient specific goals for this hospitalization  Outcome: Progressing   Family has been updated , palliative care involved and keeping family updated.     Problem: Activity Intolerance/Impaired Mobility  Goal: Mobility/activity is maintained at optimum level for patient  Outcome: Progressing   Tolerating bed turns, skin intact.    Patient remains intubated, fio2 30%, sedation propofol and versed.  Trying to decrease the propofol in preparation to transitioning to comfort cares after 4pm.  , Brian will be present, and children will be present via zoom.  Will continue to monitor and give support to family and patient.

## 2021-06-07 NOTE — PLAN OF CARE
Problem: Pain  Goal: Patient's pain/discomfort is manageable  Outcome: Progressing   Fent gtt      Problem: Inadequate Gas Exchange  Goal: Patient will achieve/maintain normal respiratory rate/effort  Outcome: Progressing   Trach and vent      Problem: Nutrition Care Problem  Goal: Nutritional status is improving  Outcome: Progressing   Start tube feeding today      Problem: Glucose Imbalance  Goal: Achieve optimal glucose control  Outcome: Progressing

## 2021-06-07 NOTE — PLAN OF CARE
"  Problem: Inadequate Airway Clearance  Goal: Patient will maintain patent airway  Outcome: Progressing  Goal: Patient will achieve/maintain normal respiratory rate/effort  Outcome: Progressing     Problem: Inadequate Gas Exchange  Goal: Patient will achieve/maintain normal respiratory rate/effort  Outcome: Progressing  Goal: Patient is adequately oxygenated and ventilation is improved  Outcome: Progressing     Problem: Excessive Fluid Volume  Goal: Patient will achieve/maintain normal respiratory rate/effort  Outcome: Progressing     Problem: Ineffective Airway Clearance  Goal: Maintain airway patency  Outcome: Progressing     Problem: Mechanical Ventilation  Goal: Patient will maintain patent airway  Outcome: Progressing  Goal: ET tube will be managed safely  Outcome: Progressing  54yr F, admitted on on 3/1/20 for aspiration pneumonia post colonoscopy, over the course of her hospital stay, patient has had increased FiO2 needed needing BiPAP and High Flow NC 40L , FiO2 70%.  She also has Albuterol neb four times a day.  Patient intubated on 3/11/2020 with a #7.5 ETT subglottic at 14:20 vented, Jerel changed to half strength  Remains vented and continues on half strength Veletri.  Albuterol neb Q6hr, suctioning PRN  No weaning today.    Vent Mode: PCV/VG  FiO2 (%):  [45 %-100 %] 45 %  S RR:  [30-32] 30  S VT:  [225 mL] 225 mL  PEEP/CPAP (cm H2O):  [12 cm H2O] 12 cm H2O  Minute Ventilation (L/min):  [7 L/min-8.4 L/min] 8.4 L/min  PIP:  [33 cm H2O-38 cm H2O] 35 cm H2O  MAP (cm H2O):  [18-21] 19  ETT #7.5 21 cm teeth    /65   Pulse (!) 57   Temp 98.4  F (36.9  C) (Axillary)   Resp (!) 30   Ht 4' 10\" (1.473 m)   Wt 143 lb 15.4 oz (65.3 kg)   SpO2 95%   BMI 30.09 kg/m       Component      Latest Ref Rng & Units 3/20/2020 3/20/2020           4:13 AM 11:55 AM   pH, Arterial      7.37 - 7.44 7.44 7.44   pCO2, Arterial      35 - 45 mm Hg 57 (H) 60 (H)   pO2, Arterial      80 - 90 mm Hg 73 (L) 84   Bicarbonate, " Arterial Calc      23.0 - 29.0 mmol/L 35.0 (H) 36.4 (H)   O2 Sat, Arterial      96.0 - 97.0 % 96.8 98.9 (H)   Oxyhemoglobin      96.0 - 97.0 % 94.1 (L) 95.7 (L)   POC Base Excess Calc      mmol/L 12.9 14.7   Ventilation Mode       PCV/VG PCV/VG   Rate      rr/min 32 12   FIO2       45.00 45.00   Peep      cm H2O 12 12   Sample Stabilized Temperature      degrees C 37.0 37.0   Ventilator Tidal Volume      mL 225 225     EXAM: XR CHEST 1 VIEW PORTABLE  LOCATION: [facility]  DATE/TIME: 03/18/2020, 4:04 AM     INDICATION: Respiratory failure.  COMPARISON: 03/15/2020.     FINDINGS: Upright portable chest. ET tube 2.5 cm above the johnnie. NG tube passes into the stomach. Left IJ central venous catheter tip just entering the right atrium. No pneumothorax. The heart size is normal. There are bibasilar infiltrates which have   increased on the right since the previous exam.     IMPRESSION:   Bilateral pulmonary infiltrates, increasing on the right.

## 2021-06-07 NOTE — PLAN OF CARE
Problem: Inadequate Gas Exchange  Goal: Patient will achieve/maintain normal respiratory rate/effort  Outcome: Progressing

## 2021-06-07 NOTE — PLAN OF CARE
Goal: Patient s discharge needs are met.  Outcome: Care Progression reviewed in ICU rounds with Intensitivst, RN, RD, RT, Pharm, Spiritual Care, Care Management  Discharge Disposition: Discussed and plan to discharge to: LTACH anticipated  Planned Discharge Date: several days  Problem: Barriers to discharge include: AMS, fevers, on vent, tube feedings, Precedex gtt, rule out Covid-19, IV ABX  Transportation needs/Ride Time:  TBD    Chart reviewed and updates with care team.  Care management is following clinical progression and is available to assist as needed.    Ileana Zacarias RN, BNS, Care Manager  3/27/2020  3:00 PM

## 2021-06-07 NOTE — PLAN OF CARE
Goal: Patient s discharge needs are met.  Outcome: Care Progression reviewed with Hospitalist, Care Manager.  Discharge Disposition: Discussed and plan to discharge to: Pending  Planned Discharge Date: 3/31/20  Problem: Barriers to discharge include: Aspiration pneumonia, intubated, comfort care  Transportation needs/Ride Time: Pending    CM note-per RN update, due to drastic change in patient status, plan is for terminal extubation 3/31/20. 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 Pt's children are in the [employer], and will not be able to be here. Video/skype type access being looked into for family. CM will follow for needs.

## 2021-06-07 NOTE — PROGRESS NOTES
NEUROLOGY PROGRESS NOTE      ASSESSMENT & PLAN       Left MCA ischemic stroke subacute with small hemorrhagic transformation.    Recent bilateral pulmonary embolism on anticoagulation    Aspiration pneumonia, with COVID-19    Encephalopathy, currently intubated and sedated.    Discussed with nursing staff.  The patient status no changes compared to my last note documentation.  Family are planning moving toward comfort care.  Hospice service involved.    Principal Problem:    Aspiration pneumonia of left lower lobe due to gastric secretions (H)  Active Problems:    Hypokalemia    Former smoker - quit in 2014    Anxiety    Hypothyroidism    Metabolic syndrome    Aspiration pneumonia due to inhalation of vomitus (H)    Diarrhea of presumed infectious origin    Hypotension, unspecified hypotension type    Acute respiratory failure with hypoxemia (H)    Cough    Chest pain at rest    Deep vein thrombosis (DVT) of upper extremity (H)    Pulmonary embolism (H)    Acute respiratory distress syndrome (ARDS) (H)    Diffuse interstitial pulmonary disease (H)    Acute kidney failure, unspecified (H)    Acute encephalopathy    Encounter for palliative care    Goals of care, counseling/discussion    Intensive care (ICU) myopathy    Altered mental status, unspecified altered mental status type    Attention to tracheostomy (H)    VAP (ventilator-associated pneumonia) (H)    Cerebrovascular accident (CVA), unspecified mechanism (H)      SUBJECTIVE     Comfortable supine in bed.    OBJECTIVE     Vital signs in last 24 hours    Vitals:    03/31/20 1109 03/31/20 1200 03/31/20 1300 03/31/20 1400   BP:  92/50 96/55 99/57   Patient Position:       Pulse: 96 94 93 98   Resp:  (!) 29 28 (!) 37   Temp:  99.7  F (37.6  C)     TempSrc:  Axillary     SpO2: 91% 91% 94% 92%   Weight:       Height:               Review of Systems   Pertinent items are noted in HPI.  Review of systems not obtained due to inability to communicate with the patient.      Physical Exam  No change from my last note documentation on 3/30/2020.    DATA REVIEW     Scheduled Meds:    albuterol  2.5 mg Nebulization Q6H - RT     chlorhexidine  15 mL Topical Q12H     [Held by provider] enoxaparin ANTICOAGULANT  1 mg/kg Subcutaneous Q12H     guar gum  1 packet Enteral Tube BID     insulin aspart (NovoLOG) injection   Subcutaneous Q6H FIXED TIMES     levETIRAcetam (KEPPRA) IVPB  500 mg Intravenous Q12H     levothyroxine  125 mcg Enteral Tube Daily 0600     meropenem  1 g Intravenous Q8H     miconazole   Topical BID     omeprazole  20 mg Enteral Tube QHS     thiamine  100 mg Enteral Tube DAILY     Continuous Infusions:    dextrose 10%       dextrose 10%       dextrose 10% 50 mL/hr (03/31/20 1314)     HYDROmorphone in 0.9 % NaCl       midazolam (VERSED) 100 mg/100 mL (1 mg/mL) 5 mg/hr (03/31/20 1434)     propofol 35 mcg/kg/min (03/31/20 1457)     PRN Meds:.acetaminophen, albuterol **AND** Nebulizer treatment intermittent, aluminum-magnesium hydroxide-simethicone, bisacodyL, dextrose 10%, dextrose 10%, dextrose 50 % (D50W), diphenhydrAMINE, glucagon (human recombinant), hydrALAZINE, HYDROmorphone, [Held by provider] hydrOXYzine, lidocaine (PF), lidocaine, lipase-protease-amylase **AND** sodium bicarbonate, LORazepam, magnesium hydroxide, naloxone **OR** naloxone, polyvinyl alcohol, sodium chloride bacteriostatic, sodium chloride, sodium chloride, sodium chloride     Pertinent Labs   Lab Results: personally reviewed.   Lab Results   Component Value Date     03/30/2020     03/29/2020     03/28/2020    K 4.1 03/30/2020    K 3.6 03/29/2020    K 4.2 03/28/2020    CO2 28 03/30/2020    CO2 27 03/29/2020    CO2 27 03/28/2020    BUN 10 03/30/2020    BUN 16 03/29/2020    BUN 20 03/28/2020    CREATININE 0.61 03/30/2020    CREATININE 0.57 (L) 03/29/2020    CREATININE 0.61 03/28/2020    CALCIUM 8.3 (L) 03/30/2020    CALCIUM 7.9 (L) 03/29/2020    CALCIUM 7.9 (L) 03/28/2020           Pertinent Radiology   Radiology Results: Personally reviewed impression/s          Taniya August MD  [facility]

## 2021-06-07 NOTE — PROGRESS NOTES
Respiratory Care Note     Patient is on full vent support. Breath sound is diminished and clear. No wean done tonight. Suction small amount of thick white secretions. Will continue to monitor.       03/25/20 0428   Vent Information   Interface Invasive   Vent ID [id number]   Vent Mode VCV   Patient Ventilator Status On   Flowmeter at Bedside Yes   Ambu-Bag With Mask at Bedside Yes   Respiratory Assessment   Assessment Type Assess only   Respiratory Pattern Regular   Chest Assessment Chest expansion symmetrical   Bilateral Breath Sounds Diminished   Vent Settings   FiO2 (%) 40 %   Heater Temperature 98.6  F (37  C)   Resp Rate (Set) 24   Vt (Set, mL) 225 mL   PEEP/CPAP (cm H2O) 8 cm H2O   Insp Flow (L/min) 35 L/min   Trigger Sensitivity Flow (L/min) 2 L/min   Bias Flow (lpm)  5 L/min   Humidification Heater   Patient Data   ETCO2 (mmHg) 51 mmHg   Vt Exp (mL) 262 mL   Minute Ventilation (L/min) 6.7 L/min   Total Resp Rate  29 BPM   PIP Observed (cm H2O) 35 cm H2O   MAP (cm H2O) 11   Plateau Pressure (cm H2O) 15 cm H2O   SpO2 99 %   Heart Rate 67   Alarms   High Resp Rate 40   Low Resp Rate 12   Low PEEP 0 cm H2O   Insp Pressure High (cm H2O) 60 cm H2O   Insp Pressure Low (cm H2O) 9 cm H2O   MV High (L/min) 18 L/min   MV Low (L/min) 3.7 L/min   Vt High (mL) 800 mL   Vt Low (mL) 150 mL   Apnea Interval (sec) 30 seconds   Apnea Rate 24   Apnea Volume (mL) 225 mL   ETCO2/TCM High (mmHg) 62 mmHg   ETCO2/TCM Low (mmHg) 10 mmHg   Alarm Functional and On Yes   Backup Mode Set Yes   Airway Suctioning/Secretions   Suction Device  Inline   Secretion Amount Small   Secretion Color White   Secretion Consistency Thick   Suction Tolerance Tolerated well   Suctioning Adverse Effects None   Subglottic Suction ET Tube 7.5   Placement Date: 03/11/20   Subglottic Suction ETT Entry Site: Oral  Size : 7.5  Cuffed: Cuffed  Insertion attempts: 1  ETT Placement Confirmation: Bilateral breath sounds;End tidal CO2 device;Confirmed by x-ray  Placed  By: NP/PA   Secured at (cm) 21 cm   Measured from Teeth   Secured Location Right   Secured with Commercial tube barrett   Cuff Pressure (cm H2O) 30 cm H2O   Site Condition Dry   Subglottic Secretions Scant   Subglottic Suction Frequency Intermittent suction   Subglottic Suction Pressure 120 mmHg   Subglottic Suction Lumen Intervention Cleaned   Weaning Assessment    Weaning Assessment Complete N

## 2021-06-07 NOTE — PLAN OF CARE
Pt suctioned for small amount ETT secretions but large amounts oral secretions. FIO2 at 40%-sats 93-95%. Pt frequently stacking breaths--Propofol gtt increased overnight from 35-70mcqs. Lungs-clear with rhonchi.  Problem: Inadequate Airway Clearance  Goal: Patient will maintain patent airway  Outcome: Progressing     Problem: Mechanical Ventilation  Goal: Patient will maintain patent airway  Outcome: Progressing     Problem: Inadequate Airway Clearance  Goal: Patient will achieve/maintain normal respiratory rate/effort  Outcome: Not Progressing     Problem: Inadequate Gas Exchange  Goal: Patient will achieve/maintain normal respiratory rate/effort  Outcome: Not Progressing     Problem: Excessive Fluid Volume  Goal: Patient will achieve/maintain normal respiratory rate/effort  Outcome: Not Progressing     Fentanyl gtt remained at 200mcqs overnight. CPOT scores 2. Eyes open spontaneously..does not follow any commands or track with eyes. No movement to any extremities except lower extremities are continuously stiff.   Problem: Pain  Goal: Patient's pain/discomfort is manageable  Outcome: Progressing   Margarita Andrews RN

## 2021-06-07 NOTE — CONSULTS
[facility] PALLIATIVE CARE CONSULTATION NOTE    PATIENT NAME: Harriett Romero  MRN #: [id number]  DATE OF ADMISSION: 3/1/2020   DATE OF ENCOUNTER: 3/23/2020   REQUESTING PHYSICIAN: Dr Pryor  PRIMARY CARE PROVIDER: Zain Cuevas MD  CONSULTANT: Parth Castillo CNP  VISIT #: [id number]    Palliative Care was asked by Dr Pryor to see Harriett Romero REASON FOR CONSULTATION: Goals of Care Assistance with advanced directive, Psychosocial Support, Facilitation of a family meeting, Spiritual Support. Patient has an 85 year-old father, not yet aware of how ill the patient is, but  would also like him to be able to visit the patient.     ASSESSMENT/RECOMMENDATIONS, AND PLAN:  1. Symptom Management and Diagnoses pertinent to Palliative Care:    -Altered respiratory function, thought most likely related to aspiration pneumonia/ARDS, day #11 on the ventilator, hx of JANAE. Done with antibiotics now. Pt had not previously done an advanced directive. Some discussion in the past week re: tracheostomy this week.  shares today that he does not believe pt would not want to be on a machine (vent) long term.  -Provided  with a clinical update after ICU rounds re: reducing sedation and ideally having some interaction, communication with pt. if able, regarding her wishes.    -Appreciate ICU MD who knows pt from earlier this hospitalization, calling  for a clinical update.      -Altered mentation, likely related to sedating medications needed for ventilation tolerance. Pt was alert and appropriate prior to intubation.  -Per ICU rounds, plan to reduce sedation today and ideally be able to communicate with pt more.     -Existential stress, as experienced by , given this unexpected change in her health, reports prior to this admission, was generally in good health.   -Will ask Palliative Care  to reach out to  tomorrow.     2. Goals of Care Palliative Care Encounter:   -See goals of care  discussion below. Called and spoke with  Brian early this morning to introduce ourselves/palliative care consult.  He was aware of the consult.  Arranged to call him back between 915 and 930 after getting an update from ICU rounds. Attended ICU rounds to listen to patient's clinical progression, current symptoms, currently very sedated and intubated.  Elected to not examine patient as no significant symptoms, in the setting of COVID precaution.  Did then call him and talk by telephone, reviewing spouse's clinical status, plan of care for the day based off of ICU rounds discussion (reducing sedation, removing art line, goals of care discussion, optimally having patient more alert and able to interact with us).   reports that she has not done an advanced directive.  He does believe that she would not want to be on a machine long-term.  He has had discussions regarding tracheostomy, believes that Wednesday will be day #14 on the ventilator.  He understands the potential benefits of it, however is also aware that it could be a very long slow journey.  He does worry a little bit about the logistics of where she would be if she went forward with a tracheostomy.  He is struggling with this current situation, acknowledges how unexpected this event has been.  Would value an update from ICU physician who has met patient and  before.  Is open to follow-up communication tomorrow from palliative care as well as palliative care  to support him with this very challenging situation.   does have our contact information.  He does wonder if he could come by to get some clothing in her room.  Reviewed this with RN and asked her to follow-up with him.     3. ACP  -Pt did not previously complete an advanced directive.  -CODE STATUS: FULL CODE (did not discuss with  today).  -Decision Making Capacity: Altered/limited at this time, 2nd sedation/intubation.   -Palliative care will continue  to follow to discuss goals and wishes for care and to make recommendations for symptom management.     Case discussed with Dr. Rock RN.     Barriers to discharge: Medical stability, goals of care   The patient is not eligible for discharge from a palliative standpoint at this time.    ADMITTING DIAGNOSIS: Aspiration pneumonia of left lower lobe due to gastric secretions (H)    CHIEF COMPLAINT: Aspiration pneumonia    HISTORY OF PRESENT ILLNESS:  Summary: Ms Harriett Romero is a 54 year old female who presented to [facility] from  on March 1st with aspiration pneumonia.  Past medical history includes PMH of obesity, MDD, anxiety, restless leg syndrome, smoker/quit in 2011, migraine headaches, insomnia.  Admitted to the hospital with aspiration pneumonia. Long hospitalization. 3/6/20 with progressive respiratory failure with acute respiratory distress syndrome and bilateral pulmonary emboli, intubated on 3/11/2020.  Other specialists following include Critical Care.   Palliative care was consulted to discuss goals and wishes for care and to make recommendations for symptom management.      Current Symptoms: Intubated, sedated.    Recent Changes: Recent colonoscopy, followed by significant coughing, diagnosed with pneumonia.  Given antibiotics.   She cannot stop coughing.  Oxygen level is 93%.  All other vitals are normal.  Says she is worse than yesterday.  Is having moderate difficulty breathing at rest.  Functional Status Prior to Admission: Was doing well, living at home with , ambulatory.    Reason for Palliative Care Referral ([facility]): Goals of Care Assistance with advanced directive, Psychosocial Support, Facilitation of a family meeting, Spiritual Support. Patient has an 85 year-old father, not yet aware of how ill the patient is, but  would also like him to be able to visit the patient.     Pertinent Social Hx/Baseline status:  to Brian x 14 years. They have twin adult sons.     Patient s Understanding: Intubated, sedated.   Capacity: Limited at this time, due to sedation, intubation.     Family/Others Present: Spoke with  Brian by phone.    Patients Understanding of Medical Condition: Limited at this time, due to sedation.     Patient/Family Concerns: Very challenging situation for  who is unable to visit. Pt has not done an advanced directive. He does not believe she would want to be on a ventilator long-term.     SERIOUS ILLNESS PATIENT GOALS OF CARE CONVERSTAION:   I introduced the role and philosophy of palliative care.   Patient's Understanding of where things stand with illness: Limited at this time, due to intubation and sedation.   We reviewed current conditions, medications and treatments including clinical progression, palliative care, goals of care, ? advanced directive, tracheostomy decision.    Quality of life: Had been very good prior to this admission.   Goals/hopes if situation worsened: Did not discuss in depth today as  wishing for a clinical update call. Does not however believe that pt would likely want to be on a ventilator long-term.   Biggest fears/worries: Acknowledges this is a very challenging time, being unable to visit spouse in the setting of a very unexpected health change.   Code status: FULL CODE  Current cares and treatments: Critical care support, palliative care, goals of care  Discharge plan: TBD    ADVANCED CARE PLANS/HEALTHCARE DIRECTIVES:  Has not done an advanced directive before    SURROGATE DECISION MAKERS IF NO DOCUMENTED/APPOINTED AGENTS:     Brian    HOSPICE ELIGIBILITY: Likely would be but did not discuss today.    SPIRITUAL ASSESSMENT:   Are you a spiritual person: Yes  Juani: [employer]  Do you have a [employer] or community providing spiritual support:Yes [employer] ([facility]   Would you like to see ? Pt intubated, sedated. Will recheck tomorrow.     PAST MEDICAL HISTORY:    Past Medical History:   Diagnosis Date     10 year risk of MI or stroke 1.0% in 2017      Anxiety      Aspiration pneumonia of left lower lobe due to gastric secretions (H) 2/28/2020     Current use of estrogen therapy      Dysthymia      Endometriosis     History - had hysterectomy.      Former smoker      Graves disease      Hypothyroidism      Insomnia      Migraine headache      Nephrolithiasis      Seizures (H)     5+ years ago, no medications     Sleep apnea     Recently diagnosed, awaiting CPAP     Trigeminal neuralgia      UTI (lower urinary tract infection)      Vitamin D deficiency        PAST SURGICAL HISTORY:   Past Surgical History:   Procedure Laterality Date     BILATERAL OOPHORECTOMY       CYSTOSCOPY W/ URETERAL STENT PLACEMENT       VT COLONOSCOPY FLX DX W/COLLJ SPEC WHEN PFRMD N/A 2/25/2020    Procedure: COLONOSCOPY;  Surgeon: Edward Patel MD;  Location: [facility];  Service: General     THYROIDECTOMY       TOTAL ABDOMINAL HYSTERECTOMY  2006    FOR ENDOMETRIOSIS       ALLERGIES:   Allergies   Allergen Reactions     Macrobid [Nitrofurantoin Monohyd/M-Cryst] Nausea And Vomiting     Penicillins Hives     3/1/2020 tolerated ceftriaxone      Sulfa (Sulfonamide Antibiotics) Hives       MEDICATIONS:   Current Facility-Administered Medications   Medication Dose Route Frequency Provider Last Rate Last Dose     acetaminophen solution 650 mg (TYLENOL)  650 mg Enteral Tube Q6H PRN Babar Leigh MD   650 mg at 03/12/20 1734     acetaminophen suppository 650 mg (TYLENOL)  650 mg Rectal Q4H PRN Meredith Enriquez, CNP   650 mg at 03/13/20 0205     albuterol nebulizer solution 2.5 mg (PROVENTIL)  2.5 mg Nebulization Q4H PRN Meredith Enriquez, CNP   2.5 mg at 03/14/20 0224     albuterol nebulizer solution 2.5 mg (PROVENTIL)  2.5 mg Nebulization Q6H - RT Nathaly Pryor MD   2.5 mg at 03/23/20 0732     aluminum-magnesium hydroxide-simethicone 200-200-20 mg/5 mL suspension 30 mL  (MAALOX ADVANCED)  30 mL Enteral Tube Q4H PRN Babar Leigh MD         amino acids-protein hydrolys 15-60 gram-kcal/30 mL packet 1 packet (ProSource No Carb)  1 packet Oral TID Kirk Rock MD   1 packet at 03/22/20 2010     bisacodyL suppository 10 mg (DULCOLAX)  10 mg Rectal Daily PRN Meredith Enriquez CNP   10 mg at 03/13/20 1255     [Held by provider] buPROPion tablet 150 mg (WELLBUTRIN)  150 mg Enteral Tube BID Alba York CNP   150 mg at 03/19/20 2122     chlorhexidine 0.12 % solution 15 mL (PERIDEX)  15 mL Topical Q12H Kirk Rock MD   15 mL at 03/23/20 0351     [Held by provider] citalopram tablet 40 mg (celeXA)  40 mg Enteral Tube QHS Babar Leigh MD   40 mg at 03/19/20 2123     dextrose 10%  15 mL/hr Intravenous Continuous PRN Kirk Rock MD         dextrose 50 % (D50W) syringe 20-50 mL  20-50 mL Intravenous Q15 Min PRN Meredith Enriquez CNP         diphenhydrAMINE injection 25 mg (BENADRYL)  25 mg Intravenous Q6H PRN Alba York CNP   25 mg at 03/10/20 0014     [Held by provider] fentaNYL - BOLUS DOSE from infusion 50 mcg  50 mcg Intravenous Q1H PRN Kirk Rock MD   50 mcg at 03/18/20 1713     fentaNYL 2500 mcg/50 mL CADD infusion (50 mcg/mL)   mcg/hr Intravenous Continuous Kirk Rock MD 4 mL/hr at 03/23/20 0414 200 mcg/hr at 03/23/20 0414     fentaNYL pf injection 100 mcg (SUBLIMAZE)  100 mcg Intravenous Once Kirk Rock MD         glucagon (human recombinant) injection 1 mg  1 mg Subcutaneous Q15 Min PRN Meredith Enriquez CNP         guar gum powder packet 2 packet (BENEFIBER;NUTRISOURCE)  2 packet Enteral Tube BID Nathaly Pryor MD   2 packet at 03/22/20 2010     heparin 25,000 units in 0.45% sodium chloride (100 units/ml) 250 mL ANTICOAGULANT infusion  1-50 Units/kg/hr Intravenous Continuous Kirk Rock MD 10 mL/hr at 03/23/20 0400 16 Units/kg/hr at 03/23/20 0400     hydrALAZINE injection 10-20 mg (APRESOLINE)  10-20  mg Intravenous Q4H PRN Tae Schmidt MD         HYDROmorphone 2 mg/2 mL solution 4 mg (DILAUDID)  4 mg Enteral Tube Q4H Meredith Enriquez CNP   4 mg at 03/23/20 0351     hydrOXYzine pamoate capsule 25-50 mg (VISTARIL)  25-50 mg Enteral Tube Q4H PRN Babar Leigh MD         insulin glargine injection 18 Units (LANTUS)  18 Units Subcutaneous QAM Nathaly Pryor MD   18 Units at 03/22/20 0801     insulin regular injection (NovoLIN R)   Subcutaneous Q6H FIXED TIMES Nathaly Pryor MD         [Held by provider] insulin regular injection 5 Units (NovoLIN R)  5 Units Subcutaneous Q6H FIXED TIMES Nathaly Pryor MD   5 Units at 03/22/20 1731     levothyroxine tablet 125 mcg (SYNTHROID, LEVOTHROID)  125 mcg Enteral Tube Daily 0600 Babar Leigh MD   125 mcg at 03/23/20 0530     lidocaine (PF) 10 mg/mL (1 %) injection 1-5 mL (XYLOCAINE-MPF)  1-5 mL Intradermal Once PRN Wilmar Rich MD         lidocaine 5 % ointment (XYLOCAINE)   Topical TID PRN Jenni Quinteros CNP         lipase-protease-amylase 5,000-17,000- 24,000 unit capsule 2 capsule (ZENPEP)  2 capsule Enteral Tube PRN Destini Trevino MD        And     sodium bicarbonate tablet 325 mg  325 mg Enteral Tube PRN Destini Trevino MD         LORazepam 0.5 mg injection (diluted concentration)  0.5 mg Intravenous Q4H PRN Wilmar Rich MD   0.5 mg at 03/08/20 2004     magnesium hydroxide suspension 30 mL (MILK OF MAG)  30 mL Enteral Tube Daily PRN Babar Leigh MD         melatonin tablet 3 mg  3 mg Enteral Tube Bedtime PRN Babar Leigh MD         midazolam (PF) injection 2 mg (VERSED)  2 mg Intravenous Q1H PRN Jenni Quinteros CNP   2 mg at 03/22/20 2123     midazolam (VERSED) 100 mg/100 mL in NS (1 mg/mL) infusion  0.25-15 mg/hr Intravenous Continuous Tae Schmidt MD 8 mL/hr at 03/23/20 0400 8 mg/hr at 03/23/20 0400     morphine injection 2 mg  2 mg Intravenous Q4H  PRN Wilmar Rich MD   2 mg at 03/11/20 1317     multivit-min-ferrous gluconate 9 mg iron/15 mL Liqd 9 mg of iron  15 mL Enteral Tube DAILY Nathaly Pryor MD   9 mg of iron at 03/22/20 0801     naloxone injection 0.2-0.4 mg (NARCAN)  0.2-0.4 mg Intravenous PRN Meredith Enriquez CNP        Or     naloxone injection 0.2-0.4 mg (NARCAN)  0.2-0.4 mg Intramuscular PRN Meredith Enriquez CNP         omeprazole suspension 20 mg (PriLOSEC)  20 mg Enteral Tube QHS Nathaly Pryor MD   20 mg at 03/22/20 2009     [Held by provider] ondansetron injection 4 mg (ZOFRAN)  4 mg Intravenous Q4H PRN Meredith Enriquez CNP   4 mg at 03/07/20 0354    Or     [Held by provider] ondansetron tablet 8 mg (ZOFRAN)  8 mg Oral Q8H PRN Meredith Enriquez CNP         oxymetazoline 0.05 % nasal spray 2 spray (AFRIN)  2 spray Each Nare BID PRN Maddi Hernandez MD         polyvinyl alcohol 1.4 % ophthalmic solution 1 drop (LIQUIFILM TEARS)  1 drop Both Eyes TID PRN Jenni Quinteros CNP   1 drop at 03/21/20 0557     pramipexole (MIRAPEX) tablet 0.75 mg  0.75 mg Enteral Tube QHS Babar Leigh MD   0.75 mg at 03/22/20 2010     predniSONE tablet 40 mg (DELTASONE)  40 mg Enteral Tube Q24H Tae Schmidt MD   40 mg at 03/22/20 0800    Followed by     [START ON 3/25/2020] predniSONE (DELTASONE) tablet 30 mg  30 mg Enteral Tube Q24H Tae Schmidt MD        Followed by     [START ON 3/30/2020] predniSONE tablet 20 mg (DELTASONE)  20 mg Enteral Tube Q24H Tae Schmidt MD        Followed by     [START ON 4/4/2020] predniSONE tablet 10 mg (DELTASONE)  10 mg Enteral Tube Q24H Tae Schmidt MD         propofoL injection (DIPRIVAN)  5-75 mcg/kg/min Intravenous Continuous Kirk Rock MD 17 mL/hr at 03/23/20 0556 45 mcg/kg/min at 03/23/20 0556     sodium chloride bacteriostatic 0.9 % injection 1-5 mL  1-5 mL Intradermal Once PRN Wilmar Rich MD         sodium chloride flush 10-20 mL (NS)  10-20 mL Intravenous  "Wilmar Nelson MD   20 mL at 03/14/20 0032     sodium chloride flush 10-30 mL (NS)  10-30 mL Intravenous Wilmar Nelson MD         sodium chloride flush 2.5 mL (NS)  2.5 mL Intravenous Line Care MinaMeredith, CNP   2.5 mL at 03/23/20 0000     sodium chloride flush 20 mL (NS)  20 mL Intravenous Wilmar Nelson MD         thiamine tablet 100 mg  100 mg Enteral Tube DAILY Nathaly Pryor MD   100 mg at 03/22/20 0801       REVIEW OF SYMPTOMS:  Review of systems not obtained due to inability to communicate with the patient.     ESAS Score:  Pt is intubated, sedated.  indicates no history of chronic pain, no hx of significant anxiety or depression although note she was on antidepressants PTA).    DEMENTIA: No  DELIRIUM: Intubated, sedated.   COMA: No  PAIN SCORE: Sedated/10  PPS:   30%- 1. Totally bed bound; 2. Unable to do any activity, extensive disease; 3. Total care; 4. Normal or reduced; 5. Full or drowsy +/- confusion    PHYSICAL EXAMINATION:  BP 95/53 (Patient Position: Lying)   Pulse 71   Temp 98.3  F (36.8  C) (Oral)   Resp 24   Ht 4' 10\" (1.473 m)   Wt 141 lb (64 kg)   SpO2 93%   BMI 29.47 kg/m    No exam performed today, Pt intubated, sedated, no significant symptoms, per clinical team. .    LAB:  Results from last 7 days   Lab Units 03/22/20  0328 03/22/20  0328 03/20/20  0413 03/20/20  0413 03/19/20  0348  03/17/20  0359   LN-WHITE BLOOD CELL COUNT thou/uL  --  17.9*  --   --  27.1*  --  29.4*   LN-HEMOGLOBIN g/dL 8.2* 8.2* 8.7*  --  8.3*   < > 9.1*   LN-HEMATOCRIT %  --  26.9*  --   --  26.8*  --  29.1*   LN-PLATELET COUNT thou/uL  --  227  227  --  235 200   < > 241    < > = values in this interval not displayed.        Results from last 7 days   Lab Units 03/23/20  0404 03/22/20  0328 03/21/20  0400 03/20/20  0413   LN-SODIUM mmol/L 141 142 140 140   LN-POTASSIUM mmol/L 4.6 4.6 4.3 5.1*   LN-CHLORIDE mmol/L 98 101 98 99   LN-CO2 mmol/L 34* 33* 34* 35*   LN-BLOOD " UREA NITROGEN mg/dL 40* 35* 41* 44*   LN-CREATININE mg/dL 0.67 0.64 0.68 0.72   LN-CALCIUM mg/dL 9.5 9.1 9.3 9.2   LN-ALBUMIN g/dL  --   --   --  2.4*   LN-PROTEIN TOTAL g/dL  --   --   --  6.0   LN-BILIRUBIN TOTAL mg/dL  --   --   --  0.3   LN-ALKALINE PHOSPHATASE U/L  --   --   --  78   LN-ALT (SGPT) U/L  --   --   --  14   LN-AST (SGOT) U/L  --   --   --  16           I have personally reviewed all pertinent labs.    RADIOLOGIC FINDINGS:  Xr Chest 1 View Portable    Result Date: 3/22/2020  EXAM: XR CHEST 1 VIEW PORTABLE LOCATION: [facility] DATE/TIME: 03/22/2020, 5:54 AM INDICATION: Respiratory failure, ARDS, follow-up lines, tubes. COMPARISON: 03/18/2020. FINDINGS: Semiupright portable chest. ET tube approximately 2 cm above the johnnie. Left IJ central venous catheter is again just entering the right atrium. NG tube passes into the stomach. There is no pneumothorax. The heart size is normal. There are persistent bilateral pulmonary infiltrates without significant change. Probable small bilateral pleural effusions.     Persistent bilateral pulmonary infiltrates.     Xr Chest 1 View Portable    Result Date: 3/18/2020  EXAM: XR CHEST 1 VIEW PORTABLE LOCATION: [facility] DATE/TIME: 03/18/2020, 4:04 AM INDICATION: Respiratory failure. COMPARISON: 03/15/2020. FINDINGS: Upright portable chest. ET tube 2.5 cm above the johnnie. NG tube passes into the stomach. Left IJ central venous catheter tip just entering the right atrium. No pneumothorax. The heart size is normal. There are bibasilar infiltrates which have increased on the right since the previous exam.     Bilateral pulmonary infiltrates, increasing on the right.     Xr Chest 1 View Portable    Result Date: 3/15/2020  EXAM: XR CHEST 1 VIEW PORTABLE LOCATION: [facility] DATE/TIME: 3/15/2020 12:37 PM INDICATION: s/p left IJ TLC placement confirm location, r/o PTX COMPARISON: 03/15/2020 at 0602 hours     New left IJ central venous catheter  tip is in the right atrium. No pneumothorax. NG tube courses below the diaphragm. Endotracheal tube remains above the johnnie. Bilateral pulmonary infiltrates have not appreciably changed.    Xr Chest 1 View Portable    Result Date: 3/15/2020  EXAM: XR CHEST 1 VIEW PORTABLE LOCATION: [facility] DATE/TIME: 3/15/2020 6:23 AM INDICATION: ARDS on vent COMPARISON: 03/14/2020     The endotracheal tube is 3 cm from the johnnie. An NG or OG tube takes an esophageal course and terminates off the field-of-view. Heterogeneous opacities throughout the lungs have not significantly changed from the prior study, except for a small focal right hilar opacity which has slightly increased. Small pleural effusions are likely. No pneumothorax. The cardiomediastinal silhouette is normal in size.     Xr Chest 1 View Portable    Result Date: 3/14/2020  EXAM: XR CHEST 1 VIEW PORTABLE LOCATION: [facility] DATE/TIME: 3/14/2020 6:52 AM INDICATION: ARDS on vent COMPARISON: 03/13/2020     Endotracheal tube tip in the low trachea approximately 2.3 cm above the level the johnnie. Minimal cardiac enlargement. Mild pulmonary vascular congestion. Interval increase in hazy ill-defined opacities right lung base. Slight decrease in opacities left lung base with minimal left basilar pleural fluid. Enteric tube extending below level of the EG junction off the inferior aspect of the image.    Xr Chest 1 View Portable    Result Date: 3/13/2020  EXAM: XR CHEST 1 VIEW PORTABLE LOCATION: [facility] DATE/TIME: 3/13/2020 6:13 AM INDICATION: ARDS on vent COMPARISON: 03/12/2020     Endotracheal tube tip in good position in mid trachea. Enteric tube extends below level of the EG junction off the inferior aspect of the image within the distal stomach. Bilateral alveolar infiltrates, greater on the left lung base with consolidation and/or atelectasis slightly improved. Minimal left basilar pleural fluid. Mild cardiac enlargement with pulmonary  vascular congestion.    Xr Chest 1 View Portable    Result Date: 3/12/2020  EXAM: XR CHEST 1 VIEW PORTABLE LOCATION: [facility] DATE/TIME: 3/12/2020 6:21 AM INDICATION: ARDS evaluate for interval change. COMPARISON: 03/11/2020     Interval retraction of the endotracheal tube now in good position in the distal trachea. Enteric tube extending below the level of the EG junction off the inferior aspect of the image within the distal stomach. Stable consolidation left lung base. Hazy ill-defined areas of consolidation left upper lung. Interval increase in amount of infiltrate right lung base. Pulmonary vascular congestion with mild cardiac enlargement. Minimal bilateral pleural fluid collections, greater on the left.    Xr Chest 1 View Portable    Result Date: 3/11/2020  EXAM: XR CHEST 1 VIEW PORTABLE LOCATION: [facility] DATE/TIME: 3/11/2020 2:41 PM INDICATION: confirm ET Tube position COMPARISON: None.     Right mainstem intubation. This finding was discussed with in [place] at 2:45 PM 3/11/2020. Enteric tube courses below the diaphragm with tip beyond the proximal stomach. Opacity in the left base is increased with new obscuration of the left hemidiaphragm. Otherwise multisegmental left perihilar distribution opacities are similar. New airspace opacity in the right perihilar distribution. No pneumothorax visible on this single supine view.    Xr Chest 1 View Portable    Result Date: 3/10/2020  EXAM: XR CHEST 1 VIEW PORTABLE LOCATION: [facility] DATE/TIME: 3/10/2020 11:38 AM INDICATION: hypoxic COMPARISON: 03/09/2020     Again noted is consolidation in the left mid and lower lung. This appears slightly worse than on the prior study. The right lung is clear. No pneumothorax or pleural effusion. Heart size and pulmonary vascularity are normal. There is a PICC catheter from right upper extremity approach with tip in the superior vena cava.    Xr Chest 1 View Portable    Result Date:  3/9/2020  EXAM: XR CHEST 1 VIEW PORTABLE LOCATION: [facility] DATE/TIME: 3/9/2020 5:37 PM INDICATION: Follow up aspiration pneumonitis. COMPARISON: 03/08/2020.     Allowing for differences in exams, little change of left mid to lower lung predominant opacities and consolidation. Cannot exclude small left pleural effusion. No other significant change. Right PICC tip at the cavoatrial junction.     Xr Chest 1 View Portable    Result Date: 3/8/2020  EXAM: XR CHEST 1 VIEW PORTABLE LOCATION: [facility] DATE/TIME: 03/08/2020, 7:44 AM INDICATION: Shortness of breath, respiratory failure. COMPARISON: 03/07/2020.     Again seen are extensive infiltrates throughout the left lung with some focal volume loss in the medial aspect of the left lung base. Very mild patchy right perihilar infiltrate. Slight improvement of the pulmonary infiltrates when compared with yesterday's exam.     Xr Chest 1 View Portable    Result Date: 3/7/2020  EXAM: XR CHEST 1 VIEW PORTABLE LOCATION: [facility] DATE/TIME: 3/7/2020 3:46 AM INDICATION: SOB COMPARISON: 03/16/2020.     Stable heart size. Bilateral airspace opacities left greater than right. Slight interval improvement in aeration left lung. Trace left effusion. Bony thorax unremarkable. Monitor electrodes.    Xr Chest 1 View Portable    Result Date: 3/6/2020  EXAM: XR CHEST 1 VIEW PORTABLE LOCATION: [facility] DATE/TIME: 3/6/2020 3:43 AM INDICATION: Increasing O2 COMPARISON: 03/01/2020.     Heart size magnified. Extensive airspace opacification left lung with consolidation and interval worsening. Airspace opacification right mid and right lower lung. Trace bilateral effusions. Stomach mildly distended with air. Interval worsening.    Xr Chest 2 Views    Result Date: 2/28/2020  EXAM: XR CHEST 2 VIEWS LOCATION: [facility] DATE/TIME: 2/28/2020 2:50 PM INDICATION: Cough COMPARISON: 11/06/2019 and older studies.     Diffuse airspace opacities are seen in  the left upper lobe and lingula consistent with a large area bronchopneumonia. No effusion. Right lung is clear. Heart and pulmonary vascularity are normal. QC to call resutls. NOTE: ABNORMAL REPORT THE DICTATION ABOVE DESCRIBES AN ABNORMALITY FOR WHICH FOLLOW-UP IS NEEDED.     Xr Abdomen Ap Portable    Result Date: 3/14/2020  EXAM: XR ABDOMEN AP PORTABLE LOCATION: [facility] DATE/TIME: 3/14/2020 6:51 AM INDICATION: Evaluate for ileus COMPARISON: None.     No definitive evidence for small bowel dilatation or obstruction. Minimal amount of gas within nondilated colon. Right-sided femoral venous catheter extending up to the inferior aspect of the IVC. Enteric tube with the tip in the distal stomach. No overt osseous abnormality.    Cta Chest Pe Run    Result Date: 3/11/2020  EXAM: CTA CHEST PE RUN LOCATION: [facility] DATE/TIME: 3/11/2020 1:05 PM INDICATION: Shortness of breath. Acute respiratory failure. Possible aspiration pneumonitis. COMPARISON: Radiograph 03/10/2020. TECHNIQUE: CT angiogram chest during arterial phase injection IV contrast. 2D and 3D MIP reconstructions were performed by the CT technologist. Dose reduction techniques were used. CONTRAST: Iohexol (Omni) 100 mL. FINDINGS: ANGIOGRAM CHEST: Mild right lower lobe subsegmental pulmonary artery embolism. Minimal bilateral pulmonary embolism elsewhere, including middle lobe (series 4, image 206), left upper lobe (images [id number] medially) and left lower lobe (image 232). No significant pulmonary arterial enlargement. RV to LV ratio measures 0.9. Slight interventricular septal flattening. Nonaneurysmal aorta without dissection. LUNGS AND PLEURA: Bilateral confluent groundglass and opacities throughout all lobes. Mild consolidation within the peripheral left upper and lower lobes, more pronounced in the lower lobe. Mild basilar predominant airway dilatation. Mild septal thickening. Tiny right and small left pleural effusions. No  pneumothorax. MEDIASTINUM/AXILLAE: Borderline mediastinal and bilateral hilar adenopathy. Example left perihilar node measures 11 x 16 mm (series 4, image 261). UPPER ABDOMEN: No pericardial effusion. MUSCULOSKELETAL: Normal.     1.  Mild bilateral pulmonary artery embolism. Borderline RV to LV ratio and slight flattening of the interventricular septum; RV strain not excluded. Pulmonary arteries not enlarged. 2.  Regions of confluent groundglass and opacities throughout all lobes bilaterally. Mild left lung consolidation. Differential includes superimposed edema / ARDS, hemorrhage and alveolar damage, or other nonspecific infectious / inflammatory process (including a massive aspiration event). These findings make evaluation of pulmonary infarct difficult. 3.  Tiny right and small left pleural effusions. 4.  Borderline adenopathy, presumed reactive. NOTE: ABNORMAL REPORT THE DICTATION ABOVE DESCRIBES AN ABNORMALITY FOR WHICH FOLLOW-UP IS NEEDED. PE and lung opacity findings verbally communicated to see in [facility] at 1:39 PM on 03/11/2020.     Us Venous Arm Right    Result Date: 3/10/2020  EXAM: US VENOUS ARM RIGHT LOCATION: [facility] DATE/TIME: 3/10/2020 3:55 PM INDICATION: Right upper extremity pain and swelling. COMPARISON: None. TECHNIQUE: Venous Duplex ultrasound of the right upper extremity with (when possible) and without compression, augmentation, and duplex. Color flow and spectral Doppler with waveform analysis performed. FINDINGS: Ultrasound includes evaluation of the internal jugular vein, innominate vein, subclavian vein, axillary vein, and brachial vein. The superficial cephalic and basilic veins were also evaluated where seen. RIGHT: Positive for nonocclusive DVT around the PICC in the right subclavian and brachiocephalic veins. Nonocclusive superficial thrombophlebitis also noted around the PICC in the right cephalic vein in the forearm.     1.  Nonocclusive DVT around the PICC and  right subclavian and brachiocephalic veins. 2.  Nonocclusive superficial thrombophlebitis around the PICC in the right cephalic vein in the forearm. I discussed the findings with the patient's nurse, Julián, at 4:00 PM on 03/10/2020.    Xr External Imaging Chest    Result Date: 3/1/2020  Please see PACS Hyperlink for images and scanned result text.    Parth Castillo, CNP  Office #: [phone]    Total Time 80 with > 50% of time during encounter was spent with family and patient on counseling and/or care coordination as documented above. yes Reviewed clinical progression, role of palliative care, goals of care, ? advanced directive, tracheostomy decision, psychosocial support, optimal communication in the setting of visitor limitations.

## 2021-06-07 NOTE — PLAN OF CARE
Problem: Inadequate Airway Clearance  Goal: Patient will maintain patent airway  Outcome: Progressing  Goal: Patient will achieve/maintain normal respiratory rate/effort  Outcome: Progressing     Problem: Inadequate Gas Exchange  Goal: Patient will achieve/maintain normal respiratory rate/effort  Outcome: Progressing  Goal: Patient is adequately oxygenated and ventilation is improved  Outcome: Progressing     Problem: Excessive Fluid Volume  Goal: Patient will achieve/maintain normal respiratory rate/effort  Outcome: Progressing     Problem: Mechanical Ventilation  Goal: Patient will maintain patent airway  Outcome: Progressing    Patient continues on ventilator with the following settings:    Vent Mode: PCV/VG  FiO2 (%):  [40 %-45 %] 40 %  S RR:  [28-30] 28  S VT:  [225 mL] 225 mL  PEEP/CPAP (cm H2O):  [12 cm H2O] 12 cm H2O  Minute Ventilation (L/min):  [6.4 L/min-7.9 L/min] 6.9 L/min  PIP:  [17 cm H2O-36 cm H2O] 36 cm H2O  MAP (cm H2O):  [15-19] 19    She is tolerating well with HR 56-58, oxygen saturation 94-96%, ETCO2 47 (it was added this shift as Veletri was discontinued on evening shift), Plateau's 30-31, [id number], and intrinsic peep 1-3. BS coarse and diminished. Patient was suctioned for small to moderate thick white secretions. # 7.5 ETT remains secured at 21 @ the teeth and was repositioned throughout this shift. Continue to monitor and wean as able.

## 2021-06-07 NOTE — PLAN OF CARE
Problem: Inadequate Airway Clearance  Goal: Patient will maintain patent airway  Outcome: Progressing     Problem: Mechanical Ventilation  Goal: Patient will maintain patent airway  Outcome: Progressing  Goal: ET tube will be managed safely  Outcome: Progressing     Vent Mode: PCV/VG  FiO2 (%):  [45 %] 45 %  S RR:  [32-35] 32  S VT:  [225 mL] 225 mL  PEEP/CPAP (cm H2O):  [12 cm H2O] 12 cm H2O  Minute Ventilation (L/min):  [6.4 L/min-7.9 L/min] 7.2 L/min  PIP:  [28 cm H2O-36 cm H2O] 36 cm H2O  MAP (cm H2O):  [15-24] 20     Pt. remains on full vent support, settings above. ETT 7.5 21 @ teeth, BS coarse, suctioning small amount of white secretions via ETT. Nebulizer treatment given as scheduled, Pt. remains on full strength veletri, PIP 36, plats 28-33, RT following    ALBERT HartT

## 2021-06-07 NOTE — PROGRESS NOTES
MD RESTRAINT FOR NON-VIOLENT BEHAVIOR FACE TO FACE EVALUATION  Progress Note  Restraint Application    I recognize that restraints are physical and/or chemical interventions intended to restrict a person's movements. Restraints are currently needed to ensure the safety of this patient and/or others. My clinical rationale appears below.    PATIENT EVALUATION  Patient evaluated on 3/20/2020 at 8:14 AM to confirm the need for medical restraints.     --  Category/Type of Restraint:     Non Violent:  Soft limb restraint x2  --  Patient's Immediate Situation:  Patient demonstrated the following behaviors: Pulling/tugging at invasive lines or tubes and does not respond to verbal/non-verbal redirection  --  Patient's Reaction to the intervention:  Does patient understand the reason for restraint/seclusion? Unable to Express  --  Medical Condition:  Is there any evidence of compromise of Skin integrity, Respiratory, Cardiovascular, Musculoskeletal, Hydration? Yes:  Skin integrity, Respiratory and Cardiovascular  --  Root Cause of the Behavior:  Acute respiratory failure,acute encephalopathy related to therapeutic sedation  --  Behavioral Condition:  In consultation with the RN, is there a need to continue this restraint or seclusion? Yes  --  Criteria for Release from the Restraint:  Patient is calm, listens to verbal redirection, and stops attempting to pull out lines/tubes    See Restraint Flowsheet for complete restraint documentation and assessment.    Nathaly Pryor MD, MPH  Pulmonary & Critical Care Medicine  Pager: [phone]  3/20/2020  8:14 AM

## 2021-06-07 NOTE — PLAN OF CARE
Midnight accucheck 26-CNP padmini redd informed. 1amp D50 given and D10 at 50cc/hr started. Accucheck up to 138 after D50 and accucheck 76 this am. Recheck of blood sugar ordered for 0800.   Problem: Glucose Imbalance  Goal: Achieve optimal glucose control  Outcome: Not Progressing      Fio2 decreased overnight from 50% to 35% FIO2. O2 sats mid 90's. Lungs coarse throughout. Large amts clear oral secretions. ETT secretions-mod thick tan/white. RR variable-teens up to 40's. Propofol gtt increased from 65 to 70 mcqs due to RR up to 50's. RR down after increase to 20's-30's.   Problem: Inadequate Airway Clearance  Goal: Patient will maintain patent airway  Outcome: Progressing     Problem: Inadequate Gas Exchange  Goal: Patient will achieve/maintain normal respiratory rate/effort  Outcome: Progressing     Problem: Excessive Fluid Volume  Goal: Patient will achieve/maintain normal respiratory rate/effort  Outcome: Progressing     Problem: Mechanical Ventilation  Goal: Patient will maintain patent airway  Outcome: Progressing  Margarita Andrews RN

## 2021-06-07 NOTE — TELEPHONE ENCOUNTER
FYI - Status Update  Who is Calling: SisterSavannah  Update: caller wanted to just let Zain Cuevas MD know that patient did pass away on April 1st at [facility]. Caller stated that you probably already knew but still wanted to just let you know incase you didn't yet.   Okay to leave a detailed message?:  No return call needed

## 2021-06-07 NOTE — PLAN OF CARE
Pt step son at bedside and upset about pt breathing-pt RR up in 50's, pt 's-pt appears to be working harder to breathe than earlier in shift. Discusses medications and POC with step son and what his dad had stated before going down to the ER-Rashida Enriquez CNP was notified of step son concerns and she came to bedside for discussion and clarification of POC.    Meds were adjusted slightly, spoke with pt  Brian while he was still in ER-he wanted to maintain care until he could come back up and be with patient and make decisions to POC.    Pt medicated and will continue to monitor for respiratory comfort.

## 2021-06-07 NOTE — PROGRESS NOTES
PULMONARY / CRITICAL CARE PROGRESS NOTE    Date / Time of Admission:  3/1/2020  4:51 PM    Assessment:   1. Acute respiratory failure s/p trach   2. 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 VAP (ventilator-associated pneumonia)  4. Aspiration pneumonia leading to ARDS   5. Hypothyroidism  6. Pulmonary embolism    Advance Directives: DNR    Plan:   1. Titrate vent settings  2. Titrate sedation to keep RASS 0 to -1, wean off propofol and dilaudid drip  3. Pulmonary toiletting  4. Heplock IV fluids  5. Titrate BP meds  6. Narrow Abx to meropenem   7. Antiseizure meds  8. Neurology following.   9. Resume tube feedings  10. PPI for GI prophylaxis  11. Glucose level monitoring  12. DVT prophylaxis SCDs, lovenox SQ was held in view of large CVA with small hemorrhagic transformation   13. Palliative care service is following, plan to transition to comfort measures this afternoon.     Please contact me if you have any questions.  Total critical care time, not including separately billable procedure time: 45 minutes  This patient had a high probability of imminent or life threatening deterioration due to acute respiratory failure which required my direct attention, intervention and personal management.     Babar Fowler  Pulmonary / Critical Care  3/31/2020   11:44 AM            ICU DAILY CHECKLIST                           Can patient transfer out of MICU? no    FAST HUG:    Feeding:  Feeding: Yes.  Patient is receiving ORAL    Clark:Yes  Analgesia/Sedation:Yes propofol and dilaudid  Thromboembolic prophylaxis: yes; Mode:  Lovenox and SCDs, lovenox was held due to large CVA and areas of small hemorrhage  HOB>30:  Yes  Stress Ulcer Protocol Active: yes; Mode: PPI  Glycemic Control: Any glucose > 180 no; Mode of Insulin Therapy: Sliding Scale Insulin    INTUBATED:  Can patient have daily waking:  yes  Can patient have spontaneous breathing trial:  yes    Restraints? no    PHYSICAL THERAPY  AND MOBILITY:  Can patient have PT and mobility trial: no  Activity: Bed Rest    Subjective:   HPI:  Harriett Romero is a 54 y.o. female with history of hypothyroidism, migraines, depression and anxiety, who was admitted 3/1/2020 with acute hypoxic respiratory failure secondary to an aspiration event during colonoscopy 2/25/2020. Complicated course with development of ARDS, bilateral PEs. Patient underwent a tracheostomy 3/25. Her pulmonary status deteriorated 3/27 & she is being treated for a new VAP. On 3/29 discovered to have an acute/subacute L MCA CVA. Per Neurology note poor long term prognosis.   Family discussed with palliative care the possibility of changing acute treatment to comfort measures.     Events overnight  - Family would like to transition acute care to comfort measures  - Patient remains sedated, unchanged O2 requirements.   - Tube feedings were held for high residuals.     Allergies: Macrobid [nitrofurantoin monohyd/m-cryst]; Penicillins; and Sulfa (sulfonamide antibiotics)     MEDS:  Scheduled Meds:    albuterol  2.5 mg Nebulization Q6H - RT     chlorhexidine  15 mL Topical Q12H     [Held by provider] enoxaparin ANTICOAGULANT  1 mg/kg Subcutaneous Q12H     guar gum  1 packet Enteral Tube BID     insulin aspart (NovoLOG) injection   Subcutaneous Q6H FIXED TIMES     levETIRAcetam (KEPPRA) IVPB  500 mg Intravenous Q12H     levothyroxine  125 mcg Enteral Tube Daily 0600     linezolid in dextrose 5%  600 mg Intravenous Q12H     meropenem  1 g Intravenous Q8H     miconazole   Topical BID     omeprazole  20 mg Enteral Tube QHS     thiamine  100 mg Enteral Tube DAILY     Continuous Infusions:    dextrose 10%       dextrose 10%       dextrose 10% 50 mL/hr (03/31/20 1000)     HYDROmorphone in 0.9 % NaCl       midazolam (VERSED) 100 mg/100 mL (1 mg/mL) 4 mg/hr (03/31/20 1000)     propofol 35 mcg/kg/min (03/31/20 1000)     PRN Meds:.acetaminophen, albuterol **AND** Nebulizer treatment intermittent,  "aluminum-magnesium hydroxide-simethicone, bisacodyL, dextrose 10%, dextrose 10%, dextrose 50 % (D50W), diphenhydrAMINE, glucagon (human recombinant), hydrALAZINE, HYDROmorphone, [Held by provider] hydrOXYzine, lidocaine (PF), lidocaine, lipase-protease-amylase **AND** sodium bicarbonate, LORazepam, magnesium hydroxide, naloxone **OR** naloxone, polyvinyl alcohol, sodium chloride bacteriostatic, sodium chloride, sodium chloride, sodium chloride    Objective:   VITALS:  /56   Pulse 96   Temp 99  F (37.2  C) (Axillary)   Resp 25   Ht 4' 10\" (1.473 m)   Wt 136 lb 0.4 oz (61.7 kg)   SpO2 91%   BMI 28.43 kg/m    EXAM:   Gen: sedated, vented  HEENT: pale conjunctiva, moist mucosa  Neck: s/p trach  Lungs: ronchi both HT  CV: regular, no murmurs or gallops appreciated  Abdomen: soft, G tube, NT, BS increase in frequency  Ext: trace edema  Neuro: sedated, not arousable    I&O:      Intake/Output Summary (Last 24 hours) at 3/31/2020 1144  Last data filed at 3/31/2020 1000  Gross per 24 hour   Intake 3253.51 ml   Output 4455 ml   Net -1201.49 ml       Data Review:  Results from last 7 days   Lab Units 03/30/20  0456   LN-WHITE BLOOD CELL COUNT thou/uL 11.8*   LN-HEMOGLOBIN g/dL 9.0*   LN-HEMATOCRIT % 28.4*   LN-PLATELET COUNT thou/uL 176     Results from last 7 days   Lab Units 03/30/20  0456   LN-SODIUM mmol/L 137   LN-POTASSIUM mmol/L 4.1   LN-CHLORIDE mmol/L 103   LN-CO2 mmol/L 28   LN-BLOOD UREA NITROGEN mg/dL 10   LN-CREATININE mg/dL 0.61   LN-CALCIUM mg/dL 8.3*     CT CHEST ABDOMEN PELVIS WO ORAL WO IV CONTRAST  LOCATION: [facility]  DATE/TIME: 3/29/2020 10:10 AM  INDICATION: Worsening respiratory failure, persistent diarrhea, concern for new ileus  COMPARISON: Chest radiograph from 3/27/2020. Chest CT from 3/11/2020.  FINDINGS:   LUNGS AND PLEURA: A tracheostomy tube terminates in the upper thoracic trachea. Extensive motion artifact compromises detailed evaluation of the lungs and pleural spaces. " There are persistent groundglass airspace opacities throughout both lungs with both central and peripheral involvement, with increasingly consolidative appearance in the right lower lobe. Mild bibasilar cylindrical bronchiectasis. There are small right and trace left pleural effusions. No pneumothorax.  MEDIASTINUM/AXILLAE: Heart size within normal limits. Central venous catheter tip terminates at cavoatrial junction. There is some persistent adenopathy in the right paratracheal region, though exact measurement is difficult due to motion artifact.  HEPATOBILIARY: Motion degraded evaluation. Liver and gallbladder are grossly normal.  PANCREAS: Normal.  SPLEEN: Motion degraded evaluation. Within normal limits.  ADRENAL GLANDS: Normal.  KIDNEYS/BLADDER: No hydronephrosis. The bladder is decompressed via Clark catheter.  BOWEL: Percutaneous gastrostomy tube is in place. There is no mechanical bowel obstruction. The appendix is within normal limits. No free air. A rectal tube is in place.  LYMPH NODES: Normal.  VASCULATURE: Unremarkable.  PELVIC ORGANS: Status post hysterectomy.  MUSCULOSKELETAL: Normal.  IMPRESSION:   1.  Persistent bilateral airspace infiltrate, primarily of groundglass appearance though with increasing consolidation in the right lower lobe. Small right and trace left pleural effusions. Of note, patient has tested negative for COVID-19 per chart history. Differential diagnosis remains broad and includes ARDS or infectious etiology.  2.  Persistent, presumed reactive paratracheal adenopathy.   3.  No mechanical bowel obstruction.    Brain MRI 3/29/2020  IMPRESSION:   HEAD MRI:   1.  3.4 x 8.6 x 6.7 cm area of mildly restricted diffusion, T2 prolongation and gyriform enhancement involving the left temporal lobe and the left parietal lobe and to a lesser extent the left occipital lobe. This is suspicious for an area of subacute ischemia. Repeating the exam in four weeks to evaluate for interval expected  evolution and resolution of the enhancement would be valuable.  2.  Couple of small foci of mildly restricted diffusion in the posterior left frontal lobe, likely due to foci of subacute ischemia.   3.  Punctate focus of hemorrhages in the left posterior temporal lobe.   4.  There is lack of FLAIR suppression within the sulci of the left temporal lobe and the left parietal lobe. No corresponding hemorrhages within the subarachnoid spaces on the prior head CT. These could be related to enhancement, venous congestion   versus less likely hemorrhage.  HEAD MRA:   1.  No vessel cut off of the anterior circulation arteries.   2.  There are areas of moderate decrease of flow related enhancement of the M2 and M3 branches of the left middle cerebral artery.    NECK MRA:  1.  No hemodynamically significant stenosis of the internal carotid arteries bilaterally based on the NASCET criteria.  2.  The vertebral arteries are patent throughout their course in the neck.      By:  Babar Fowler, 3/31/2020, 11:44 AM    Primary Care Physician:  Zain Cuevas MD

## 2021-06-07 NOTE — PROGRESS NOTES
NEUROLOGY PROGRESS NOTE      ASSESSMENT & PLAN       Left MCA ischemic stroke subacute with small hemorrhagic transformation.    Recent bilateral pulmonary embolism on anticoagulation    Aspiration pneumonia, with COVID-19    Encephalopathy, currently intubated and sedated.      Radiographic pictures reviewed and discussed with .  Poor neurological prognosis with significant left MCA ischemic stroke.  Most likely she will be living dependent on other people.  At the same time is potentially imminent that she will herniate and need surgical intervention.  With her current respiratory status and bilateral PE, and potential interim more embolic stroke, will all complicate the situation.  Per  their children are coming to town overnight from [employer].  He is not interested in any further neurosurgical intervention, which I support.  Therefore I will defer repeat CT scan of the head.  Second visit and make further decision on if moving toward comfort care only.  She has expressed her wish in the past this kind of a long-term outcome and on the ventilator is not the situation she ever herself to be in.     Work-ups:  CT scan head March 29, 2020  A.  Large area of cytotoxic edema involving left parietal lobe, left temporal lobe, superior left occipital lobe.  Measures 2.7 x 9.1 cm consistent with subacute stroke  B.  0.4 cm intraparenchymal hemorrhage anterior inferior left parietal lobe with areas of petechial hemorrhage also  C.  Significant cytotoxic edema but no midline shift at this time  CT chest abdomen pelvis see official report  A.  Persistent bilateral infiltrate primary groundglass appearance though with increasing consolidation right lower lobe  B.  Persistent reactive paratracheal adenopathy     Labs  Sodium 139 potassium 3.6 BUN 16 creatinine 0.57  Glucose 115  White blood count 12.4   Hemoglobin 7.7, previously 8.0  Platelets 145,000           Principal Problem:    Aspiration pneumonia of left  "lower lobe due to gastric secretions (H)  Active Problems:    Hypokalemia    Former smoker - quit in 2014    Anxiety    Hypothyroidism    Metabolic syndrome    Aspiration pneumonia due to inhalation of vomitus (H)    Diarrhea of presumed infectious origin    Hypotension, unspecified hypotension type    Acute respiratory failure with hypoxemia (H)    Cough    Chest pain at rest    Deep vein thrombosis (DVT) of upper extremity (H)    Pulmonary embolism (H)    Acute respiratory distress syndrome (ARDS) (H)    Diffuse interstitial pulmonary disease (H)    Acute kidney failure, unspecified (H)    Acute encephalopathy    Encounter for palliative care    Goals of care, counseling/discussion    Intensive care (ICU) myopathy    Altered mental status, unspecified altered mental status type    Attention to tracheostomy (H)    VAP (ventilator-associated pneumonia) (H)    Cerebrovascular accident (CVA), unspecified mechanism (H)      SUBJECTIVE     In coma      OBJECTIVE     Vital signs in last 24 hours    Vitals:    03/30/20 0714 03/30/20 0800 03/30/20 0900 03/30/20 1000   BP:  98/54 117/62 92/51   Patient Position:  Semi-lakhani     Pulse:  90 92 92   Resp:  27 (!) 33 26   Temp:  99.9  F (37.7  C)     TempSrc:  Axillary     SpO2:  99% 100% 99%   Weight:       Height: 4' 10\" (1.473 m)              Review of Systems   Pertinent items are noted in HPI.  Review of systems not obtained due to inability to communicate with the patient.     Physical Exam  Sedated on ventilator.  Supine in bed comfortably.  No corneal reflex or doll's eyes.  Flaccid in for committees.  No edema.  No rashes.    DATA REVIEW     Scheduled Meds:    albuterol  2.5 mg Nebulization Q6H - RT     chlorhexidine  15 mL Topical Q12H     [Held by provider] enoxaparin ANTICOAGULANT  1 mg/kg Subcutaneous Q12H     guar gum  1 packet Enteral Tube BID     insulin aspart (NovoLOG) injection   Subcutaneous TID with meals     levETIRAcetam (KEPPRA) IVPB  500 mg Intravenous " Q12H     levothyroxine  125 mcg Enteral Tube Daily 0600     linezolid in dextrose 5%  600 mg Intravenous Q12H     magnesium oxide  400 mg Oral TID     meropenem  1 g Intravenous Q8H     miconazole   Topical BID     omeprazole  20 mg Enteral Tube QHS     thiamine  100 mg Enteral Tube DAILY     Continuous Infusions:    dextrose 10%       dextrose 10% Stopped (20 1713)     dextrose 10%       dextrose 10% 50 mL/hr (20 0735)     HYDROmorphone in 0.9 % NaCl       midazolam (VERSED) 100 mg/100 mL (1 mg/mL) 7 mg/hr (20 1019)     propofol 70 mcg/kg/min (20 1018)     PRN Meds:.acetaminophen, albuterol **AND** Nebulizer treatment intermittent, albuterol **AND** [] Nebulizer treatment intermittent, aluminum-magnesium hydroxide-simethicone, bisacodyL, dextrose 10%, dextrose 10%, dextrose 50 % (D50W), diphenhydrAMINE, glucagon (human recombinant), haloperidol lactate, hydrALAZINE, HYDROmorphone, [Held by provider] hydrOXYzine, lidocaine (PF), lidocaine, lipase-protease-amylase **AND** sodium bicarbonate, LORazepam, magnesium hydroxide, melatonin, naloxone **OR** naloxone, polyvinyl alcohol, sodium chloride bacteriostatic, sodium chloride, sodium chloride, sodium chloride     Pertinent Labs   Lab Results: personally reviewed.   Lab Results   Component Value Date     2020     2020     2020    K 4.1 2020    K 3.6 2020    K 4.2 2020    CO2 28 2020    CO2 27 2020    CO2 27 2020    BUN 10 2020    BUN 16 2020    BUN 20 2020    CREATININE 0.61 2020    CREATININE 0.57 (L) 2020    CREATININE 0.61 2020    CALCIUM 8.3 (L) 2020    CALCIUM 7.9 (L) 2020    CALCIUM 7.9 (L) 2020          Pertinent Radiology   Radiology Results: Personally reviewed impression/s          Taniya August MD  [facility].

## 2021-06-07 NOTE — CONSULTS
General surgery consult         ASSESSMENT     - LUE DVT with bilateral PE on heparin  - ARF with ARDS on ventilator       PLAN      Trach and PEG tomorrow with Dr. Patel  Hold/stop heparin drip at least 6 hours prior to surgery       CHIEF COMPLAINT   No chief complaint on file.        HPI     Harriett Romero is a 54 y.o. female who we are consulted to see for trach/PEG. Patient with PMH significant for depression, anxiety, migraines, insomnia, metabolic syndrome and hypothyroidism. Patient has been ventilated since 3/11 secondary to progressive respiratory failure with ARDS and bilat PEs after developing aspiration pneumonia. Patient has not been able to wean from the vent and will need tracheostomy for airway protection and ventilation, as well as a PEG tube for nutrition.         PAST MEDICAL HISTORY SURGICAL HISTORY     Past Medical History:   Diagnosis Date     10 year risk of MI or stroke 1.0% in 2017      Anxiety      Aspiration pneumonia of left lower lobe due to gastric secretions (H) 2/28/2020     Current use of estrogen therapy      Dysthymia      Endometriosis     History - had hysterectomy.      Former smoker      Graves disease      Hypothyroidism      Insomnia      Migraine headache      Nephrolithiasis      Seizures (H)     5+ years ago, no medications     Sleep apnea     Recently diagnosed, awaiting CPAP     Trigeminal neuralgia      UTI (lower urinary tract infection)      Vitamin D deficiency     Past Surgical History:   Procedure Laterality Date     BILATERAL OOPHORECTOMY       CYSTOSCOPY W/ URETERAL STENT PLACEMENT       VA COLONOSCOPY FLX DX W/COLLJ SPEC WHEN PFRMD N/A 2/25/2020    Procedure: COLONOSCOPY;  Surgeon: Edward Patel MD;  Location: [facility];  Service: General     THYROIDECTOMY       TOTAL ABDOMINAL HYSTERECTOMY  2006    FOR ENDOMETRIOSIS          CURRENT MEDICATIONS ALLERGIES       Current Facility-Administered Medications:      acetaminophen solution 650 mg  (TYLENOL), 650 mg, Enteral Tube, Q6H PRN, Babar Leigh MD, 650 mg at 03/12/20 1734     acetaminophen suppository 650 mg (TYLENOL), 650 mg, Rectal, Q4H PRN, Meredith Enriquez CNP, 650 mg at 03/13/20 0205     albuterol nebulizer solution 2.5 mg (PROVENTIL), 2.5 mg, Nebulization, Q4H PRN, 2.5 mg at 03/14/20 0224 **AND** Nebulizer treatment intermittent, , , Q4H PRN, Meredith Enriquez CNP     albuterol nebulizer solution 2.5 mg (PROVENTIL), 2.5 mg, Nebulization, Q6H - RT, 2.5 mg at 03/24/20 1318 **AND** Nebulizer treatment intermittent, , , Q6H - RT, Nathaly Pryor MD     aluminum-magnesium hydroxide-simethicone 200-200-20 mg/5 mL suspension 30 mL (MAALOX ADVANCED), 30 mL, Enteral Tube, Q4H PRN, Babar Leigh MD     amino acids-protein hydrolys 15-60 gram-kcal/30 mL packet 1 packet (ProSource No Carb), 1 packet, Enteral Tube, DAILY, Kirk Rock MD, 1 packet at 03/24/20 0804     bisacodyL suppository 10 mg (DULCOLAX), 10 mg, Rectal, Daily PRN, Meredith Enriquez CNP, 10 mg at 03/13/20 1255     [Held by provider] buPROPion tablet 150 mg (WELLBUTRIN), 150 mg, Enteral Tube, BID, Alba York CNP, 150 mg at 03/19/20 2122     chlorhexidine 0.12 % solution 15 mL (PERIDEX), 15 mL, Topical, Q12H, Kirk Rock MD, 15 mL at 03/24/20 1526     citalopram tablet 20 mg (celeXA), 20 mg, Enteral Tube, QHS, Kirk Rock MD, 20 mg at 03/23/20 2013     dextrose 10%, 15 mL/hr, Intravenous, Continuous PRN, Kirk Rock MD     dextrose 50 % (D50W) syringe 20-50 mL, 20-50 mL, Intravenous, Q15 Min PRN, Meredith Enriquez CNP     diphenhydrAMINE injection 25 mg (BENADRYL), 25 mg, Intravenous, Q6H PRN, Alba York CNP, 25 mg at 03/10/20 0014     [Held by provider] fentaNYL - BOLUS DOSE from infusion 50 mcg, 50 mcg, Intravenous, Q1H PRN, Kirk Rock MD, 50 mcg at 03/18/20 1713     fentaNYL 2500 mcg/50 mL CADD infusion (50 mcg/mL),  mcg/hr, Intravenous, Continuous, Pranav  MD Kirk, Last Rate: 4 mL/hr at 03/24/20 0523, 200 mcg/hr at 03/24/20 0523     fentaNYL pf injection 50 mcg (SUBLIMAZE), 50 mcg, Intravenous, Q1H PRN, Kirk Rock MD     glucagon (human recombinant) injection 1 mg, 1 mg, Subcutaneous, Q15 Min PRN, Meredith Enriquez CNP     guar gum powder packet 2 packet (BENEFIBER;NUTRISOURCE), 2 packet, Enteral Tube, TID, Kirk Rock MD, 2 packet at 03/24/20 1527     heparin 25,000 units in 0.45% sodium chloride (100 units/ml) 250 mL ANTICOAGULANT infusion, 1-50 Units/kg/hr, Intravenous, Continuous, Kirk Rock MD, Last Rate: 10 mL/hr at 03/23/20 2113, 16 Units/kg/hr at 03/23/20 2113     hydrALAZINE injection 10-20 mg (APRESOLINE), 10-20 mg, Intravenous, Q4H PRN, Tae Schmidt MD     hydrOXYzine pamoate capsule 25-50 mg (VISTARIL), 25-50 mg, Enteral Tube, Q4H PRN, Babar Leigh MD     insulin glargine injection 18 Units (LANTUS), 18 Units, Subcutaneous, QAM, Nathaly Pryor MD, 18 Units at 03/24/20 0803     insulin regular injection (NovoLIN R), , Subcutaneous, Q6H FIXED TIMES, Nathaly Pryor MD, 2 Units at 03/24/20 1156     [Held by provider] insulin regular injection 5 Units (NovoLIN R), 5 Units, Subcutaneous, Q6H FIXED TIMES, Nathaly Pryor MD, 5 Units at 03/22/20 1731     levothyroxine tablet 125 mcg (SYNTHROID, LEVOTHROID), 125 mcg, Enteral Tube, Daily 0600, Babar Leigh MD, 125 mcg at 03/24/20 0629     lidocaine (PF) 10 mg/mL (1 %) injection 1-5 mL (XYLOCAINE-MPF), 1-5 mL, Intradermal, Once PRN, Wilmar Rich MD     lidocaine 5 % ointment (XYLOCAINE), , Topical, TID PRN, Jenni Quinteros, CNP     lipase-protease-amylase 5,000-17,000- 24,000 unit capsule 2 capsule (ZENPEP), 2 capsule, Enteral Tube, PRN **AND** sodium bicarbonate tablet 325 mg, 325 mg, Enteral Tube, PRN, Destini Trevino MD     magnesium hydroxide suspension 30 mL (MILK OF MAG), 30 mL, Enteral Tube, Daily PRN, Babar Leigh  MD Saran     magnesium oxide tablet 400 mg (MAG-OX), 400 mg, Oral, TID, Alba York CNP, 400 mg at 03/24/20 1526     melatonin tablet 3 mg, 3 mg, Enteral Tube, Bedtime PRN, Babar Leigh MD     miconazole 2 % powder (MICOTIN), , Topical, BID, Alba York CNP     morphine injection 2 mg, 2 mg, Intravenous, Q4H PRN, Wilmar Rich MD, 2 mg at 03/11/20 1317     multivit-min-ferrous gluconate 9 mg iron/15 mL Liqd 9 mg of iron, 15 mL, Enteral Tube, DAILY, Nathaly Pryor MD, 9 mg of iron at 03/24/20 0805     naloxone injection 0.2-0.4 mg (NARCAN), 0.2-0.4 mg, Intravenous, PRN **OR** naloxone injection 0.2-0.4 mg (NARCAN), 0.2-0.4 mg, Intramuscular, PRN, Meredith Enriquez CNP     omeprazole suspension 20 mg (PriLOSEC), 20 mg, Enteral Tube, QHS, Nathaly Pryor MD, 20 mg at 03/23/20 2014     [Held by provider] ondansetron injection 4 mg (ZOFRAN), 4 mg, Intravenous, Q4H PRN, 4 mg at 03/07/20 0354 **OR** [Held by provider] ondansetron tablet 8 mg (ZOFRAN), 8 mg, Oral, Q8H PRN, Meredith Enriquez CNP     oxymetazoline 0.05 % nasal spray 2 spray (AFRIN), 2 spray, Each Nare, BID PRN, Maddi Hernandez MD     polyvinyl alcohol 1.4 % ophthalmic solution 1 drop (LIQUIFILM TEARS), 1 drop, Both Eyes, TID PRN, Jenni Quinteros CNP, 1 drop at 03/21/20 0557     pramipexole (MIRAPEX) tablet 0.75 mg, 0.75 mg, Enteral Tube, QHS, Babar Leigh MD, 0.75 mg at 03/23/20 2013     predniSONE tablet 20 mg (DELTASONE), 20 mg, Oral, Daily with brkfst, Kirk Rock MD, 20 mg at 03/24/20 0803     propofoL injection (DIPRIVAN), 5-75 mcg/kg/min, Intravenous, Continuous, Kirk Rock MD, Last Rate: 22.7 mL/hr at 03/24/20 1410, 60 mcg/kg/min at 03/24/20 1410     QUEtiapine tablet 25 mg (SEROquel), 25 mg, Oral, BID, Kirk Rock MD, 25 mg at 03/24/20 0803     sodium chloride bacteriostatic 0.9 % injection 1-5 mL, 1-5 mL, Intradermal, Once PRN, Wilmar Rich MD     sodium chloride flush  10-20 mL (NS), 10-20 mL, Intravenous, PRBRIANNA, Wilmar Rich MD, 20 mL at 20 0032     sodium chloride flush 10-30 mL (NS), 10-30 mL, Intravenous, PRHilario REED Tarek S, MD     sodium chloride flush 2.5 mL (NS), 2.5 mL, Intravenous, Line Care, Meredith Enriquez CNP, 10 mL at 20 1527     sodium chloride flush 20 mL (NS), 20 mL, Intravenous, Hilario DUARTE Tarek S, MD     thiamine tablet 100 mg, 100 mg, Enteral Tube, DAILY, Nathaly Pryor MD, 100 mg at 20 0804 Allergies   Allergen Reactions     Macrobid [Nitrofurantoin Monohyd/M-Cryst] Nausea And Vomiting     Penicillins Hives     3/1/2020 tolerated ceftriaxone      Sulfa (Sulfonamide Antibiotics) Hives          FAMILY HISTORY     Family History   Problem Relation Age of Onset     Von Hippel-Lindau syndrome Sister      Von Hippel-Lindau syndrome Mother      Breast cancer Paternal Aunt         65     Diabetes Neg Hx      Heart disease Neg Hx      Colon cancer Neg Hx          SOCIAL HISTORY     Social History     Socioeconomic History     Marital status:      Spouse name: Not on file     Number of children: 0     Years of education: Not on file     Highest education level: Not on file   Occupational History     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, [employer]     Employer: [employer]   Social Needs     Financial resource strain: Not on file     Food insecurity     Worry: Not on file     Inability: Not on file     Transportation needs     Medical: Not on file     Non-medical: Not on file   Tobacco Use     Smoking status: Former Smoker     Packs/day: 0.25     Last attempt to quit: 2014     Years since quittin.7     Smokeless tobacco: Never Used   Substance and Sexual Activity     Alcohol use: No     Drug use: No     Sexual activity: Yes     Birth control/protection: Surgical   Lifestyle     Physical activity     Days per week: Not on file     Minutes per session: Not on file     Stress: Not on file   Relationships     Social  "connections     Talks on phone: Not on file     Gets together: Not on file     Attends Mu-ism service: Not on file     Active member of club or organization: Not on file     Attends meetings of clubs or organizations: Not on file     Relationship status: Not on file     Intimate partner violence     Fear of current or ex partner: Not on file     Emotionally abused: Not on file     Physically abused: Not on file     Forced sexual activity: Not on file   Other Topics Concern     Not on file   Social History Narrative    .   has severe COPD in 2017 and is in his early 50's and may need lung transplant.        Quit smoking in 2014.        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         REVIEW OF SYSTEMS      Review of Systems - 12 system review negative except as noted in HPI    PHYSICAL EXAM     VITAL SIGNS: /70   Pulse 78   Temp 98.9  F (37.2  C) (Oral)   Resp 20   Ht 4' 10\" (1.473 m)   Wt 134 lb 4.8 oz (60.9 kg)   SpO2 96%   BMI 28.07 kg/m     General : sedated, responsive to painful stimuli  Skin: Skin color and texture normal for patient, no rashes or lesions    Head: Normocephalic, without obvious abnormality, atraumatic   HEENT: no scleral edema or icterus noted   Throat: unable to evaluate  Neck: Supple, no obvious adenopathy   Back: No CVA tenderness   Respiratory: Vented on 40% FiO2, PEEP 8  Chest Wall: Atraumatic, no tenderness or deformity noted  Heart: Regular rate and rhythm  Cardiovascular: Pulses 2+ and symmetric  GI: Abdomen is soft; no guarding or distention  : Patient is eliminating bladder via moreno with adequate output noted   Musculoskeletal: Stable gait; no obvious swelling, bruising or deformity  Neurologic: sedated; responsive to pain stimuli      RADIOLOGY      XR Chest 1 View Portable   Final Result   Persistent bilateral pulmonary infiltrates.         XR Chest 1 View Portable   Final Result   Bilateral pulmonary infiltrates, increasing on " the right.         XR Chest 1 View Portable   Final Result   New left IJ central venous catheter tip is in the right atrium. No pneumothorax. NG tube courses below the diaphragm. Endotracheal tube remains above the johnnie. Bilateral pulmonary infiltrates have not appreciably changed.      XR Chest 1 View Portable   Final Result   The endotracheal tube is 3 cm from the johnnie. An NG or OG tube takes an esophageal course and terminates off the field-of-view. Heterogeneous opacities throughout the lungs have not significantly changed from the prior study, except for a    small focal right hilar opacity which has slightly increased. Small pleural effusions are likely. No pneumothorax. The cardiomediastinal silhouette is normal in size.       XR Chest 1 View Portable   Final Result   Endotracheal tube tip in the low trachea approximately 2.3 cm above the level the johnnie. Minimal cardiac enlargement. Mild pulmonary vascular congestion. Interval increase in hazy ill-defined opacities right lung base. Slight decrease in    opacities left lung base with minimal left basilar pleural fluid. Enteric tube extending below level of the EG junction off the inferior aspect of the image.      XR Abdomen AP Portable   Final Result   No definitive evidence for small bowel dilatation or obstruction. Minimal amount of gas within nondilated colon. Right-sided femoral venous catheter extending up to the inferior aspect of the IVC. Enteric tube with the tip in the distal    stomach. No overt osseous abnormality.      XR Chest 1 View Portable   Final Result   Endotracheal tube tip in good position in mid trachea. Enteric tube extends below level of the EG junction off the inferior aspect of the image within the distal stomach. Bilateral alveolar infiltrates, greater on the left lung base with    consolidation and/or atelectasis slightly improved. Minimal left basilar pleural fluid. Mild cardiac enlargement with pulmonary vascular  congestion.      XR Chest 1 View Portable   Final Result   Interval retraction of the endotracheal tube now in good position in the distal trachea. Enteric tube extending below the level of the EG junction off the inferior aspect of the image within the distal stomach. Stable consolidation left lung    base. Hazy ill-defined areas of consolidation left upper lung. Interval increase in amount of infiltrate right lung base. Pulmonary vascular congestion with mild cardiac enlargement. Minimal bilateral pleural fluid collections, greater on the left.      XR Chest 1 View Portable   Final Result      Right mainstem intubation. This finding was discussed with in Khalif at 2:45 PM 3/11/2020.      Enteric tube courses below the diaphragm with tip beyond the proximal stomach.      Opacity in the left base is increased with new obscuration of the left hemidiaphragm. Otherwise multisegmental left perihilar distribution opacities are similar. New airspace opacity in the right perihilar distribution.      No pneumothorax visible on this single supine view.      CTA Chest PE Run   Final Result      1.  Mild bilateral pulmonary artery embolism. Borderline RV to LV ratio and slight flattening of the interventricular septum; RV strain not excluded. Pulmonary arteries not enlarged.      2.  Regions of confluent groundglass and opacities throughout all lobes bilaterally. Mild left lung consolidation. Differential includes superimposed edema / ARDS, hemorrhage and alveolar damage, or other nonspecific infectious / inflammatory process    (including a massive aspiration event). These findings make evaluation of pulmonary infarct difficult.      3.  Tiny right and small left pleural effusions.      4.  Borderline adenopathy, presumed reactive.         NOTE: ABNORMAL REPORT      THE DICTATION ABOVE DESCRIBES AN ABNORMALITY FOR WHICH FOLLOW-UP IS NEEDED.      PE and lung opacity findings verbally communicated to see in AME Quinteros at  1:39 PM on 03/11/2020.         US Venous Arm Right   Final Result   1.  Nonocclusive DVT around the PICC and right subclavian and brachiocephalic veins.   2.  Nonocclusive superficial thrombophlebitis around the PICC in the right cephalic vein in the forearm.      I discussed the findings with the patient's nurse, Julián, at 4:00 PM on 03/10/2020.      XR Chest 1 View Portable   Final Result   Again noted is consolidation in the left mid and lower lung. This appears slightly worse than on the prior study. The right lung is clear. No pneumothorax or pleural effusion. Heart size and pulmonary vascularity are normal. There is a PICC    catheter from right upper extremity approach with tip in the superior vena cava.      XR Chest 1 View Portable   Final Result   Allowing for differences in exams, little change of left mid to lower lung predominant opacities and consolidation. Cannot exclude small left pleural effusion. No other significant change. Right PICC tip at the cavoatrial junction.            XR Chest 1 View Portable   Final Result   Again seen are extensive infiltrates throughout the left lung with some focal volume loss in the medial aspect of the left lung base. Very mild patchy right perihilar infiltrate. Slight improvement of the pulmonary infiltrates when compared    with yesterday's exam.         XR Chest 1 View Portable   Final Result   Stable heart size. Bilateral airspace opacities left greater than right. Slight interval improvement in aeration left lung. Trace left effusion. Bony thorax unremarkable. Monitor electrodes.      XR Chest 1 View Portable   Final Result   Heart size magnified. Extensive airspace opacification left lung with consolidation and interval worsening. Airspace opacification right mid and right lower lung. Trace bilateral effusions. Stomach mildly distended with air. Interval    worsening.      XR External Imaging Chest   Final Result          EKG     Reviewed        LABS     Labs  reviewed personally      Thank you for the consult and allowing us to be involved in the care of this patient.    Tamia Gallegos, CNP  [phone]  [facility]

## 2021-06-07 NOTE — PLAN OF CARE
Problem: Inadequate Airway Clearance  Goal: Patient will maintain patent airway  Outcome: Progressing     Problem: Mechanical Ventilation  Goal: Patient will maintain patent airway  Outcome: Progressing  Goal: ET tube will be managed safely  Outcome: Progressing    Vent Mode: PCV/VG  FiO2 (%):  [45 %] 45 %  S RR:  [32] 32  S VT:  [225 mL] 225 mL  PEEP/CPAP (cm H2O):  [12 cm H2O] 12 cm H2O  Minute Ventilation (L/min):  [6.4 L/min-7.3 L/min] 7.2 L/min  PIP:  [28 cm H2O-36 cm H2O] 35 cm H2O  MAP (cm H2O):  [17-24] 20  Plat: 32     Pt. remains on full vent support, settings above. ETT 7.5 21 @ teeth, BS coarse. Nebulizer treatment given as scheduled, Pt. remains on full strength veletri, PIP mid 30's, plats remained at 32. RT following    Dane Stock, LRT

## 2021-06-07 NOTE — PROGRESS NOTES
"[facility]  Palliative Care Social Work Note:    PCSW made supportive check in call to  Brian. Acknowledged how challenging this situation is. He has a good support system in place both with family/friends but also from medical community at [facility] because of his own health issues. He hopes pt can experience \"some improvement.\" He is most concerned about pt staying on a machine as he knows \"she wouldn't want that.\" There are also questions/unknowns about what her life will be after a tracheostomy. He has not received confirmation re when this procedure will take place.      is concerned that he isn't able to be with pt because of visitor restrictions and is especially concerned that she will wake up confused without any family at her bedside. PCSW acknowledged how difficult this. He hopes there will be an opportunity to see her at some point. He also worries about her 80 year old father. PCSW provided active listening, validated his feelings and provided emotional support.     PCSW ensured he has Palliaitive Care office number and encouraged him to call at anytime. He expressed appreciation for support and call and found conversation helpful.    Rama Peters, [facility]  Palliative Care   Office # [phone]  "

## 2021-06-07 NOTE — PROGRESS NOTES
Clinical Nutrition Therapy Follow Up Note    Per rounds continue to hold TF for now. Per MD, plan to transition to comfort care tomorrow.     Current Nutrition Prescription:   Diet: NPO  Flushes 120 ml q 6 hours.  dextrose or Fluids:dextrose 10%  dextrose 10%, Last Rate: Stopped (03/26/20 1713)  dextrose 10%  dextrose 10%, Last Rate: 50 mL/hr (03/30/20 0735)  HYDROmorphone in 0.9 % NaCl  midazolam (VERSED) 100 mg/100 mL (1 mg/mL), Last Rate: 7 mg/hr (03/30/20 1019)  propofol, Last Rate: 70 mcg/kg/min (03/30/20 1018)    Current Nutrition Intake:  NPO does not meet estimated needs.     Anthropometrics:  Admission weight: 147 lb 4.3 oz (66.8 kg) bed  Weight: 138 lb 0.1 oz (62.6 kg)     GI Status/Output:   GI symptoms include: watery BM per nursing per rectal tube   Bowel Sounds present per nurse  Last BM: 3/30/20 per nurse     Skin/Wound:  No wound was noted.    Medications:  Reviewed.     Labs:  Reviewed.     Estimated nutrition needs:   Assessment weight is 49.0 Kg, adjusted weight  Energy needs: 7792-8496 Calories/day (25-30 Jean/Kg)  Protein needs: 59-74 g daily (1.2-1.5 g/Kg)   Fluid needs: 5890-5749 ml/day (30-35 ml/Kg)    Malnutrition: Noted previously (severe)    Nutrition Risk Level: high risk    Nutrition dx:  Inadequate oral intake r/t respiratory failure as evidenced by need for nutrition support.     Malnutrition r/t aspiration PNA and acute Respiratory failure as evidenced by 3.2% wt loss in 1 week, moderate muscle loss, and fluid accumulation.    Goal Status:  Patient will tolerate TF - not met   BG < 180 mg/dL- met   Maintain weight  - fluctuates  Bowel function WNL- not progressing  Stools < 3/day- progressing slowly    Intervention:  Continue to monitor if POC changes.     Monitoring/Evaluation:  TF tolerance, stooling, labs, wt

## 2021-06-07 NOTE — PROGRESS NOTES
Clinical Nutrition Therapy Follow Up Note      Current Nutrition Prescription:   Diet: Tube Feeding, No Tray  Formula: Peptamen AF @ 25ml/hr held 1 hr before and 2 hours after levothyroxine  Flushes: 100ml water q 4 hours  Diet Supplements: 1 pkt Prosource TID  IV dextrose or Fluids:dextrose 10%  fentaNYL 2500 mcg/50 mL (50 mcg/mL), Last Rate: 200 mcg/hr (03/22/20 0800)  heparin, Last Rate: 16 Units/kg/hr (03/22/20 0800)  midazolam (VERSED) 100 mg/100 mL (1 mg/mL), Last Rate: 7 mg/hr (03/22/20 1056)  propofol, Last Rate: 50 mcg/kg/min (03/22/20 0745)    Current Nutrition Intake:  Enteral nutrition access is a oral gastric tube, with a placement date of 3/11/20. The current tube feeding order will provide 525 kcals, 40 grams protein, 3 grams fiber, 59 grams carbohydrate, 424mls free water from formula, 600 mls from fluid flush, for a total of 1024 mls free water daily.    The current propofol order provides 499 calories daily    Prosource provides additional 180kcals and 45g protein bringing daily totals to 1309kcals and 85g protein.     Total nutrient intake from all sources meets estimated nutritional needs.    Anthropometrics:  Admission weight: 147 lb 4.3 oz (66.8 kg) bed  Weight: 142 lb (64.4 kg) +1 pitting edema (up from nonpitting edema), 3.2% wt loss in 1 week     GI Status/Output:   GI symptoms include: Diarrhea per nurse x 7 stools 3/21/20  Bowel Sounds present per nurse  Last BM: 3/22/20 per nurse    Skin/Wou/nd:  No wound was noted.    Medications:  Prosource, Lantus, Nutrisource, Novolin, synthroid/levothroid, prilosec, deltasone, thiamine    Labs:  BUN 35, hgb 8.2, rbc 2.81, hct 26.9    Estimated nutrition needs:   Assessment weight is 49.0 Kg, adjusted weight     Energy needs: 6574-5886 Calories/day (25-30 Jean/Kg)  Protein needs: 59-74 g daily (1.2-1.5 g/Kg)   Fluid needs: 8950-2161 ml/day (30-35 ml/Kg)    Malnutrition: Patient meets diagnostic criteria for severe protein calorie malnutrition in the  context of acute illness as evidenced by  unintentional weight loss, muscle loss and fluid accumulation    Nutrition Risk Level: high risk    Nutrition dx:  Inadequate oral intake r/t respiratory failure as evidenced by need for nutrition support.    Malnutrition r/t aspiration PNA and acute Respiratory failure as evidenced by 3.2% wt loss in 1 week, moderate muscle loss, and fluid accumulation.    Goal Status:    Patient will tolerate TFas evidenced by RV < 500 mls, MET With Reglan Rx, RV < 100 mls    BG < 180 mg/dL, MET    No s&s aspiration MET    Maintain weight MET  Weights 140 to 147 lbs    Bowel function WNL-not progressing    Stools < 3/day    Intervention:  Reassessed nutrition needs w/ significant weight loss and no longer obese    Ordered 2 pkts Nutrisource two times a day to help with diarrhea. Provides additional 64kcals and 12g fiber.    Increased TF to 30ml/hr to avoid further weight loss.     Decreased Prosource No Carb to two times dt increased TF rate below    Peptamen AF @ 30ml/hr x 21 hrs (held 1 hr before and 2 hours after levothyroxine) will provide 756kcals, 48g protein, 4g fiber, 71g CHO, 35g fat, 509ml free fluid, 600ml water from flushes for a total of 1109ml fluid daily.    All modulars and propofol will bring daily total to: 1439kcals, 78g protein, 16g fiber.    If diarrhea improves but not yet < 3 stools per day recommend increase Nutrisource to 2 pkts three times a day.    If diarrhea does not improve or gets worse recommend switch formula to Peptamen 1.5 as this formula does not contain inulin or any other additional fiber.     Monitoring/Evaluation:  TF tolerance, stooling, labs, wt

## 2021-06-07 NOTE — PROGRESS NOTES
CRITICAL CARE PROGRESS NOTE:    Assessment/Plan:  Harriett Romero is a 54 y.o. female with depression with anxiety, history of migraines, insomnia, remote tobacco abuse, metabolic syndrome, hypothyroidism, and restless leg syndrome who developed aspiration pneumonitis after vomiting while recovering from anesthesia for elective colonoscopy on 2/25/20, presented to urgency room with aspiration pneumonia, and directly admitted to [facility] on 3/1/2020 for care, subsequently transferred to ICU on 3/6/20 with progressive respiratory failure with acute respiratory distress syndrome and bilateral pulmonary emboli, intubated on 3/11/2020.    NEURO:  Sedated on vent. Was paralyzed last week for vent dyssynchrony with very high airway pressures. High risk for ICU delirium and myopathy (ICU and steroid). ?serotonin syndrome last week with ?seizures and clonus, CK was normal    Stop versed today    Cont propofol, fentanyl for sedation. Prop labs OK on 3/20    RASS goal 0 to -1    Tylenol prn pain    Stop the scheduled dilaudid - unclear why this was started, per RN no significant pain issues today or yesterday per report.     Start seroquel 25mg two times a day (last QTc OK)     Resume celexa but half home dose (20mg daily)    Hold home wellbutrin.    Cont mirapex as previously    CV:  Was in circulatory shock, on NE. Was not on high dose.lactates normal. Organ perfusionis adequate. Echo 3/7 no RH strain, EF 63%, no WMA's, normal RV size/function, no valve disease, no change from 2017.  on 3/6. Trop's neg    MAP >65    Diuretics on hold due to contraction alkalosis    Hold home anti-hypertensives    No need to trend lactates unless clinical change    RESP:  Presumed massive aspiration episode on 2/25 after colonoscopy. Presented to hospital 3/1 with left sided infiltrate. Intubated 3/11 for progressive resp failure and ARDS c/b bilateraly PE's. Definite improvement in lung compliance with lower Pplat, on  less PEEP/FiO2 oxygenation improving compared to last week.     Dropped Vt to 225cc with improvement in Pplat to 20-26 (was 32 on 250cc)    DP goal 10-15, last was 26 - 8 = 14 cm H2O    Cont high PEEP/low FiO2 protocol, currently 0.35/8    Permissive hypercapnia, pH OK    Holding on Lasix as above    UFH drip for PE (see heme section below)    Steroids did not help (got 6 days IV methylpred). Will accelerate po prednisone taper as likely to worsen delirium and ICU myopathy    Cont daily ABG's for now    A-line to be removed    Will discuss potential trach this week with her  Elen.    Bedside US done on right -> minimal pleural fluid, not enough to tap.     GI:  intermitently high GVR, improved 600 -> 100 this AM    Cont TF and advance as tolerated    Bowel regimen    Cont PPI    RENAL:  No issues, CO2 elevated likely contraction allk +/- compensatory for hypercapnia in the setting of permissive hypercapnia.    Clark in place    Avoid nephrotoxins.    Follow BUN/Scr    ID:  Completed ~2 weeks of various, no cultures positive. Candida seen on bronch likely OP contaminant. PJP negative. Wbc 27K -> 17K likely some of this is stress and steroid response with demargination.    Cont to monitor off abx    Follow fever curve    HEMATOLOGIC:  LUE PICC-assoc DVT with bilateral PE's    Cont UFH drip pending decision for trach    Follow plt, hgb    hgb >7    ENDOCRINE:  No issues.     FSBG checks, insulin SS/drip per ICU protocol    ICU PROPHYLAXIS:    UFH drip    peridex    PPI    DISPO/CODE STATUS: full code    FAMILY COMMUNICATION: will touch bse with her  Elen today.  palliative care has been consulted to clarify GOC with Elen. A tracheostomy this week would be the next step if he wants to proceed with aggressive measures.     Lines/Drains/Tubes:  Clark  Left IJ TLC - maintain  Arterial line - REMOVE today  PIV  ETT 7.5mm  OG tube    Restraints  Progress Note  Restraint Application    I recognize that  "restraints are physical and/or chemical interventions intended to restrict a person's movements. Restraints are currently needed to ensure the safety of this patient and/or others. My clinical rationale appears below.    Category/Type of Restraint     Non Violent:  Soft limb restraint x2  --  Behavior  Pulling at tubes/lines  --  Root Cause of the Behavior  Sedation/intubation  --  Less-Restrictive Measures that Failed  Non Violent Measures:  Close Observation  --  Response to the Restraint  Patient unable to pull at tubes/lines  --  Criteria for Release from the Restraint  Patient calm and off sedation    Kirk Rock MD (Avi)  [facility] Pulmonary & Critical Care  Pager [phone]  Clinic [phone]      Overnight events:  No major events  Vent settings approaching minimal  P:F this AM after Vt adjusemtn 195 on 0.4/8  Vt decr to 225cc due to Pplat 32, 15-20 min after adjusment, Pplat was 20 with DP 20 - 8 = 12 cm H2O. Around noon Pplat was 25, still at goal.   RASS at -3, does not wake up but getting propfool, fentanyl, versed drips and scheduled dilaudid.   Spoke with palliative care this AM  GVR >600, TF paused, down to 100cc this AM and TF planned to resume.     Subjective:  Unable to assess    Objective:  Physical Exam:  Vent settings for last 24 hours:  Vent Mode: VCV  FiO2 (%):  [40 %-50 %] 40 %  S RR:  [24] 24  S VT:  [250 mL] 250 mL  PEEP/CPAP (cm H2O):  [8 cm H2O-10 cm H2O] 8 cm H2O  Minute Ventilation (L/min):  [6 L/min-8.2 L/min] 6 L/min  PIP:  [37 cm H2O-58 cm H2O] 37 cm H2O  MAP (cm H2O):  [12-16] 12    BP 95/53 (Patient Position: Lying)   Pulse 60   Temp 98.3  F (36.8  C) (Oral)   Resp 24   Ht 4' 10\" (1.473 m)   Wt 141 lb (64 kg)   SpO2 99%   BMI 29.47 kg/m      Intake/Output last 3 shifts:  I/O last 3 completed shifts:  In: 2179.8 [I.V.:1369.8; NG/GT:810]  Out: 3025 [Urine:3025]  Intake/Output this shift:  I/O this shift:  In: 100 [NG/GT:100]  Out: 600 " [Urine:600]    Physical Exam  Gen: intubated, sedated. Waking more as sedation is turned off  HEENT: no OP lesions, no JESSICA  CV: RRR, no m/g/r  Resp: diminished ant some coarse sounds no wheezing.   Abd: soft, nontender, BS+  Neuro: PERRL, nonfocal  Ext: no edema    LAB:  Results from last 7 days   Lab Units 03/22/20  0328 03/22/20  0328   LN-WHITE BLOOD CELL COUNT thou/uL  --  17.9*   LN-HEMOGLOBIN g/dL 8.2* 8.2*   LN-HEMATOCRIT %  --  26.9*   LN-PLATELET COUNT thou/uL  --  227  227     Results from last 7 days   Lab Units 03/23/20  0404 03/22/20  0328 03/21/20  0400 03/20/20  0413   LN-SODIUM mmol/L 141 142 140 140   LN-POTASSIUM mmol/L 4.6 4.6 4.3 5.1*   LN-CHLORIDE mmol/L 98 101 98 99   LN-CO2 mmol/L 34* 33* 34* 35*   LN-BLOOD UREA NITROGEN mg/dL 40* 35* 41* 44*   LN-CREATININE mg/dL 0.67 0.64 0.68 0.72   LN-CALCIUM mg/dL 9.5 9.1 9.3 9.2   LN-PROTEIN TOTAL g/dL  --   --   --  6.0   LN-BILIRUBIN TOTAL mg/dL  --   --   --  0.3   LN-ALKALINE PHOSPHATASE U/L  --   --   --  78   LN-ALT (SGPT) U/L  --   --   --  14   LN-AST (SGOT) U/L  --   --   --  16       Micro  PCT 0.82 -> 0.66 last checked 3/14  BAL 3/11  + for candida likely contaminant  BAL 3/14: also with candida  All culturesneg    BAL cytology from RML/LLL neg for PJP  Pseudohyphae c/w candida likely oral contaminant    Flu swab neg  Legionella neg        Current Facility-Administered Medications   Medication Dose Route Frequency Provider Last Rate Last Dose     acetaminophen solution 650 mg (TYLENOL)  650 mg Enteral Tube Q6H PRN Babar Leigh MD   650 mg at 03/12/20 1734     acetaminophen suppository 650 mg (TYLENOL)  650 mg Rectal Q4H PRN Meredith Enriquez, CNP   650 mg at 03/13/20 0205     albuterol nebulizer solution 2.5 mg (PROVENTIL)  2.5 mg Nebulization Q4H PRN Meredith Enriquez, CNP   2.5 mg at 03/14/20 0224     albuterol nebulizer solution 2.5 mg (PROVENTIL)  2.5 mg Nebulization Q6H - RT Nathaly Pryor MD   2.5 mg at 03/23/20  0732     aluminum-magnesium hydroxide-simethicone 200-200-20 mg/5 mL suspension 30 mL (MAALOX ADVANCED)  30 mL Enteral Tube Q4H PRN Babar Leigh MD         amino acids-protein hydrolys 15-60 gram-kcal/30 mL packet 1 packet (ProSource No Carb)  1 packet Oral TID Kirk Rock MD   1 packet at 03/23/20 0843     bisacodyL suppository 10 mg (DULCOLAX)  10 mg Rectal Daily PRN Meredith Enriquez CNP   10 mg at 03/13/20 1255     [Held by provider] buPROPion tablet 150 mg (WELLBUTRIN)  150 mg Enteral Tube BID Alba York CNP   150 mg at 03/19/20 2122     chlorhexidine 0.12 % solution 15 mL (PERIDEX)  15 mL Topical Q12H Kirk Rock MD   15 mL at 03/23/20 0351     citalopram tablet 20 mg (celeXA)  20 mg Enteral Tube QHS Kirk Rock MD         dextrose 10%  15 mL/hr Intravenous Continuous PRN Kirk Rock MD         dextrose 50 % (D50W) syringe 20-50 mL  20-50 mL Intravenous Q15 Min PRN Meredith Enriquez CNP         diphenhydrAMINE injection 25 mg (BENADRYL)  25 mg Intravenous Q6H PRN Alba York CNP   25 mg at 03/10/20 0014     [Held by provider] fentaNYL - BOLUS DOSE from infusion 50 mcg  50 mcg Intravenous Q1H PRN Kirk Rock MD   50 mcg at 03/18/20 1713     fentaNYL 2500 mcg/50 mL CADD infusion (50 mcg/mL)   mcg/hr Intravenous Continuous Kirk Rock MD 4 mL/hr at 03/23/20 0800 200 mcg/hr at 03/23/20 0800     glucagon (human recombinant) injection 1 mg  1 mg Subcutaneous Q15 Min PRN Meredith Enriquez CNP         guar gum powder packet 2 packet (BENEFIBER;NUTRISOURCE)  2 packet Enteral Tube BID Nathaly Pryor MD   2 packet at 03/23/20 0843     heparin 25,000 units in 0.45% sodium chloride (100 units/ml) 250 mL ANTICOAGULANT infusion  1-50 Units/kg/hr Intravenous Continuous Kirk Rock MD 10 mL/hr at 03/23/20 0800 16 Units/kg/hr at 03/23/20 0800     hydrALAZINE injection 10-20 mg (APRESOLINE)  10-20 mg Intravenous Q4H PRN Tae Schmidt MD          HYDROmorphone 2 mg/2 mL solution 2 mg (DILAUDID)  2 mg Enteral Tube Q6H Kirk Rock MD         hydrOXYzine pamoate capsule 25-50 mg (VISTARIL)  25-50 mg Enteral Tube Q4H PRN Babar Leigh MD         insulin glargine injection 18 Units (LANTUS)  18 Units Subcutaneous QAM Nathaly Pryor MD   18 Units at 03/23/20 0843     insulin regular injection (NovoLIN R)   Subcutaneous Q6H FIXED TIMES Nathaly Pryor MD         [Held by provider] insulin regular injection 5 Units (NovoLIN R)  5 Units Subcutaneous Q6H FIXED TIMES Nathaly Pryor MD   5 Units at 03/22/20 1731     levothyroxine tablet 125 mcg (SYNTHROID, LEVOTHROID)  125 mcg Enteral Tube Daily 0600 Babar Leigh MD   125 mcg at 03/23/20 0530     lidocaine (PF) 10 mg/mL (1 %) injection 1-5 mL (XYLOCAINE-MPF)  1-5 mL Intradermal Once PRN Wilmar Rich MD         lidocaine 5 % ointment (XYLOCAINE)   Topical TID PRN Jenni Quinteros CNP         lipase-protease-amylase 5,000-17,000- 24,000 unit capsule 2 capsule (ZENPEP)  2 capsule Enteral Tube PRN Destini Trevino MD        And     sodium bicarbonate tablet 325 mg  325 mg Enteral Tube PRN Destini Trevino MD         LORazepam 0.5 mg injection (diluted concentration)  0.5 mg Intravenous Q4H PRN Wilmar Rich MD   0.5 mg at 03/08/20 2004     magnesium hydroxide suspension 30 mL (MILK OF MAG)  30 mL Enteral Tube Daily PRN Babar Leigh MD         melatonin tablet 3 mg  3 mg Enteral Tube Bedtime PRN Babar Leigh MD         midazolam (PF) injection 2 mg (VERSED)  2 mg Intravenous Q1H PRN Jenni Quinteros CNP   2 mg at 03/22/20 2123     midazolam (VERSED) 100 mg/100 mL in NS (1 mg/mL) infusion  0.25-15 mg/hr Intravenous Continuous Brayan, Tae P, MD 2 mL/hr at 03/23/20 1048 2 mg/hr at 03/23/20 1048     morphine injection 2 mg  2 mg Intravenous Q4H PRN Wilmar Rich MD   2 mg at 03/11/20 1317     multivit-min-ferrous  gluconate 9 mg iron/15 mL Liqd 9 mg of iron  15 mL Enteral Tube DAILY Nathaly Pryor MD   9 mg of iron at 03/23/20 0843     naloxone injection 0.2-0.4 mg (NARCAN)  0.2-0.4 mg Intravenous PRN Meredith Enriquez CNP        Or     naloxone injection 0.2-0.4 mg (NARCAN)  0.2-0.4 mg Intramuscular PRN Meredith Enriquez CNP         omeprazole suspension 20 mg (PriLOSEC)  20 mg Enteral Tube QHS Nathaly Pryor MD   20 mg at 03/22/20 2009     [Held by provider] ondansetron injection 4 mg (ZOFRAN)  4 mg Intravenous Q4H PRN Meredith Enriquez CNP   4 mg at 03/07/20 0354    Or     [Held by provider] ondansetron tablet 8 mg (ZOFRAN)  8 mg Oral Q8H PRN Meredith Enriquez CNP         oxymetazoline 0.05 % nasal spray 2 spray (AFRIN)  2 spray Each Nare BID PRN Maddi Hernandez MD         polyvinyl alcohol 1.4 % ophthalmic solution 1 drop (LIQUIFILM TEARS)  1 drop Both Eyes TID PRN Jenni Quinteros CNP   1 drop at 03/21/20 0557     pramipexole (MIRAPEX) tablet 0.75 mg  0.75 mg Enteral Tube QHS Babar Leigh MD   0.75 mg at 03/22/20 2010     predniSONE tablet 40 mg (DELTASONE)  40 mg Enteral Tube Q24H Tae Schmidt MD   40 mg at 03/23/20 0843    Followed by     [START ON 3/25/2020] predniSONE (DELTASONE) tablet 30 mg  30 mg Enteral Tube Q24H Tae Schmidt MD        Followed by     [START ON 3/30/2020] predniSONE tablet 20 mg (DELTASONE)  20 mg Enteral Tube Q24H Tae Schmidt MD        Followed by     [START ON 4/4/2020] predniSONE tablet 10 mg (DELTASONE)  10 mg Enteral Tube Q24H Tae Schmidt MD         propofoL injection (DIPRIVAN)  5-75 mcg/kg/min Intravenous Continuous Kirk Rock MD 13.3 mL/hr at 03/23/20 0932 35 mcg/kg/min at 03/23/20 0932     sodium chloride bacteriostatic 0.9 % injection 1-5 mL  1-5 mL Intradermal Once PRN Wilmar Rich MD         sodium chloride flush 10-20 mL (NS)  10-20 mL Intravenous PRN Wilmar Rich MD   20 mL at 03/14/20 0032     sodium chloride  flush 10-30 mL (NS)  10-30 mL Intravenous PRN Wilmar Rich MD         sodium chloride flush 2.5 mL (NS)  2.5 mL Intravenous Line Care Meredith Enriquez CNP   10 mL at 03/23/20 0844     sodium chloride flush 20 mL (NS)  20 mL Intravenous PRN Wilmar Rich MD         thiamine tablet 100 mg  100 mg Enteral Tube DAILY Nathaly Pryor MD   100 mg at 03/23/20 0843       Critical care attestation: 40 minutes spent managing the following issues: acute respiratory failure requiring intubation/IMV, severe aspiratoin with severe ARDS now fibroproliferative stage.  toxic metabolic encepahlopathy, risk for ICU delirium. High risk for organ deterioration and death requiring ICU level care.

## 2021-06-07 NOTE — PROGRESS NOTES
PROVIDER RESTRAINT FOR NON-VIOLENT BEHAVIOR FACE TO FACE EVALUATION  3/26/2020   8:44 AM     Patient seen on morning interdisciplinary rounds.     Patient's Immediate Situation:  Patient demonstrated the following behaviors: Pulling/tugging at invasive lines or tubes and does not respond to verbal/non-verbal redirection    Patient's Reaction to the intervention:  Does patient understand the reason for restraint/seclusion? Unable to Express    Medical Condition:  Is there any evidence of compromise of Skin integrity, Respiratory, Cardiovascular, Musculoskeletal, Hydration? No    Behavioral Condition:  In consultation with the RN, is there a need to continue this restraint or seclusion? Yes    See Restraint Flowsheet for complete restraint documentation and assessment.    Meredith Palomares, CNP

## 2021-06-07 NOTE — PRE-PROCEDURE
Verbal consent obtained?: Yes  Written consent obtained?: Yes  Risks and benefits: Risks, benefits and alternatives were discussed  Consent given by: patient  Expected level of sedation: moderate  ASA Class: Class 3- Severe systemic disease, definite functional limitations  Mallampati: Grade 1- soft palate, uvula, tonsillar pillars, and posterior pharyngeal wall visible  Patient states understanding of procedure being performed: Yes  Patient's understanding of procedure matches consent: Yes  Procedure consent matches procedure scheduled: Yes  Appropriately NPO: yes  Lungs: other (comment)  Lung exam comment: diminished anteriorly no wheezing  Heart: normal heart sounds and rate  History & Physical reviewed: History and physical reviewed and no updates needed  Statement of review: I have reviewed the lab findings, diagnostic data, medications, and the plan for sedation

## 2021-06-07 NOTE — PLAN OF CARE
"  Problem: Inadequate Airway Clearance  Goal: Patient will maintain patent airway  Outcome: Progressing  Goal: Patient will achieve/maintain normal respiratory rate/effort  Outcome: Progressing     Problem: Inadequate Gas Exchange  Goal: Patient will achieve/maintain normal respiratory rate/effort  Outcome: Progressing     Problem: Excessive Fluid Volume  Goal: Patient will achieve/maintain normal respiratory rate/effort  Outcome: Progressing     Problem: Impaired Gas Exchange  Goal: Demonstrate improved ventilation and adequate oxygenation of tissues as evidenced by absence of respiratory distress  Outcome: Progressing     Problem: Mechanical Ventilation  Goal: Patient will maintain patent airway  Outcome: Progressing  Goal: ET tube will be managed safely  Outcome: Progressing   RESPIRATORY CARE NOTE     Patient Name: Harriett Romero  Today's Date: 3/20/2020     Pt continues on the following settings:  Vent Mode: PCV/VG  FiO2 (%):  [45 %] 45 %  S RR:  [32] 32  S VT:  [225 mL] 225 mL  PEEP/CPAP (cm H2O):  [12 cm H2O] 12 cm H2O  Minute Ventilation (L/min):  [6.4 L/min-7.2 L/min] 7.2 L/min  PIP:  [32 cm H2O-38 cm H2O] 33 cm H2O  MAP (cm H2O):  [18-21] 18   Plateau pressure: 33-35 cm H2O     Pt is intubated with  # 7.5 ETT secured  21 at the teeth.  Pt receives Veletri  full strength cont. BS are coarse pre and post neb tx. Pt has minimal cough with suction. RT suctioned pt for mod to lg yellow green. Pt's respiratory status is stable. RT will continue to follow per MD's orders.     /65   Pulse (!) 59   Temp 99  F (37.2  C) (Oral)   Resp (!) 32   Ht 4' 10\" (1.473 m)   Wt 143 lb 15.4 oz (65.3 kg)   SpO2 95%   BMI 30.09 kg/m        Tootie Skelton, LRT   "

## 2021-06-07 NOTE — CONSULTS
General surgery consultation:    I agree with the note from Tamia robins.  We will plan to do a PEG and trach on this patient tomorrow morning.    Please hold tube feeds after midnight  Please stop heparin after midnight    Edward [facility]  [phone]

## 2021-06-07 NOTE — PLAN OF CARE
Problem: Pain  Goal: Patient's pain/discomfort is manageable  Outcome: Not Progressing   Continues to be restless and grimace. Dilaudid given throughout day along with Ativan for agitation      Problem: Infection  Goal: Signs and symptoms of infections are decreased or avoided  Outcome: Not Progressing  PCXR showed increase infiltrate. ABX started      Problem: Knowledge Deficit  Goal: Patient/family/caregiver demonstrates understanding of disease process, treatment plan, medications, and discharge instructions  Outcome: Not Progressing    Reassured and updated on date, time and location.  updated

## 2021-06-07 NOTE — PROGRESS NOTES
Critical Care Medicine Progress Note  3/28/2020    ASSESSMENT/PLAN:  54 y.o. former smoker with most pertinent history of hypothyroidism, migraines, depression and anxiety, who was admitted 3/1/2020 with acute hypoxic respiratory failure secondary to an aspiration event during colonoscopy 2/25/2020.  She had a prolonged hospitalization during which she was diagnosed with bilateral PEs and was subsequently intubated for progressively worsening hypoxic respiratory failure with ARDS on 3/11/2020.  She underwent a tracheostomy 3/25. Her pulmonary status deteriorated 3/27 & she is being treated for a new VAP.     New events:    COVID-19 PCR pending    Sedation w/ propofol, midazolam, dilaudid drips    Blood cultures, sputum culture, urine culture    Cefepime switched to meropenem    Neuro/Psych:   #. Analgesia/Sedation: Goal RASS 0 to -1.  Regimen escalated to propofol + midazolam + dilaudid drips. Discontinue Precedex.   #.  Potential serotonin syndrome, transitioned from fentanyl to Dilaudid.  #. Encephalopathy, protracted, non-focal. Will consider CT head once we know patient's COVID-19 status. Will consult Neurology once we have imaging available.     Pulmonary:   #.  Acute hypoxic and hypercarbic respiratory failure with intubation 3/11 in setting of aspiration pneumonia with ARDS and bilateral PEs.  Bronchoscopy 3/11 and 3/15, thus far no pathogens identified; in this setting Candida tends to be a colonizing organism. Clinical deterioration 3/27 is concerning for new VAP (see ID).   #.  Tracheostomy 3/25, #8 Shiley in place.  At this point in time continue with full ventilator support.    Cardiovascular: having episodes of intermittent hypotension, which is concerning for sepsis d/t new VAP. Managing w/ PRN LR boluses. Strict I/O.    Infectious:  Antibiotics: vancomycin (started 3/27), meropenem (started 3/28), cefepime (given 3/27 & 3/28)  #.  COVID-19 rule out initiated 3/26 in setting of persistent respiratory  failure and fever.  My concern would be for community-acquired infection. PCR pending. Trying to minimize advanced imaging until result is available to minimize exposure/contramination risks.   #. VAP, new opacities on imaging 3/27 w/ acute clinical deterioration. Started on broad empiric antibiotics, sputum culture pending.     Renal: No acute issues. Improving HCO3 w/ some volume repletion.   GI: No acute issues.  PEG 3/25, TF restarted 3/26. Continue increasing the rate until at goal.    Heme:   #.  Bilateral PEs and LUE PIC associated DVT,will require 3 months of anticoagulation.  Currently on therapeutic enoxaparin -- in light of clinical deterioration, I am holding off on transitioning to a DOAC in case an urgent invasive intervention is required.     Endo: Thyroidism, currently on PTA levothyroxine.     Access/Lines/Tubes: required and necessary for continued patient cares  Tracheostomy, [place], #8  CVC, L IJ  Clark catheter  Rectal tube  PEG tube    ICU prophylaxis:   #. VAP ppx: HOB 30 degrees, chlorhexidine rinses  #. Stress ulcer: PPI  #. Diet: starting TF via PEG 3/26  #. VTE: enoxaparin  #. Restraints: required and necessary for continued patient cares    CODE: FULL    Dispo: ICU for sedation, ventilator management    Patient is critically ill with persistent & worsened hypoxic respiratory failure requiring mechanical ventilation and encephalopathy with concern for serotonin syndrome; both have a high risk of rapid and profound deterioration requiring critical time billing. Total CCT spent 67 minutes thus far today.     This report was prepared using speech recognition software.  Any typographical errors are unintentional.  Please, contact me directly for any clarifications of my report.    Casandra Franklin MD  Pulmonary and Critical Care     =================================================================    Interval history: remains intermittently febrile. Started on empiric antibiotics for VAP 3/27.  Markedly tachypneic over the last 24 hours -- propofol started overnight, she has been requiring a lot of PRN medications. Nursing notes reviewed.     Vitals: Temp:  [98.7  F (37.1  C)-101.1  F (38.4  C)] 100  F (37.8  C)  Heart Rate:  [] 105  Resp:  [24-62] 61  BP: ()/(45-84) 144/68  FiO2 (%):  [40 %-100 %] 50 %  Vent:   Vent Mode: PCV/VG  FiO2 (%):  [40 %-100 %] 50 %  S RR:  [18-24] 18  S VT:  [225 mL-300 mL] 300 mL  PEEP/CPAP (cm H2O):  [5 cm H2O] 5 cm H2O  Minute Ventilation (L/min):  [8.1 L/min-16.1 L/min] 8.1 L/min  PIP:  [13 cm H2O-44 cm H2O] 38 cm H2O  MAP (cm H2O):  [5-25] 16    Physical Exam:   General: petite  woman, lying in bed, NAD  HEENT: trach midline, sutures in place, site is clean/dry, PERRL, MMM   CV: RRR, no M/R/G; extremities adequately perfused  Pulm: markedly tachypneic, equal coarse mechanical bilateral breath sounds, no wheezing, no rhonchi, bibasilar crackles, no cough  Abd: soft, ND, NT, hypoactive bowel sounds  Msk: warm to touch, no BLE edema  Derm: no acute lesions or rashes on limited exam  Neuro: somnolent at the time of the exam; BUE have normal joint movement; BLE rigid vs simply resisting any attempt to manipulate the joints; no clonus  Psych: sedated    Labs: personally reviewed in EMR. Most pertinent for HCO3 of 27. Mg 1.5 & is being replaced. Calcium 7.9 & is being replaced. VBG 7.48/39/--    Imaging: personally reviewed in EMR. Formal Radiology impression below:  #. CXR, 3/27:  Increased airspace opacity at the right lung base. There is new silhouetting of the left hemidiaphragm consistent with increased airspace consolidation at the left base as well. Heart size and pulmonary vascularity are normal. Tracheostomy tube and central venous catheter are unchanged. No pneumothorax or pleural effusion. Gastrostomy tube in stomach.

## 2021-06-07 NOTE — PLAN OF CARE
Problem: Nutrition Care Problem  Goal: Nutritional status is improving  Outcome: Not Progressing     Problem: Inadequate Airway Clearance  Goal: Patient will achieve/maintain normal respiratory rate/effort  Outcome: Progressing   Pt had bedside trach and PEG placement this morning. Gtube to gravity for 24hrs per order.  Attempted to wean sedation again and RR 38-42 and pt unable to maintain tidal volumes.

## 2021-06-07 NOTE — PLAN OF CARE
Problem: Inadequate Airway Clearance  Goal: Patient will maintain patent airway  Outcome: Progressing     Problem: Mechanical Ventilation  Goal: Patient will maintain patent airway  Outcome: Progressing  Goal: ET tube will be managed safely  Outcome: Progressing     Vent Mode: VCV  FiO2 (%):  [40 %-50 %] 40 %  S RR:  [24] 24  S VT:  [250 mL] 250 mL  PEEP/CPAP (cm H2O):  [8 cm H2O-10 cm H2O] 8 cm H2O  Minute Ventilation (L/min):  [6 L/min-8.2 L/min] 6 L/min  PIP:  [37 cm H2O-58 cm H2O] 37 cm H2O  MAP (cm H2O):  [12-16] 12     ETT 7.5 22 at teeth  No SBT today, patient sedated.  Albuterol neb given as scheduled.  Breath sounds coarse, suction for moderate amts of white secretions. Sedation is being lightened, pt. coughing more  Pip 38-42, plateau 30-34.

## 2021-06-07 NOTE — PLAN OF CARE
Problem: Pain  Goal: Patient's pain/discomfort is manageable  Outcome: Progressing, cpot 0/10.  Dilaudid gtt and pca continued this shift    Problem: Knowledge Deficit  Goal: Patient/family/caregiver demonstrates understanding of disease process, treatment plan, medications, and discharge instructions  Outcome: Progressing, updated family several times this shift.    Problem: Nutrition Care Problem  Goal: Nutritional status is improving  Outcome: Progressing, continues to be NPO this shift, possible ileus per ICU MD    Vitals stable this shift, following closely.  NSR, resp rate controlled with pain med and sedation

## 2021-06-07 NOTE — PROGRESS NOTES
Neurosurgery Chart Check:    Note plan to transition to comfort care. Neurosurgery will sign off. Please call if we can be of further assistance.     Elva Khan CNP  [facility] Neurosurgery  O: [phone]  P: [phone]

## 2021-06-07 NOTE — PROGRESS NOTES
"RESPIRATORY CARE NOTE     Patient Name: Harriett Romero  Today's Date: 3/23/2020     Pt continues on the following settings:  Vent Mode: VCV  FiO2 (%):  [40 %-50 %] 40 %  S RR:  [24-28] 24  S VT:  [225 mL-250 mL] 250 mL  PEEP/CPAP (cm H2O):  [10 cm H2O-12 cm H2O] 10 cm H2O  Minute Ventilation (L/min):  [6.3 L/min-8.2 L/min] 6.3 L/min  PIP:  [33 cm H2O-58 cm H2O] 40 cm H2O  MAP (cm H2O):  [14-19] 14   Plateau pressure: 26 cm H2O     Pt is intubated with  # 7.5 ETT secured 21 at the teeth.  Pt receives albuterol. BS are coarse pre and post neb tx. Pt has a strong cough with suction. RT suctioned pt for moderate thick white. Pt's respiratory status is stable. RT will continue to follow per MD's orders.     /58 (Patient Position: Semi-lakhani)   Pulse 60   Temp 99.1  F (37.3  C) (Oral)   Resp 24   Ht 4' 10\" (1.473 m)   Wt 141 lb (64 kg)   SpO2 95%   BMI 29.47 kg/m        Paris Weaver, LRT  "

## 2021-06-07 NOTE — PLAN OF CARE
NEUROSURGERY CHART CHECK:    Received a call from Dr. Franklin reporting a 55 yo female who has had a prolonged hospitalization related to bilateral PE, pneumonia, ARDs, is trached and has reportedly been encephalopathic. A head Ct was completed which revealed a probable L MCA infarct. MRI/ MRA was ordered STAT and was personally reviewed and reviewed with Dr. Ann Fuchs. It us our assessment that this is subacute and is likely at least a couple of days old. Awaiting radiology read.    Would anticipate this right handed female to have significant long term deficits including both expressive and receptive aphasia. Literature indicates that patients who undergo decompressive craniectomy for dominant hemisphere CVA have poor modified martin scores and are unlikely to return to independent living.    At this time there is very little mass effect. There is room around the brainstem, no herniation. There would be no indication at this time to discuss surgical intervention, and given her significant current illness it is not likely to benefit the patient to transfer her to [facility].     Given the overall clinical picture (bilateral PE, ischemic insult to dominant hemisphere despite lovenox, petechial hemorrhage now contraindicating anticoagulation, severe bilateral ground glass opacities in the lungs, poor neuro exam to name a few) it would be very aggressive to pursue surgical intervention should she develop severe cerebral edema.    Discussed at length with Dr. Franklin who at this time believes the patient's  will likely elect to proceed with comfort care, but he is currently reaching out to other family members as well as trying to process this news himself.    Please don't hesitate to contact us with questions or concerns. Will follow peripherally until  determines how aggressive the patient would wish for us to be in this scenario.    [facility]  [facility]surgery  [address]  [place], [address] [place] [id number]    O: [phone]  P: [phone]

## 2021-06-07 NOTE — PLAN OF CARE
Problem: Mechanical Ventilation  Goal: Patient will maintain patent airway  Outcome: Progressing     Problem: Glucose Imbalance  Goal: Achieve optimal glucose control  Outcome: Progressing     Problem: Knowledge Deficit  Goal: Patient/family/caregiver demonstrates understanding of disease process, treatment plan, medications, and discharge instructions  Outcome: Progressing     Problem: Potential for Compromised Skin Integrity  Goal: Nutritional status is improving  Outcome: Progressing     Medicated Rash with Benadryl IV PRN. VSS. Plan for Care conference today at 1600.

## 2021-06-07 NOTE — PROGRESS NOTES
[facility] ACUTE PAIN SERVICE    ([facility], [facility], and [facility])   Daily PAIN Progress Note    Assessment/Plan:  Harriett Romero is a 54 y.o. female who was admitted on 3/1/2020.  2 Days Post-Op. I was asked to see the patient for pain and concern for serotonin syndrome. Admitted on 3/1 for aspiration pneumonia and now is trached. History of obesity, MDD, anxiety, restless leg syndrome, smoker/quit in 2011, migraine headaches, insomnia.  Patient appears slightly agitated. Has been on >100mcg fentanyl per hour since 3/11. Now propofol is off and fentanyl has been dc'ed as well. Now on precedex, and PRN ativan/dilaudid. In 24 hours she has only used 4mg IV dilaudid and has also used 3.5mg iv lorazepam.     PLAN:   1) Some grimacing and dyssynchrony. Overnight did require haldol for agitation. Patient is also undergoing COVID rule out, for this reason a dilaudid drip is being considered. Discussed with nursing and they report they are in the room every 2 hour for turning and drip may not be required. Can scheduled Seroquel at HS. Other serotonergic agents have been dc'ed.  Will continue to monitor 24 hour opioid/bezo requirements and suggest close monitoring of encephalopathy. ICU MD considering head CT.    2)Multimodal Medication Therapy  Topical:  prn lidocaine ointment to scheduled 5%    NSAID'S: none  Muscle Relaxants: none  Adjuvants: mirapex 0.75 mg ET at bedtime, prednisone 20 mg ET qam, APAP prn, hydroxyzine 25-50 mg ET q4h prn, add back Seroquel at HS 25mg  Antidepressants/anxiolytics: Citalopram 20 mg ET daily has been stopped, mirapex 0.75 mg ET at bedtime, ativan 1 mg IV q4hprn (increase to q4h prn) and Wellbutrin stopped   Opioids: dilaudid PCA not yet required   IV Pain medication: dilaudid 0.5mg PRN for pain behaviors (now q2h prn)  3)Non-medication interventions  -turning - could consider removing rectal tube and re-inflate to ensure this is not the cause of pain- or  lidocaine to rectum   4)Constipation Prophylaxis  -diarrhea with rectal tube in place   5) Follow up   -Discharge Recommendations - TBD      Principal Problem:    Aspiration pneumonia of left lower lobe due to gastric secretions (H)  Active Problems:    Hypokalemia    Former smoker - quit in 2014    Anxiety    Hypothyroidism    Metabolic syndrome    Aspiration pneumonia due to inhalation of vomitus (H)    Diarrhea of presumed infectious origin    Hypotension, unspecified hypotension type    Acute respiratory failure with hypoxemia (H)    Cough    Chest pain at rest    Deep vein thrombosis (DVT) of upper extremity (H)    Pulmonary embolism (H)    Acute respiratory distress syndrome (ARDS) (H)    Diffuse interstitial pulmonary disease (H)    Acute kidney failure, unspecified (H)    Acute encephalopathy    Encounter for palliative care    Goals of care, counseling/discussion    Intensive care (ICU) myopathy    Altered mental status, unspecified altered mental status type    Attention to tracheostomy (H)     LOS: 26 days       Subjective:  Pt does not respond to stimuli     Discussed with ICU pharmacist. Awaiting 36 hour period after propofol dc'ed.     Discussed with ICU MD who is concerned about freq gowning and gloving to respond to pain and agitation. Wondering about drip.     Discussed with ICU RN who notes that she is in the room every 2 hours for turning and does not feel that agitation has been present today. Agitation was more severe on nights.      Discussed with Pain Pharmacist - she recommends we minimize opioids, unless pain present. Consider gabapentin for agitation and potential pleuritic pain if this is the cause. She mentions avoiding haldol. She recommends low dose for all to avoid worsening encephalopathy.        chlorhexidine  15 mL Topical Q12H     enoxaparin ANTICOAGULANT  1 mg/kg Subcutaneous Q12H     guar gum  1 packet Enteral Tube BID     HYDROmorphone  0.5-1 mg Intravenous Once     insulin  glargine  18 Units Subcutaneous QAM     insulin regular (NovoLIN R) injection   Subcutaneous Q6H FIXED TIMES     [Held by provider] insulin regular  5 Units Subcutaneous Q6H FIXED TIMES     levothyroxine  125 mcg Enteral Tube Daily 0600     miconazole   Topical BID     multivitamin with iron-mineral  15 mL Enteral Tube DAILY     omeprazole  20 mg Enteral Tube QHS     pramipexole  0.75 mg Enteral Tube QHS     predniSONE  20 mg Oral Daily with brkfst     thiamine  100 mg Enteral Tube DAILY       Objective:  Vital signs in last 24 hours:  Temp:  [98.4  F (36.9  C)-100.8  F (38.2  C)] 100.2  F (37.9  C)  Heart Rate:  [] 116  Resp:  [17-44] 41  BP: ()/(48-95) 137/83  FiO2 (%):  [35 %-50 %] 50 %  Weight:   Weight:   Wt Readings from Last 1 Encounters:   03/27/20 0000 134 lb 14.7 oz (61.2 kg)   03/26/20 0000 133 lb 2.5 oz (60.4 kg)   03/25/20 0400 134 lb 4.2 oz (60.9 kg)   03/24/20 0000 134 lb 4.8 oz (60.9 kg)   03/23/20 0000 141 lb (64 kg)   03/22/20 0200 142 lb (64.4 kg)   03/21/20 0000 142 lb 13.7 oz (64.8 kg)   03/20/20 0100 143 lb 15.4 oz (65.3 kg)   03/19/20 0600 140 lb (63.5 kg)   03/18/20 0000 140 lb 11.2 oz (63.8 kg)   03/17/20 0030 142 lb 13.7 oz (64.8 kg)   03/16/20 0054 147 lb 4.8 oz (66.8 kg)   03/15/20 0200 146 lb 8 oz (66.5 kg)   03/14/20 0000 143 lb 8 oz (65.1 kg)   03/13/20 0000 140 lb 14.4 oz (63.9 kg)   03/12/20 0315 139 lb 15.9 oz (63.5 kg)   03/11/20 0000 139 lb (63 kg)   03/10/20 0400 137 lb 9.1 oz (62.4 kg)   03/07/20 0410 151 lb 14.4 oz (68.9 kg)   03/06/20 1435 152 lb 8 oz (69.2 kg)   03/05/20 1908 154 lb (69.9 kg)   03/01/20 1702 147 lb 4.3 oz (66.8 kg)     Weight change: 1 lb 12.2 oz (0.8 kg)  Body mass index is 28.2 kg/m .    Intake/Output last 3 shifts:  I/O last 3 completed shifts:  In: 1861 [I.V.:1311; NG/GT:550]  Out: 3145 [Urine:3035; Stool:110]  Intake/Output this shift:  No intake/output data recorded.    Review of Systems:   As per subjective, all others negative.    Physical  Exam:    General Appearance:  Small female, short in stature, with trach and not responsive    Head:  Normocephalic, without obvious abnormality, atraumatic   Eyes:  PERRL but does not track   Eyelash extension (very few left)    Nose: Nares normal, septum midline, mucosa normal, small amount of clear drainage   Throat: Lips, mucosa, and tongue dry    Neck: Trach in place, only small amount of blood    Back:   Symmetric, no curvature    Lungs:   RR >40   Chest Wall:  No tenderness or deformity   Heart:  Tachy    Abdomen:   Soft, slightly distended   Rectal tube in place    Extremities: Extremities edematoud    Skin: Skin color, texture, turgor normal, no rashes or lesions   Neurologic: Does not respond  No hyperreflexia  ridged movement          Lab Results:  Personally Reviewed.   Results from last 7 days   Lab Units 03/26/20  0511 03/25/20  0456 03/24/20  0554   LN-WHITE BLOOD CELL COUNT thou/uL 15.1* 16.5* 21.1*   LN-HEMOGLOBIN g/dL 9.5* 9.1* 9.3*   LN-HEMATOCRIT % 30.0* 29.5* 29.2*   LN-PLATELET COUNT thou/uL 214 244 243     Results from last 7 days   Lab Units 03/27/20  0331 03/26/20  0511 03/25/20  0456   LN-SODIUM mmol/L 139 137 137   LN-POTASSIUM mmol/L 4.2 4.1 4.1   LN-CHLORIDE mmol/L 98 94* 95*   LN-CO2 mmol/L 33* 34* 35*   LN-BLOOD UREA NITROGEN mg/dL 21 23* 32*   LN-CREATININE mg/dL 0.67 0.65 0.68   LN-CALCIUM mg/dL 8.8 8.8 9.0              Total unit/floor time 35 minutes, time consisted of the following, examination of patient, reviewing the record and lab results, and completing documentation. Coordination of care time with nurse, ICU pharmacist, ICU MD, and pain pharmacist.       Veronica Luna APRN, CNS-BC, CNP, ACHPN  Acute Care Pain Management Program Hour 7a-1700  [facility] ([place], [place], [place])   Page via [employer]- Click HERE to page Veronica or call [phone]

## 2021-06-07 NOTE — PLAN OF CARE
Problem: Inadequate Airway Clearance  Goal: Patient will maintain patent airway  Outcome: Progressing     Problem: Mechanical Ventilation  Goal: Patient will maintain patent airway  Outcome: Progressing  Goal: ET tube will be managed safely  Outcome: Progressing     Vent Mode: PCV/VG  FiO2 (%):  [40 %-100 %] 50 %  S RR:  [18] 18  S VT:  [300 mL] 300 mL  PEEP/CPAP (cm H2O):  [5 cm H2O] 5 cm H2O  Minute Ventilation (L/min):  [8.1 L/min-16.1 L/min] 13.7 L/min  PIP:  [14 cm H2O-44 cm H2O] 32 cm H2O  MAP (cm H2O):  [6-25] 11     Pt. remains on full vent support, settings above, Shiley #8. BS coarse, suctioning moderate amount of tan secretions via trach. PIP 30-38, plats 28, respiratory pattern tachypneic 40-55 (MD aware), wean on hold, RT following    ALBERT HartT

## 2021-06-07 NOTE — CONSULTS
This is a neurological consultation      54-year-old admitted to hospital on March first 2020    Neurology consulted on March 29, 2020    Chief complaint  Large left MCA stroke subacute  Small hemorrhagic component of stroke on the left hemisphere  Young age concern for herniation profile  Recent bilateral pulmonary embolus on anticoagulation  Aspiration pneumonia with secondary ARDS vent dependent  Status post trach feeding tube  Patient has been ruled out for COVID 19        HPI  54-year-old admitted to hospital on March 1, 2020  Had developed an aspiration pneumonia  Patient had screening colonoscopy on February 25, 2020 vomited as she was waking up from anesthesia  Developed diffuse weakness generalized malaise low-grade fever and productive cough  Treated at home with clindamycin initially without improvement  Persistent symptoms came to the urgent care and then was admitted    Patient was hypotensive 94/62 temp of 100 O2 sats 97% on room air when she presented  Chest x-ray at presentation showed left upper lobe and left lower lobe changes    Prolonged hospitalization multiple EMR notes reviewed    Problem list  Acute hypoxic and hypercarbic respiratory failure intubation March 11, 2020  Worsening hypoxic respiratory failure with ARDS 3/11/2020  Tracheostomy March 25, 2020  Pulmonary deterioration March 27, 2020    Intermittent hypotension    Infection  Vancomycin started March 27  Meropenem started March 28  Cefepime given March 27/28    Bilateral pulmonary embolus  Left upper extremity PIC associated DVT    Per ICU staff ruled out for COVID-19    CT scan head March 29, 2020  Large left MCA stroke with edema  Small amount of hemorrhagic conversion of left MCA stroke  Patient on anticoagulation for bilateral PEs            Past medical history  History of positive colorectal cancer screening  Hyperlipidemia  History of Graves' disease/hypothyroidism  Nephrolithiasis  History of seizure ?  Per an H&P, further  "review question convulsive syncope 2014  Migraines  Restless leg syndrome  History of trigeminal neuralgia  Insomnia/sleep apnea    Neurologic work-up July 2014  Convulsive syncope  MRI scan brain \"negative\"  EEG without seizure activity                  Habits  Quit smoking 5 years ago smoked 1/4 pack/day  Does not drink alcohol    Family history  Maternal aunt with breast cancer  Mother with von Hippel-Lindau syndrome  Sister with von Hippel-Lindau syndrome          CT scan head reviewed 3/29/2020      Work-up  CT scan head March 29, 2020  A.  Large area of cytotoxic edema involving left parietal lobe, left temporal lobe, superior left occipital lobe.  Measures 2.7 x 9.1 cm consistent with subacute stroke  B.  0.4 cm intraparenchymal hemorrhage anterior inferior left parietal lobe with areas of petechial hemorrhage also  C.  Significant cytotoxic edema but no midline shift at this time  CT chest abdomen pelvis see official report  A.  Persistent bilateral infiltrate primary groundglass appearance though with increasing consolidation right lower lobe  B.  Persistent reactive paratracheal adenopathy        Labs  Sodium 139 potassium 3.6 BUN 16 creatinine 0.57  Glucose 115  White blood count 12.4   Hemoglobin 7.7, previously 8.0  Platelets 145,000                Exam  Blood pressure 84/48, pulse 100, temperature 98.5  Blood pressure range 78//70  Pulse range 62-1 08  T-max 100.0    Review of systems unable  Patient intubated comatose    General exam per ICU staff  Trached  On the vent  Feeding tube    Neurologic exam  Sluggish reaction of pupils small to mid position  Patient has a downward gaze  No nystagmus  Face symmetrical    Rare movement of the left arm spontaneously at the elbow    Right arm flaccid    No movement of the legs    Discussed with nursing staff at bedside face-to-face  Discussed with ICU staff face-to-face  Reviewed scans with ICU staff            Impression    1.  Large left MCA stroke with " cytotoxic edema, 2.7 x 9.1 cm size, there is involvement of left parietal lobe left temporal lobe as well as the left occipital lobe involvement consistent with subacute ischemia    2.  CT scan shows 0.4 cm small intraparenchymal hemorrhage anterior inferior left parietal lobe also some areas of petechial hemorrhage     3.  Respiratory failure on the vent has a tracheostomy and feeding tube    4.  Patient unresponsive with sundowning type of eye movements    5.  Patient with significant hypotension concern with increased intracranial pressure decreased perfusion, very large left hemispheric stroke with cytotoxic edema concern for herniation profile also has some hemorrhagic component which could cause more rapid decompensation since she is anticoagulated    6.  Status post bilateral pulmonary emboli which had required previous anticoagulation        Diagnosis  Large subacute left hemisphere stroke with petechial and intracranial hemorrhagic component    Concern for herniation profile with further edema    Recommend  Transfer to [facility]  Patient is extremely young concern with increased swelling whether she would need a decompressive craniotomy    Start Keppra  Keppra 1000 mg IV given x1  Keppra 500 mg IV every 12 hours start at 6 PM    Complicating factor bilateral PE with poor respiratory function  Question hold anticoagulation and place vena cava filter  Versus monitor close CT scans to see if the hemorrhage is stable.    Patient is critically ill with severely abnormal neurologic exam  Previous days neurologic exam blunted by previous severe illness and sedation    Stroke appears to be subacute in nature.    Critical care time 40 minutes  Total care time 60 minutes    Second visit discussed with ICU staff  [facility] does not feel that they have any further intervention to offer  If patient was to decompensate with further malignant edema of the left hemisphere she would not be a good candidate for  decompressive craniotomy  Also if she had hemorrhage she would not be a good candidate for evacuation    Currently holding anticoagulation  Check MRI scan of the head    Poor prognosis for good neurologic survival or recuperation  Also has significant respiratory difficulties and currently has a trach and is on the vent with significant lung disease.    Further 15 minutes of care time.

## 2021-06-07 NOTE — PROGRESS NOTES
PULMONARY / CRITICAL CARE PROGRESS NOTE    Date / Time of Hospital Admission:  3/1/2020  4:51 PM    ID:  Harriett Romero is a 54 y.o. female with depression with anxiety, history of migraines, insomnia, remote tobacco abuse, metabolic syndrome, hypothyroidism, and restless leg syndrome who developed aspiration pneumonitis after vomiting while recovering from anesthesia for elective colonoscopy on 2/25/20, presented to urgency room with aspiration pneumonia, and directly admitted to [facility] on 3/1/2020 for care, subsequently transferred to ICU on 3/6/20 with progressive respiratory failure with acute respiratory distress syndrome and bilateral pulmonary emboli, intubated on 3/11/2020.     Assessment:   1. Acute encephalopathy.  In setting of critical illness, high sedative therapy.  Unclear if any anoxic injury.  Had normal mental status prior to intubation.  Has underlying history of depression with anxiety, history of migraines, and insomnia.  2. Possible serotonin syndrome.  Developed myolonus activity early morning of 3/20/2020, CK mildly elevated at 180.  No recurrent episodes, holding bupropion, citalopram; previously unscheduled metoclopramide but this discontinued on 3/18.  3. Acute respiratory failure with hypoxia, acute respiratory distress syndrome.  Patient intubated 3/11 following abnormal CT scan concerning for ARDS. Presumed massive aspiration event sometime after colonoscopy 2/25, with progressive inlammation and pneumonitis, likely now fibroproliferative stage. No positive cultures. Bronch 3/11 no mucus plugs.  Bronch 3/15 no mucus plugs, diffuse airway inflammation/friabiliaty, BAL done in L x2 no evidence of DAH. RADHA, ANCA, RF and HIV negative, no e/o interstitial lung disease. Required paralytics (cisatricurium infusion) for ventilator synchrony 3/15 - 3/18. Patient was not proned due to late presentation in fibroproliferative stage with unclear benefit. Initiated on steroids in  setting of abnormal CT, progressive respiratory failure.   4. Aspiration pneumonia.  Completed course of Abx therapy. Stopped cefepime, off all abx 3/15; received almost 2 weeks of IV abx  5. Circulatory shock, improved.  Off vasopressors 3/17/20.  Likely hypovolemic vs vasodilatory from sepsis. No positive cultures.   6. Hypoalbuminemia.    7. Diarrhea.  Thought related to enteral feeding however with leukocytosis and have been holding stool softeners.  Rectal tube placed 3/18.   8. Acute kidney injury, resolved.  In setting of ATN, shock and aggressive diuresis.  Lasix last given 3/17.    9. Hyperkalemia.    10. Metabolic acidosis.    11. Hypothyroidism, unspecified.  TSH 9.86, free T4 0.7 on 3/2/20.  12. History of metabolic syndrome.  Patient taking phentermine and metformin at baseline; currently on hold.  13. Right upper extremity PICC-line associated DVT 3/10/20 and bilateral pulmonary emboli.  RUE U/S 3/10 with + DVT adjacent to PICC line and into subclavian vein.  PICC line removed.  CTA 3/11 with + bilateral pulmonary emboli (mild disease).  Initiated on heparin gtt.  TTE 3/7 without any RV strain noted.  14. Leukocytosis, unspecified.  Elevated WBC, loose stools.  Check D. Diff.     Advance Directives:  <no information>      Plan:   Systems to Assess:     Pulmonary: Wean supplemental O2 as tolerated; goal O2 sat > 92%.  HOB > 30 degrees to limit aspiration risk.      Vent:  VCV,  mL (6 mL/kg IBW), PEEP 12, RR 32, FiO2 45%.      Will reduce Veletri to 1/2 strength, monitor FiO2 status.  Reduce RR to 30.    ABG per protocol, Q4H.     Continue albuterol nebs Q6H.      Continue prednisone taper (start date 3/20 per Dr. Schmidt):  40 mg daily x 5, then 30 mg daily x 5, then 20 mg daily x 5, then 10 mg daily x 5.     Antibiotics as listed below.      Cardiovascular: Cardiac monitoring.     SBP > 90 mmHg, MAP > 65 mmHg.      Norepinephrine as needed for BP control.  Holding home antihypertensives.      EKG  PRN.    Neurological: Neuro checks per ICU protocol.      Propofol, fentanyl, midazolam as needed for sedation.  Monitor Triglycerides; last 132 on 3/20/20.  RASS goal -4; will decrease as tolerates.    Wean fentanyl infusion as tolerates.  Concern for possible serotonin syndrome: Holding neuroleptic agents including citalopram, bupropion, ondansetron.    Monitor CK level.    Pain control: Fentanyl infusion.  Dilaudid IV as needed.    GI/: NPO.    Continue enteral feeding, at goal.     GI prophylaxis:  PPI daily.    Renal: Monitor I/O's.  Electrolyte repletion PRN.  Avoid/limit nephrotoxic agents.     IVFs: Saline lock.     Heme/Coag: Monitor H/H.     Transition from heparin infusion to enteral anticoagulant after potential tracheostomy needs determined.      DVT prophylaxis:  Heparin infusion.      Infectious disease: General precautions.     Follow-up cultures.  Off Abx therapy.     Endocrine: Insulin infusion per protocol.     Transition from insulin gtt to Lantus 18 units QAM, Regular insulin 5 units Q6H scheduled, and insulin SQ PRN.    Continue home synthroid.     Musculoskeletal: Bedrest.    Lines: PIV  7.5 mm endotracheal tube, placed 3/11  Right brachial arterial line, placed 3/11  Left IJ triple-lumen catheter, placed 3/15  OG tube, placed 3/11  Clark catheter, placed 3/17  Rectal tube, placed 3/18    Activity: Bed Rest    Code Status:  Full code.    The patient and/or the family was educated about the above plan of care and indicated understanding.      This patient is considered critically ill and requires ICU level of care due to acute respiratory failure with hypoxia/ARDS requiring mechanical ventilation, high risk of hemodynamic collapse.    Total Critical Care time, not including separate billable procedure time:  50 minutes.    Nathaly Pryor MD, MPH  Pulmonary & Critical Care Medicine  Pager: [phone]  3/20/2020  12:42 PM       ICU Checklist:   ICU DAILY CHECKLIST                        "  Can patient transfer out of MICU? no    FAST HUG:    Feeding:  Feeding: Yes.  Patient is receiving TUBE FEEDS    Clark:Yes  Analgesia/Sedation:Yes fentanyl, midazolam and propofol  Thromboembolic prophylaxis: yes; Mode:  Heparin  HOB>30:  Yes  Stress Ulcer Protocol Active: yes; Mode: PPI  Glycemic Control: Any glucose > 180 no; Mode of Insulin Therapy: Insulin gtt    INTUBATED:  Can patient have daily waking:  no  Can patient have spontaneous breathing trial:  no    Restraints? yes    PHYSICAL THERAPY AND MOBILITY:  Can patient have PT and mobility trial: no  Activity: Bed Rest     Subjective:   HPI:  Harriett Romero is a 54 y.o. female with depression with anxiety, history of migraines, insomnia, remote tobacco abuse, metabolic syndrome, hypothyroidism, and restless leg syndrome who developed aspiration pneumonitis after vomiting while recovering from anesthesia for elective colonoscopy on 2/25/20, presented to urgency room with aspiration pneumonia, and directly admitted to [facility] on 3/1/2020 for care, subsequently transferred to ICU on 3/6/20 with progressive respiratory failure with acute respiratory distress syndrome and bilateral pulmonary emboli, intubated on 3/11/2020.    Per initial consult by Dr. Rich 3/6:  \"She was treated with clindamycin as outpatient was started on ceftriaxone and Flagyl here.  She had a chest x-ray that showed left-sided infiltrate however over the past several days her chest x-ray has progressively worsened.  Her oxygen requirements have been increasing.  Patient is about 6 L up since admission.\"      3/6: Placed on noninvasive ventilation on cardiac telemetry.  Continue to be tachypneic, transferred to ICU, placed on high flow nasal cannula.    3/7: Steroids initiated.    3/10: Right upper extremity DVT around take an in subclavian vein.  PIC removed, heparin drip started, steroids discontinued.    3/11: Intubated. CTA with bilateral PE and groundglass opacities. "  Bronchoscopy, status post BAL, no DAH.. Veletri initiated.    3/13: Initiated Lasix.    3/15: Bronchoscopy, friable/inflamed mucosa but no mucous plugs status post BAL..  Initiated cisatracurium infusion.    3/17: Norepinephrine gtt stopped.  FiO2 decreased from 50% to 45%.    3/18: Cisatracurium discontinued.  Initiated midazolam infusion.    Overnight, had inducible sustained clonus, very stiff with turns, no ocular clonus, intermittently diaphoretic.  CK total normal.  Concern for serotonin syndrome, hold placed on citalopram, bupropion, Zofran.  Increased enteric tube drainage.  Tube feeding at goal.     Review of Systems:  Pertinent items are noted in HPI.  Review of systems not obtained due to inability to communicate with the patient.     Problem List: Principal Problem:    Aspiration pneumonia of left lower lobe due to gastric secretions (H)  Active Problems:    Hypokalemia    Former smoker - quit in 2014    Anxiety    Hypothyroidism    Metabolic syndrome    Aspiration pneumonia due to inhalation of vomitus (H)    Diarrhea of presumed infectious origin    Hypotension, unspecified hypotension type    Acute respiratory failure with hypoxia (H)    Cough    Chest pain at rest    Deep vein thrombosis (DVT) of upper extremity (H)    Pulmonary embolism (H)    Acute respiratory distress syndrome (ARDS) (H)    Diffuse interstitial pulmonary disease (H)    Acute kidney failure, unspecified (H)      ALLERGIES: Macrobid [nitrofurantoin monohyd/m-cryst]; Penicillins; and Sulfa (sulfonamide antibiotics)    MEDS:  Reviewed.  Active include:      albuterol  2.5 mg Nebulization QID     amino acids-protein hydrolys  1 packet Oral TID     [Held by provider] buPROPion  150 mg Enteral Tube BID     chlorhexidine  15 mL Topical Q12H     [Held by provider] citalopram  40 mg Enteral Tube QHS     fentaNYL pf  100 mcg Intravenous Once     levothyroxine  125 mcg Enteral Tube Daily 0600     multivitamin with iron-mineral  15 mL Enteral  "Tube DAILY     omeprazole  20 mg Oral QHS     pramipexole  0.75 mg Enteral Tube QHS     predniSONE  40 mg Enteral Tube Q24H    Followed by     [START ON 3/25/2020] predniSONE  30 mg Enteral Tube Q24H    Followed by     [START ON 3/30/2020] predniSONE  20 mg Enteral Tube Q24H    Followed by     [START ON 4/4/2020] predniSONE  10 mg Enteral Tube Q24H     sodium chloride  2.5 mL Intravenous Line Care     thiamine  100 mg Enteral Tube DAILY     Continuous Infusions:    dextrose 10%       epoprostenol 0.25 mg/50 mL (Half Strength) (VELETRI) inhalation solution 40,000 ng/hr (03/20/20 1020)     epoprostenol 0.5 mg/50 mL (Full Strength) (VELETRI) inhalation solution 80,000 ng/hr (03/20/20 0437)     fentaNYL 2500 mcg/50 mL (50 mcg/mL) 150 mcg/hr (03/20/20 1210)     heparin 14 Units/kg/hr (03/20/20 1219)     insulin infusion (1 unit/mL) 2 Units/hr (03/20/20 1200)     midazolam (VERSED) 100 mg/100 mL (1 mg/mL) 6 mg/hr (03/20/20 0813)     norepinephrine Stopped (03/17/20 0900)     propofol 45 mcg/kg/min (03/20/20 1219)     sodium chloride 0.9% 10 mL/hr (03/20/20 1220)        Objective:   VITALS:  /65   Pulse (!) 57   Temp 97.8  F (36.6  C) (Axillary)   Resp (!) 30   Ht 4' 10\" (1.473 m)   Wt 143 lb 15.4 oz (65.3 kg)   SpO2 95%   BMI 30.09 kg/m    Temp:  [97.8  F (36.6  C)-99  F (37.2  C)] 97.8  F (36.6  C)  Heart Rate:  [54-70] 57  Resp:  [30-40] 30  BP: (125-147)/(65) 125/65  SpO2:  [88 %-96 %] 95 %  Arterial Line BP: (110-148)/(51-69) 135/63  FiO2 (%):  [45 %] 45 %    PHYSICAL EXAM:    GEN: Intubated, sedated.  HEENT: Normocephalic, atraumatic.  Pupils pinpoint but reactive, anicteric sclera. Moist mucous membranes. Endotracheal tube.    NECK: Supple.    PULM: On mechanical ventilation.  Non-labored breathing.  No use of accessory muscles.  Coarse breath sounds bilaterally.  CVS: Regular rate and rhythm.  Normal S1, S2.  No rubs, murmurs, or gallops.    ABDOMEN: Normoactive bowel sounds.  Soft. Non-distended.  "   EXTREMITES:  No clubbing, cyanosis. Trace pitting edema bilateral ankles.  NEURO: Intubated, sedated.    I&O:      Intake/Output Summary (Last 24 hours) at 3/20/2020 1242  Last data filed at 3/20/2020 1200  Gross per 24 hour   Intake 2376.05 ml   Output 3010 ml   Net -633.95 ml       PERTINENT STUDIES:  Serum Glucose range:No results for input(s): POCGLU in the last 72 hours.  ABG:  Recent Labs     03/20/20  1155   PHART 7.44   RDW9OER 60*   PO2ART 84   OXYHB 95.7*   BEARTCALC 14.7   POCPEEP 12   TEMP 37.0   POCRATE 12     CBC:  Results from last 7 days   Lab Units 03/20/20 0413 03/20/20 0413 03/19/20 0348 03/18/20 0439 03/18/20  0439 03/17/20  0359 03/16/20  0605  03/14/20  0511   LN-WHITE BLOOD CELL COUNT thou/uL  --   --  27.1*  --   --  29.4* 20.2*   < > 27.0*   LN-HEMOGLOBIN g/dL 8.7*  --  8.3* 8.8*  --  9.1* 8.3*   < > 9.0*   LN-HEMATOCRIT %  --   --  26.8*  --   --  29.1* 26.8*   < > 29.1*   LN-PLATELET COUNT thou/uL  --  235 200  --  193 241 165   < > 190   LN-NEUTROPHILS RELATIVE PERCENT %  --   --   --   --   --   --   --   --  87*   LN-MONOCYTES RELATIVE PERCENT %  --   --   --   --   --   --   --   --  3    < > = values in this interval not displayed.     Chemistry:  Results from last 7 days   Lab Units 03/20/20 0413 03/19/20 0348 03/18/20 0439   LN-SODIUM mmol/L 140 141 145   LN-POTASSIUM mmol/L 5.1* 4.8 5.0   LN-CHLORIDE mmol/L 99 100 102   LN-CO2 mmol/L 35* 36* 35*   LN-BLOOD UREA NITROGEN mg/dL 44* 56* 60*   LN-CREATININE mg/dL 0.72 0.74 0.78   LN-CALCIUM mg/dL 9.2 9.1 8.9   LN-MAGNESIUM mg/dL 2.1 2.2 2.5   LN-ALBUMIN g/dL 2.4*  --   --    LN-ALT (SGPT) U/L 14  --   --    LN-AST (SGOT) U/L 16  --   --    LN-ALKALINE PHOSPHATASE U/L 78  --   --    LN-BILIRUBIN TOTAL mg/dL 0.3  --   --      Coags:  Lab Results   Component Value Date    INR 1.34 (H) 03/10/2020     Cardiac Markers:  Results from last 7 days   Lab Units 03/20/20  0413   LN-CREATINE KINASE TOTAL U/L 180         Microbiology:        Influenza A/B rapid swab, 3/6:  Negative.     Legionella Ag urine, 3/6: Negative.    Bronchoalveolar lavage, 3/11:    KOH prep: Negative.    Fungal culture: 1+ Candida albicans.    Viral culture: Collected and pending.    AFB: AFB smear negative.  Culture pending.    Bronchoalveolar lavage, 3/15:     KOH prep: Yeast and hyphal fragments.    Fungal culture: Candida albicans.    Viral culture: Collected and pending.    AFB: AFB smear negative.  Culture pending.    Legionella species PCR: Negative.     Cardiac/Radiology Studies:   Cardiac:    EKG:   personally reviewed.      Limited TTE, 3/7/20:  Summary    Left ventricle ejection fraction is normal. The calculated left ventricular ejection fraction is 63% without wall motion abnormality.    Normal right ventricular size and systolic function.    No significant mitral or tricuspid regurgitation.    When compared to the previous study dated 8/27/2017, no interval change.    Radiology:  Personally reviewed image/s and Personally reviewed impression/s    Chest X-Ray, 3/18/20:   FINDINGS: Upright portable chest. ET tube 2.5 cm above the johnnie. NG tube passes into the stomach. Left IJ central venous catheter tip just entering the right atrium. No pneumothorax. The heart size is normal. There are bibasilar infiltrates which have increased on the right since the previous exam. IMPRESSION:  Bilateral pulmonary infiltrates, increasing on the right    CTA PE protocol, 3/11/20:      FINDINGS:    ANGIOGRAM CHEST: Mild right lower lobe subsegmental pulmonary artery embolism. Minimal bilateral pulmonary embolism elsewhere, including middle lobe (series 4, image 206), left upper lobe (images [id number] medially) and left lower lobe (image 232). No significant pulmonary arterial enlargement. RV to LV ratio measures 0.9. Slight interventricular septal flattening. Nonaneurysmal aorta without dissection.     LUNGS AND PLEURA: Bilateral confluent groundglass and  opacities throughout all lobes. Mild consolidation within the peripheral left upper and lower lobes, more pronounced in the lower lobe. Mild basilar predominant airway dilatation. Mild septal thickening. Tiny right and small left pleural effusions. No pneumothorax.    MEDIASTINUM/AXILLAE: Borderline mediastinal and bilateral hilar adenopathy. Example left perihilar node measures 11 x 16 mm (series 4, image 261).    UPPER ABDOMEN: No pericardial effusion.    MUSCULOSKELETAL: Normal.    IMPRESSION:  1.  Mild bilateral pulmonary artery embolism. Borderline RV to LV ratio and slight flattening of the interventricular septum; RV strain not excluded. Pulmonary arteries not enlarged.  2.  Regions of confluent groundglass and opacities throughout all lobes bilaterally. Mild left lung consolidation. Differential includes superimposed edema / ARDS, hemorrhage and alveolar damage, or other nonspecific infectious / inflammatory process (including a massive aspiration event). These findings make evaluation of pulmonary infarct difficult. 3.  Tiny right and small left pleural effusions. 4.  Borderline adenopathy, presumed reactive.    Right UE ultrasound, 3/10/20: IMPRESSION:  1.  Nonocclusive DVT around the PICC and right subclavian and brachiocephalic veins. 2.  Nonocclusive superficial thrombophlebitis around the PICC in the right cephalic vein in the forearm.     Outside reports reviewed: Historical medical records.    Additional Comments:  None

## 2021-06-07 NOTE — PROGRESS NOTES
"RESPIRATORY CARE NOTE     Patient Name: Harriett Romero  Today's Date: 3/28/2020     Pt continues on the following settings:  Vent Mode: PCV/VG  FiO2 (%):  [40 %-100 %] 50 %  S RR:  [18-24] 18  S VT:  [225 mL-300 mL] 300 mL  PEEP/CPAP (cm H2O):  [5 cm H2O] 5 cm H2O  Minute Ventilation (L/min):  [11.8 L/min-16.1 L/min] 14.9 L/min  PIP:  [12 cm H2O-44 cm H2O] 22 cm H2O  MAP (cm H2O):  [5-25] 11   Plateau pressure: 26 cm H2O     Pt has a #8.0 cuffed tracheostomy tube in place. Pt. was not weaned today. Does not meet criteria. Pt has prn nebs ordered and has not needed any this shift. BS are course and suctioning out moderate pink tinged secretions. Pt's respiratory status is stable. RT will continue to follow per MD's orders.     /65   Pulse (!) 109   Temp 100  F (37.8  C) (Axillary)   Resp (!) 55   Ht 4' 10\" (1.473 m)   Wt 132 lb 7.9 oz (60.1 kg)   SpO2 95%   BMI 27.69 kg/m        Dane Stock, LRT  "

## 2021-06-07 NOTE — SIGNIFICANT EVENT
Patient comfort cares, medications per orders. Family at bedside and updated on prognosis throughout AM    Patient  at 1131 with family at bedside. [facility] called within 15 minutes of expiring. Waiting for call back regarding donation.     ME called and released body.     This RN walked  to ED to be evaluated for chest pain he had during night shift. Was a internal code 9 and seen in ED but recommended a follow up.

## 2021-06-07 NOTE — OP NOTE
Operative Note     Name:  Harriett Romero  [place] [facility] [place]cedure Date:  3/25/2020  PCP:  Zain Cuevas MD        Findings: Unremarkable appearing EGD         Name:  Harriett Romero  [place] [facility] [place]  Procedure Date:  3/25/2020  PCP:  Zain Cuevas MD        EGD with PEG tube placement     Pre-Procedure Diagnosis:  Acute respiratory failure with ARDS     Post-Procedure Diagnosis:    Same as pre-operative diagnosis     Surgeon: Dr. Edward Patel MD  Assistant:  Tamia Gallegos CNP;  her assistance was required for exposure and visualization     Anesthesia Type: The patient is already intubated in the ICU and additional sedation was added for effective general anesthesia            Past Medical History:   Diagnosis Date     10 year risk of MI or stroke 1.0% in 2017       Anxiety       Aspiration pneumonia of left lower lobe due to gastric secretions (H) 2/28/2020     Current use of estrogen therapy       Dysthymia       Endometriosis       History - had hysterectomy.      Former smoker       Graves disease       Hypothyroidism       Insomnia       Migraine headache       Nephrolithiasis       Seizures (H)       5+ years ago, no medications     Sleep apnea       Recently diagnosed, awaiting CPAP     Trigeminal neuralgia       UTI (lower urinary tract infection)       Vitamin D deficiency               Patient Active Problem List     Diagnosis Date Noted     Attention to tracheostomy (H)       Encounter for palliative care       Goals of care, counseling/discussion       Intensive care (ICU) myopathy       Altered mental status, unspecified altered mental status type       Acute encephalopathy       Acute kidney failure, unspecified (H) 03/15/2020     Deep vein thrombosis (DVT) of upper extremity (H) 03/11/2020     Pulmonary embolism (H) 03/11/2020     Acute respiratory distress syndrome (ARDS) (H) 03/11/2020     Diffuse interstitial pulmonary disease (H)       Cough        Chest pain at rest       Diarrhea of presumed infectious origin 03/03/2020     Hypotension, unspecified hypotension type 03/03/2020     Acute respiratory failure with hypoxemia (H) 03/03/2020     Aspiration pneumonia due to inhalation of vomitus (H) 03/01/2020     Aspiration pneumonia of left lower lobe due to gastric secretions (H) 02/28/2020     History of Positive colorectal cancer screening using Cologuard test 02/06/2020     Major depression in complete remission (H) 08/26/2018     At high risk for caregiver role strain 03/30/2018     Metabolic syndrome 10/06/2017     Dyslipidemia 10/06/2017     Vitamin D deficiency 10/06/2017     RLS (restless legs syndrome) 07/28/2016     Anxiety 07/28/2016     Hypothyroidism 07/28/2016     Menopausal hot flushes       Migraine headache       Former smoker - quit in 2014       Insomnia       Hypokalemia 07/26/2014     Obesity due to excess calories with serious comorbidity          Findings:  EGD was unremarkable      Operative Report:    Patient brought to the operating room placed in the supine position and given Mac anesthesia.  She's turned left side down, bite-block in place, the oropharynx is sprayed with topical anesthetic spray.  The endoscope was advanced atraumatically down through the esophagus.  The lower esophageal sphincter area noted. The scope was taken into the stomach, which was then aspirated off all fluids in the stomach.  The stomach was then insufflated with air and the pylorus was visualized.  The scope was drawn back visualizing the anterior wall of the upper stomach.  I was able to palpate the abdominal wall at the left subcostal margin and noted good movement of the anterior wall has visualized from inside the stomach.  I chose this as a site for placement of the PEG tube.  The patient was then repositioned into a supine position and the anterior abdominal wall was sterilely prepped and draped.  The skin and subcu tissues track was infused with local  anesthesia and I made a small 3 mm incision with a 11 blade scalpel and advanced an angiocatheter in through the abdominal wall and into the lumen of the stomach under direct visualization of the endoscope.  A wire was advanced into the lumen of the stomach which was grasped with a snare.  The wire along with the endoscope was brought out through the mouth.  Over the wire the push technique PEG tube was advanced.  The tapered plastic end of the PEG tube was brought out through the skin tracking through the stomach and out the mouth.  It was grasped once it was out on the skin.  The PEG tube was then pulled down through the mouth and esophagus and into the stomach where the flange of the PEG tubing was brought up against the anterior abdominal wall of the stomach itself.  The wire was removed and the phalange from the outside was then brought down over the tapered end of the PEG tube, the clamp was also brought down over the PEG tube and into position.  The tube was then cut cut to the appropriate length and the nipple was attached to the end of the tube.  The nipple was then fitted to the tubing of the Clark bag and was left open to gravity.  I clamped the tubing for the PEG tube and then reintubated the esophagus with the upper endoscope.  I evaluated the stomach and the placement of the PEG tube.  I was pleased with the location of the PEG tube from within the stomach.  I did not see any evidence of bleeding or bruising along the gastric wall.  I aspirated air from the stomach and withdrew the endoscope.  I then dressed the wound where the PEG tube comes out through the skin and taped the tubing down to the skin of the abdominal wall.     Estimated Blood Loss: Minimal        Specimens:    None       Drains:        Rectal Tube With balloon (Active)   Site Skin Assessment Clean;Intact 03/25/20 0000   Care/Interventions Patent;Routine care completed;Leakage noted;Irrigated 03/25/20 0000   Reason for Continuing Rectal  Tube Liquid stool with high risk for perianal skin breakdown 03/25/20 0000   Rectal Tube Output 150 mL 03/25/20 0600   Drainage Appearance Brown;Watery 03/24/20 2000       Urethral Catheter Latex (Active)   Site Skin Assessment Clean;Intact 03/25/20 0400   Care/Interventions Patent and draining;Standard catheter cares;Periurethral area inspected for signs of inflammation and infection every shift;Closed drainage system maintained 03/25/20 0400   Securement Method Stabilization device 03/25/20 0400   CAUTI Infection Prevention Education Handout Given to Patient Yes 03/24/20 0800   Protocol: Indication to Continue Output monitoring in critical patient 03/25/20 0400   Output (mL) 200 mL 03/25/20 0600   Drainage Color Xochilt 03/24/20 2000   Drainage Appearance Sediment 03/23/20 0409         Implants:  PEG tube     Complications:    None     Edward Patel      Date: 3/25/2020  Time: 10:22 AM.

## 2021-06-07 NOTE — PLAN OF CARE
Goal: Patient s discharge needs are met.  Outcome: Care Progression reviewed with Hospitalist, Care Manager.  Discharge Disposition: Discussed and plan to discharge to:  home  Planned Discharge Date: unknown  Problem: Barriers to discharge include: comfort care, Palliative Care following  Transportation needs/Ride Time:   home    Chart reviewed and updates with care team.  Patient spouse and one son at the bedside, other son is resting in another room.  Palliative Care assisted with video conference call yesterday for other family members.  Spiritual care providing support as well.  Anticipate patient will here in the hospital.     Ileana Zacarias RN, BSN,  Care Manager  2020   9:50 AM

## 2021-06-07 NOTE — SIGNIFICANT EVENT
House officer called to pronounce Harriett TAMAYO Romero dead.  Patient unresponsive to verbal and tactile stimuli.  No heart sounds heard, no pulse felt.  No spontaneous respirations.  Pupils fixed and dilated.  Patient pronounced dead at 11:31 AM on 4/1/2020.  Family present.    Dr. Misael Del Real,   [facility]  [facility] House Officer

## 2021-06-07 NOTE — PLAN OF CARE
Problem: Inadequate Airway Clearance  Goal: Patient will maintain patent airway  Outcome: Progressing     Problem: Mechanical Ventilation  Goal: Patient will maintain patent airway  Outcome: Progressing  Goal: ET tube will be managed safely  Outcome: Progressing   Vent Mode: PCV/VG  FiO2 (%):  [40 %-50 %] 50 %  S RR:  [24-28] 24  S VT:  [225 mL-250 mL] 250 mL  PEEP/CPAP (cm H2O):  [10 cm H2O-12 cm H2O] 10 cm H2O  Minute Ventilation (L/min):  [6.4 L/min-7.5 L/min] 6.7 L/min  PIP:  [17 cm H2O-36 cm H2O] 33 cm H2O  MAP (cm H2O):  [15-19] 16     ETT 7.5 21 at teeth  Mode changed to VCV , RR 24 50% peep 10.  Albuterol nebs given as ordered.  Breath sounds coarse, suction white secrtions.  No SBT today, patient sedated. Pip 38, plateau 29.

## 2021-06-07 NOTE — PROGRESS NOTES
Spiritual Assessment:     Family experiencing anticipatory grief    Care Provided:   Empathic listening and presence  Helped sister in processing of emotions  Normalized/validated her feelings of feeling overwhelmed, and also needing to compartmentalize at this time    Spiritual Care Note: Called Savannah, Harriett's sister, to discuss and assess what family needs might look like tomorrow. Savannah shares that the family would appreciate a prayer, emotional support for Brian, and perhaps a wheelchair on hand for him. She shares that Harriett is a very generous, giving person and that although she did not want to talk about EOL issues, they family knows she would not want to remain on a vent.     Plan of Care: Will remain available for support to Brian and family tomorrow.     Larissa Montero M.Div.  Staff   [phone]

## 2021-06-07 NOTE — PROGRESS NOTES
General Surgery Progress Note/Chart check:    Patient not seen due to COVID-19 precautions.    Chart reviewed; PEG being utilized for tube feeds    Spoke with RT who reports that the trach is intact with no signs of bleeding and patient is working well with no mechanical ventilation issues.    Surgery will sign off at this time. Please contact us with questions/concerns.    Tamia Gallegos CNP  [phone] pager  [facility]

## 2021-06-07 NOTE — PLAN OF CARE
Problem: Inadequate Gas Exchange  Goal: Patient will achieve/maintain normal respiratory rate/effort  Outcome: Progressing   Patient continues to be tachypneic and restless throughout shift. Medications administered per orders, CNP aware. Propofol started per orders and titrated. Precedex infusing per orders. Lung sounds coarse. Will continue to monitor.    Problem: Nutrition Care Problem  Goal: Nutritional status is improving  Outcome: Progressing   Tube feeding infusing at goal rate. Residuals within limits. Tube feeding flushes administered per orders. Will continue to monitor.

## 2021-06-07 NOTE — PROGRESS NOTES
RESPIRATORY CARE NOTE     Patient Name: Harriett Romero  Today's Date: 3/25/2020     Percutaneous trach tube placed today with surgeon and Dr Rock.  Disposable bronchoscope used.  #8 Shiley trach tube placed without difficulty.  Look and see was done with the bronch afterwards but no specimens sent to lab.  Spare trach is in room along with the obturator.         Kimberly Whalen

## 2021-06-07 NOTE — PLAN OF CARE
Problem: Inadequate Gas Exchange  Goal: Patient is adequately oxygenated and ventilation is improved  Outcome: Progressing   ABG's slightly improved with vent changes and paralytics off.  Cont. With plan.  Problem: Glucose Imbalance  Goal: Achieve optimal glucose control  Outcome: Progressing   Insulin gtt infusing titration per protocol.  BS have been within goal 100-150 for 8 hours.

## 2021-06-07 NOTE — PROGRESS NOTES
Respiratory Care Note      Patient is on full vent support. Breath sound is diminished and clear. No wean done tonight. Suction small amount of thick blood tinged secrestions. Will continue to monitor.       03/26/20 0400   Vent Information   Interface Invasive   Vent ID [id number]   Vent Mode VCV   Patient Ventilator Status On   Flowmeter at Bedside Yes   Ambu-Bag With Mask at Bedside Yes   Respiratory Assessment   Assessment Type Assess only   Respiratory Pattern Regular   Chest Assessment Chest expansion symmetrical   Bilateral Breath Sounds Coarse;Diminished   Vent Settings   FiO2 (%) 35 %   Heater Temperature 98.6  F (37  C)   Resp Rate (Set) 24   Vt (Set, mL) 225 mL   PEEP/CPAP (cm H2O) 5 cm H2O   Insp Flow (L/min) 35 L/min   Trigger Sensitivity Flow (L/min) 2 L/min   Bias Flow (lpm)  5 L/min   Humidification Heater   Patient Data   Vt Exp (mL) 251 mL   Minute Ventilation (L/min) 6.3 L/min   Total Resp Rate  26 BPM   PIP Observed (cm H2O) 30 cm H2O   MAP (cm H2O) 8   Plateau Pressure (cm H2O) 19 cm H2O   Static Compliance (L/cm H2O) 13   Dynamic Compliance (L/cm H2O) 25 L/cm H2O   SpO2 94 %   Heart Rate 93   Alarms   High Resp Rate 40   Low PEEP 0 cm H2O   Insp Pressure High (cm H2O) 65 cm H2O   Insp Pressure Low (cm H2O) 10 cm H2O   MV High (L/min) 20 L/min   MV Low (L/min) 3 L/min   Vt Low (mL) 0 mL   Apnea Interval (sec) 30 seconds   Apnea Rate 24   Apnea Volume (mL) 225 mL   Alarm Functional and On Yes   Backup Mode Set Yes

## 2021-06-07 NOTE — PROGRESS NOTES
"RESPIRATORY CARE NOTE     Patient Name: Harriett Romero  Today's Date: 3/30/2020     Pt continues on the following settings:  Vent Mode: PCV/VG  FiO2 (%):  [35 %-60 %] 35 %  S RR:  [18] 18  S VT:  [300 mL] 300 mL  PEEP/CPAP (cm H2O):  [5 cm H2O] 5 cm H2O  Minute Ventilation (L/min):  [7.2 L/min-14.9 L/min] 11.8 L/min  PIP:  [17 cm H2O-46 cm H2O] 34 cm H2O  MAP (cm H2O):  [7-17] 17   Plateau pressure: 27 cm H2O        Pt has a #8.0 cuffed tracheostomy tube in place. Pt. was not weaned today. Does not meet criteria. Pt has Q6 Albuterol  neb ordered which were given. BS are course and suctioning out moderate pale yellowsecretions. Pt's respiratory status is stable. RT will continue to follow per MD's orders.     BP 92/55   Pulse 99   Temp 99.6  F (37.6  C) (Axillary)   Resp (!) 31   Ht 4' 10\" (1.473 m)   Wt 138 lb 0.1 oz (62.6 kg)   SpO2 99%   BMI 28.84 kg/m        Dane Stock, LRT  "

## 2021-06-07 NOTE — PLAN OF CARE
Goal: Patient s discharge needs are met.  Outcome: Care Progression reviewed in ICU rounds with Intensitivst, RN, RD, RT, Pharm, Spiritual Care, Care Management  Discharge Disposition: Discussed and plan to discharge to: plan for transition to comfort care  Planned Discharge Date: unknown  Problem: Barriers to discharge include: ICU cares, sedation, s/p CVA, on vent, transition to comfort care  Transportation needs/Ride Time:  TBD    Chart reviewed and updates with care team. Palliative Care and ICU coordinating ZOOM conference with family for 4:00 PM today.  Anticipate transition to comfort care.  CM remains available as needed.    Ileana Zacarias RN, BSN, Care Manager  3/31/2020  9:42 AM

## 2021-06-07 NOTE — PROGRESS NOTES
Patient intubated and on mechanical ventilation intensivist primarily managing hospital medicine will follow

## 2021-06-07 NOTE — PROGRESS NOTES
Palliative Care Progress Note  NAME: Harriett Romero, : 1965,   MRN: [id number] Date: 3/31/2020  Problem List:  Active Problems   Principal Problem:    Aspiration pneumonia of left lower lobe due to gastric secretions (H)  Active Problems:    Hypokalemia    Former smoker - quit in     Anxiety    Hypothyroidism    Metabolic syndrome    Aspiration pneumonia due to inhalation of vomitus (H)    Diarrhea of presumed infectious origin    Hypotension, unspecified hypotension type    Acute respiratory failure with hypoxemia (H)    Cough    Chest pain at rest    Deep vein thrombosis (DVT) of upper extremity (H)    Pulmonary embolism (H)    Acute respiratory distress syndrome (ARDS) (H)    Diffuse interstitial pulmonary disease (H)    Acute kidney failure, unspecified (H)    Acute encephalopathy    Encounter for palliative care    Goals of care, counseling/discussion    Intensive care (ICU) myopathy    Altered mental status, unspecified altered mental status type    Attention to tracheostomy (H)    VAP (ventilator-associated pneumonia) (H)    Cerebrovascular accident (CVA), unspecified mechanism (H)      Assessment:   54 y.o. former smoker with most pertinent history of hypothyroidism, migraines, depression and anxiety, who was admitted 3/1/2020 with acute hypoxic respiratory failure secondary to an aspiration event during colonoscopy 2020.  She had a prolonged hospitalization during which she was diagnosed with bilateral PEs and was subsequently intubated for progressively worsening hypoxic respiratory failure with ARDS on 3/11/2020.  She underwent a tracheostomy 3/25. Her pulmonary status deteriorated 3/27 & she is being treated for VAP. On 3/29 discovered to have an acute/subacute L MCA CVA w/ a very poor prognosis. Her code status was changed to DNR & family is planning to transition to comfort cares on 3/31.     Recommendations:   Dyspnea: Due to acute hypoxic and hypercarbic respiratory failure with  intubation 3/11 in setting of aspiration pneumonia with ARDS and bilateral PEs. S/p trachesotomy on 3/25. Now with VAP and complicated by acute/subacute CVA.  - Ventilator discontinued with transition to comfort care today. Supplemental oxygen via trach mask.    1. Post-extubation comfort medications:     --for dyspnea/pain: Continue dilaudid PCA at 0.2 mg/hr basal rate with 0.5 mg bolus q15min prn dyspnea/pain. Ok to give additional 0.5 mg IV q15 minutes for total of 1mg iv q15 minutes for pain/dyspnea   -- for anxiety/ agitation: lorazepam 1-2mg IV q 15 min prn. Haldol prn also available   -- for secretions: prn glycopyrrolate 0.1-0.2mg IV q 4 hours prn and 1-2 atropine drops sublingual qid prn    - Medication adjustment/titration recs:  -If needing 3 or more prn lorazepam doses per hour for 2-3 hours, would start continuous infusion at a rate that equals total hourly use over past 1-2 hours.   - if only moderate benefit from max dose lorazepam would do 50% dose increase  - if minimal to no benefit from max dose lorazepam would do 100% dose increase  2. Patient/Family support: ongoing   and social work involvment    Decision making capacity: None   - Advance directive on file: No.  Brian is surrogate decision maker.    Goals of care: Transitioned to comfort care today. Due to COVID19 restrictions, only 1 visitor allowed at one time. Brian and both sons were able to alternately come to floor for visit (Dr. Duarte approved visit by giacomo as long as wearing mask and only touching patient.)    Code status: DNR    =========================================================  Current Medical Status:  No acute events overnight. Patient continues to be sedated, on vent via trach.     Symptom-Focused ROS:  Unable to obtain ROS as patient sedated, on vent.    Palliative Care Focused Medications:  Keppra 500 mg iv q12h  Dilaudid PCA at 0.2mg/h basal rate with 0.2 mg q10 min demand  Versed infusion  Propofol  "infusion  APAP per enteral tube prn  Bisacodyl 10 mg suppository daily prn  Haldol 0.5 mg iv q2h prn  Ativan 1mg iv q4h prn  MOM per enteral tube prn  Melatonin 3mg at bedtime prn      PERTINENT PHYSICAL EXAMINATION:  Vital Signs: Blood pressure 96/55, pulse 93, temperature 99.7  F (37.6  C), temperature source Axillary, resp. rate 28, height 4' 10\" (1.473 m), weight 136 lb 0.4 oz (61.7 kg), SpO2 94 %, not currently breastfeeding.   Gen: Patient is sedated, on vent via trach. Appears in NAD.    See ICU physician exam. Due to COVID pandemic, patient not examined today.     I have reviewed labs and imaging in [facility]     ----------------------------------------------------  TT: I have personally spent a total of 120 minutes  today on the unit in review of medical record, consultation with the medical providers and assessment of patient today, with more than 50% of this time spent in counseling, coordination of care, and discussion re:diagnostic results, prognosis, symptom management, risks and benefits of management options, and development of plan of care.    Cindy Subramanian PA-C  [facility]  Palliative Medicine   Office: [phone]    "

## 2021-06-07 NOTE — PLAN OF CARE
Pt much more comfortable, not restless and respiratory status improved with RR in 30's. Will continue PRN meds and monitoring for comfort. Pt  and step sons were all very pleased with how patient more comfortable with breathing.

## 2021-06-07 NOTE — PROGRESS NOTES
PULMONARY / CRITICAL CARE PROGRESS NOTE    Date / Time of Hospital Admission:  3/1/2020  4:51 PM    ID:  Harriett Romero is a 54 y.o. female with depression with anxiety, history of migraines, insomnia, remote tobacco abuse, metabolic syndrome, hypothyroidism, and restless leg syndrome who developed aspiration pneumonitis after vomiting while recovering from anesthesia for elective colonoscopy on 2/25/20, presented to urgency room with aspiration pneumonia, and directly admitted to [facility] [place] on 3/1/2020 for care, subsequently transferred to ICU on 3/6/20 with progressive respiratory failure with acute respiratory distress syndrome and bilateral pulmonary emboli, intubated on 3/11/2020.     Assessment:   1. Acute encephalopathy.  In setting of critical illness, high sedative therapy.  Unclear if any anoxic injury.  Had normal mental status prior to intubation.  Has underlying history of depression with anxiety, history of migraines, and insomnia.  2. Possible serotonin syndrome.  Developed myolonus activity early morning of 3/20/2020, CK mildly elevated at 180.  No recurrent episodes, holding bupropion, citalopram; previously scheduled metoclopramide but this discontinued on 3/18.  3. Acute respiratory failure with hypoxia, acute respiratory distress syndrome.  Patient intubated 3/11 following abnormal CT scan concerning for ARDS. Presumed massive aspiration event sometime after colonoscopy 2/25, with progressive inflammation and pneumonitis, likely now fibroproliferative stage. No positive cultures. Bronch 3/11 no mucus plugs.  Bronch 3/15 no mucus plugs, diffuse airway inflammation/friability, BAL done in L x2 no evidence of DAH. RADHA, ANCA, RF and HIV negative, no e/o interstitial lung disease. Required paralytics (cisatricurium infusion) for ventilator synchrony 3/15 - 3/18. Patient was not proned due to late presentation in fibroproliferative stage with unclear benefit. Initiated on steroids in  setting of abnormal CT, progressive respiratory failure.  Complicating this was bilateral pulmonary emboli.  4. Aspiration pneumonia.  Completed course of Abx therapy. Stopped cefepime, off all abx 3/15; received almost 2 weeks of IV abx  5. Right upper extremity PICC-line associated DVT 3/10/20 and bilateral pulmonary emboli.  RUE U/S 3/10 with + DVT adjacent to PICC line and into subclavian vein.  PICC line removed.  CTA 3/11 with + bilateral pulmonary emboli (mild disease).  Initiated on heparin gtt.  TTE 3/7 without any RV strain noted.  6. Circulatory shock, improved.  Off vasopressors 3/17/20.  Likely hypovolemic vs vasodilatory from sepsis. No positive cultures.   7. Hypoalbuminemia.    8. Diarrhea.  Thought related to enteral feeding however with leukocytosis and have been holding stool softeners.  Rectal tube placed 3/18.  C dif PCR negative 3/20.  9. Acute kidney injury, resolved.  In setting of ATN, shock and aggressive diuresis.  Lasix last given 3/17.    10. Hyperkalemia.    11. Metabolic alkalosis.    12. Hypothyroidism, unspecified.  TSH 9.86, free T4 0.7 on 3/2/20.  13. History of metabolic syndrome.  Patient taking phentermine and metformin at baseline; currently on hold.  14. Leukocytosis, unspecified.  Elevated WBC, loose stools.  Check D. Diff.     Advance Directives:  <no information>      Plan:   Systems to Assess:     Pulmonary: Wean supplemental O2 as tolerated; goal O2 sat > 92%.  HOB > 30 degrees to limit aspiration risk.      Vent:  VCV,  mL (6 mL/kg IBW), PEEP 12, RR 30, FiO2 45%.      Will reduce Veletri to 1/4 strength, monitor FiO2 status.  Reduce RR to 28.    ABG per protocol, Q8H.     Continue albuterol nebs Q6H.      Continue prednisone taper (start date 3/20 per Dr. Schmidt):  40 mg daily x 5, then 30 mg daily x 5, then 20 mg daily x 5, then 10 mg daily x 5.     Patient day #11 on ventilator (intubated 3/11).  Anticipating tracheostomy this week, but further goals of care  discussion ongoing.  Plan for General Surgical consultation after Palliative Care discussions.    Cardiovascular: Cardiac monitoring.     SBP > 90 mmHg, MAP > 65 mmHg.      Norepinephrine as needed for BP control.  Holding home antihypertensives.      EKG PRN.    Neurological: Neuro checks per ICU protocol.      Propofol, fentanyl, midazolam as needed for sedation.  Monitor Triglycerides; last 132 on 3/20/20.  RASS goal -4; will decrease as tolerates.  Limited due to ventilator dyssnchrony.    Continue scheduled dilaudid 4 mg po q4h for longer-acting pain control.    Wean fentanyl infusion as tolerates.  Concern for possible serotonin syndrome: Holding neuroleptic agents including citalopram, bupropion, ondansetron.    Pain control: Fentanyl infusion.  Dilaudid IV as needed.    GI/: NPO.    Continue enteral feeding, at goal.     GI prophylaxis:  PPI daily.    Renal: Monitor I/O's.  Electrolyte repletion PRN.  Avoid/limit nephrotoxic agents.     IVFs: Saline lock.     Heme/Coag: Monitor H/H.     Transition from heparin infusion to enteral anticoagulant after potential tracheostomy needs determined.      DVT prophylaxis:  Heparin infusion.      Infectious disease: General precautions.     Follow-up cultures.  Off Abx therapy.     Endocrine: Insulin infusion per protocol.     Transition from insulin gtt to Lantus 18 units QAM, Regular insulin 5 units Q6H scheduled, and insulin SQ PRN.    Continue home synthroid.     Musculoskeletal: Bedrest.    Lines: PIV  7.5 mm endotracheal tube, placed 3/11  Right brachial arterial line, placed 3/11  Left IJ triple-lumen catheter, placed 3/15  OG tube, placed 3/11  Clark catheter, placed 3/17  Rectal tube, placed 3/18    Activity: Bed Rest    Code Status:  Full code.     The patient and/or the family was educated about the above plan of care and indicated understanding.  Discussed care with the patient's , Brian.  We had a long discussion of the patient's clinical status,  her respiratory status, her significant hypoxia despite 11 days with ventilatory support.  I outlined my concern for prolonged mechanical ventilation, next steps in therapy including tracheostomy vs comfort care transitions, and unclear neurologic status due to issues with sedation reduction.  He was understanding of this and appreciated the jagruti conversation of her illness.  He did not appreciate how ill she was.  We will plan for Palliative Care conference on Monday and to help facilitate a care conference.  Patient's father is 80+ years old, also not aware of how sick she is, and would like (at some point) to come visit the patient.  We discussed compassionate care visitation and will review again on Monday.      This patient is considered critically ill and requires ICU level of care due to acute respiratory failure with hypoxia/ARDS requiring mechanical ventilation, high risk of hemodynamic collapse.    Total Critical Care time, not including separate billable procedure time:  45 minutes.    Nathaly Pryor MD, MPH  Pulmonary & Critical Care Medicine  Pager: [phone]  3/21/2020  12:14 PM       ICU Checklist:   ICU DAILY CHECKLIST                         Can patient transfer out of MICU? no    FAST HUG:    Feeding:  Feeding: Yes.  Patient is receiving TUBE FEEDS    Clark:Yes  Analgesia/Sedation:Yes fentanyl, midazolam and propofol  Thromboembolic prophylaxis: yes; Mode:  Heparin  HOB>30:  Yes  Stress Ulcer Protocol Active: yes; Mode: PPI  Glycemic Control: Any glucose > 180 no; Mode of Insulin Therapy: Insulin gtt    INTUBATED:  Can patient have daily waking:  no  Can patient have spontaneous breathing trial:  no    Restraints? yes    PHYSICAL THERAPY AND MOBILITY:  Can patient have PT and mobility trial: no  Activity: Bed Rest     Subjective:   HPI:  Harriett Romero is a 54 y.o. female with depression with anxiety, history of migraines, insomnia, remote tobacco abuse, metabolic syndrome,  "hypothyroidism, and restless leg syndrome who developed aspiration pneumonitis after vomiting while recovering from anesthesia for elective colonoscopy on 2/25/20, presented to urgency room with aspiration pneumonia, and directly admitted to [facility] on 3/1/2020 for care, subsequently transferred to ICU on 3/6/20 with progressive respiratory failure with acute respiratory distress syndrome and bilateral pulmonary emboli, intubated on 3/11/2020.    Per initial consult by Dr. Rich 3/6:  \"She was treated with clindamycin as outpatient was started on ceftriaxone and Flagyl here.  She had a chest x-ray that showed left-sided infiltrate however over the past several days her chest x-ray has progressively worsened.  Her oxygen requirements have been increasing.  Patient is about 6 L up since admission.\"      3/6: Placed on noninvasive ventilation on cardiac telemetry.  Continue to be tachypneic, transferred to ICU, placed on high flow nasal cannula.    3/7: Steroids initiated.    3/10: Right upper extremity DVT around take an in subclavian vein.  PIC removed, heparin drip started, steroids discontinued.    3/11: Intubated. CTA with bilateral PE and groundglass opacities.  Bronchoscopy, status post BAL, no DAH.. Veletri initiated.    3/13: Initiated Lasix.    3/15: Bronchoscopy, friable/inflamed mucosa but no mucous plugs status post BAL..  Initiated cisatracurium infusion.    3/17: Norepinephrine gtt stopped.  FiO2 decreased from 50% to 45%.    3/18: Cisatracurium discontinued.  Initiated midazolam infusion.    3/19:  Overnight, had inducible sustained clonus, very stiff with turns, no ocular clonus, intermittently diaphoretic.  CK total normal.  Concern for serotonin syndrome, hold placed on citalopram, bupropion, Zofran.  Increased enteric tube drainage.  Tube feeding at goal.     Yesterday: Veletri to 1/2 strength.  RR from 32 to 30.  Overnight, had some issues with vent dyssynchrony with attempted fentanyl " gtt reduction.  Started on scheduled dilaudid.    Review of Systems:  Pertinent items are noted in HPI.  Review of systems not obtained due to inability to communicate with the patient.     Problem List: Principal Problem:    Aspiration pneumonia of left lower lobe due to gastric secretions (H)  Active Problems:    Hypokalemia    Former smoker - quit in 2014    Anxiety    Hypothyroidism    Metabolic syndrome    Aspiration pneumonia due to inhalation of vomitus (H)    Diarrhea of presumed infectious origin    Hypotension, unspecified hypotension type    Acute respiratory failure with hypoxia (H)    Cough    Chest pain at rest    Deep vein thrombosis (DVT) of upper extremity (H)    Pulmonary embolism (H)    Acute respiratory distress syndrome (ARDS) (H)    Diffuse interstitial pulmonary disease (H)    Acute kidney failure, unspecified (H)      ALLERGIES: Macrobid [nitrofurantoin monohyd/m-cryst]; Penicillins; and Sulfa (sulfonamide antibiotics)    MEDS:  Reviewed.  Active include:      albuterol  2.5 mg Nebulization Q6H - RT     amino acids-protein hydrolys  1 packet Oral TID     [Held by provider] buPROPion  150 mg Enteral Tube BID     chlorhexidine  15 mL Topical Q12H     [Held by provider] citalopram  40 mg Enteral Tube QHS     fentaNYL pf  100 mcg Intravenous Once     HYDROmorphone (DILAUDID) oral solution 1 mg/mL  4 mg Enteral Tube Q4H     insulin glargine  18 Units Subcutaneous QAM     insulin regular (NovoLIN R) injection   Subcutaneous Q6H FIXED TIMES     insulin regular  3 Units Subcutaneous Q6H FIXED TIMES     levothyroxine  125 mcg Enteral Tube Daily 0600     multivitamin with iron-mineral  15 mL Enteral Tube DAILY     omeprazole  20 mg Oral QHS     pramipexole  0.75 mg Enteral Tube QHS     predniSONE  40 mg Enteral Tube Q24H    Followed by     [START ON 3/25/2020] predniSONE  30 mg Enteral Tube Q24H    Followed by     [START ON 3/30/2020] predniSONE  20 mg Enteral Tube Q24H    Followed by     [START ON  "4/4/2020] predniSONE  10 mg Enteral Tube Q24H     sodium chloride  2.5 mL Intravenous Line Care     thiamine  100 mg Enteral Tube DAILY     Continuous Infusions:    dextrose 10%       epoprostenol 0.125 mg/50 mL (Quarter Strength) (VELETRI) inhalation solution 20,000 ng/hr (03/21/20 0856)     epoprostenol 0.25 mg/50 mL (Half Strength) (VELETRI) inhalation solution 40,000 ng/hr (03/21/20 0304)     fentaNYL 2500 mcg/50 mL (50 mcg/mL) 125 mcg/hr (03/21/20 1200)     heparin 16 Units/kg/hr (03/21/20 1200)     midazolam (VERSED) 100 mg/100 mL (1 mg/mL) 6 mg/hr (03/21/20 1200)     norepinephrine Stopped (03/17/20 0900)     propofol 50 mcg/kg/min (03/21/20 1127)        Objective:   VITALS:  /65   Pulse 77   Temp 98.3  F (36.8  C) (Oral)   Resp 22   Ht 4' 10\" (1.473 m)   Wt 142 lb 13.7 oz (64.8 kg)   SpO2 98%   BMI 29.86 kg/m    Temp:  [97.6  F (36.4  C)-99  F (37.2  C)] 98.3  F (36.8  C)  Heart Rate:  [55-77] 77  Resp:  [22-39] 22  SpO2:  [91 %-100 %] 98 %  Arterial Line BP: (111-171)/(52-73) 136/61  FiO2 (%):  [45 %-100 %] 45 %    PHYSICAL EXAM:    GEN: Intubated, sedated.  HEENT: Normocephalic, atraumatic.  Pupils pinpoint but reactive, anicteric sclera. Moist mucous membranes. Endotracheal tube.    NECK: Supple.    PULM: On mechanical ventilation.  Non-labored breathing.  No use of accessory muscles.  Coarse breath sounds bilaterally.  CVS: Regular rate and rhythm.  Normal S1, S2.  No rubs, murmurs, or gallops.    ABDOMEN: Normoactive bowel sounds.  Soft. Non-distended.    EXTREMITES:  No clubbing, cyanosis. Trace pitting edema bilateral ankles.  NEURO: Intubated, sedated.    I&O:      Intake/Output Summary (Last 24 hours) at 3/21/2020 1214  Last data filed at 3/21/2020 1200  Gross per 24 hour   Intake 2344.25 ml   Output 3065 ml   Net -720.75 ml       PERTINENT STUDIES:  Serum Glucose range:No results for input(s): POCGLU in the last 72 hours.  ABG:  Recent Labs     03/21/20  1155   PHART 7.39   MHG3OLK 66* "   PO2ART 76*   OXYHB 94.5*   BEARTCALC 13.0   POCPEEP 12   TEMP 37.0   POCRATE 28     CBC:  Results from last 7 days   Lab Units 03/20/20 0413 03/20/20 0413 03/19/20  0348 03/18/20  0439 03/18/20  0439 03/17/20  0359 03/16/20  0605   LN-WHITE BLOOD CELL COUNT thou/uL  --   --  27.1*  --   --  29.4* 20.2*   LN-HEMOGLOBIN g/dL 8.7*  --  8.3* 8.8*  --  9.1* 8.3*   LN-HEMATOCRIT %  --   --  26.8*  --   --  29.1* 26.8*   LN-PLATELET COUNT thou/uL  --  235 200  --  193 241 165     Chemistry:  Results from last 7 days   Lab Units 03/21/20  0400 03/20/20 0413 03/19/20  0348   LN-SODIUM mmol/L 140 140 141   LN-POTASSIUM mmol/L 4.3 5.1* 4.8   LN-CHLORIDE mmol/L 98 99 100   LN-CO2 mmol/L 34* 35* 36*   LN-BLOOD UREA NITROGEN mg/dL 41* 44* 56*   LN-CREATININE mg/dL 0.68 0.72 0.74   LN-CALCIUM mg/dL 9.3 9.2 9.1   LN-MAGNESIUM mg/dL 1.9 2.1 2.2   LN-ALBUMIN g/dL  --  2.4*  --    LN-ALT (SGPT) U/L  --  14  --    LN-AST (SGOT) U/L  --  16  --    LN-ALKALINE PHOSPHATASE U/L  --  78  --    LN-BILIRUBIN TOTAL mg/dL  --  0.3  --      Coags:  Lab Results   Component Value Date    INR 1.34 (H) 03/10/2020     Cardiac Markers:  Results from last 7 days   Lab Units 03/20/20 0413   LN-CREATINE KINASE TOTAL U/L 180        Microbiology:        Influenza A/B rapid swab, 3/6:  Negative.     Legionella Ag urine, 3/6: Negative.    Bronchoalveolar lavage, 3/11:    KOH prep: Negative.    Fungal culture: 1+ Candida albicans.    Viral culture: Collected and pending.    AFB: AFB smear negative.  Culture pending.    Bronchoalveolar lavage, 3/15:     KOH prep: Yeast and hyphal fragments.    Fungal culture: Candida albicans.    Viral culture: Collected and pending.    AFB: AFB smear negative.  Culture pending.    Legionella species PCR: Negative.    C dif toxin PCR, 3/20:  Negative.     Cardiac/Radiology Studies:   Cardiac:    EKG:   personally reviewed.      Limited TTE, 3/7/20:  Summary    Left ventricle ejection fraction is normal. The calculated  left ventricular ejection fraction is 63% without wall motion abnormality.    Normal right ventricular size and systolic function.    No significant mitral or tricuspid regurgitation.    When compared to the previous study dated 8/27/2017, no interval change.    Radiology:  Personally reviewed image/s and Personally reviewed impression/s    Chest X-Ray, 3/18/20:   FINDINGS: Upright portable chest. ET tube 2.5 cm above the johnnie. NG tube passes into the stomach. Left IJ central venous catheter tip just entering the right atrium. No pneumothorax. The heart size is normal. There are bibasilar infiltrates which have increased on the right since the previous exam. IMPRESSION:  Bilateral pulmonary infiltrates, increasing on the right    CTA PE protocol, 3/11/20:      FINDINGS:    ANGIOGRAM CHEST: Mild right lower lobe subsegmental pulmonary artery embolism. Minimal bilateral pulmonary embolism elsewhere, including middle lobe (series 4, image 206), left upper lobe (images [id number] medially) and left lower lobe (image 232). No significant pulmonary arterial enlargement. RV to LV ratio measures 0.9. Slight interventricular septal flattening. Nonaneurysmal aorta without dissection.     LUNGS AND PLEURA: Bilateral confluent groundglass and opacities throughout all lobes. Mild consolidation within the peripheral left upper and lower lobes, more pronounced in the lower lobe. Mild basilar predominant airway dilatation. Mild septal thickening. Tiny right and small left pleural effusions. No pneumothorax.    MEDIASTINUM/AXILLAE: Borderline mediastinal and bilateral hilar adenopathy. Example left perihilar node measures 11 x 16 mm (series 4, image 261).    UPPER ABDOMEN: No pericardial effusion.    MUSCULOSKELETAL: Normal.    IMPRESSION:  1.  Mild bilateral pulmonary artery embolism. Borderline RV to LV ratio and slight flattening of the interventricular septum; RV strain not excluded. Pulmonary arteries not enlarged.  2.  Regions  of confluent groundglass and opacities throughout all lobes bilaterally. Mild left lung consolidation. Differential includes superimposed edema / ARDS, hemorrhage and alveolar damage, or other nonspecific infectious / inflammatory process (including a massive aspiration event). These findings make evaluation of pulmonary infarct difficult. 3.  Tiny right and small left pleural effusions. 4.  Borderline adenopathy, presumed reactive.    Right UE ultrasound, 3/10/20: IMPRESSION:  1.  Nonocclusive DVT around the PICC and right subclavian and brachiocephalic veins. 2.  Nonocclusive superficial thrombophlebitis around the PICC in the right cephalic vein in the forearm.     Outside reports reviewed: Historical medical records.    Additional Comments:  None

## 2021-06-07 NOTE — PROGRESS NOTES
PULMONARY / CRITICAL CARE PROGRESS NOTE    Date / Time of Hospital Admission:  3/1/2020  4:51 PM    ID:  Harriett Romero is a 54 y.o. female with depression with anxiety, history of migraines, insomnia, remote tobacco abuse, metabolic syndrome, hypothyroidism, and restless leg syndrome who developed aspiration pneumonitis after vomiting while recovering from anesthesia for elective colonoscopy on 2/25/20, presented to urgency room with aspiration pneumonia, and directly admitted to [facility] on 3/1/2020 for care, subsequently transferred to ICU on 3/6/20 with progressive respiratory failure with acute respiratory distress syndrome and bilateral pulmonary emboli, intubated on 3/11/2020.     Assessment:   1. Acute encephalopathy.  In setting of critical illness, high sedative therapy.  Unclear if any anoxic injury.  Had normal mental status prior to intubation.  Has underlying history of depression with anxiety, history of migraines, and insomnia.  2. Possible serotonin syndrome.  Developed myolonus activity early morning of 3/20/2020, CK mildly elevated at 180.  No recurrent episodes, holding bupropion, citalopram; previously scheduled metoclopramide but this discontinued on 3/18.  Maintained on fentanyl gtt due to agitation, vent asynchrony with titrating off - added scheduled dilaudid.   3. Acute respiratory failure with hypoxia, acute respiratory distress syndrome.  Patient intubated 3/11 following abnormal CT scan concerning for ARDS. Presumed massive aspiration event sometime after colonoscopy 2/25, with progressive inflammation and pneumonitis, likely now fibroproliferative stage. No positive cultures. Bronch 3/11 no mucus plugs.  Bronch 3/15 no mucus plugs, diffuse airway inflammation/friability, BAL done in RML x2 no evidence of DAH. RADHA, ANCA, RF and HIV negative, no e/o interstitial lung disease. Required paralytics (cisatricurium infusion) for ventilator synchrony 3/15 - 3/18.  On inhaled  Veletri 3/11 - 3/21. Patient was not proned due to late presentation in fibroproliferative stage with unclear benefit. Initiated on steroids in setting of abnormal CT, progressive respiratory failure.  Complicating this was bilateral pulmonary emboli.    4. Aspiration pneumonia.  Completed course of Abx therapy. Stopped cefepime, off all abx 3/15; received almost 2 weeks of IV abx  5. Right upper extremity PICC-line associated DVT 3/10/20 and bilateral pulmonary emboli.  RUE U/S 3/10 with + DVT adjacent to PICC line and into subclavian vein.  PICC line removed.  CTA 3/11 with + bilateral pulmonary emboli (mild disease).  Initiated on heparin gtt.  TTE 3/7 without any RV strain noted.  6. Circulatory shock, improved.  Off vasopressors 3/17/20.  Likely hypovolemic vs vasodilatory from sepsis. No positive cultures.   7. Hypoalbuminemia.    8. Diarrhea.  Thought related to enteral feeding however with leukocytosis and have been holding stool softeners.  Rectal tube placed 3/18.  C dif PCR negative 3/20.  9. Acute kidney injury, resolved.  In setting of ATN, shock and aggressive diuresis.  Lasix last given 3/17.    10. Hyperkalemia.    11. Metabolic alkalosis.    12. Hypothyroidism, unspecified.  TSH 9.86, free T4 0.7 on 3/2/20.  13. History of metabolic syndrome.  Patient taking phentermine and metformin at baseline; currently on hold.  14. Leukocytosis, unspecified.  Elevated WBC, loose stools.  Check D. Diff.     Advance Directives:  <no information>      Plan:   Systems to Assess:     Pulmonary: Wean supplemental O2 as tolerated; goal O2 sat > 92%.  HOB > 30 degrees to limit aspiration risk.      Vent:  PCV-VG,  mL (6 mL/kg IBW), PEEP 12, RR 28, FiO2 40%.      Veletri discontinued last night, ABG reviewed.     Poor compliance; continue vent adjustments as tolerates:      Increase TV to 250 mL (~ 6 mL/kg), PEEP 10, RR 24, FiO2 50%.      Transition from PCV-VG to VCV if tolerates.    Follow-up ABG.       Discontinue serial ABG; switch to daily and PRN vent changes.     Continue albuterol nebs Q6H.      Continue prednisone taper (start date 3/20 per Dr. Schmidt):  40 mg daily x 5, then 30 mg daily x 5, then 20 mg daily x 5, then 10 mg daily x 5.     Patient day #12 on ventilator (intubated 3/11).  Anticipating tracheostomy this week, but further goals of care discussion ongoing.  Plan for General Surgical consultation after Palliative Care discussions.    Cardiovascular: Cardiac monitoring.     SBP > 90 mmHg, MAP > 65 mmHg.      Norepinephrine as needed for BP control.  Holding home antihypertensives.      EKG PRN.    Neurological: Neuro checks per ICU protocol.      Propofol, fentanyl, midazolam as needed for sedation.  Monitor Triglycerides; last 132 on 3/20/20.  RASS goal -4; will decrease as tolerates.  Limited due to ventilator dyssnchrony.    Continue scheduled dilaudid 4 mg po q4h for longer-acting pain control.    Wean fentanyl infusion as tolerates.  Concern for possible serotonin syndrome: Holding neuroleptic agents including citalopram, bupropion, ondansetron.    Pain control: Fentanyl infusion.  Dilaudid IV as needed.    GI/: NPO.    Continue enteral feeding, at goal. Will follow-up with Nutrition team about additional fiber to thicken stool and remove rectal tube    GI prophylaxis:  PPI daily.    Renal: Monitor I/O's.  Electrolyte repletion PRN.  Avoid/limit nephrotoxic agents.     IVFs: Saline lock.  Autodiuresing; no diuretics for now.    Heme/Coag: Monitor H/H.     Transition from heparin infusion to enteral anticoagulant after potential tracheostomy needs determined.      DVT prophylaxis:  Heparin infusion.      Infectious disease: General precautions.     Follow-up cultures.  Off Abx therapy.     Endocrine: Insulin infusion per protocol.     Continue Lantus 18 units QAM, Regular insulin 5 units Q6H scheduled, and insulin SQ PRN.    Continue home synthroid.     Musculoskeletal:  Bedrest.    Lines: PIV  7.5 mm endotracheal tube, placed 3/11  Right brachial arterial line, placed 3/11  Left IJ triple-lumen catheter, placed 3/15  OG tube, placed 3/11  Clark catheter, placed 3/17  Rectal tube, placed 3/18    Activity: Bed Rest    Code Status:  Full code.     The patient and/or the family was educated about the above plan of care and indicated understanding.  Discussed care with the patient's , Brian, yesterday.  We had a long discussion of the patient's clinical status, her respiratory status, her significant hypoxia despite 11 days with ventilatory support.  I outlined my concern for prolonged mechanical ventilation, next steps in therapy including tracheostomy vs comfort care transitions, and unclear neurologic status due to issues with sedation reduction.  He was understanding of this and appreciated the jagruti conversation of her illness.  He did not appreciate how ill she was.  We will plan for Palliative Care conference on Monday and to help facilitate a care conference.  Patient's father is 80+ years old, also not aware of how sick she is, and would like (at some point) to come visit the patient.  We discussed compassionate care visitation and will review again on Monday.      This patient is considered critically ill and requires ICU level of care due to acute respiratory failure with hypoxia/ARDS requiring mechanical ventilation, high risk of hemodynamic collapse.    Total Critical Care time, not including separate billable procedure time:  40 minutes.    Nathaly Pryor MD, MPH  Pulmonary & Critical Care Medicine  Pager: [phone]  3/22/2020  8:18 AM       ICU Checklist:   ICU DAILY CHECKLIST                         Can patient transfer out of MICU? no    FAST HUG:    Feeding:  Feeding: Yes.  Patient is receiving TUBE FEEDS    Clark:Yes  Analgesia/Sedation:Yes fentanyl, midazolam and propofol  Thromboembolic prophylaxis: yes; Mode:  Heparin  HOB>30:  Yes  Stress Ulcer  "Protocol Active: yes; Mode: PPI  Glycemic Control: Any glucose > 180 no; Mode of Insulin Therapy: Insulin gtt    INTUBATED:  Can patient have daily waking:  no  Can patient have spontaneous breathing trial:  no    Restraints? yes    PHYSICAL THERAPY AND MOBILITY:  Can patient have PT and mobility trial: no  Activity: Bed Rest     Subjective:   HPI:  Harriett Romero is a 54 y.o. female with depression with anxiety, history of migraines, insomnia, remote tobacco abuse, metabolic syndrome, hypothyroidism, and restless leg syndrome who developed aspiration pneumonitis after vomiting while recovering from anesthesia for elective colonoscopy on 2/25/20, presented to urgency room with aspiration pneumonia, and directly admitted to [facility] on 3/1/2020 for care, subsequently transferred to ICU on 3/6/20 with progressive respiratory failure with acute respiratory distress syndrome and bilateral pulmonary emboli, intubated on 3/11/2020.    Per initial consult by Dr. Rich 3/6:  \"She was treated with clindamycin as outpatient was started on ceftriaxone and Flagyl here.  She had a chest x-ray that showed left-sided infiltrate however over the past several days her chest x-ray has progressively worsened.  Her oxygen requirements have been increasing.  Patient is about 6 L up since admission.\"      3/6: Placed on noninvasive ventilation on cardiac telemetry.  Continue to be tachypneic, transferred to ICU, placed on high flow nasal cannula.    3/7: Steroids initiated.    3/10: Right upper extremity DVT around take an in subclavian vein.  PIC removed, heparin drip started, steroids discontinued.    3/11: Intubated. CTA with bilateral PE and groundglass opacities.  Bronchoscopy, status post BAL, no DAH.. Veletri initiated.    3/13: Initiated Lasix.    3/15: Bronchoscopy, friable/inflamed mucosa but no mucous plugs status post BAL..  Initiated cisatracurium infusion.    3/17: Norepinephrine gtt stopped.  FiO2 decreased " from 50% to 45%.    3/18: Cisatracurium discontinued.  Initiated midazolam infusion.    3/19:  Overnight, had inducible sustained clonus, very stiff with turns, no ocular clonus, intermittently diaphoretic.  CK total normal.  Concern for serotonin syndrome, hold placed on citalopram, bupropion, Zofran.  Increased enteric tube drainage.  Tube feeding at goal.     3/20: Veletri to 1/2 strength.  RR from 32 to 30.  Overnight, had some issues with vent dyssynchrony with attempted fentanyl gtt reduction.  Started on scheduled dilaudid.    Yesterday reduced Veletri to 1/4 strength, then stopped in evening.  RR from 30 to 28.  Compliance 13 today on vent.  Still w/ stooling from enteral feeding.    Review of Systems:  Pertinent items are noted in HPI.  Review of systems not obtained due to inability to communicate with the patient.     Problem List: Principal Problem:    Aspiration pneumonia of left lower lobe due to gastric secretions (H)  Active Problems:    Hypokalemia    Former smoker - quit in 2014    Anxiety    Hypothyroidism    Metabolic syndrome    Aspiration pneumonia due to inhalation of vomitus (H)    Diarrhea of presumed infectious origin    Hypotension, unspecified hypotension type    Acute respiratory failure with hypoxia (H)    Cough    Chest pain at rest    Deep vein thrombosis (DVT) of upper extremity (H)    Pulmonary embolism (H)    Acute respiratory distress syndrome (ARDS) (H)    Diffuse interstitial pulmonary disease (H)    Acute kidney failure, unspecified (H)    Acute encephalopathy      ALLERGIES: Macrobid [nitrofurantoin monohyd/m-cryst]; Penicillins; and Sulfa (sulfonamide antibiotics)    MEDS:  Reviewed.  Active include:      albuterol  2.5 mg Nebulization Q6H - RT     amino acids-protein hydrolys  1 packet Oral TID     [Held by provider] buPROPion  150 mg Enteral Tube BID     chlorhexidine  15 mL Topical Q12H     [Held by provider] citalopram  40 mg Enteral Tube QHS     fentaNYL pf  100 mcg  "Intravenous Once     HYDROmorphone (DILAUDID) oral solution 1 mg/mL  4 mg Enteral Tube Q4H     insulin glargine  18 Units Subcutaneous QAM     insulin regular (NovoLIN R) injection   Subcutaneous Q6H FIXED TIMES     insulin regular  5 Units Subcutaneous Q6H FIXED TIMES     levothyroxine  125 mcg Enteral Tube Daily 0600     multivitamin with iron-mineral  15 mL Enteral Tube DAILY     omeprazole  20 mg Enteral Tube QHS     pramipexole  0.75 mg Enteral Tube QHS     predniSONE  40 mg Enteral Tube Q24H    Followed by     [START ON 3/25/2020] predniSONE  30 mg Enteral Tube Q24H    Followed by     [START ON 3/30/2020] predniSONE  20 mg Enteral Tube Q24H    Followed by     [START ON 4/4/2020] predniSONE  10 mg Enteral Tube Q24H     sodium chloride  2.5 mL Intravenous Line Care     thiamine  100 mg Enteral Tube DAILY     Continuous Infusions:    dextrose 10%       fentaNYL 2500 mcg/50 mL (50 mcg/mL) 200 mcg/hr (03/22/20 0359)     heparin 16 Units/kg/hr (03/22/20 0448)     midazolam (VERSED) 100 mg/100 mL (1 mg/mL) 8 mg/hr (03/22/20 0400)     norepinephrine Stopped (03/17/20 0900)     propofol 50 mcg/kg/min (03/22/20 0745)        Objective:   VITALS:  /58 (Patient Position: Semi-lakhani)   Pulse (!) 59   Temp 98.7  F (37.1  C) (Oral)   Resp 28   Ht 4' 10\" (1.473 m)   Wt 142 lb (64.4 kg)   SpO2 95%   BMI 29.68 kg/m    Temp:  [98.3  F (36.8  C)-99  F (37.2  C)] 98.7  F (37.1  C)  Heart Rate:  [55-77] 59  Resp:  [22-29] 28  BP: (108)/(58) 108/58  SpO2:  [94 %-100 %] 95 %  ETCO2 (mmHg):  [46 mmHg-51 mmHg] 50 mmHg  Arterial Line BP: (104-146)/(49-66) 104/49  FiO2 (%):  [40 %-50 %] 50 %    PHYSICAL EXAM:    GEN: Intubated, sedated.  HEENT: Normocephalic, atraumatic.  Pupils reactive bilaterally, anicteric sclera. Moist mucous membranes. Endotracheal tube.    NECK: Supple.    PULM: On mechanical ventilation.  Non-labored breathing.  No use of accessory muscles.  Coarse breath sounds bilaterally.  CVS: Regular rate and " rhythm.  Normal S1, S2.  No rubs, murmurs, or gallops.    ABDOMEN: Normoactive bowel sounds.  Soft. Non-distended.    EXTREMITES:  No clubbing, cyanosis. Trace pitting edema bilateral ankles.  Mild pitting edema of hands.  NEURO: Intubated, sedated.    I&O:      Intake/Output Summary (Last 24 hours) at 3/22/2020 0818  Last data filed at 3/22/2020 0600  Gross per 24 hour   Intake 1318.6 ml   Output 4600 ml   Net -3281.4 ml       PERTINENT STUDIES:  Serum Glucose range:No results for input(s): POCGLU in the last 72 hours.  ABG:  Recent Labs     03/22/20 0328   PHART 7.44   FPX6MFF 59*   PO2ART 75*   OXYHB 93.6*   BEARTCALC 14.1   POCPEEP 12   TEMP 37.0   POCRATE 28     CBC:  Results from last 7 days   Lab Units 03/22/20 0328 03/22/20  0328 03/20/20  0413 03/20/20  0413 03/19/20  0348  03/17/20  0359 03/16/20  0605   LN-WHITE BLOOD CELL COUNT thou/uL  --   --   --   --  27.1*  --  29.4* 20.2*   LN-HEMOGLOBIN g/dL 8.2*  --  8.7*  --  8.3*   < > 9.1* 8.3*   LN-HEMATOCRIT %  --   --   --   --  26.8*  --  29.1* 26.8*   LN-PLATELET COUNT thou/uL  --  227  --  235 200   < > 241 165    < > = values in this interval not displayed.     Chemistry:  Results from last 7 days   Lab Units 03/22/20  0328 03/21/20  0400 03/20/20  0413   LN-SODIUM mmol/L 142 140 140   LN-POTASSIUM mmol/L 4.6 4.3 5.1*   LN-CHLORIDE mmol/L 101 98 99   LN-CO2 mmol/L 33* 34* 35*   LN-BLOOD UREA NITROGEN mg/dL 35* 41* 44*   LN-CREATININE mg/dL 0.64 0.68 0.72   LN-CALCIUM mg/dL 9.1 9.3 9.2   LN-MAGNESIUM mg/dL 1.9 1.9 2.1   LN-ALBUMIN g/dL  --   --  2.4*   LN-ALT (SGPT) U/L  --   --  14   LN-AST (SGOT) U/L  --   --  16   LN-ALKALINE PHOSPHATASE U/L  --   --  78   LN-BILIRUBIN TOTAL mg/dL  --   --  0.3     Coags:  Lab Results   Component Value Date    INR 1.34 (H) 03/10/2020     Cardiac Markers:  Results from last 7 days   Lab Units 03/20/20  0413   LN-CREATINE KINASE TOTAL U/L 180        Microbiology:        Influenza A/B rapid swab, 3/6:  Negative.      Legionella Ag urine, 3/6: Negative.    Bronchoalveolar lavage, 3/11:    KOH prep: Negative.    Fungal culture: 1+ Candida albicans.    Viral culture: Collected and pending.    AFB: AFB smear negative.  Culture pending.    Bronchoalveolar lavage, 3/15:     KOH prep: Yeast and hyphal fragments.    Fungal culture: Candida albicans.    Viral culture: Collected and pending.    AFB: AFB smear negative.  Culture pending.    Legionella species PCR: Negative.    C dif toxin PCR, 3/20:  Negative.     Cardiac/Radiology Studies:   Cardiac:    EKG:   personally reviewed.      Limited TTE, 3/7/20:  Summary    Left ventricle ejection fraction is normal. The calculated left ventricular ejection fraction is 63% without wall motion abnormality.    Normal right ventricular size and systolic function.    No significant mitral or tricuspid regurgitation.    When compared to the previous study dated 8/27/2017, no interval change.    Radiology:  Personally reviewed image/s and Personally reviewed impression/s    Chest X-Ray, 3/18/20:   FINDINGS: Upright portable chest. ET tube 2.5 cm above the johnnie. NG tube passes into the stomach. Left IJ central venous catheter tip just entering the right atrium. No pneumothorax. The heart size is normal. There are bibasilar infiltrates which have increased on the right since the previous exam. IMPRESSION:  Bilateral pulmonary infiltrates, increasing on the right    CTA PE protocol, 3/11/20:      FINDINGS:    ANGIOGRAM CHEST: Mild right lower lobe subsegmental pulmonary artery embolism. Minimal bilateral pulmonary embolism elsewhere, including middle lobe (series 4, image 206), left upper lobe (images 328-355 medially) and left lower lobe (image 232). No significant pulmonary arterial enlargement. RV to LV ratio measures 0.9. Slight interventricular septal flattening. Nonaneurysmal aorta without dissection.     LUNGS AND PLEURA: Bilateral confluent groundglass and opacities throughout all lobes.  Mild consolidation within the peripheral left upper and lower lobes, more pronounced in the lower lobe. Mild basilar predominant airway dilatation. Mild septal thickening. Tiny right and small left pleural effusions. No pneumothorax.    MEDIASTINUM/AXILLAE: Borderline mediastinal and bilateral hilar adenopathy. Example left perihilar node measures 11 x 16 mm (series 4, image 261).    UPPER ABDOMEN: No pericardial effusion.    MUSCULOSKELETAL: Normal.    IMPRESSION:  1.  Mild bilateral pulmonary artery embolism. Borderline RV to LV ratio and slight flattening of the interventricular septum; RV strain not excluded. Pulmonary arteries not enlarged.  2.  Regions of confluent groundglass and opacities throughout all lobes bilaterally. Mild left lung consolidation. Differential includes superimposed edema / ARDS, hemorrhage and alveolar damage, or other nonspecific infectious / inflammatory process (including a massive aspiration event). These findings make evaluation of pulmonary infarct difficult. 3.  Tiny right and small left pleural effusions. 4.  Borderline adenopathy, presumed reactive.    Right UE ultrasound, 3/10/20: IMPRESSION:  1.  Nonocclusive DVT around the PICC and right subclavian and brachiocephalic veins. 2.  Nonocclusive superficial thrombophlebitis around the PICC in the right cephalic vein in the forearm.     Outside reports reviewed: Historical medical records.    Additional Comments:  None

## 2021-06-07 NOTE — PLAN OF CARE
"  Problem: Mechanical Ventilation  Goal: ET tube will be managed safely  Outcome: Progressing   RESPIRATORY CARE NOTE    Arterial Blood Gas result:  pH 7.42; pCO2 62; pO2 77; HCO3 36, %O2 Sat 98.    Quarter strength Veletri discontinued post above ABG around 2000 per provider. Pt intubated with 7.5 ETT secure 21 cm at teeth. Patient tolerating bellow ventilator settings:      Vent Mode: PCV/VG  FiO2 (%):  [40 %-45 %] 40 %  S RR:  [28-30] 28  S VT:  [225 mL] 225 mL  PEEP/CPAP (cm H2O):  [12 cm H2O] 12 cm H2O  Minute Ventilation (L/min):  [6.4 L/min-8.4 L/min] 6.4 L/min  PIP:  [17 cm H2O-34 cm H2O] 34 cm H2O  MAP (cm H2O):  [15-19] 15     /58 (Patient Position: Semi-lakhani)   Pulse (!) 59   Temp 99  F (37.2  C) (Oral)   Resp 28   Ht 4' 10\" (1.473 m)   Wt 142 lb 13.7 oz (64.8 kg)   SpO2 96%   BMI 29.86 kg/m       BS course, gave Albuterol treatment x1, BS. Sxn moderate white thick, Pt tolerated well.  RT will follow per protocol.      ALBERT AlvaradoT   "

## 2021-06-07 NOTE — PLAN OF CARE
Problem: Pain  Goal: Patient's pain/discomfort is manageable  Outcome: Progressing   Pt on Fentanyl gtt at 200mcg    Problem: Inadequate Airway Clearance  Goal: Patient will achieve/maintain normal respiratory rate/effort  Outcome: Progressing  Vent setting on vent changed from 35 to 32      Problem: Nutrition Care Problem  Goal: Nutritional status is improving  Outcome: Progressing   TF at goal    Problem: Glucose Imbalance  Goal: Achieve optimal glucose control  Outcome: Progressing   Pt on insulin gtt    Pt began stacking breaths and over breathing vent at 1600. Versed IVP and Fentanyl boluses given through 1830 when pt became compliant with vent. Order for Versed gtt obtained.

## 2021-06-07 NOTE — PROGRESS NOTES
"RESPIRATORY CARE NOTE     Patient Name: Harriett Romero  Today's Date: 3/29/2020     Pt continues on the following settings:  Vent Mode: PCV/VG  FiO2 (%):  [50 %-100 %] 50 %  S RR:  [18] 18  S VT:  [300 mL] 300 mL  PEEP/CPAP (cm H2O):  [5 cm H2O] 5 cm H2O  Minute Ventilation (L/min):  [7.9 L/min-15 L/min] 9.9 L/min  PIP:  [28 cm H2O-38 cm H2O] 28 cm H2O  MAP (cm H2O):  [11-19] 12   Plateau pressure: 32 cm H2O     Pt has a #8.0 cuffed tracheostomy tube in place. Pt. was not weaned today. Does not meet criteria. Pt has prn nebs ordered and has not needed any this shift. BS are course and suctioning out moderate pink tinged secretions. Pt's respiratory status is stable. Patient ruled out for COVID-19 last evening.RT will continue to follow per MD's orders.     /77   Pulse (!) 105   Temp 99.1  F (37.3  C) (Oral)   Resp (!) 45   Ht 4' 10\" (1.473 m)   Wt 139 lb 15.9 oz (63.5 kg)   SpO2 96%   BMI 29.26 kg/m        Dane Stock, LRT  "

## 2021-06-07 NOTE — PROGRESS NOTES
Patient intubated and on mechanical ventilation.   Intensivist managing patient's care.  [facility] has nothing to add at this time.  And will follow peripherally.  Will resume care of patient once she is out of ICU.    Sulema Ballard

## 2021-06-07 NOTE — PROGRESS NOTES
Brief ICU note, full note to follow  3/29/2020     Patient tested negative for COVID-19. D/t persistent encephalopathy, we obtained a head CT that was notable for an acute/subacute L MCA CVA w/ surrounding edema. Patient was kindly seen by Neurology -- there is neurologic evidence of increasing intracranial pressure & the recommendation is to transfer patient to a hospital that can do an emergent craniotomy if needed ([facility]). Neurosurgery paged.

## 2021-06-07 NOTE — PROGRESS NOTES
Clinical Nutrition Therapy Follow Up Note    Per rounds TF held overnight for residuals >250 ml. Per intensivist, increase threshold to 500 ml. Per RN pt is otherwise     Current Nutrition Prescription:   Diet: NPO  TF: on hold for 24 hours after PEG placement  Pt was tolerating Peptamen AF at 30 mL/hr via OGT  Diet Supplements:  1 pkt Nutrisource two times a day via tube  Flushes 120 ml q 6 hours.  dextrose or Fluids:dexmedetomidine 400 mcg/100 mL in NS (PRECEDEX) (4mcg/mL), Last Rate: 1.5 mcg/kg/hr (03/27/20 3155)  dextrose 10%  dextrose 10%, Last Rate: Stopped (03/26/20 1713)  dextrose 10%  propofol, Last Rate: Stopped (03/26/20 1422)    Current Nutrition Intake:  Per RN pt tolerating TF.   Goal TF with Nutrisource Fiber provides: 1260 mL, 1544 kcal, 95 gm protein, 141 gm cho, 14 gm fiber, 1018  ML free water + 480 mL water flushes = 1498 mL water    Anthropometrics:  Admission weight: 147 lb 4.3 oz (66.8 kg) bed  Weight: 134 lb 14.7 oz (61.2 kg)     GI Status/Output:   GI symptoms include: watery BM per nursing per rectal tube   Bowel Sounds present per nurse  Last BM: 3/27/20 per nurse     Skin/Wound:  No wound was noted.    Medications:  Lantus, ssi, levothyroxine, multivitamin with fe, prilosec, prednisone, thiamine    Labs:  Reviewed.     Estimated nutrition needs:   Assessment weight is 49.0 Kg, adjusted weight  Energy needs: 4886-7536 Calories/day (25-30 Jean/Kg)  Protein needs: 59-74 g daily (1.2-1.5 g/Kg)   Fluid needs: 6067-0757 ml/day (30-35 ml/Kg)    Malnutrition: Noted previously (severe)    Nutrition Risk Level: high risk    Nutrition dx:  Inadequate oral intake r/t respiratory failure as evidenced by need for nutrition support.     Malnutrition r/t aspiration PNA and acute Respiratory failure as evidenced by 3.2% wt loss in 1 week, moderate muscle loss, and fluid accumulation.    Goal Status:  Patient will tolerate TF - progressing   BG < 180 mg/dL- met   Maintain weight  - fluctuates  Bowel  function WNL-progressing  Stools < 3/day- progressing slowly    Intervention:  Continue current TF plan.     Adjust residual hold orders to >500 ml     Monitoring/Evaluation:  TF tolerance, stooling, labs, wt

## 2021-06-07 NOTE — PLAN OF CARE
Problem: Mechanical Ventilation  Goal: Patient will maintain patent airway  Outcome: Progressing     Problem: Mechanical Ventilation  Goal: ET tube will be managed safely  Outcome: Progressing   Vent Mode: VCV  FiO2 (%):  [35 %-50 %] 50 %  S RR:  [24] 24  S VT:  [225 mL] 225 mL  PEEP/CPAP (cm H2O):  [5 cm H2O] 5 cm H2O  Minute Ventilation (L/min):  [5.5 L/min-12 L/min] 11.8 L/min  PIP:  [10 cm H2O-47 cm H2O] 12 cm H2O  MAP (cm H2O):  [5-11] 5  #8 shiley trach in place.  Suction for white secretions.  Vent changed to vcv rate 18, vt to 300ml., patient with RR  Into the  High 40's  No SBT today

## 2021-06-07 NOTE — PLAN OF CARE
Problem: Inadequate Airway Clearance  Goal: Patient will maintain patent airway  Outcome: Progressing     Problem: Mechanical Ventilation  Goal: Patient will maintain patent airway  Outcome: Progressing     Problem: Tracheostomy Tube Management  Goal: Tracheostomy will be managed safely  Outcome: Progressing     Vent Mode: VCV  FiO2 (%):  [35 %] 35 %  S RR:  [24] 24  S VT:  [225 mL] 225 mL  PEEP/CPAP (cm H2O):  [5 cm H2O] 5 cm H2O  Minute Ventilation (L/min):  [5.2 L/min-6.9 L/min] 5.8 L/min  PIP:  [30 cm H2O-37 cm H2O] 35 cm H2O  MAP (cm H2O):  [8-9] 9     Pt. remains on full vent support, settings above, Shiley #8. BS coarse, suctioning moderate amount of tan secretions via trach. PIP 35, plats 28, nebulizer treatment given as scheduled, RT following    ALBERT HartT

## 2021-06-07 NOTE — PROGRESS NOTES
"RESPIRATORY CARE NOTE     Patient Name: Harriett Romero  Today's Date: 3/23/2020     Pt continues on the following settings:  Vent Mode: VCV  FiO2 (%):  [35 %-40 %] 40 %  S RR:  [24] 24  S VT:  [225 mL-250 mL] 225 mL  PEEP/CPAP (cm H2O):  [8 cm H2O-10 cm H2O] 8 cm H2O  Minute Ventilation (L/min):  [6 L/min-9.6 L/min] 9.6 L/min  PIP:  [13 cm H2O-58 cm H2O] 18 cm H2O  MAP (cm H2O):  [8-16] 8   Plateau pressure: 30 cm H2O     Pt is intubated with  # 7.5 ETT secured  21 at the teeth. BS: coarse rhonchi and decreased @ bases. Tracheal suctioning done x 2 for small thick white secretions wih lavages.Pt's respiratory status is stable. RT will continue to follow per MD's orders.     /58   Pulse (!) 107   Temp 99.4  F (37.4  C) (Oral)   Resp 24   Ht 4' 10\" (1.473 m)   Wt 141 lb (64 kg)   SpO2 97%   BMI 29.47 kg/m        Montrell Montoya, LRT  "

## 2021-06-07 NOTE — PROGRESS NOTES
Critical Care Medicine Progress Note  3/29/2020    ASSESSMENT/PLAN:  54 y.o. former smoker with most pertinent history of hypothyroidism, migraines, depression and anxiety, who was admitted 3/1/2020 with acute hypoxic respiratory failure secondary to an aspiration event during colonoscopy 2/25/2020.  She had a prolonged hospitalization during which she was diagnosed with bilateral PEs and was subsequently intubated for progressively worsening hypoxic respiratory failure with ARDS on 3/11/2020.  She underwent a tracheostomy 3/25. Her pulmonary status deteriorated 3/27 & she is being treated for a new VAP. On 3/29 discovered to have an acute/subacute L MCA CVA.     New events:    COVID-19 PCR negative    New L MCA CVA    Enoxaparin on hold    No changes to antibiotics    Neuro/Psych:   #. Acute on subacute L MCA CVA w/ edema & small associated IPH. Timeline for the CVA development unknown. Occurrence of CVA while on therapeutic anticoagulation is concerning as well. No radiographic e/o impending herniation per NSG, however, patient remains at risk for this considering her age; peak timing for post-CVA edema unknown (since CVA onset is unknown). Very poor prognosis in this R-handed woman w/ multiple medical co-morbidities -- this was very frankly discussed w/ her . Plan for MRI/A of brain & neck. Enoxaparin on hold. No immediate plan for transfer. Ongoing discussions regarding C w/ .   #. Analgesia/Sedation: Goal RASS 0 to -1.  Regimen escalated to propofol + midazolam + dilaudid drips.  #.  Potential serotonin syndrome, transitioned from fentanyl to Dilaudid on 3/26.    Pulmonary:   #.  Acute hypoxic and hypercarbic respiratory failure with intubation 3/11 in setting of aspiration pneumonia with ARDS and bilateral PEs.  Bronchoscopy 3/11 and 3/15, thus far no pathogens identified; in this setting Candida tends to be a colonizing organism. Clinical deterioration 3/27 is concerning for new VAP (see ID).    #.  Tracheostomy 3/25, #8 Shiley in place.  At this point in time continue with full ventilator support.    Cardiovascular: having episodes of intermittent hypotension, which is concerning for sepsis d/t new VAP + s/e of sedation.   Infectious: VAP, started on empiric antibiotics 3/27. Continue w/ vancomycin + meropenem at this time.  Renal: No acute issues. Improving HCO3 w/ some volume repletion.   GI: persistent high residuals concerning for functional ileus (no mechanical obstruction seen on CT abdomen). TF on hold.   Heme: Bilateral PEs and LUE PICC-associated DVT. Has been on therapeutic enoxaparin. With new finding of CVA w/ petechial bleeding, enoxaparin is on hold.  Endo: Thyroidism, currently on PTA levothyroxine.     Access/Lines/Tubes: required and necessary for continued patient cares  Tracheostomy, Shiley, #8  CVC, L IJ  Clark catheter  Rectal tube  PEG tube    ICU prophylaxis:   #. VAP ppx: HOB 30 degrees, chlorhexidine rinses  #. Stress ulcer: PPI  #. Diet: NPO d/t high residuals  #. VTE: NONE -- enoxaparin on hold d/t CVA  #. Restraints: required and necessary for continued patient cares    CODE: FULL    Dispo: ICU for sedation, ventilator management, CVA management    Patient is critically ill with persistent & worsened hypoxic respiratory failure requiring mechanical ventilation and acute/subacute L MCA CVA w/ edema & small IPH; both have a high risk of rapid and profound deterioration requiring critical time billing. Total CCT spent 96 minutes thus far today.     This report was prepared using speech recognition software.  Any typographical errors are unintentional.  Please, contact me directly for any clarifications of my report.    Casandra Franklin MD  Pulmonary and Critical Care     =================================================================    Interval history: afebrile. Tested negative for COVID-19.     Head CT today w/ a finding of acute/subacute L MCA CVA w/ surrounding edema & small  "petechial bleeding. Discussed with both Neurology and Neurosurgery d/t concern for potentially worsening intracranial edema. I updated her  w/ the news as well, including the overall dismal prognosis -- patient is right-handed & this stroke would not allow for any meaningful rehabilitation d/t both receptive and expression aphasia that is likely present; this mean lack of independent function, and high likelihood of inability to wean off the ventilator support. In light of patient's significant health issues that have accrued over the course of her hospitalization (ARDS, VAP, acute b/l PEs, tracheostomy, ICU neuropathy/myopathy/encephalopathy), her prognosis is unfortunately even less favorable. Should she have an intracranial emergency w/ edema & herniation, a surgical intervention would be considered \"heroic\" & is likely to represent more risk than benefit without improved long-term prognosis. I explained all of this to patient's  to the best of my ability. He is trying to update all of the family & is awaiting results of the MRI. He understands that these new developments would mean the need to have difficult discussions regarding continuation of further medical support (likely would be prolonging her suffering rather than providing curative benefit).     Nursing notes reviewed.     Vitals: Temp:  [98.4  F (36.9  C)-99.5  F (37.5  C)] 98.5  F (36.9  C)  Heart Rate:  [] 103  Resp:  [25-49] 29  BP: ()/(46-77) 84/48  FiO2 (%):  [50 %-100 %] 60 %  Vent:   Vent Mode: PCV/VG  FiO2 (%):  [50 %-100 %] 60 %  S RR:  [18] 18  S VT:  [300 mL] 300 mL  PEEP/CPAP (cm H2O):  [5 cm H2O] 5 cm H2O  Minute Ventilation (L/min):  [7.9 L/min-15 L/min] 14.9 L/min  PIP:  [17 cm H2O-31 cm H2O] 21 cm H2O  MAP (cm H2O):  [7-19] 7    Physical Exam:   General: petite  woman, lying in bed, NAD  HEENT: trach midline, sutures in place, site is clean/dry, PERRL, MMM   CV: RRR, no M/R/G; extremities adequately " perfused  Pulm: persistently tachypneic, equal coarse mechanical bilateral breath sounds, no wheezing, no rhonchi, bibasilar crackles, no cough  Abd: soft, ND, NT, hypoactive bowel sounds  Msk: warm to touch, no BLE edema  Derm: no acute lesions or rashes on limited exam  Neuro: non-interactive, intermittently raising BUE against restraints  Psych: non-interactive    Labs: personally reviewed in EMR. Electrolytes are being replaced per protocol.     Imaging: personally reviewed in EMR. Formal Radiology impression below:  #. HCT, 3/29:  INTRACRANIAL CONTENTS: There is a large area of cytotoxic type edema involving the left parietal lobe, and the left temporal lobes as well as the superior left occipital lobe. It measures approximately 2.7 x 9.1 cm (craniocaudal times AP). There is associated 0.4 cm small intraparenchymal hemorrhage in the anterior inferior left parietal lobe. There is also couple of areas of petechial hemorrhage associated with this ischemia. This cytotoxic type edema has mass effect on the adjacent brain parenchyma. Partial effacement of the posterior body and horn of the left lateral ventricle. No midline shift. The basilar cisterns are patent. No cerebellar tonsillar ectopia.   VISUALIZED ORBITS/SINUSES/MASTOIDS: No intraorbital abnormality. No paranasal sinus mucosal disease. Moderate opacification of the right mastoid air cells. The left mastoid air cells are clear.  BONES/SOFT TISSUES: No acute abnormality.    #. CT chest/abd/pelvis, 3/29:  LUNGS AND PLEURA: A tracheostomy tube terminates in the upper thoracic trachea. Extensive motion artifact compromises detailed evaluation of the lungs and pleural spaces. There are persistent groundglass airspace opacities throughout both lungs with both   central and peripheral involvement, with increasingly consolidative appearance in the right lower lobe. Mild bibasilar cylindrical bronchiectasis. There are small right and trace left pleural effusions. No  pneumothorax.  MEDIASTINUM/AXILLAE: Heart size within normal limits. Central venous catheter tip terminates at cavoatrial junction. There is some persistent adenopathy in the right paratracheal region, though exact measurement is difficult due to motion artifact.  HEPATOBILIARY: Motion degraded evaluation. Liver and gallbladder are grossly normal.  PANCREAS: Normal.  SPLEEN: Motion degraded evaluation. Within normal limits.  ADRENAL GLANDS: Normal.  KIDNEYS/BLADDER: No hydronephrosis. The bladder is decompressed via Clark catheter.  BOWEL: Percutaneous gastrostomy tube is in place. There is no mechanical bowel obstruction. The appendix is within normal limits. No free air. A rectal tube is in place.  LYMPH NODES: Normal.  VASCULATURE: Unremarkable.  PELVIC ORGANS: Status post hysterectomy.  MUSCULOSKELETAL: Normal.

## 2021-06-07 NOTE — PROGRESS NOTES
MD RESTRAINT FOR NON-VIOLENT BEHAVIOR FACE TO FACE EVALUATION  Progress Note  Restraint Application    I recognize that restraints are physical and/or chemical interventions intended to restrict a person's movements. Restraints are currently needed to ensure the safety of this patient and/or others. My clinical rationale appears below.    PATIENT EVALUATION  Patient evaluated on 3/22/2020 at 7:27 AM to confirm the need for medical restraints.     --  Category/Type of Restraint:     Non Violent:  Soft limb restraint x2  --  Patient's Immediate Situation:  Patient demonstrated the following behaviors: Pulling/tugging at invasive lines or tubes and does not respond to verbal/non-verbal redirection  --  Patient's Reaction to the intervention:  Does patient understand the reason for restraint/seclusion? Unable to Express  --  Medical Condition:  Is there any evidence of compromise of Skin integrity, Respiratory, Cardiovascular, Musculoskeletal, Hydration? Yes:  Skin integrity, Respiratory and Cardiovascular  --  Root Cause of the Behavior:  Acute respiratory failure,acute encephalopathy related to therapeutic sedation  --  Behavioral Condition:  In consultation with the RN, is there a need to continue this restraint or seclusion? Yes  --  Criteria for Release from the Restraint:  Patient is calm, listens to verbal redirection, and stops attempting to pull out lines/tubes    See Restraint Flowsheet for complete restraint documentation and assessment.    Nathaly Pryor MD, MPH  Pulmonary & Critical Care Medicine  Pager: [phone]  3/22/2020  7:27 AM

## 2021-06-07 NOTE — PROGRESS NOTES
Spiritual Care Note    Spiritual Assessment:      provided spiritual/emotional support for family, including  and two sons, following death of patient.     Family shared some family/life memories.  Brian profoundly grieving, and was also able to shared story and laugh. Sons remain very strong source of support for Brian.     Offered reading of Psalm 23 and prayer of commendation    Care Provided:     Empathic listening and presence     Helped patient in processing of emotions     Offered prayer     Plan of Care: No further plans to visit at this time, but  staff available if further needs arise.      Greg Brody, MDIV, [facility]

## 2021-06-07 NOTE — PROGRESS NOTES
PROVIDER RESTRAINT FOR NON-VIOLENT BEHAVIOR FACE TO FACE EVALUATION  3/23/2020   8:53 AM     Patient seen on morning interdisciplinary rounds.     Patient's Immediate Situation:  Patient demonstrated the following behaviors: Pulling/tugging at invasive lines or tubes and does not respond to verbal/non-verbal redirection    Patient's Reaction to the intervention:  Does patient understand the reason for restraint/seclusion? Unable to Express    Medical Condition:  Is there any evidence of compromise of Skin integrity, Respiratory, Cardiovascular, Musculoskeletal, Hydration? No    Behavioral Condition:  In consultation with the RN, is there a need to continue this restraint or seclusion? Yes    See Restraint Flowsheet for complete restraint documentation and assessment.    Meredith Palomares, CNP

## 2021-06-07 NOTE — PROGRESS NOTES
Respiratory Care Note     Patient is on full vent support. Breath sound is diminished. No wean done tonight. Will continue to monitor.       03/24/20 0405   Vent Information   Interface Invasive   Vent ID [id number]   Vent Mode VCV   Patient Ventilator Status On   Flowmeter at Bedside Yes   Ambu-Bag With Mask at Bedside Yes   Respiratory Assessment   Assessment Type Assess only   Respiratory Pattern Regular   Chest Assessment Chest expansion symmetrical   Bilateral Breath Sounds Clear   Vent Settings   FiO2 (%) 40 %   Heater Temperature 98.6  F (37  C)   Resp Rate (Set) 24   Vt (Set, mL) 225 mL   PEEP/CPAP (cm H2O) 8 cm H2O   Insp Flow (L/min) 35 L/min   Trigger Sensitivity Flow (L/min) 2 L/min   Bias Flow (lpm)  5 L/min   Humidification Heater   Patient Data   ETCO2 (mmHg) 48 mmHg   Vt Exp (mL) 263 mL   Minute Ventilation (L/min) 6.4 L/min   Total Resp Rate  27 BPM   PIP Observed (cm H2O) 30 cm H2O   MAP (cm H2O) 11   SpO2 96 %   Heart Rate 64   Alarms   High Resp Rate 40   Low PEEP 0 cm H2O   Insp Pressure High (cm H2O) 60 cm H2O   Insp Pressure Low (cm H2O) 9 cm H2O   MV High (L/min) 16 L/min   MV Low (L/min) 2.5 L/min   Vt Low (mL) 150 mL   Apnea Interval (sec) 30 seconds   Apnea Rate 24   Apnea Volume (mL) 225 mL   Alarm Functional and On Yes   Backup Mode Set Yes   Airway Suctioning/Secretions   Suction Device  Inline   Secretion Amount Small   Secretion Color White   Secretion Consistency Thick   Suction Tolerance Tolerated well   Suctioning Adverse Effects None   Subglottic Suction ET Tube 7.5   Placement Date: 03/11/20   Subglottic Suction ETT Entry Site: Oral  Size : 7.5  Cuffed: Cuffed  Insertion attempts: 1  ETT Placement Confirmation: Bilateral breath sounds;End tidal CO2 device;Confirmed by x-ray  Placed By: NP/PA   Secured at (cm) 21 cm   Measured from Teeth   Secured Location Right   Secured with Commercial tube barrett   Cuff Pressure (cm H2O) 30 cm H2O   Site Condition Dry   Subglottic Secretions Scant    Subglottic Suction Frequency Intermittent suction   Subglottic Suction Pressure 120 mmHg   Subglottic Suction Lumen Intervention Cleaned   Weaning Assessment    Weaning Assessment Complete N

## 2021-06-07 NOTE — PLAN OF CARE
Problem: Psychosocial Needs  Goal: Demonstrates ability to cope with hospitalization/illness  Outcome: Progressing     Problem: Knowledge Deficit  Goal: Patient/family/caregiver demonstrates understanding of disease process, treatment plan, medications, and discharge instructions  Outcome: Progressing     BLE clonus. CNP aware. ? Serotonin syndrome. Hold wellbutrin, celexa and Fentanyl bolus.      Problem: Nutrition Care Problem  Goal: Nutritional status is improving  Outcome: Progressing     Tube feeding residual : 195 ml ( 00:00)  and 175 ml  (04:00)      Problem: Glucose Imbalance  Goal: Achieve optimal glucose control  Outcome: Progressing     Blood Sugar Currently at Target Goal. Insulin gtt at 1 unit/hr under algorithm 2.

## 2021-06-07 NOTE — PROGRESS NOTES
Quick Palliative Care Note:  Spoke with  Brian this AM. Is aware of and agreed to moving forward with Trach and PEG for pt today. He is coping ok, looking after himself, but does indicate he worries about how his wife will feel when she wakes up and has much uncertainty re: what all has occurred. He would really wish that she have reassurance that staff have been calling him regularly, explain why he is unable to see her now, and have assistance facilitating a call to her with him on speaker phone so she can hear him and a staff member, helping describe her responses, as a communication bridge during this time. Palliative Care team (SW and providers), will continue to follow.   Parth Castillo CNP, Palliative Care  .

## 2021-06-07 NOTE — DISCHARGE SUMMARY
Death / Discharge Summary    Admit date: 3/1/2020  Patient YOB: 1965     Date and time of death: 2020 at 11:31 AM    Reason for Admission: Aspiration pneumonia    Hospital Summary:     Ms. Romero is a 55 yo female with history of MDD, anxiety, restless leg syndrome, previous tobacco user, migraine headaches, insomnia.  Patient underwent diagnostic colonoscopy on 2020, and vomited when waking up from anesthesia. Patient was seen that evening in Urgent care and diagnosed with aspiration pneumonia and started on ABx.   Patient reported worsening dyspnea, generalized malaise and productive cough.   Patient was seen in Urgent care on 3/1 and admitted to this facility with diagnosis of multilobar pneumonia. Started on broad IV antibiotics.   Hospital course complicated with ARDS, bilateral pulmonary embolism.   Underwent diagnostic bronchoscopy on 3/11 and 3/15. Cultures negative.   Persistent high O2 requirements, systemic steroids were added without significant in O2 requirements. Underwent tracheostomy on 3/25. PEG tube placement on 3/25.   Patient had increase trach secretions, treated for VAP.   Ongoing encephalopathy, head CR scan done on 3/29 showed acute / subacute L MCA CVA with surrounding edema. Neurology and neurosurgery consulted.   Poor prognosis from neurology point of view. Palliative care service was consulted.   Family would like to change acute treatment to comfort measures. Steps for withdrawing life support were done following palliative care recommendations.   Patient  on 2020 at 11:31 AM    Events Leading to Death:   Aspiration pneumonia leading to acute respiratory failure / ARDS.   Associated pulmonary embolism.  Acute / subacute CVA with brain edema and areas of hemorrhagic transformation     Consults: Pulmonary critical care, surgery, neurology , neurosurgery, palliative care    Significant Diagnostic Studies:     Chest CT scan 3/11  IMPRESSION:   1.   Mild bilateral pulmonary artery embolism. Borderline RV to LV ratio and slight flattening of the interventricular septum; RV strain not excluded. Pulmonary arteries not enlarged.  2.  Regions of confluent groundglass and opacities throughout all lobes bilaterally. Mild left lung consolidation. Differential includes superimposed edema / ARDS, hemorrhage and alveolar damage, or other nonspecific infectious / inflammatory process (including a massive aspiration event). These findings make evaluation of pulmonary infarct difficult.  3.  Tiny right and small left pleural effusions.   4.  Borderline adenopathy, presumed reactive.    Head CT scan 3/29/20  IMPRESSION:   1.  Large area of cytotoxic type edema involving the left parietal lobe, and the left temporal lobes as well as the superior left occipital lobe. This measures approximately 2.7 x 9.1 cm. This is suspicious for an area of acute/subacute ischemia. It can be confirmed with MRI.  2.  There is associated 0.4 cm small intraparenchymal hemorrhage in the anterior inferior left parietal lobe. There is also couple of areas of petechial hemorrhage associated with this ischemia.   3.  It does have mass effect on the adjacent brain parenchyma. However there is no midline shift.    Treatments: Mechanical ventilation, antibiotics, pressors, tube feedings    Surgical Procedures Performed:     Diagnostic bronchoscopy 3/11, 3/15  Tracheostomy 3/25  S/p PEG tube 3/125    Autopsy:  Not performed     Principal Diagnosis: ARDS secondary to aspiration pneumonia    Other Diagnoses:  1. Ventilator associated pneumonia  2. Prolonged mechanical ventilation s/p tracheostomy  3. Deep vein thrombosis (DVT) of upper extremity related to line placement  4. Bilateral pulmonary embolism  5. Acute kidney failure  6. Acute encephalopathy  7. Acute / subacute cerebrovascular accident with surrounding edema    Admitting Physician: Nathaly Pryor MD     Primary Care Physician: [facility]  MD Zain    Discharge Physician: Babar Fowler

## 2021-06-07 NOTE — PLAN OF CARE
Problem: Pain  Goal: Patient's pain/discomfort is manageable  Outcome: Progressing     Problem: Inadequate Airway Clearance  Goal: Patient will maintain patent airway  Outcome: Progressing  Goal: Patient will achieve/maintain normal respiratory rate/effort  Outcome: Progressing     Problem: Mechanical Ventilation  Goal: Patient will maintain patent airway  Outcome: Progressing  Goal: ET tube will be managed safely  Outcome: Progressing     The patient remained on RASS from -3 to -5. Still intubated, Arterial blood gas was sent and relayed to Nurse Practitioner Rashida Veletri is turned off at 2040H. Still with sedation.

## 2021-06-07 NOTE — PLAN OF CARE
Problem: Impaired Gas Exchange  Goal: Demonstrate improved ventilation and adequate oxygenation of tissues as evidenced by absence of respiratory distress  Outcome: Progressing   Veletri changed to 1/4 strengh, ABG's Q8hr, sats 95-98% on 45% FIO2.  Attempts to wean sedation unsuccessful again today.  Pt stacking breaths, low TV's, hypertensive.  Intensivist updated pt , plans for palliative consult/meeting on Monday.

## 2021-06-07 NOTE — PROGRESS NOTES
Patient's is COVID-19 negative.  May remove patient from Contact, Droplet, and Airborne precautions    HAYLEY Kelly, CNP  Pulmonary Critical Care  Pager: [phone]

## 2021-06-07 NOTE — CONSULTS
PAIN MANAGEMENT SERVICE CHART CHECK    This patient's chart has been reviewed by the Pain Service. Discussed with RN and ICU MD. Pt on dilaudid gtt, propofol, and precedex. Pain team will sign off and please reconsult if needed.  If you would like the patient to be seen, please reconsult via [employer] or contact the service at [phone] and ask to have the patient seen.    Thank you!      Veronica Luna APRN, CNS-BC, ACHPN  Acute Care Pain Management Program  [facility]  Page via online [employer] system   [phone]  Please infoNet page rather than call Click here to page

## 2021-06-07 NOTE — PROGRESS NOTES
"RESPIRATORY CARE NOTE     Patient Name: Harriett Romero  Today's Date: 3/31/2020     Pt continues on the following settings:  Vent Mode: PCV/VG  FiO2 (%):  [30 %-35 %] 30 %  S RR:  [18] 18  S VT:  [300 mL] 300 mL  PEEP/CPAP (cm H2O):  [5 cm H2O] 5 cm H2O  Minute Ventilation (L/min):  [7.5 L/min-10.4 L/min] 8 L/min  PIP:  [27 cm H2O-40 cm H2O] 34 cm H2O  MAP (cm H2O):  [12-16] 13   Plateau pressure: 31 cm H2O      Pt has a #8.0 cuffed tracheostomy tube in place. Pt. was not weaned today. Does not meet criteria. Pt has Q6 Albuterol  neb ordered which were given. BS are course and suctioning out moderate pale yellow secretions. Pt's respiratory status is stable. RT will continue to follow per MD's orders.            BP 98/61   Pulse 91   Temp 99.3  F (37.4  C) (Axillary)   Resp (!) 29   Ht 4' 10\" (1.473 m)   Wt 136 lb 0.4 oz (61.7 kg)   SpO2 96%   BMI 28.43 kg/m        Dane Stock, LRT  "

## 2021-06-07 NOTE — PROGRESS NOTES
CRITICAL CARE PROGRESS NOTE:    Assessment/Plan:  54 year old female, former smoker, with past medical history significant for restless leg syndrome with aspiration pneumonitis after vomiting while recovering from anesthesia for elective colonoscopy on 2/25/2020, with delayed presentation of severe fibroproliferative ARDS and bilateral pulmonary emboli.    NEURO:  Therapeutic sedation on vent. Had normal mental status prior to intubation. Became quite hypertensive 3/17 despite BIS 30-50, improved with increased sedation. Discontinued cisatracurium on 3/18. Now on propofol plus midazolam drip as high-dose propofol and frequent fentanyl and midazolam boluses plus continuous fentanyl were inadequate with ongoing vent dyssynchrony.    Cont propofol, fentanyl    Added midazolam drip on 3/18    RASS goal -4 for now    Tylenol prn pain    Propofol labs OK 3/13, normal CK, LA and TG; recheck CK and triglyceride level in AM    CV:  Circulatory shock, now hypertensive: Likely hypovolemic vs vasodilatory from sepsis. No positive cultures. Off pressors. On 3/17 HTN with SBP 160s-170s. Despite BIS 30-50, improved with increased sedation. Likely BIS not accurately reading level of alertness. Now on propofol plus midazolam drip as high-dose propofol and frequent fentanyl doses plus continuous fentanyl were inadequate with ongoing vent dyssynchrony.    MAP >65, currently off pressors    Lactate normal no need to trend    Echo 3/7 EF 63%, normal RV size/funcion, no pulm HTN, no RV strain, no shunt study done    Hold home triamterene-HCTZ for now    Continue sedation and analgesia    RESP:  Acute resp failure with hypoxemia, ARDS, pulmonary emboli: Intubated 3/11 for worsening CT scan c/w ARDS. Presumed massive aspiration event sometime after colonoscopy 2/25, with progressive inlammation and pneumonitis, likely now fibroproliferative stage. No positive cultures. Bronch 3/11 no mucus plugs. Bronch 3/15 no mucus plugs, diffuse airway  inflammation/friabiliaty, BAL done in RML x2 no evidence of DAH. RADHA, ANCA, RF and HIV negative, no e/o interstitial lung disease. Cisatracurium discontinued 3/18. Patient was not proned due to late presentation in fibroproliferative stage with unclear benefit.    Continue PCV-VG 32/225    FiO2 down to 45%    Decrease PEEP from 14 to 12 on 3/17, tolerating    Trial off cisatracurium today    Pplat down below 30 for the first time on 3/17, remains 28 today    Driving pressure 16    ABG q 8 hrs    Permissive hypercapnia, pH now normalized    Nebulized epoprostenol still full strength; may be able to cut to half strength tomorrow    Have held off on proning as was delayed presentation in fibroproliferative stage; unclear benefit    IV steroids; titrated down, now at 40 mg two times a day; will decrease to daily prednisone with taper; adjust as needed    Current goal is to get patient to settings safer for bedside perc trach early next weak; doubt she will get to the point of SBT/extubation by then    GI:  Had high residuals, started trophic tube feeding at 10 mL/hr on 3/17 on cisatracurium after starting metoclopramide; residuals <100. Now with loose stools after tube feeding up to goal; has rectal tube. C diff negative on 3/3.    Bowel regimen on hold    PPI    Advanced tube feeding to goal    Change metoclopramide to as-needed    If persistent watery stools with bowel regimen on hold with recurrent fever, consider rechecking C diff    RENAL:  Acute kidney injury: Likely ATN due to shock and aggressive diuresis. Cr normalized but BUN 60. Net negative >5liters    Furosemide stopped; re-dose as indicated     Propofol labs (CK, TG, LA) nomal on 3/13, no e/o rhabdo/PIS; recheck TG and CK tomorrow    Clark in place    Avoid nephrotixins    ID:  Presumed aspiration pneumonia with ARDS, has gotten many days of abx, cultures negative on BAL 3/11. Bronch 3/15 (2nd one) c/w diffuse infectious/inflammatory process with some  secretions, no mucus plugs, secretions were not thick or copious but airways friable and erythematous. Persistent leukocytosis though may be steroid-induced. C diff negative on 3/3.    Stopped cefepime, off all abx 3/15; received almost 2 weeks of IV abx    If persistent watery stools with bowel regimen on hold with recurrent fever, consider rechecking C diff    HEMATOLOGIC:  LUE DVT around PICC (since removed), right subclavian thrombus and looked like there was a thrombus in RIJ on bedside US today (was doing an US prior to line palcement). Bilateral PE without RH strain.    Continue UFH drip; transition to enteral anticoagulant after critical illness resolved; likely to need trach    plt OK, cont to follow    hgb goal >7    ENDOCRINE:  Hyperglycemia due to high dose IV steroids and critical illness.    Insulin drip per protocol    Cont synthroid    ICU PROPHYLAXIS:    UFH drip    PPI    peridex    HOB elevation    DISPO/CODE STATUS: Full code. Critically ill requiring invasive mechanical ventilation. The goal is likely perc trach early next week.    FAMILY COMMUNICATION: Spoke with the patient's , Brian, by telephone 3/19.    Lines/Drains/Tubes:  ETT placed 3/11/20  OG tube placed 3/11/20  Moreno changed 3/17/20 after some bloody return from prior moreno requiring flushing to clear  Right brachial arterial line placed 3/11/20  Right femoral TLC placed 3/11/20, removed 3/15/20  Left IJ TLC placed 3/15/20  PICC LUE assoc w/ DVT was removed    Restraints  Progress Note  Restraint Application    I recognize that restraints are physical and/or chemical interventions intended to restrict a person's movements. Restraints are currently needed to ensure the safety of this patient and/or others. My clinical rationale appears below.    Category/Type of Restraint     Non Violent:  Soft limb restraint x2  --  Behavior  Pulling at tubes/lines  --  Root Cause of the Behavior  Sedation/intubation  --  Less-Restrictive  "Measures that Failed  Non Violent Measures:  Close Observation  --  Response to the Restraint  Patient unable to pull at tubes/lines  --  Criteria for Release from the Restraint  Patient calm and off sedation    Tae Schmidt MD  Pulmonary and Critical Care Medicine  [facility]  Cell [phone]  Office [phone]  Pager [phone]    Overnight events:  Off paralytic, needing more sedation for vent synchrony, including adding midazolam drip, as boluses of fentanyl and midazolam plus high-dose propofol were inadequate.    Subjective:  Unable to assess    Objective:  Physical Exam:  Vent settings for last 24 hours:  Vent Mode: PCV/VG  FiO2 (%):  [45 %] 45 %  S RR:  [32-35] 32  S VT:  [225 mL] 225 mL  PEEP/CPAP (cm H2O):  [12 cm H2O] 12 cm H2O  Minute Ventilation (L/min):  [6.4 L/min-7.9 L/min] 7.2 L/min  PIP:  [28 cm H2O-36 cm H2O] 36 cm H2O  MAP (cm H2O):  [15-24] 20    /74   Pulse (!) 56   Temp 98.7  F (37.1  C) (Oral)   Resp (!) 32   Ht 4' 10\" (1.473 m)   Wt 140 lb (63.5 kg)   SpO2 93%   BMI 29.26 kg/m      Intake/Output last 3 shifts:  I/O last 3 completed shifts:  In: 2750.3 [I.V.:1228.3; NG/GT:1472; IV Piggyback:50]  Out: 1785 [Urine:1685; Stool:100]  Intake/Output this shift:  I/O this shift:  In: 264 [I.V.:114; NG/GT:100; IV Piggyback:50]  Out: 160 [Urine:160]    Physical Exam  Gen: intubated, sedated  HEENT: no OP lesions, no JESSICA  CV: RRR, no m/g/r  Resp: coarse crackles bilaterally  Abd: soft, nontender, BS+  Neuro: PERRL, unable to assess motor, sedated/paralyzed  Ext: no significant edema    LAB:  Results from last 7 days   Lab Units 03/19/20  0348   LN-WHITE BLOOD CELL COUNT thou/uL 27.1*   LN-HEMOGLOBIN g/dL 8.3*   LN-HEMATOCRIT % 26.8*   LN-PLATELET COUNT thou/uL 200     Results from last 7 days   Lab Units 03/19/20  0348 03/18/20  0439 03/17/20  0358  03/13/20  0500   LN-SODIUM mmol/L 141 145 143   < > 141   LN-POTASSIUM mmol/L 4.8 5.0 4.8   < > 4.8   LN-CHLORIDE mmol/L 100 " 102 102   < > 106   LN-CO2 mmol/L 36* 35* 35*   < > 29   LN-BLOOD UREA NITROGEN mg/dL 56* 60* 62*   < > 21   LN-CREATININE mg/dL 0.74 0.78 0.95   < > 0.97   LN-CALCIUM mg/dL 9.1 8.9 8.8   < > 8.7   LN-PROTEIN TOTAL g/dL  --   --   --   --  6.2   LN-BILIRUBIN TOTAL mg/dL  --   --   --   --  0.2   LN-ALKALINE PHOSPHATASE U/L  --   --   --   --  119   LN-ALT (SGPT) U/L  --   --   --   --  15   LN-AST (SGOT) U/L  --   --   --   --  13    < > = values in this interval not displayed.       Micro  PCT 0.82 on 3/2 -> 0.66 3/14  Bronch/BAL cultures all negative, fungal/AFB NGTD  Flu swab neg 3/6  C diff neg  Legionella neg    HIV neg  RADHA normal  RF neg  ANCA <1:20  ESR 58  CRP 29 -> 22 -> 10 -> 13    Cytology from BAL 3/11  Final Diagnosis    A) LUNG, LEFT LOWER LOBE, BRONCHIOALVEOLAR LAVAGE:        1)  MILD CHRONIC INFLAMMATION WITHOUT INCREASED ACUTE INFLAMMATION        2)  NUMEROUS RESPIRATORY EPITHELIAL CELLS        3)  NEGATIVE FOR VIRAL INCLUSIONS, DYSPLASTIC AND MALIGNANT CELLS        4)  GMS STAINED SUREPATH SMEAR DEMONSTRATES OCCASIONAL BUDDING YEAST             AND PSEUDOHYPHAE CONSISTENT WITH ROSY SPECIES (POSSIBLE OROPHARYNGEAL             CONTAMINANT); NEGATIVE FOR PNEUMOCYSTIS     B) LUNG, RIGHT MIDDLE LOBE, BRONCHIOALVEOLAR LAVAGE:        1)  ABUNDANT ALVEOLAR MACROPHAGES, RESPIRATORY EPITHELIAL CELLS, MILD             BACKGROUND CHRONIC INFLAMMATION AND OCCASIONAL SQUAMOUS EPITHELIAL             CELLS        2)  NEGATIVE FOR VIRAL INCLUSIONS, DYSPLASTIC AND MALIGNANT CELLS        3)  CELL BLOCK SECTIONS AND GMS STAINED SMEARS DEMONSTRATE BUDDING YEAST             AND PSEUDOHYPHAE CONSISTENT WITH ROSY SPECIES (POSSIBLE OROPHARYNGEAL             CONTAMINANT); NEGATIVE FOR PNEUMOCYSTIS ORGANISMS          Current Facility-Administered Medications   Medication Dose Route Frequency Provider Last Rate Last Dose     acetaminophen solution 650 mg (TYLENOL)  650 mg Enteral Tube Q6H PRN Babar Leigh  MD Saran   650 mg at 03/12/20 1734     acetaminophen suppository 650 mg (TYLENOL)  650 mg Rectal Q4H PRN Meredith Enriquez, CNP   650 mg at 03/13/20 0205     albuterol nebulizer solution 2.5 mg (PROVENTIL)  2.5 mg Nebulization Q4H PRN Meredith Enriquez, CNP   2.5 mg at 03/14/20 0224     albuterol nebulizer solution 2.5 mg (PROVENTIL)  2.5 mg Nebulization QID Meredith Enriquez, CNP   2.5 mg at 03/19/20 1108     aluminum-magnesium hydroxide-simethicone 200-200-20 mg/5 mL suspension 30 mL (MAALOX ADVANCED)  30 mL Enteral Tube Q4H PRN Babar Leigh MD         amino acids-protein hydrolys 15-60 gram-kcal/30 mL packet 1 packet (ProSource No Carb)  1 packet Oral TID Kirk Rock MD   1 packet at 03/19/20 0944     bisacodyL suppository 10 mg (DULCOLAX)  10 mg Rectal Daily PRN Meredith Enriquez CNP   10 mg at 03/13/20 1255     buPROPion tablet 150 mg (WELLBUTRIN)  150 mg Enteral Tube BID Alba York CNP   150 mg at 03/19/20 0944     chlorhexidine 0.12 % solution 15 mL (PERIDEX)  15 mL Topical Q12H Kirk Rock MD   15 mL at 03/19/20 0416     citalopram tablet 40 mg (celeXA)  40 mg Enteral Tube QHS Babar Leigh MD   40 mg at 03/18/20 2102     dextrose 10%  15 mL/hr Intravenous Continuous PRN Kirk Rokc MD         dextrose 50 % (D50W) syringe 20-50 mL  20-50 mL Intravenous Q15 Min PRN Meredith Enrqiuez CNP         diphenhydrAMINE injection 25 mg (BENADRYL)  25 mg Intravenous Q6H PRN Alba York CNP   25 mg at 03/10/20 0014     docusate sodium 50 mg/5 mL oral liquid 100 mg (COLACE)  100 mg Enteral Tube BID Alba York, CNP   100 mg at 03/18/20 0917     epoprostenol 0.5 mg/50 mL (Full Strength) (VELETRI) inhalation solution  80,000 ng/hr Nebulization Continuous Kirk Rock MD 8 mL/hr at 03/19/20 1059 80,000 ng/hr at 03/19/20 1059     fentaNYL - BOLUS DOSE from infusion 50 mcg  50 mcg Intravenous Q1H PRN Kirk Rock MD   50 mcg at 03/18/20 1713     fentaNYL 2500  mcg/50 mL CADD infusion (50 mcg/mL)   mcg/hr Intravenous Continuous Kirk Rock MD 4 mL/hr at 03/19/20 0820 200 mcg/hr at 03/19/20 0820     fentaNYL pf injection 100 mcg (SUBLIMAZE)  100 mcg Intravenous Once Kirk Rock MD         glucagon (human recombinant) injection 1 mg  1 mg Subcutaneous Q15 Min PRN Meredith Enriquez, CNP         heparin 25,000 units in 0.45% sodium chloride (100 units/ml) 250 mL ANTICOAGULANT infusion  1-50 Units/kg/hr Intravenous Continuous Kirk Rock MD 8.7 mL/hr at 03/19/20 0820 14 Units/kg/hr at 03/19/20 0820     hydrALAZINE injection 10-20 mg (APRESOLINE)  10-20 mg Intravenous Q4H PRN Tae Schmidt MD         hydrOXYzine pamoate capsule 25-50 mg (VISTARIL)  25-50 mg Enteral Tube Q4H PRN Babar Leigh MD         insulin regular 1 Units/mL in sodium chloride 0.9% 250 mL  0-24 Units/hr Intravenous Continuous Meredith Enriquez, CNP 1.5 mL/hr at 03/19/20 1106 1.5 Units/hr at 03/19/20 1106     levothyroxine tablet 125 mcg (SYNTHROID, LEVOTHROID)  125 mcg Enteral Tube Daily 0600 Babar Leigh MD   125 mcg at 03/19/20 0608     lidocaine (PF) 10 mg/mL (1 %) injection 1-5 mL (XYLOCAINE-MPF)  1-5 mL Intradermal Once PRN Wilmar Rich MD         lidocaine 5 % ointment (XYLOCAINE)   Topical TID PRN Jenni Quinteros CNP         lipase-protease-amylase 5,000-17,000- 24,000 unit capsule 2 capsule (ZENPEP)  2 capsule Enteral Tube PRN Destini Trevino MD        And     sodium bicarbonate tablet 325 mg  325 mg Enteral Tube PRN Destini Trevino MD         LORazepam 0.5 mg injection (diluted concentration)  0.5 mg Intravenous Q4H PRN Wilmar Rich MD   0.5 mg at 03/08/20 2004     magnesium hydroxide suspension 30 mL (MILK OF MAG)  30 mL Enteral Tube Daily PRN Babar Leigh MD         melatonin tablet 3 mg  3 mg Enteral Tube Bedtime PRN Babar Leigh MD         methylPREDNISolone sod suc(PF) injection 40  mg (SOLU-MEDROL)  40 mg Intravenous Q12H Tae Schmidt MD   40 mg at 03/19/20 0057     metoclopramide injection 5 mg (REGLAN)  5 mg Intravenous Q6H PRN Tae Schmidt MD         midazolam (PF) injection 2 mg (VERSED)  2 mg Intravenous Q1H PRN Jenni Quinteros CNP   2 mg at 03/18/20 1830     midazolam (VERSED) 100 mg/100 mL in NS (1 mg/mL) infusion  0.25-15 mg/hr Intravenous Continuous Tae Schmidt MD 6 mL/hr at 03/19/20 0820 6 mg/hr at 03/19/20 0820     morphine injection 2 mg  2 mg Intravenous Q4H PRN Wilmar Rich MD   2 mg at 03/11/20 1317     naloxone injection 0.2-0.4 mg (NARCAN)  0.2-0.4 mg Intravenous PRN Meredith Enriquez CNP        Or     naloxone injection 0.2-0.4 mg (NARCAN)  0.2-0.4 mg Intramuscular PRN Meredith Enriquez, AME         norepinephrine 4 mg/250 ml in NS (0.016 mg/ml)  0.01-0.4 mcg/kg/min Intravenous Continuous Kirk Rock MD   Stopped at 03/17/20 0900     omeprazole suspension 20 mg (PriLOSEC)  20 mg Oral QHS Tae Schmidt MD   20 mg at 03/18/20 2104     ondansetron injection 4 mg (ZOFRAN)  4 mg Intravenous Q4H PRN Meredith Enriquez CNP   4 mg at 03/07/20 0354    Or     ondansetron tablet 8 mg (ZOFRAN)  8 mg Oral Q8H PRN Meredith Enriquez, CNP         oxymetazoline 0.05 % nasal spray 2 spray (AFRIN)  2 spray Each Nare BID PRN Maddi Hernandez MD         polyethylene glycol packet 17 g (MIRALAX)  17 g Enteral Tube DAILY Tae Schmidt MD   17 g at 03/18/20 0916     polyvinyl alcohol 1.4 % ophthalmic solution 1 drop (LIQUIFILM TEARS)  1 drop Both Eyes TID PRN Jenni Quinteros CNP   1 drop at 03/19/20 0950     pramipexole (MIRAPEX) tablet 0.75 mg  0.75 mg Enteral Tube QHS Babar Leigh MD   0.75 mg at 03/18/20 2102     propofoL injection (DIPRIVAN)  5-75 mcg/kg/min Intravenous Continuous Kirk Rock MD 20.8 mL/hr at 03/19/20 1003 55 mcg/kg/min at 03/19/20 1003     sennosides syrup 8.8 mg (for SENOKOT)  8.8 mg Enteral Tube BID Tae Schmidt,  MD   8.8 mg at 03/18/20 0917     sodium chloride 0.9%  10 mL/hr Intravenous Continuous Kirk Rock MD 10 mL/hr at 03/19/20 0821 10 mL/hr at 03/19/20 0821     sodium chloride bacteriostatic 0.9 % injection 1-5 mL  1-5 mL Intradermal Once PRN Wlimar Rich MD         sodium chloride flush 10-20 mL (NS)  10-20 mL Intravenous PRN Wilmar Rich MD   20 mL at 03/14/20 0032     sodium chloride flush 10-30 mL (NS)  10-30 mL Intravenous PRN Wilmar Rich MD         sodium chloride flush 2.5 mL (NS)  2.5 mL Intravenous Line Care Meredith Enriquez CNP   2.5 mL at 03/19/20 0945     sodium chloride flush 20 mL (NS)  20 mL Intravenous PRN Wilmar Rich MD         thiamine 100 mg in sodium chloride 0.9% 50 mL (vitamin B1)  100 mg Intravenous DAILY Destini Trevino  mL/hr at 03/19/20 0944 100 mg at 03/19/20 0944       Critical care attestation: 45 minutes spent managing the following issues: acute respiratory failure requiring intubation/IMV, ARDS due to massive aspiration with pneumonia and pneumonitis, circulatory shock requiring continuous vasopressor infusions, acute kidney injury. High risk for organ deterioration and death requiring ICU level care.

## 2021-06-07 NOTE — PROGRESS NOTES
CRITICAL CARE PROGRESS NOTE:    Assessment/Plan:  Harriett Romero is a 54 y.o. female with depression with anxiety, history of migraines, insomnia, remote tobacco abuse, metabolic syndrome, hypothyroidism, and restless leg syndrome who developed aspiration pneumonitis after vomiting while recovering from anesthesia for elective colonoscopy on 2/25/20, presented to urgency room with aspiration pneumonia, and directly admitted to [facility] on 3/1/2020 for care, subsequently transferred to ICU on 3/6/20 with progressive respiratory failure with acute respiratory distress syndrome and bilateral pulmonary emboli, intubated on 3/11/2020.    NEURO:  Sedated on vent. Was paralyzed at one point week for vent dyssynchrony with very high airway pressures. High risk for ICU delirium and myopathy (ICU and steroid). ?serotonin syndrome last week with ?seizures and clonus, CK was normal    Off versed    Cont propofol, fentanyl for sedation. Prop labs OK on 3/20    RASS goal 0 to -1    Tylenol prn pain    Continue seroquel 25mg two times a day (last QTc OK)     Continue celexa at half home dose (20mg daily)    Hold home wellbutrin.    Cont mirapex as previously    CV:  Was in circulatory shock, on NE. Was not on high dose lactates normal. Organ perfusionis adequate. Echo 3/7 no RH strain, EF 63%, no WMA's, normal RV size/function, no valve disease, no change from 2017.  on 3/6. Trop's neg. Off pressors for a while.    MAP >65. Ordered NE in case she needs some after all the sedation for procedures today.    Diuretics on hold due to contraction alkalosis. Still has great UOP.    Hold home anti-hypertensives    No need to trend lactates unless clinical change    RESP:  Presumed massive aspiration episode on 2/25 after colonoscopy. Presented to hospital 3/1 with left sided infiltrate. Intubated 3/11 for progressive resp failure and ARDS c/b bilateraly PE's. Presumed severe ARDS with now fibroproliferative phase.  Definite improvement in lung compliance with lower Pplat, on less PEEP/FiO2 oxygenation improving compared to last week. Repeat bronch today doneprior to perc trach significant improvement inairway appearance, less erythema/friability and NO mucus plugs visualized, all airways were normal.    Continu LPV with low Vt to 225cc (approx 6cc/kg IBW) with significant improvement in Pplat down to 17 on 3/24, indicating improved lung compliance (Pplat's high 20s-low 30s last week)    Titrate FiO2 for goal O2 sat 88-92% and/or PaO2 55 mm Hg or greater.    DP goal 10-15, Pplat <30    Cont high PEEP/low FiO2 protocol, currently 0.4/8. wean PEEP to 5 today    S/p perc trach today 3/25 by gen surg (Dr. Patel), awaiting f/u CXR    Permissive hypercapnia, pH OK    Holding on Lasix as above    Change to LMWH therapeutic today for PE's. Needs at least 3 months a/c.    Continue accelarated prednisone taper    General surgery consultation for trach/PEG this week, hopefully tomorrow.     Bedside US done on right 3/23 -> minimal pleural fluid, not enough to tap.     GI:  intermitently high GVR, improved 600 -> 100 this AM    G tube to gravity for 24 hours then resume TF    S/p EGD/PEG tube by Dr. Patel and GI service on 3/25.     Bowel regimen    Cont PPI    RENAL:  No issues, CO2 elevated likely contraction allk +/- compensatory for hypercapnia in the setting of permissive hypercapnia.    Clark in place    Avoid nephrotoxins.    Follow BUN/Scr    Holding Lasix as above. Good UOP without diuretics.     ID:  Completed ~2 weeks of various IV abx, no cultures positive. Candida seen on bronch likely OP contaminant. PJP negative. Wbc 27K -> 17K likely some of this is stress and steroid response with demargination. Wbc up to 21K today, low grade temps in 99 range. Wbc down today, no overt fevers.    F/u new cultures from 3/24    Holding off on restarting abx unless decompensates.     Follow fever curve    HEMATOLOGIC:  LUE PICC-assoc  DVT with bilateral PE's, no RH strain on echo.    Stop UFH drip, start LMWh therapeutic today, needs 3 months anticoagulation as above.    Follow plt, hgb    hgb >7    ENDOCRINE:  No issues.     FSBG checks, insulin SS/drip per ICU protocol    ICU PROPHYLAXIS:    UFH drip -> switch to LMWH today.    peridex    PPI    DISPO/CODE STATUS: full code    FAMILY COMMUNICATION: updated Brian on the phone tochayo. He'd like to proceed with trach/PEG and aggressive rehab, vent weaning in the future.  Consulted gen surg as above.     Lines/Drains/Tubes:  Clark  Left IJ TLC - maintain  PIV  ETT 7.5mm  OG tube    Restraints  Progress Note  Restraint Application    I recognize that restraints are physical and/or chemical interventions intended to restrict a person's movements. Restraints are currently needed to ensure the safety of this patient and/or others. My clinical rationale appears below.    Category/Type of Restraint     Non Violent:  Soft limb restraint x2  --  Behavior  Pulling at tubes/lines  --  Root Cause of the Behavior  Sedation/intubation  --  Less-Restrictive Measures that Failed  Non Violent Measures:  Close Observation  --  Response to the Restraint  Patient unable to pull at tubes/lines  --  Criteria for Release from the Restraint  Patient calm and off sedation    Kirk (Pato) MD Pranav  [facility] Pulmonary & Critical Care  Pager [phone]  Clinic [phone]      Overnight events:  No issues overnight  Heparin, TF on hold since [place] for trach/PEG  See separate procedure notes for bronch, trach and PEG placement.     Subjective:  Unable to assess    Objective:  Physical Exam:  Vent settings for last 24 hours:  Vent Mode: VCV  FiO2 (%):  [40 %-100 %] 40 %  S RR:  [24] 24  S VT:  [225 mL] 225 mL  PEEP/CPAP (cm H2O):  [8 cm H2O] 8 cm H2O  Minute Ventilation (L/min):  [5.2 L/min-8.5 L/min] 5.7 L/min  PIP:  [14 cm H2O-47 cm H2O] 47 cm H2O  MAP (cm H2O):  [10-12] 12    BP 99/56   Pulse 70   " Temp 98.8  F (37.1  C) (Oral)   Resp 28   Ht 4' 10\" (1.473 m)   Wt 134 lb 4.2 oz (60.9 kg)   SpO2 99%   BMI 28.06 kg/m      Intake/Output last 3 shifts:  I/O last 3 completed shifts:  In: 2389.4 [I.V.:914.4; NG/GT:1475]  Out: 3060 [Urine:2890; Stool:170]  Intake/Output this shift:  No intake/output data recorded.    Physical Exam  Gen: intubated, sedated. Waking more as sedation is turned off  HEENT: no OP lesions, no JESSICA  CV: RRR, no m/g/r  Resp: diminished ant some coarse sounds no wheezing.   Abd: soft, nontender, BS+  Neuro: PERRL, nonfocal  Ext: no edema    LAB:  Results from last 7 days   Lab Units 03/25/20  0456   LN-WHITE BLOOD CELL COUNT thou/uL 16.5*   LN-HEMOGLOBIN g/dL 9.1*   LN-HEMATOCRIT % 29.5*   LN-PLATELET COUNT thou/uL 244     Results from last 7 days   Lab Units 03/25/20  0456 03/24/20  0554 03/23/20  0404  03/20/20  0413   LN-SODIUM mmol/L 137 140 141   < > 140   LN-POTASSIUM mmol/L 4.1 3.6 4.6   < > 5.1*   LN-CHLORIDE mmol/L 95* 96* 98   < > 99   LN-CO2 mmol/L 35* 36* 34*   < > 35*   LN-BLOOD UREA NITROGEN mg/dL 32* 32* 40*   < > 44*   LN-CREATININE mg/dL 0.68 0.68 0.67   < > 0.72   LN-CALCIUM mg/dL 9.0 9.2 9.5   < > 9.2   LN-PROTEIN TOTAL g/dL  --   --   --   --  6.0   LN-BILIRUBIN TOTAL mg/dL  --   --   --   --  0.3   LN-ALKALINE PHOSPHATASE U/L  --   --   --   --  78   LN-ALT (SGPT) U/L  --   --   --   --  14   LN-AST (SGOT) U/L  --   --   --   --  16    < > = values in this interval not displayed.       Micro  PCT 0.82 -> 0.66 last checked 3/14  BAL 3/11  + for candida likely contaminant  BAL 3/14: also with candida  All culturesneg    BAL cytology from RML/LLL neg for PJP  Pseudohyphae c/w candida likely oral contaminant    Flu swab neg  Legionella neg    New cultures 3/24  Sputum pending, no org on gram stain  Blood, urine pending.    C diff neg 3/20        Current Facility-Administered Medications   Medication Dose Route Frequency Provider Last Rate Last Dose     acetaminophen " solution 650 mg (TYLENOL)  650 mg Enteral Tube Q6H PRN Babar Leigh MD   650 mg at 03/12/20 1734     acetaminophen suppository 650 mg (TYLENOL)  650 mg Rectal Q4H PRN Meredith Enriquez CNP   650 mg at 03/13/20 0205     albuterol nebulizer solution 2.5 mg (PROVENTIL)  2.5 mg Nebulization Q4H PRN Meredith Enriquez CNP   2.5 mg at 03/14/20 0224     albuterol nebulizer solution 2.5 mg (PROVENTIL)  2.5 mg Nebulization Q6H - RT Nathaly Pryor MD   2.5 mg at 03/25/20 0735     aluminum-magnesium hydroxide-simethicone 200-200-20 mg/5 mL suspension 30 mL (MAALOX ADVANCED)  30 mL Enteral Tube Q4H PRN Babar Leigh MD         amino acids-protein hydrolys 15-60 gram-kcal/30 mL packet 1 packet (ProSource No Carb)  1 packet Enteral Tube DAILY Kirk Rock MD   1 packet at 03/24/20 0804     bisacodyL suppository 10 mg (DULCOLAX)  10 mg Rectal Daily PRN Meredith Enriquez CNP   10 mg at 03/13/20 1255     [Held by provider] buPROPion tablet 150 mg (WELLBUTRIN)  150 mg Enteral Tube BID Alba York CNP   150 mg at 03/19/20 2122     chlorhexidine 0.12 % solution 15 mL (PERIDEX)  15 mL Topical Q12H Kirk Rock MD   15 mL at 03/25/20 0421     citalopram tablet 20 mg (celeXA)  20 mg Enteral Tube QHS Kirk Rock MD   20 mg at 03/24/20 2029     dextrose 10%  15 mL/hr Intravenous Continuous PRN Kirk Rock MD         dextrose 10%  35 mL/hr Intravenous Continuous Alba York CNP 35 mL/hr at 03/25/20 0127 35 mL/hr at 03/25/20 0127     dextrose 50 % (D50W) syringe 20-50 mL  20-50 mL Intravenous Q15 Min PRN Meredith Enriquez CNP         diphenhydrAMINE injection 25 mg (BENADRYL)  25 mg Intravenous Q6H PRN Alba York CNP   25 mg at 03/10/20 0014     fentaNYL - BOLUS DOSE from infusion 50 mcg  50 mcg Intravenous Q1H PRN Kirk Rock MD   50 mcg at 03/25/20 0917     fentaNYL 2500 mcg/50 mL CADD infusion (50 mcg/mL)   mcg/hr Intravenous Continuous Kirk Rock MD 4.5  mL/hr at 03/25/20 0841 225 mcg/hr at 03/25/20 0841     glucagon (human recombinant) injection 1 mg  1 mg Subcutaneous Q15 Min PRN Meredith Enriquez CNP         guar gum powder packet 2 packet (BENEFIBER;NUTRISOURCE)  2 packet Enteral Tube TID Kirk Rock MD   2 packet at 03/24/20 2029     [Held by provider] heparin 25,000 units in 0.45% sodium chloride (100 units/ml) 250 mL ANTICOAGULANT infusion  1-50 Units/kg/hr Intravenous Continuous Kirk Rock MD   Stopped at 03/25/20 0200     hydrALAZINE injection 10-20 mg (APRESOLINE)  10-20 mg Intravenous Q4H PRN Tae Schmidt MD         hydrOXYzine pamoate capsule 25-50 mg (VISTARIL)  25-50 mg Enteral Tube Q4H PRN Babar Leigh MD         insulin glargine injection 18 Units (LANTUS)  18 Units Subcutaneous QAM Nathaly Pryor MD   18 Units at 03/24/20 0803     insulin regular injection (NovoLIN R)   Subcutaneous Q6H FIXED TIMES Nathaly Pryor MD   2 Units at 03/24/20 1831     [Held by provider] insulin regular injection 5 Units (NovoLIN R)  5 Units Subcutaneous Q6H FIXED TIMES Nathaly Pryor MD   5 Units at 03/22/20 1731     levothyroxine tablet 125 mcg (SYNTHROID, LEVOTHROID)  125 mcg Enteral Tube Daily 0600 Babar Leigh MD   125 mcg at 03/24/20 0629     lidocaine (PF) 10 mg/mL (1 %) injection 1-5 mL (XYLOCAINE-MPF)  1-5 mL Intradermal Once PRN Wilmar Rich MD         lidocaine 5 % ointment (XYLOCAINE)   Topical TID PRN Jenni Quinteros CNP         lipase-protease-amylase 5,000-17,000- 24,000 unit capsule 2 capsule (ZENPEP)  2 capsule Enteral Tube PRN Destini Trevino MD        And     sodium bicarbonate tablet 325 mg  325 mg Enteral Tube PRN Destini Trevino MD         magnesium hydroxide suspension 30 mL (MILK OF MAG)  30 mL Enteral Tube Daily PRN Babar Leigh MD         magnesium oxide tablet 400 mg (MAG-OX)  400 mg Oral TID Alba York CNP   400 mg at 03/24/20 2100      melatonin tablet 3 mg  3 mg Enteral Tube Bedtime PRN Babar Leigh MD         miconazole 2 % powder (MICOTIN)   Topical BID Alba York CNP   1 application at 03/25/20 0816     midazolam (PF) injection 2 mg (VERSED)  2 mg Intravenous STAT x1 Kirk Rock MD         midazolam (PF) injection 2 mg (VERSED)  2 mg Intravenous Once Kirk Rock MD         midazolam (PF) injection 2 mg (VERSED)  2 mg Intravenous Once Kirk Rock MD         morphine injection 2 mg  2 mg Intravenous Q4H PRN Wilmar Rich MD   2 mg at 03/11/20 1317     multivit-min-ferrous gluconate 9 mg iron/15 mL Liqd 9 mg of iron  15 mL Enteral Tube DAILY Nathaly Pryor MD   9 mg of iron at 03/24/20 0805     naloxone injection 0.2-0.4 mg (NARCAN)  0.2-0.4 mg Intravenous PRN Meredith Enriquez CNP        Or     naloxone injection 0.2-0.4 mg (NARCAN)  0.2-0.4 mg Intramuscular PRN Meredith Enriquez CNP         norepinephrine 4 mg/250 ml in NS (0.016 mg/ml)  0.01-0.4 mcg/kg/min Intravenous Continuous Kirk Rock MD         omeprazole suspension 20 mg (PriLOSEC)  20 mg Enteral Tube QHS Nathaly Pryor MD   20 mg at 03/24/20 2100     [Held by provider] ondansetron injection 4 mg (ZOFRAN)  4 mg Intravenous Q4H PRN Meredith Enriquez CNP   4 mg at 03/07/20 0354    Or     [Held by provider] ondansetron tablet 8 mg (ZOFRAN)  8 mg Oral Q8H PRN Meredith Enriquez CNP         oxymetazoline 0.05 % nasal spray 2 spray (AFRIN)  2 spray Each Nare BID PRN Maddi Hernandez MD         polyvinyl alcohol 1.4 % ophthalmic solution 1 drop (LIQUIFILM TEARS)  1 drop Both Eyes TID PRN Jenni Quinteros CNP   1 drop at 03/21/20 0557     pramipexole (MIRAPEX) tablet 0.75 mg  0.75 mg Enteral Tube QHS Babar Leigh MD   0.75 mg at 03/24/20 2030     predniSONE tablet 20 mg (DELTASONE)  20 mg Oral Daily with Kirk Walsh MD   20 mg at 03/24/20 0803     propofoL injection (DIPRIVAN)  5-75 mcg/kg/min Intravenous  Continuous Kirk Rock MD 34.1 mL/hr at 03/25/20 0831 90 mcg/kg/min at 03/25/20 0831     QUEtiapine tablet 25 mg (SEROquel)  25 mg Oral BID Kirk Rock MD   25 mg at 03/24/20 2100     sodium chloride bacteriostatic 0.9 % injection 1-5 mL  1-5 mL Intradermal Once PRN Wilmar Rich MD         sodium chloride flush 10-20 mL (NS)  10-20 mL Intravenous PRN Wilmar Rich MD   20 mL at 03/14/20 0032     sodium chloride flush 10-30 mL (NS)  10-30 mL Intravenous PRN Wilmar Rich MD         sodium chloride flush 2.5 mL (NS)  2.5 mL Intravenous Line Care Meredith Enriquez CNP   2.5 mL at 03/25/20 0900     sodium chloride flush 20 mL (NS)  20 mL Intravenous PRN Wilmar Rich MD         thiamine tablet 100 mg  100 mg Enteral Tube DAILY Nathaly Pryor MD   100 mg at 03/24/20 0804       Critical care attestation: 45 minutes spent managing the following issues: acute respiratory failure requiring intubation/IMV, severe aspiratoin with severe ARDS now fibroproliferative stage.  toxic metabolic encepahlopathy, risk for ICU delirium. High risk for organ deterioration and death requiring ICU level care.

## 2021-06-07 NOTE — PLAN OF CARE
Goal: Patient s discharge needs are met.  Outcome: Care Progression reviewed with Hospitalist, Care Manager.  Discharge Disposition: Discussed and plan to discharge to: TBD  Planned Discharge Date: several days  Problem: Barriers to discharge include: Intubated, plan to stabilize for potential trach next week, tube feedings, rectal tube, Insulin gtt, Fentanyl gtt, Heparin gtt, Propofol gtt, clinical progression  Transportation needs/Ride Time: TBD  With transition of [place] to a [place] [id number] facility, referral also sent to [facility].

## 2021-06-07 NOTE — PROGRESS NOTES
"/58 (Patient Position: Semi-lakhani)   Pulse (!) 57   Temp 98.3  F (36.8  C) (Oral)   Resp 24   Ht 4' 10\" (1.473 m)   Wt 142 lb (64.4 kg)   SpO2 98%   BMI 29.68 kg/m      Vent Mode: VCV  FiO2 (%):  [40 %-50 %] 40 %  S RR:  [24-28] 24  S VT:  [225 mL-250 mL] 250 mL  PEEP/CPAP (cm H2O):  [10 cm H2O-12 cm H2O] 10 cm H2O  Minute Ventilation (L/min):  [6.4 L/min-7.1 L/min] 6.4 L/min  PIP:  [33 cm H2O-42 cm H2O] 42 cm H2O  MAP (cm H2O):  [14-19] 14'    Pt remains on full vent support. Settings are shown above. BS were coarse when last seen. Rt will continue to follow.  "

## 2021-06-07 NOTE — PROGRESS NOTES
Chart check only.  His nurse states no issues with the G-tube or trach site.  We will sign off.  Please contact us if there is any change in her condition.

## 2021-06-07 NOTE — PLAN OF CARE
Problem: Inadequate Gas Exchange  Goal: Patient will achieve/maintain normal respiratory rate/effort  Outcome: Progressing     Problem: Glucose Imbalance  Goal: Achieve optimal glucose control  Outcome: Progressing   Pt remains on 45%, Peep 12 on vent.  Jerel decreased to 1/2 strength.  Attempting to decrease sedation to RASS goal -3.  IV steroids completed and po started.  Blood sugars on insulin drip remain in the target range.

## 2021-06-07 NOTE — PLAN OF CARE
Problem: Mechanical Ventilation  Goal: Patient will maintain patent airway  3/25/2020 1133 by Kimberly Whalen, ALBERTT  Outcome: Progressing  3/25/2020 1130 by Kimberly Whalen, ALBERTT  Outcome: Progressing     Problem: Tracheostomy Tube Management  Goal: Tracheostomy will be managed safely  Outcome: Progressing     Vent Mode: VCV  FiO2 (%):  [35 %-100 %] 35 %  S RR:  [24] 24  S VT:  [225 mL] 225 mL  PEEP/CPAP (cm H2O):  [5 cm H2O-8 cm H2O] 5 cm H2O  Minute Ventilation (L/min):  [5.2 L/min-8.5 L/min] 6 L/min  PIP:  [14 cm H2O-47 cm H2O] 27 cm H2O  MAP (cm H2O):  [8-12] 8  Plateau 25-19    Pt tolerating current vent settings today.  Pt tolerated percutaneous trach placement today.  #8 Shiley placed.  Disposable inner cannulas ordered by storeroom.  Spare trach placed in room.  Suctioned for small amts white secretions.  Titrated peep and fi02 today.  Vent settings currently match with vent orders.  Clear decreased  breath sounds.

## 2021-06-07 NOTE — PLAN OF CARE
Problem: Pain  Goal: Patient's pain/discomfort is manageable  Outcome: Not Progressing  Patient continuously moving with very shift stiff body pain meds given with no change in body posture.  Problem: Nutrition Care Problem  Goal: Nutritional status is improving  Outcome: Not Progressing     Patient had high TF residual 350, held 2 hours recheck is was 300 held 2 more hour then residuals 175 TF restarted and Alba York CNP inform and TF not increased per order.

## 2021-06-07 NOTE — PROGRESS NOTES
MRI reviewed & results discussed with Neurosurgery. Please, refer to the thorough note by Twyla Leyva CNP.     I discussed the MRI & its implications on Harriett with her . While he is overwhelmed, he is clear that Harriett would never want to live on a ventilator & w/ the degree of neurologic deficit she now has. He agreed that at this point patient's code status would change to DNR. He is in communication with the rest of the family about the current situation, trying to decide on the most appropriate course of action. One of the factors is that Harriett's 2 children are both in the [employer] and are deployed -- unclear the timing for their ability to get back to [place] to see the patient.     I explained to Brian (patient's ) that at this time we plan to continue the same level of care for Harriett as she has been receiving. I will touch base with him this evening -- we all want to make sure that there is a plan for having family be able to say good-bye. With the visitation policy changed by the pandemic this is additionally challenging for Brian.       Casandra Franklin MD  Pulmonary and Critical Care

## 2021-06-07 NOTE — PLAN OF CARE
Problem: Pain  Goal: Patient's pain/discomfort is manageable  Outcome: Progressing   Dilaudid PCA in progress--pt currently appears comfortable.    Problem: Safety  Goal: Patient will be injury free during hospitalization  Outcome: Progressing     Problem: Daily Care  Goal: Daily care needs are met  Outcome: Progressing     Problem: Inadequate Airway Clearance  Goal: Patient will maintain patent airway  Outcome: Progressing     Problem: Infection  Goal: Signs and symptoms of infections are decreased or avoided  Outcome: Progressing   See vitals  Problem: Potential for Compromised Skin Integrity  Goal: Skin integrity is maintained or improved  Outcome: Progressing     Problem: Glucose Imbalance  Goal: Achieve optimal glucose control  Outcome: Progressing     Problem: Mechanical Ventilation  Goal: Patient will maintain patent airway  Outcome: Progressing   Pt remains at 50% FIO2. Resps currently 27 but occ increases to 30-40's. Sat 97%.    Problem: Tracheostomy Tube Management  Goal: Tracheostomy will be managed safely  Outcome: Progressing   See vitals and assessment---see MD's orders and notes. Will cont to monitor closely and cont plan of care.

## 2021-06-07 NOTE — PROGRESS NOTES
Patient is grimacing, pulling at restraints, moving legs, moving head, not tracking or following commands, unable to calm down. Dilaudid given PRN. MD at bedside see new orders for meds. LR bolus on hold see flowsheet for BP and heart rate.

## 2021-06-07 NOTE — PROGRESS NOTES
Patient has periods where eyes are open, does not seem to track. Will move head back and forth, does not follow commands, heart rate and blood pressure seem to elevate at this time.  Will open eyes to sound/stimuli at times. Did adjust medication/sedation and pain meds per MD order. See MAR. Patient will also move arms at times. Legs appear to be rigid, difficulty bending st the knee. ICU MD is aware. Did re-start tube feeding per recommendation.  Did update spouse via phone.

## 2021-06-07 NOTE — PROGRESS NOTES
Clinical Nutrition Therapy Follow Up Note      Current Nutrition Prescription:   Diet: Tube Feeding, No Tray  Formula: Replete w/ Fiber @ 25ml/hr  Flushes: 100ml q 4 hours  Diet Supplements: 1 pkt Prosource No Carb TID  IV dextrose or Fluids:dextrose 10%  epoprostenol 0.25 mg/50 mL (Half Strength) (VELETRI) inhalation solution, Last Rate: 40,000 ng/hr (03/20/20 1020)  epoprostenol 0.5 mg/50 mL (Full Strength) (VELETRI) inhalation solution, Last Rate: 80,000 ng/hr (03/20/20 0437)  fentaNYL 2500 mcg/50 mL (50 mcg/mL), Last Rate: 150 mcg/hr (03/20/20 1046)  heparin, Last Rate: 14 Units/kg/hr (03/20/20 0942)  insulin infusion (1 unit/mL), Last Rate: 2 Units/hr (03/20/20 0812)  midazolam (VERSED) 100 mg/100 mL (1 mg/mL), Last Rate: 6 mg/hr (03/20/20 0813)  norepinephrine, Last Rate: Stopped (03/17/20 0900)  propofol, Last Rate: 45 mcg/kg/min (03/20/20 0950)  sodium chloride 0.9%, Last Rate: 10 mL/hr (03/20/20 0813)    Current Nutrition Intake:  Enteral nutrition access is a oral gastric tube, with a placement date of 3/11/20. The current tube feeding order will provide 600 kcals, 37 grams protein, 9 grams fiber, 74 grams carbohydrate, 499mls free water from formula, 600 mls from fluid flush, for a total of 1099 mls free water daily.  The current propofol order provides 449-499 calories daily     Prosource provides additional 180kcals and 45g protein bringing totals to 1229-1279kcals, 82g protein.     Anthropometrics:  Admission weight: 147 lb 4.3 oz (66.8 kg) bed  Weight: 143 lb 15.4 oz (65.3 kg)    GI Status/Output:   GI symptoms include: Diarrhea per nurse 6 stools 3/19/20  Bowel Sounds absent per nurse  Last BM: 3/20/20 per nurse    Skin/Wound:  No wound was noted.    Medications:  Prosource three times a day, synthroid/levothroid, solu-medrol, prilosec, deltasone, thiamine, insulin, diprivan    Labs:  K 5.1, BUN 44, alb 2.4    Estimated nutrition needs:   Assessment weight is 43.8 Kg, ideal weight per ASPEN  guidelines for critically ill and obese patients     Energy needs: 965-1095 Calories/day (22-25 Jean/Kg)  Protein needs: 87 g daily (2 g/Kg)   May need to decrease to 1.2-1.5 IBW if Cr >2  Fluid needs: 6570-8317 ml/day (30-35 ml/Kg)    Malnutrition: Not noted    Nutrition Risk Level: high risk    Nutrition dx:  Inadequate oral intake r/t respiratory failure as evidenced by need for nutrition support.    Goal Status:    Patient will tolerate TFas evidenced by RV < 500 mls, MET With Reglan Rx, RV < 100 mls    BG < 180 mg/dL, MET    No s&s aspiration MET    Maintain weight MET  Weights 140 to 147 lbs    Bowel function WNL-not progressing     Intervention:  Hold TF 1 hour before and 2 hours after levothyroxine as patient has been on enteral feeding > 7 days    D/t diarrhea recommend switch TF formula to Peptamen AF @ 25ml/hr to provide 630kcals, 40g protein, 59g CHO, 29g fat, 3g fiber, 424ml free water, 600ml water from flushes for a total of 1024ml fluid daily.     W/ Prosource and propofol daily totals come to 1259kcals and 85g protein.     Ordered multivitamin w/ minerals per protocol for patients at risk for pressure injuries d/t deepika score 11    Monitoring/Evaluation:  TF tolerance, wt, labs, stooling

## 2021-06-07 NOTE — PLAN OF CARE
Problem: Pain  Goal: Patient's pain/discomfort is manageable  Outcome: Progressing    Patient appears comfortable on current gtts. RR been in 20's all afternoon      Problem: Psychosocial Needs  Goal: Demonstrates ability to cope with hospitalization/illness  Outcome: Progressing    Spoke with  and comforted him after Dr. Franklin updated him regarding CT results.       Problem: Nutrition Care Problem  Goal: Nutritional status is improving  Outcome: Progressing     High residuals, TF shut off per Dr. Franklin. CT of abd ordered to R/O ileaus. No update to resume at this time.     TYLER and mel boot on

## 2021-06-07 NOTE — PROGRESS NOTES
[facility]  Palliative Care Social Work Note:    Patient Info:  Harriett Romero is a 54 y.o. female transitioning to comfort cares.    Brief summary of visit:   PCSW at bedside with Cinyd Subramanian [facility] Charly ANTHONY, Chaplain Kimani, Larissa, RN. PCSW present to provide emotional support to , Brian. Step son's Mustapha and Jean Marie visited individually. Family members (sister and parents) also present via zoom meeting. PCSW played favorite song of pt's for shared experience with family. Family appreciative of prayer from . PCSW provided active listening, validation of feelings, and emotional support.     PCSW remains available to provide psychosocial/grief support to family.     Rama Peters, [facility]  Palliative Care   Office # [phone]

## 2021-06-07 NOTE — PLAN OF CARE
Problem: Pain  Goal: Patient's pain/discomfort is manageable  Outcome: Progressing, added hydromorphone PCA and continuous gtt this shift.  PCA bumps PRN.  REsp rate continues to be high most of shift.     Problem: Psychosocial Needs  Goal: Demonstrates ability to cope with hospitalization/illness  Outcome: Progressing, spoke with Brian this shift.  Updated and allowed  to facetime so he could see his wife.   seemed to enjoy that.      Problem: Inadequate Gas Exchange  Goal: Patient is adequately oxygenated and ventilation is improved  Outcome: Progressing, sats have been high 90's this shift.  REsp rate at times high.  Does not repsond to any change with sedation, pain med or fever.  Seems to be consistent no matter the cvhange in gtts.  Added versed gtt today, weaned opff precedex and continued with propofol.  Following closely.  Dr. Franklin has been updated this shift

## 2021-06-07 NOTE — PROGRESS NOTES
Palliative Care Progress Note  NAME: Harriett Romero, : 1965,   MRN: [id number] Date: 3/30/2020  Problem List:  Active Problems   Principal Problem:    Aspiration pneumonia of left lower lobe due to gastric secretions (H)  Active Problems:    Hypokalemia    Former smoker - quit in     Anxiety    Hypothyroidism    Metabolic syndrome    Aspiration pneumonia due to inhalation of vomitus (H)    Diarrhea of presumed infectious origin    Hypotension, unspecified hypotension type    Acute respiratory failure with hypoxemia (H)    Cough    Chest pain at rest    Deep vein thrombosis (DVT) of upper extremity (H)    Pulmonary embolism (H)    Acute respiratory distress syndrome (ARDS) (H)    Diffuse interstitial pulmonary disease (H)    Acute kidney failure, unspecified (H)    Acute encephalopathy    Encounter for palliative care    Goals of care, counseling/discussion    Intensive care (ICU) myopathy    Altered mental status, unspecified altered mental status type    Attention to tracheostomy (H)    VAP (ventilator-associated pneumonia) (H)    Cerebrovascular accident (CVA), unspecified mechanism (H)      Assessment:   54 y.o. former smoker with most pertinent history of hypothyroidism, migraines, depression and anxiety, who was admitted 3/1/2020 with acute hypoxic respiratory failure secondary to an aspiration event during colonoscopy 2020.  She had a prolonged hospitalization during which she was diagnosed with bilateral PEs and was subsequently intubated for progressively worsening hypoxic respiratory failure with ARDS on 3/11/2020.  She underwent a tracheostomy 3/25. Her pulmonary status deteriorated 3/27 & she is being treated for VAP. On 3/29 discovered to have an acute/subacute L MCA CVA w/ a very poor prognosis. Her code status was changed to DNR & family is planning to transition to comfort cares 3/31.     Recommendations:   Dyspnea: Due to acute hypoxic and hypercarbic respiratory failure with  "intubation 3/11 in setting of aspiration pneumonia with ARDS and bilateral PEs. S/p trachesotomy on 3/25. Now with VAP and complicated by acute/subacute CVA.  - Ventilator management per ICU/Pulmonology     Decision making capacity: None   - Advance directive on file: No.  Brian is surrogate decision maker.    Goals of care: See discussion below. Plan for transition to comfort care on 3/31 at 4pm.  Brian to be present. Appreciate assistance from  regarding arranging Zoom conferencing for other family members who cannot be present due to COVID 19 restrictions on visitors.     Code status: DNR    =========================================================  Current Medical Status:  Patient continues to be sedated, on vent via trach. Called and spoke with Brian and also Savannah, sister-in-law to Brian. Brian states he understands Harriett's medical situation stating \"I get where Harriett is now.\" They are discussing about how \"best to do this\" meaning the transition to comfort care. Savannah states Brian has not been sleeping and not coping very well with current situation. She states Brian's sons are in the process of traveling back home, but may not be back until Thursday, 4/2. They realize that in order not to prolong suffering for patient, that transitioning to comfort care would best be done sooner than 4/2. They also understand the unfortunate restrictions on only 1 family member visiting due to COVID19 pandemic. Savannah states likely this will be Brian, but that he may only stay to say goodbye and then leave as staying longer might be too stressful for him. He is also a double lung transplant patient and immunosuppressed. They would like palliative SW and  to be present. They would like other family members to be able to attend via Zoom teleconferencing if possible. Originally plan for 10 AM on 3/31, but later Savannah told RN that one son would be arriving tomorrow morning and " "would like to drive his father to and from the hospital. Plan for 4pm instead.     Symptom-Focused ROS:  Unable to obtain ROS as patient sedated, on vent.    Palliative Care Focused Medications:  Keppra 500 mg iv q12h  Dilaudid PCA at 0.2mg/h basal rate with 0.2 mg q10 min demand  Versed infusion  Propofol infusion  APAP per enteral tube prn  Bisacodyl 10 mg suppository daily prn  Haldol 0.5 mg iv q2h prn  Ativan 1mg iv q4h prn  MOM per enteral tube prn  Melatonin 3mg at bedtime prn      PERTINENT PHYSICAL EXAMINATION:  Vital Signs: Blood pressure 98/54, pulse 90, temperature 99.9  F (37.7  C), temperature source Axillary, resp. rate 27, height 4' 10\" (1.473 m), weight 138 lb 0.1 oz (62.6 kg), SpO2 99 %, not currently breastfeeding.   Gen: Patient is sedated, on vent via trach. Appears in NAD.  See ICU physician exam. Due to COVID pandemic, patient not examined today.     I have reviewed labs and imaging in [facility]     ----------------------------------------------------  TT: I have personally spent a total of 35 minutes  today on the unit in review of medical record, consultation with the medical providers and assessment of patient today, with more than 50% of this time spent in counseling, coordination of care, and discussion re:diagnostic results, prognosis, symptom management, risks and benefits of management options, and development of plan of care.    Cindy Subramanian PA-C  [facility]  Palliative Medicine   Office: [phone]    "

## 2021-06-07 NOTE — PROGRESS NOTES
Clinical Nutrition Therapy Nutrition Brief Note    Patient is NPO currently due to G-tube placement and noted G-tube is to gravity for 24 hrs & did confirm with nurse as well which she mentioned should be able to start TF ~09:00 tomorrow which they will discuss at rounds tomorrow.    RD to follow-up tomorrow morning to find out if ok to initiate TF  Please see RD note on 3/23 for further details

## 2021-06-07 NOTE — OP NOTE
Operative Note    Name:  Harriett Romero  Location: [facility]  Procedure Date:  3/25/2020  PCP:  Zain Cuevas MD      Tracheostomy tube placement: Percutaneous tracheostomy with bronchoscopy    Pre-Procedure Diagnosis:  Acute respiratory failure with ARDS    Post-Procedure Diagnosis:    Same as pre-operative diagnosis      * Surgery not found *    Anesthesia Type:  Anesthesia type cannot be found on the log.    Past Medical History:   Diagnosis Date     10 year risk of MI or stroke 1.0% in 2017      Anxiety      Aspiration pneumonia of left lower lobe due to gastric secretions (H) 2/28/2020     Current use of estrogen therapy      Dysthymia      Endometriosis     History - had hysterectomy.      Former smoker      Graves disease      Hypothyroidism      Insomnia      Migraine headache      Nephrolithiasis      Seizures (H)     5+ years ago, no medications     Sleep apnea     Recently diagnosed, awaiting CPAP     Trigeminal neuralgia      UTI (lower urinary tract infection)      Vitamin D deficiency        Patient Active Problem List    Diagnosis Date Noted     Attention to tracheostomy (H)      Encounter for palliative care      Goals of care, counseling/discussion      Intensive care (ICU) myopathy      Altered mental status, unspecified altered mental status type      Acute encephalopathy      Acute kidney failure, unspecified (H) 03/15/2020     Deep vein thrombosis (DVT) of upper extremity (H) 03/11/2020     Pulmonary embolism (H) 03/11/2020     Acute respiratory distress syndrome (ARDS) (H) 03/11/2020     Diffuse interstitial pulmonary disease (H)      Cough      Chest pain at rest      Diarrhea of presumed infectious origin 03/03/2020     Hypotension, unspecified hypotension type 03/03/2020     Acute respiratory failure with hypoxemia (H) 03/03/2020     Aspiration pneumonia due to inhalation of vomitus (H) 03/01/2020     Aspiration pneumonia of left lower lobe due to gastric secretions (H)  02/28/2020     History of Positive colorectal cancer screening using Cologuard test 02/06/2020     Major depression in complete remission (H) 08/26/2018     At high risk for caregiver role strain 03/30/2018     Metabolic syndrome 10/06/2017     Dyslipidemia 10/06/2017     Vitamin D deficiency 10/06/2017     RLS (restless legs syndrome) 07/28/2016     Anxiety 07/28/2016     Hypothyroidism 07/28/2016     Menopausal hot flushes      Migraine headache      Former smoker - quit in 2014      Insomnia      Hypokalemia 07/26/2014     Obesity due to excess calories with serious comorbidity        Findings:  Airways were relatively clean on bronchoscopy evaluation    Operative Report:    The patient was in the intensive care unit.  His neck was hyperextended with bolsters under the shoulders.  The neck was sterilely prepped and draped.  The bronchoscopy scope was advanced down through the ET tube.  The ET tube was drawn back up the trachea.  The midline of the lower neck just above the manubrial notch was infused with local anesthesia.  A 1 cm incision was made vertically above the manubrial notch.  The 22-gauge needle was visualized penetrating down through the trachea.  Following that same tract I advanced an 16-gauge needle into the trachea.  Through that needle I advanced the guidewire.  Over the guidewire I advanced the blue small dilator and visualized it coming into the lumen of the trachea.  I then treated that out for the white glide stylette and over that stylette I advanced the blue rhino dilator.  I dilated up to a size that would be appropriate for an 8 Shiley.  I then removed the blue rhino and advanced a 8 Shiley tracheostomy tube in through this opening with the appropriate-sized dilator running through the lumen of the tracheostomy tube.  The dilator and the lumen was drawn back the 8 Shiley tracheostomy tubing was advanced down into the trachea.  The cuff on the tracheostomy tubing was inflated and it was  connected to the ventilator.  We had a good air seal.  We then evaluated the airway below the tracheostomy with the bronchoscope.  Aspirated some small amount of blood and could see good seating of the tracheostomy tubing well above the johnnie.    The phalange of the tracheostomy was sutured to the skin with 2-0 Prolene sutures.  The phalange of the tracheostomy was secured with a padded Velcro strap .  Ran around the back of the neck.  A piece of gauze was placed between the phalange and the skin to keep the skin clean and dry    On postoperative chest x-ray the tracheostomy tube is positioned well and there is no pneumothorax    Estimated Blood Loss: Minimal      Specimens:    * Cannot find log *       Drains:   Rectal Tube With balloon (Active)   Site Skin Assessment Clean;Intact 03/25/20 0000   Care/Interventions Patent;Routine care completed;Leakage noted;Irrigated 03/25/20 0000   Reason for Continuing Rectal Tube Liquid stool with high risk for perianal skin breakdown 03/25/20 0000   Rectal Tube Output 150 mL 03/25/20 0600   Drainage Appearance Brown;Watery 03/24/20 2000       Urethral Catheter Latex (Active)   Site Skin Assessment Clean;Intact 03/25/20 0400   Care/Interventions Patent and draining;Standard catheter cares;Periurethral area inspected for signs of inflammation and infection every shift;Closed drainage system maintained 03/25/20 0400   Securement Method Stabilization device 03/25/20 0400   CAUTI Infection Prevention Education Handout Given to Patient Yes 03/24/20 0800   Protocol: Indication to Continue Output monitoring in critical patient 03/25/20 0400   Output (mL) 200 mL 03/25/20 0600   Drainage Color Xochilt 03/24/20 2000   Drainage Appearance Sediment 03/23/20 0409       Implants:  [unfilled]    Complications:    None    Edward Patel     Date: 3/25/2020  Time: 10:22 AM.

## 2021-06-07 NOTE — PROGRESS NOTES
Clinical Nutrition Therapy Follow Up Note      Current Nutrition Prescription:   Diet: Tube Feeding, NoTray  TF: Peptamen AF @ 30ml/hr held 1 hr before and 2 hours after levothyroxine  Flushes: 100ml water flushes q 4 hours  Diet Supplements:  Prosource three times a day, 2 pkts Nutrisource BID  dextrose or Fluids:dextrose 10%  fentaNYL 2500 mcg/50 mL (50 mcg/mL), Last Rate: 200 mcg/hr (03/23/20 0800)  heparin, Last Rate: 16 Units/kg/hr (03/23/20 0800)  propofol, Last Rate: 35 mcg/kg/min (03/23/20 0932)    Current Nutrition Intake:  Enteral nutrition access is a oral gastric tube, with a placement date of 3/11/20. The current tube feeding order will provide 756 kcals, 48 grams protein, 4 grams fiber, 71 grams carbohydrate, 509mls free water from formula, 600 mls from fluid flush, for a total of 1109 mls free water daily.    The current propofol order provides 351 calories daily    Total nutrient intake from all sources does meet estimated nutritional needs w/ Prosource and Nutrisource Fiber bringing daily totals to 1351kcals, 93g protein, 16g fiber.     Anthropometrics:  Admission weight: 147 lb 4.3 oz (66.8 kg) bed  Weight: 141 lb (64 kg) patient is fluid down per I/Os, +1 edema,     GI Status/Output:   GI symptoms include: loose per nurse no longer diarrhea (6 watery stools yesterday)  Bowel Sounds present per nurse  Last BM: 3/23/20 per nurse    Skin/Wound:  No wound was noted.    Medications:  Prosource, dulcolax, Nutrisource, lantus, novolin, synthroid/levothroid, mvi w/ minerals, prilosec, pantoprazole, deltasone, thiamine, diprivan     Labs:  BUN 40    Estimated nutrition needs:   Assessment weight is 49.0 Kg, adjusted weight     Energy needs: 9065-9088 Calories/day (25-30 Jean/Kg)  Protein needs: 59-74 g daily (1.2-1.5 g/Kg)   Fluid needs: 0636-2809 ml/day (30-35 ml/Kg)    Malnutrition: Noted previously as severe    Nutrition Risk Level: high risk    Nutrition dx:  Inadequate oral intake r/t respiratory  failure as evidenced by need for nutrition support.     Malnutrition r/t aspiration PNA and acute Respiratory failure as evidenced by 3.2% wt loss in 1 week, moderate muscle loss, and fluid accumulation.    Goal Status:    Patient will tolerate TFas evidenced by RV < 500 mls, MET With Reglan Rx, RV < 100 mls    BG < 180 mg/dL, MET    No s&s aspiration MET    Maintain weight MET  Weights 140 to 147 lbs    Bowel function WNL-progressing    Stools < 3/day- progressing slowly    Intervention:  D/t decreased propofol rate, increased TF to 35ml/hr x 21 hrs (held 1 hour before and after levothyroxine) to provide 882kcals, 56g protein, 82g CHO, 40g fat, 4g fiber, 594 ml free fluid, 600ml water from flushes for a total of 1194ml fluid daily.    Decreased Prosource to q day   Increased Nutrisource Fiber to 2pkts three times a day    Modulars and propofol brings daily totals to 1389kcals, 71g protein, 22g fiber.    Monitoring/Evaluation:  TF tolerance, stooling, labs, wt

## 2021-06-07 NOTE — PROGRESS NOTES
Critical Care Medicine Progress Note  3/27/2020    ASSESSMENT/PLAN:  54 y.o. former smoker with most pertinent history of hypothyroidism, migraines, depression and anxiety, who was admitted 3/1/2020 with acute hypoxic respiratory failure secondary to an aspiration event during colonoscopy 2/25/2020.  She had a prolonged hospitalization during which she was diagnosed with bilateral PEs and was subsequently intubated for progressively worsening hypoxic respiratory failure with ARDS on 3/11/2020.  She underwent a tracheostomy 3/25.    New events:    COVID-19 PCR pending    PRN Dilaudid & Ativan for encephalopathy    Neuro/Psych:   #. Analgesia/Sedation: Goal RASS 0 to -1.  Plan to transition from propofol to Precedex drip.  #.  Potential serotonin syndrome, transitioned to PRN Dilaudid & Ativan. Still requiring a lot of PRNs -- to help minimize staff exposure, asked Pain team to evaluate for having a basal rate for Dilaudid until we can calculate her daily requirements. Appreciate assistance from the Pain team.  #. Encephalopathy, protracted, non-focal. Will consider CT head once we know patient's COVID-19 status. Continue w/ consolidation of her medications -- without a focal finding or a specific concern, will hold off on placing a Neuro consult.     Pulmonary:   #.  Acute hypoxic and hypercarbic respiratory failure with intubation 3/11 in setting of aspiration pneumonia with ARDS and bilateral PEs.  Bronchoscopy 3/11 and 3/15, thus far no pathogens identified; in this setting Candida tends to be a colonizing organism.  Considering patient's presentation and severity of her condition despite her age and lack of other clear underlying medical conditions, I think it would be wise to check for COVID-19 (see ID).  #.  Tracheostomy 3/25, #8 Shiley in place.  At this point in time continue with full ventilator support.    Cardiovascular: No acute issues.    Infectious:  Antibiotics: Not currently on antibiotics; completed  broad-spectrum coverage 3/14  #.  COVID-19 rule out initiated 3/26 in setting of persistent respiratory failure and fever.  My concern would be for community-acquired infection. PCR pending.     Renal: No acute issues, elevated HCO3 on BMP is likely secondary to contraction alkalosis.  GI: No acute issues.  PEG 3/25, TF restarted 3/26. Continue increasing the rate until at goal.    Heme:   #.  Bilateral PEs and LUE PIC associated DVT.  Transitioned from heparin drip to therapeutic enoxaparin.  Will require 3 months of anticoagulation (VTE presumably provoked by prolonged immobility and inflammatory state).  Plan to transition to DOAC if no new issues arise.     Endo: Thyroidism, currently on PTA levothyroxine.     Access/Lines/Tubes: required and necessary for continued patient cares  Tracheostomy, Holland, #8  CVC, L IJ  Clark catheter  Rectal tube  PEG tube    ICU prophylaxis:   #. VAP ppx: HOB 30 degrees, chlorhexidine rinses  #. Stress ulcer: PPI  #. Diet: starting TF via PEG 3/26  #. VTE: enoxaparin  #. Restraints: required and necessary for continued patient cares    CODE: FULL    Dispo: ICU for sedation weaning, ventilator management    Patient is critically ill with persistent hypoxic respiratory failure requiring mechanical ventilation and encephalopathy with concern for serotonin syndrome; both have a high risk of rapid and profound deterioration requiring critical time billing. Total CCT spent 49 minutes thus far today.     This report was prepared using speech recognition software.  Any typographical errors are unintentional.  Please, contact me directly for any clarifications of my report.    Casandra Franklin MD  Pulmonary and Critical Care     =================================================================    Interval history: No acute events overnight. Febrile to 100.8 over the last 24 hours. Required a lot of PRNs (Dilaudid & Ativan overnight for agitation). Nursing notes reviewed.     Vitals: Temp:   [98.4  F (36.9  C)-100.8  F (38.2  C)] 100.2  F (37.9  C)  Heart Rate:  [] 116  Resp:  [17-44] 41  BP: ()/(48-95) 137/83  FiO2 (%):  [35 %-50 %] 50 %  Vent:   Vent Mode: VCV  FiO2 (%):  [35 %-50 %] 50 %  S RR:  [24] 24  S VT:  [225 mL] 225 mL  PEEP/CPAP (cm H2O):  [5 cm H2O] 5 cm H2O  Minute Ventilation (L/min):  [5.5 L/min-12 L/min] 11.8 L/min  PIP:  [10 cm H2O-47 cm H2O] 12 cm H2O  MAP (cm H2O):  [5-11] 5    Physical Exam:   General: petite  woman, lying in bed, NAD  HEENT: trach midline, sutures in place, site is clean/dry, PERRL, MMM   CV: RRR, no M/R/G; extremities adequately perfused  Pulm: equal bilateral breath sounds, no wheezing, no rhonchi, bibasilar crackles, no cough  Abd: soft, ND, NT, hypoactive bowel sounds  Msk: warm to touch, no BLE edema  Derm: no acute lesions or rashes on limited exam  Neuro: somnolent, at times raises her arms against the restraints -- BUE have normal joint movement; BLE rigid vs simply resisting any attempt to manipulate the joints; no clonus  Psych: sedated    Labs: personally reviewed in EMR. Most pertinent for HCO3 of 33 without any other significant electrolyte abnormalities.     Imaging: personally reviewed in EMR. No new pertinent imaging.

## 2021-06-07 NOTE — PROGRESS NOTES
Called to the patient's room by RN. Son at bedside. Patient's  currently in ER.  Patient appears to be very uncomfortable. Grimacing, frowning, diaphoretic, tachypneic. Intermittently restless  This is despite having received Dilaudid 2 mg and Lorazepam 2 mg in the last 90 minutes on top of Dilaudid and midazolam infusions.   I suspect she has developed a tolerance to these medications over her lengthy hospital stay.       Vitals:    03/31/20 1700 03/31/20 2056 03/31/20 2310 04/01/20 0145   BP: 109/57      Patient Position:       Pulse: 92 81 (!) 112 (!) 125   Resp: 27 19 (!) 54 (!) 38   Temp:       TempSrc:       SpO2: 94%   (!) 71%   Weight:       Height:             I discussed current situation with son. We will gladly increase the amount of sedation and narcotics with a goal of comfort.   Discussed that the increased doses may hasten her death. We all agree that we want to minimize any pain or anxiety she may have with the goal of  a peaceful death. The complicating factor is that her  is not able to be here with her now. It is unknown if and when he may be able to return.    Emma Blevins, bedside RN was able to speak to her  in the emergency room. He may be able to return to her bedside in a few hours. He does want her to be as comfortable as possible. At the same time he would really like to be with her when she dies. He was reassured that we would do what was needed to keep her comfortable. Our hope is that he can return to her bedside.      We will increase the dose of her prn dilaudid. We will also increase the rate of her midazolam infusion.  Continue to follow and titrate as needed.     Meredith Enriquez, CNP

## 2021-06-07 NOTE — PLAN OF CARE
Problem: Pain  Goal: Patient's pain/discomfort is manageable  3/23/2020 0619 by Jace Oneil RN  Outcome: Progressing  3/23/2020 0616 by Jace Oneil RN  Outcome: Progressing  Patient did not show of any discomfort on this shift. Also her Pain has been manageable with fentanyl drip.      Problem: Safety  Goal: Patient will be injury free during hospitalization  3/23/2020 0619 by Jace Oneil RN  Outcome: Progressing  3/23/2020 0616 by Jace Oneil RN  Outcome: Progressing  Remains on restraints x2 for patient safety.     Problem: Daily Care  Goal: Daily care needs are met  3/23/2020 0619 by Jace Oneil RN  Outcome: Progressing  3/23/2020 0616 by Jace Oneil RN  Outcome: Progressing  Daily cares have met. Reposition her every 2 hours.      Problem: Nutrition Care Problem  Goal: Nutritional status is improving  Outcome: Progressing  Patient has had 200 to 300 ml on this shift. Tube feeding is on hold.    Problem: Excessive Fluid Volume  Goal: Patient will achieve/maintain normal respiratory rate/effort  Outcome: Progressing  Good amount Urine output more than 1600 ml on this shift.    Problem: Glucose Imbalance  Goal: Achieve optimal glucose control  3/23/2020 0619 by Jace Oneil RN  Outcome: Progressing  3/23/2020 0616 by Jace Oneil RN  Outcome: Progressing  Blood sugar has been stable on this shift.     Problem: Mechanical Ventilation  Goal: Patient will maintain patent airway  3/23/2020 0619 by Jace Oneil RN  Outcome: Progressing  3/23/2020 0616 by Jace Oneil RN  Outcome: Progressing  Changed PEEP on this shift, now on peep of 8.

## 2021-06-07 NOTE — PROGRESS NOTES
Spiritual Assessment:     Family experiencing intense grief    Good support of one another     Care Provided:     Grief support    Empathic listening and presence    Prayer     Spiritual Care Note: Escorted Harriett's  Brian, step-sons Mustapha and Jean Marie, and DIL Felicity to say their goodbyes. Joined with Cindy Subramanian (Physician Assistant, Palliative) and Rama Peters (, Palliative) and various members of Harriett's family in a Zoom meeting. Led family in prayer. Tended to family's needs and provided grief support. 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     Brian's sons state that they are planning on staying with their dad another 10-14 days to provide support. Harriett's belongings taken by family members.     Plan of Care: A  ill remain available for further support as patient/family needs/desires.      Larissa Montero M.Div.  Staff   [phone]

## 2021-06-07 NOTE — PLAN OF CARE
"  Problem: Knowledge Deficit  Goal: Patient/family/caregiver demonstrates understanding of disease process, treatment plan, medications, and discharge instructions  Outcome: Progressing     Problem: Inadequate Airway Clearance  Goal: Patient will maintain patent airway  Outcome: Progressing     Problem: Mechanical Ventilation  Goal: Patient will maintain patent airway  Outcome: Progressing   Sputum culture obtained, no weaning today, BS coarse bilat.  On Q6 neb treatments.    (ABGs/VBGs)    Vent Mode: VCV  FiO2 (%):  [40 %] 40 %  S RR:  [24] 24  S VT:  [225 mL] 225 mL  PEEP/CPAP (cm H2O):  [8 cm H2O] 8 cm H2O  Minute Ventilation (L/min):  [5.2 L/min-9.6 L/min] 5.2 L/min  PIP:  [13 cm H2O-51 cm H2O] 31 cm H2O  MAP (cm H2O):  [8-13] 11  Plat: 13-17  IPEEP:0-3    /70   Pulse 78   Temp 98.9  F (37.2  C) (Oral)   Resp 20   Ht 4' 10\" (1.473 m)   Wt 134 lb 4.8 oz (60.9 kg)   SpO2 96%   BMI 28.07 kg/m      ALBERT TinsleyT  3/24/2020   "

## 2021-06-07 NOTE — PROGRESS NOTES
".  Pharmacy Consult: Vancomycin Dosing    Pharmacist consulted to dose vancomycin for Harriett Romero, a 54 y.o. female.    Ordering provider: Dr. Franklin    Indication for vancomycin therapy: Ventilator Associated Pneumonia    Goal Trough Range:  15-20 mcg/mL based on indication    Other current antimicrobials              cefepime 2 g in NaCl 0.9 % 100 mL (MINI-BAG Plus) (MAXIPIME)  Every 8 hours                   Subjective/Objective:    Patient was admitted for Aspiration pneumonia of left lower lobe due to gastric secretions (H) on 3/1/2020    Height: 4' 10\" (1.473 m)    Actual Body Weight (ABW): 61.2 kg (134 lb 14.7 oz)    Patient must be at least 60 in tall to calculate ideal body weight    BMI: Body mass index is 28.2 kg/m .    Allergies   Allergen Reactions     Macrobid [Nitrofurantoin Monohyd/M-Cryst] Nausea And Vomiting     Penicillins Hives     3/1/2020 tolerated ceftriaxone      Sulfa (Sulfonamide Antibiotics) Hives       Patient Active Problem List   Diagnosis     Obesity due to excess calories with serious comorbidity     Hypokalemia     Menopausal hot flushes     Migraine headache     Former smoker - quit in 2014     Insomnia     RLS (restless legs syndrome)     Anxiety     Hypothyroidism     Metabolic syndrome     Dyslipidemia     Vitamin D deficiency     At high risk for caregiver role strain     Major depression in complete remission (H)     History of Positive colorectal cancer screening using Cologuard test     Aspiration pneumonia of left lower lobe due to gastric secretions (H)     Aspiration pneumonia due to inhalation of vomitus (H)     Diarrhea of presumed infectious origin     Hypotension, unspecified hypotension type     Acute respiratory failure with hypoxemia (H)     Cough     Chest pain at rest     Deep vein thrombosis (DVT) of upper extremity (H)     Pulmonary embolism (H)     Acute respiratory distress syndrome (ARDS) (H)     Diffuse interstitial pulmonary disease (H)     Acute " kidney failure, unspecified (H)     Acute encephalopathy     Encounter for palliative care     Goals of care, counseling/discussion     Intensive care (ICU) myopathy     Altered mental status, unspecified altered mental status type     Attention to tracheostomy (H)    Past Medical History:   Diagnosis Date     10 year risk of MI or stroke 1.0% in 2017      Anxiety      Aspiration pneumonia of left lower lobe due to gastric secretions (H) 2/28/2020     Current use of estrogen therapy      Dysthymia      Endometriosis     History - had hysterectomy.      Former smoker      Graves disease      Hypothyroidism      Insomnia      Migraine headache      Nephrolithiasis      Seizures (H)     5+ years ago, no medications     Sleep apnea     Recently diagnosed, awaiting CPAP     Trigeminal neuralgia      UTI (lower urinary tract infection)      Vitamin D deficiency         Temp Readings from Current Encounter:     03/27/20 0800 03/27/20 1000 03/27/20 1200   Temp: 99.6  F (37.6  C) 99.7  F (37.6  C) 99.9  F (37.7  C)     Net Intake/Output (last 24 hours):  I/O last 3 completed shifts:  In: 1651 [I.V.:981; NG/GT:670]  Out: 2860 [Urine:2750; Stool:110]    Recent Labs     03/25/20  0456 03/25/20  0456 03/26/20  0511 03/26/20  0511 03/27/20  0331   WBC  --  16.5*  --  15.1*  --    BUN 32*  --  23*  --  21   CREATININE 0.68  --  0.65  --  0.67     Estimated Creatinine Clearance: 94.9 mL/min (by C-G formula based on SCr of 0.67 mg/dL).    No results for input(s): CULTURE in the last 72 hours.    Results for orders placed or performed during the hospital encounter of 03/01/20   Sputum culture    Collection Time: 03/24/20 11:49 AM    Specimen: Respiratory   Result Value Status    Culture Usual Cora Final   Urine culture    Collection Time: 03/24/20 10:23 AM    Specimen: Urine   Result Value Status    Culture 50,000-100,000 col/ml Candida albicans (!) Final   Culture/Gram Stain: Bronchial    Collection Time: 03/15/20  9:51 AM     Specimen: Respiratory   Result Value Status    Culture Usual Sarita Final   Culture, Fungus, Respiratory    Collection Time: 03/15/20  9:51 AM    Specimen: Respiratory   Result Value Status    Culture Candida albicans (!) Preliminary   Culture, Fungus, Respiratory    Collection Time: 03/11/20  3:40 PM    Specimen: Respiratory   Result Value Status    Culture 1+ Candida albicans (!) Preliminary   Culture/Gram Stain: Bronchial    Collection Time: 03/11/20  3:40 PM    Specimen: Respiratory   Result Value Status    Culture Usual Sarita Final   Culture, Stool    Collection Time: 03/03/20 10:28 AM    Specimen: Stool   Result Value Status    Culture No gram negative sarita isolated Final       No results for input(s): VANCOMYCIN in the last 168 hours.    Vancomycin administrations: (last 120 hours)     None          Assessment/Plan:    Pharmacist consulted to dose vancomycin for Ventilator Associated Pneumonia, goal trough range 15-20 mcg/mL.  1. Initiate vancomycin 1000 mg (16.3 mg/kg) IV x1, followed by 750 mg every 12 hours (12.3 mg/kg actual body weight).   2. Prior trough level of 21.1 was based on 1000 mg q12h dosing; renal function was slightly worse at the time. Will back off dosing a bit -- q12h dosing will allow earlier assessment of trough level.  3. Pharmacist will plan to check a vancomycin trough level prior to 4th or 5th dose.  4. Pharmacist will continue to follow.    Thank you for the consult.  Anali Wynne, PharmD 3/27/2020 3:14 PM

## 2021-06-07 NOTE — PLAN OF CARE
Problem: Mechanical Ventilation  Goal: Patient will maintain patent airway  3/29/2020 1157 by Annamaria Purdy LRT  Outcome: Progressing     Problem: Tracheostomy Tube Management  Goal: Tracheostomy will be managed safely  3/29/2020 1157 by Annamaria Purdy LRT  Outcome: Progressing  Vent Mode: PCV/VG  FiO2 (%):  [50 %-100 %] 60 %  S RR:  [18] 18  S VT:  [300 mL] 300 mL  PEEP/CPAP (cm H2O):  [5 cm H2O] 5 cm H2O  Minute Ventilation (L/min):  [7.9 L/min-15 L/min] 14.9 L/min  PIP:  [17 cm H2O-31 cm H2O] 21 cm H2O  MAP (cm H2O):  [7-19] 7     Pt. remains on full vent support, settings above, Shiley #8. BS coarse, suctioning moderate amount of tan secretions via trach, respiratory pattern tachypneic (RR 40-55) MD aware. Pt. transported to CT and MRI without complications, wean on hold, RT following    PHYLLIS Hart

## 2021-06-07 NOTE — PLAN OF CARE
Problem: Discharge Barriers  Goal: Patient's discharge needs are met  Outcome: Not Progressing   Goal: Patient s discharge needs are met.  Outcome: Care Progression reviewed with Hospitalist, Care Manager.  Discharge Disposition: Discussed and plan to discharge to: [facility]  Planned Discharge Date: TBD  Problem: Barriers to discharge include: mechanical vent, possible trach placement, restraints, tube feeding, heparin gtt, daily abgs.   Transportation needs/Ride Time: TBD    [facility] will possibly be able to admit patient. As of now they need to see if she has out of network benefits. Noted that there may be an exception due to the fact that [facility] is only for COVID-19 patients. Farzaneh will continue to update CM.

## 2021-06-07 NOTE — PROGRESS NOTES
CRITICAL CARE PROGRESS NOTE:    Assessment/Plan:  Harriett Romero is a 54 y.o. female with depression with anxiety, history of migraines, insomnia, remote tobacco abuse, metabolic syndrome, hypothyroidism, and restless leg syndrome who developed aspiration pneumonitis after vomiting while recovering from anesthesia for elective colonoscopy on 2/25/20, presented to urgency room with aspiration pneumonia, and directly admitted to [facility] on 3/1/2020 for care, subsequently transferred to ICU on 3/6/20 with progressive respiratory failure with acute respiratory distress syndrome and bilateral pulmonary emboli, intubated on 3/11/2020.    NEURO:  Sedated on vent. Was paralyzed last week for vent dyssynchrony with very high airway pressures. High risk for ICU delirium and myopathy (ICU and steroid). ?serotonin syndrome last week with ?seizures and clonus, CK was normal    Off versed    Cont propofol, fentanyl for sedation. Prop labs OK on 3/20    RASS goal 0 to -1    Tylenol prn pain    Continue seroquel 25mg two times a day (last QTc OK)     Continue celexa at half home dose (20mg daily)    Hold home wellbutrin.    Cont mirapex as previously    CV:  Was in circulatory shock, on NE. Was not on high dose.lactates normal. Organ perfusionis adequate. Echo 3/7 no RH strain, EF 63%, no WMA's, normal RV size/function, no valve disease, no change from 2017.  on 3/6. Trop's neg    MAP >65    Diuretics on hold due to contraction alkalosis    Hold home anti-hypertensives    No need to trend lactates unless clinical change    RESP:  Presumed massive aspiration episode on 2/25 after colonoscopy. Presented to hospital 3/1 with left sided infiltrate. Intubated 3/11 for progressive resp failure and ARDS c/b bilateraly PE's. Definite improvement in lung compliance with lower Pplat, on less PEEP/FiO2 oxygenation improving compared to last week.     Continu LPV with low Vt to 225cc (approx 6cc/kg IBW) with improvement  in Pplat down to 17, indicating improved lung compliance (Pplat's high 20s-low 30s last week)    DP goal 10-15, last was 27 - 8 = 9 cm H2O    Cont high PEEP/low FiO2 protocol, currently 0.4/8    Permissive hypercapnia, pH OK    Holding on Lasix as above    UFH drip for PE (see heme section below)    Continue accelarated prednisone taper    OK to stop daily ABG's. Just follow SpO2 for now. A-line has been removed.     A-line to be removed    General surgery consultation for trach/PEG this week, hopefully tomorrow.     Bedside US done on right 3/23 -> minimal pleural fluid, not enough to tap.     GI:  intermitently high GVR, improved 600 -> 100 this AM    Cont TF and advance as tolerated    Bowel regimen    Cont PPI    RENAL:  No issues, CO2 elevated likely contraction allk +/- compensatory for hypercapnia in the setting of permissive hypercapnia.    Clark in place    Avoid nephrotoxins.    Follow BUN/Scr    ID:  Completed ~2 weeks of various IV abx, no cultures positive. Candida seen on bronch likely OP contaminant. PJP negative. Wbc 27K -> 17K likely some of this is stress and steroid response with demargination. Wbc up to 21K today, low grade temps in 99 range.    Pan-culture, urine/blood/sputum today but hold off on restarting abx at this time; low threshold to start if she decompensates.     Follow fever curve    HEMATOLOGIC:  LUE PICC-assoc DVT with bilateral PE's, no RH strain on echo.    Cont UFH drip pending decision for trach    Follow plt, hgb    hgb >7    ENDOCRINE:  No issues.     FSBG checks, insulin SS/drip per ICU protocol    ICU PROPHYLAXIS:    UFH drip    peridex    PPI    DISPO/CODE STATUS: full code    FAMILY COMMUNICATION: updated Brian on the phone eloise. He'd like to proceed with trach/PEG and aggressive rehab, vent weaning in the future.  Consulted gen surg as above.     Lines/Drains/Tubes:  Clark  Left IJ TLC - maintain  PIV  ETT 7.5mm  OG tube    Restraints  Progress Note  Restraint  "Application    I recognize that restraints are physical and/or chemical interventions intended to restrict a person's movements. Restraints are currently needed to ensure the safety of this patient and/or others. My clinical rationale appears below.    Category/Type of Restraint     Non Violent:  Soft limb restraint x2  --  Behavior  Pulling at tubes/lines  --  Root Cause of the Behavior  Sedation/intubation  --  Less-Restrictive Measures that Failed  Non Violent Measures:  Close Observation  --  Response to the Restraint  Patient unable to pull at tubes/lines  --  Criteria for Release from the Restraint  Patient calm and off sedation    Kirk (Pato) MD Pranav  [facility]  Pager [phone]  Clinic [phone]      Overnight events:  Off versed drip  Prop had to be increased for breath stacking/dyssynchrony  Continues on fentanyl  Good UOP, without Lasix  Approaching minimal vent settings.     Subjective:  Unable to assess    Objective:  Physical Exam:  Vent settings for last 24 hours:  Vent Mode: VCV  FiO2 (%):  [35 %-40 %] 40 %  S RR:  [24] 24  S VT:  [225 mL] 225 mL  PEEP/CPAP (cm H2O):  [8 cm H2O] 8 cm H2O  Minute Ventilation (L/min):  [5.2 L/min-9.6 L/min] 5.2 L/min  PIP:  [13 cm H2O-52 cm H2O] 31 cm H2O  MAP (cm H2O):  [8-13] 11    /70   Pulse 78   Temp (P) 98.9  F (37.2  C) (Oral)   Resp 20   Ht 4' 10\" (1.473 m)   Wt 134 lb 4.8 oz (60.9 kg)   SpO2 96%   BMI 28.07 kg/m      Intake/Output last 3 shifts:  I/O last 3 completed shifts:  In: 1932 [I.V.:682; NG/GT:1250]  Out: 2560 [Urine:2560]  Intake/Output this shift:  I/O this shift:  In: 187 [I.V.:87; NG/GT:100]  Out: 350 [Urine:350]    Physical Exam  Gen: intubated, sedated. Waking more as sedation is turned off  HEENT: no OP lesions, no JESSICA  CV: RRR, no m/g/r  Resp: diminished ant some coarse sounds no wheezing.   Abd: soft, nontender, BS+  Neuro: PERRL, nonfocal  Ext: no edema    LAB:  Results " from last 7 days   Lab Units 03/24/20  0554   LN-WHITE BLOOD CELL COUNT thou/uL 21.1*   LN-HEMOGLOBIN g/dL 9.3*   LN-HEMATOCRIT % 29.2*   LN-PLATELET COUNT thou/uL 243     Results from last 7 days   Lab Units 03/24/20  0554 03/23/20  0404 03/22/20  0328  03/20/20  0413   LN-SODIUM mmol/L 140 141 142   < > 140   LN-POTASSIUM mmol/L 3.6 4.6 4.6   < > 5.1*   LN-CHLORIDE mmol/L 96* 98 101   < > 99   LN-CO2 mmol/L 36* 34* 33*   < > 35*   LN-BLOOD UREA NITROGEN mg/dL 32* 40* 35*   < > 44*   LN-CREATININE mg/dL 0.68 0.67 0.64   < > 0.72   LN-CALCIUM mg/dL 9.2 9.5 9.1   < > 9.2   LN-PROTEIN TOTAL g/dL  --   --   --   --  6.0   LN-BILIRUBIN TOTAL mg/dL  --   --   --   --  0.3   LN-ALKALINE PHOSPHATASE U/L  --   --   --   --  78   LN-ALT (SGPT) U/L  --   --   --   --  14   LN-AST (SGOT) U/L  --   --   --   --  16    < > = values in this interval not displayed.       Micro  PCT 0.82 -> 0.66 last checked 3/14  BAL 3/11  + for candida likely contaminant  BAL 3/14: also with candida  All culturesneg    BAL cytology from RML/LLL neg for PJP  Pseudohyphae c/w candida likely oral contaminant    Flu swab neg  Legionella neg        Current Facility-Administered Medications   Medication Dose Route Frequency Provider Last Rate Last Dose     acetaminophen solution 650 mg (TYLENOL)  650 mg Enteral Tube Q6H PRN Babar Leigh MD   650 mg at 03/12/20 1734     acetaminophen suppository 650 mg (TYLENOL)  650 mg Rectal Q4H PRN Meredith Enriquez, CNP   650 mg at 03/13/20 0205     albuterol nebulizer solution 2.5 mg (PROVENTIL)  2.5 mg Nebulization Q4H PRN Meredith Enriquez, CNP   2.5 mg at 03/14/20 0224     albuterol nebulizer solution 2.5 mg (PROVENTIL)  2.5 mg Nebulization Q6H - RT Nathaly Pryor MD   2.5 mg at 03/24/20 0739     aluminum-magnesium hydroxide-simethicone 200-200-20 mg/5 mL suspension 30 mL (MAALOX ADVANCED)  30 mL Enteral Tube Q4H PRN Babar Leigh MD         amino acids-protein hydrolys  15-60 gram-kcal/30 mL packet 1 packet (ProSource No Carb)  1 packet Enteral Tube DAILY Kirk Rock MD   1 packet at 03/24/20 0804     bisacodyL suppository 10 mg (DULCOLAX)  10 mg Rectal Daily PRN Meredith Enriquez CNP   10 mg at 03/13/20 1255     [Held by provider] buPROPion tablet 150 mg (WELLBUTRIN)  150 mg Enteral Tube BID Alba York CNP   150 mg at 03/19/20 2122     chlorhexidine 0.12 % solution 15 mL (PERIDEX)  15 mL Topical Q12H Kirk Rock MD   15 mL at 03/24/20 0523     citalopram tablet 20 mg (celeXA)  20 mg Enteral Tube QHS Kirk Rock MD   20 mg at 03/23/20 2013     dextrose 10%  15 mL/hr Intravenous Continuous PRN Kirk Rock MD         dextrose 50 % (D50W) syringe 20-50 mL  20-50 mL Intravenous Q15 Min PRN Meredith Enriquez CNP         diphenhydrAMINE injection 25 mg (BENADRYL)  25 mg Intravenous Q6H PRN Alba York CNP   25 mg at 03/10/20 0014     [Held by provider] fentaNYL - BOLUS DOSE from infusion 50 mcg  50 mcg Intravenous Q1H PRN Kirk Rock MD   50 mcg at 03/18/20 1713     fentaNYL 2500 mcg/50 mL CADD infusion (50 mcg/mL)   mcg/hr Intravenous Continuous Kirk Rock MD 4 mL/hr at 03/24/20 0523 200 mcg/hr at 03/24/20 0523     fentaNYL pf injection 50 mcg (SUBLIMAZE)  50 mcg Intravenous Q1H PRN Kirk Rock MD         glucagon (human recombinant) injection 1 mg  1 mg Subcutaneous Q15 Min PRN Meredith Enriquez CNP         guar gum powder packet 2 packet (BENEFIBER;NUTRISOURCE)  2 packet Enteral Tube TID Kirk Rock MD   2 packet at 03/24/20 0804     heparin 25,000 units in 0.45% sodium chloride (100 units/ml) 250 mL ANTICOAGULANT infusion  1-50 Units/kg/hr Intravenous Continuous iKrk Rock MD 10 mL/hr at 03/23/20 2113 16 Units/kg/hr at 03/23/20 2113     hydrALAZINE injection 10-20 mg (APRESOLINE)  10-20 mg Intravenous Q4H PRN Tae Schmidt MD         hydrOXYzine pamoate capsule 25-50 mg (VISTARIL)  25-50 mg Enteral Tube Q4H PRN Felicia  Babar Fowler MD         insulin glargine injection 18 Units (LANTUS)  18 Units Subcutaneous QAM Nathaly Pryor MD   18 Units at 03/24/20 0803     insulin regular injection (NovoLIN R)   Subcutaneous Q6H FIXED TIMES Nathaly Pryor MD   2 Units at 03/24/20 1156     [Held by provider] insulin regular injection 5 Units (NovoLIN R)  5 Units Subcutaneous Q6H FIXED TIMES Nathaly Pryor MD   5 Units at 03/22/20 1731     levothyroxine tablet 125 mcg (SYNTHROID, LEVOTHROID)  125 mcg Enteral Tube Daily 0600 Babar Leigh MD   125 mcg at 03/24/20 0629     lidocaine (PF) 10 mg/mL (1 %) injection 1-5 mL (XYLOCAINE-MPF)  1-5 mL Intradermal Once PRN Wilmar Rich MD         lidocaine 5 % ointment (XYLOCAINE)   Topical TID PRN Jenni Quinteros CNP         lipase-protease-amylase 5,000-17,000- 24,000 unit capsule 2 capsule (ZENPEP)  2 capsule Enteral Tube PRN Destini Trevino MD        And     sodium bicarbonate tablet 325 mg  325 mg Enteral Tube PRN Destini Trevino MD         magnesium hydroxide suspension 30 mL (MILK OF MAG)  30 mL Enteral Tube Daily PRN Babar Leigh MD         magnesium oxide tablet 400 mg (MAG-OX)  400 mg Oral TID Alba York CNP   400 mg at 03/24/20 0803     melatonin tablet 3 mg  3 mg Enteral Tube Bedtime PRN Babar Leigh MD         miconazole 2 % powder (MICOTIN)   Topical BID Alba York CNP         morphine injection 2 mg  2 mg Intravenous Q4H PRN Wilmar Rich MD   2 mg at 03/11/20 1317     multivit-min-ferrous gluconate 9 mg iron/15 mL Liqd 9 mg of iron  15 mL Enteral Tube DAILY Nathaly Pryor MD   9 mg of iron at 03/24/20 0805     naloxone injection 0.2-0.4 mg (NARCAN)  0.2-0.4 mg Intravenous PRN Meredith Enriquez, AME        Or     naloxone injection 0.2-0.4 mg (NARCAN)  0.2-0.4 mg Intramuscular PRN Meredith Enriquez, CNP         omeprazole suspension 20 mg (PriLOSEC)  20 mg Enteral Tube QHS  Nathaly Pryor MD   20 mg at 03/23/20 2014     [Held by provider] ondansetron injection 4 mg (ZOFRAN)  4 mg Intravenous Q4H PRN Meredith Enriquez CNP   4 mg at 03/07/20 0354    Or     [Held by provider] ondansetron tablet 8 mg (ZOFRAN)  8 mg Oral Q8H PRN Meredith Enriquez CNP         oxymetazoline 0.05 % nasal spray 2 spray (AFRIN)  2 spray Each Nare BID PRN Maddi Hernandez MD         polyvinyl alcohol 1.4 % ophthalmic solution 1 drop (LIQUIFILM TEARS)  1 drop Both Eyes TID PRN Jenni Quinteros CNP   1 drop at 03/21/20 0557     pramipexole (MIRAPEX) tablet 0.75 mg  0.75 mg Enteral Tube QHS Babar Leigh MD   0.75 mg at 03/23/20 2013     predniSONE tablet 20 mg (DELTASONE)  20 mg Oral Daily with brkfst Kirk Rock MD   20 mg at 03/24/20 0803     propofoL injection (DIPRIVAN)  5-75 mcg/kg/min Intravenous Continuous Kirk Rock MD 24.6 mL/hr at 03/24/20 1016 65 mcg/kg/min at 03/24/20 1016     QUEtiapine tablet 25 mg (SEROquel)  25 mg Oral BID Kirk Rock MD   25 mg at 03/24/20 0803     sodium chloride bacteriostatic 0.9 % injection 1-5 mL  1-5 mL Intradermal Once PRN Wilmar Rich MD         sodium chloride flush 10-20 mL (NS)  10-20 mL Intravenous PRN Wilmar Rich MD   20 mL at 03/14/20 0032     sodium chloride flush 10-30 mL (NS)  10-30 mL Intravenous PRN Wilmar Rich MD         sodium chloride flush 2.5 mL (NS)  2.5 mL Intravenous Line Care Meredith Enriquez CNP   10 mL at 03/24/20 0805     sodium chloride flush 20 mL (NS)  20 mL Intravenous PRN Wilmar Rich MD         thiamine tablet 100 mg  100 mg Enteral Tube DAILY Nathaly Pryor MD   100 mg at 03/24/20 0804       Critical care attestation: 45 minutes spent managing the following issues: acute respiratory failure requiring intubation/IMV, severe aspiratoin with severe ARDS now fibroproliferative stage.  toxic metabolic encepahlopathy, risk for ICU delirium. High risk for organ deterioration and death  requiring ICU level care.

## 2021-06-07 NOTE — PLAN OF CARE
Problem: Pain  Goal: Patient's pain/discomfort is manageable  Outcome: Progressing  Patient started on dilaudid in 0.9%Nacl. started at 3:26am. Also PRN 2mg dilaudid given on this shift (SEE MAR)    Problem: Daily Care  Goal: Daily care needs are met  Outcome: Progressing  Daily cares have met. repositioned her every 2 hours.     Problem: Inadequate Airway Clearance  Goal: Patient will maintain patent airway  Outcome: Progressing  Goal: Patient will achieve/maintain normal respiratory rate/effort  Outcome: Progressing  Her repsiration has been manageable with PRN IV lorazepam.      Patient is on comfort cares. Patient's family is in the room and updated them.

## 2021-06-07 NOTE — PROGRESS NOTES
Physical Therapy  Per RN, pt not appropriate for PT at this time. Should pt status change, please re-order as appropriate. Thank you.    Rachel Walker, DPT  3/30/2020

## 2021-06-07 NOTE — PLAN OF CARE
Problem: Inadequate Gas Exchange  Goal: Patient will achieve/maintain normal respiratory rate/effort  Outcome: Progressing     Intermittently stacks breathing. Takes an hour for breathing to comeback to baseline after repositioning. RR 30-39.       Problem: Activity Intolerance/Impaired Mobility  Goal: Mobility/activity is maintained at optimum level for patient  Outcome: Progressing     AntiXa 0.24. Heparin bolus of 1950 units then increased gtt to 16 units/kg/hr. Next Anti XA at 10:46.

## 2021-06-12 NOTE — PROGRESS NOTES
Wt Readings from Last 20 Encounters:   09/01/17 159 lb 9.6 oz (72.4 kg)   08/22/17 162 lb (73.5 kg)   08/16/17 162 lb 12.8 oz (73.8 kg)   01/24/17 163 lb (73.9 kg)   09/19/16 157 lb (71.2 kg)   07/28/16 158 lb 6.4 oz (71.8 kg)   11/05/15 160 lb (72.6 kg)   05/29/15 160 lb 6.4 oz (72.8 kg)   01/30/15 153 lb 8 oz (69.6 kg)   07/26/14 152 lb 9.6 oz (69.2 kg)

## 2021-06-12 NOTE — PROGRESS NOTES
[facility] Note  Patient Name: Harriett Romero  Patient Age: 51 y.o.  YOB: 1965  MRN: [id number]  ?  Date of Visit: 8/16/2017  Reason for Office Visit:   Chief Complaint   Patient presents with     Leg Swelling     with feet sweeling x 2 weeks pt states she has gained 6 pounds over the past 1.5 weeks      HPI: Harriett Romero 51 y.o. female who presents to clinic for bilateral feet and ankle swelling x 1 month, progressively getting worse. Worse at end of day. She has been around 154 and today weighs 162.8. She does not use a lot of salt when cooking Denies orthopnea, PND, sob, rubin, chest pain. She did have her premarin dose decreased by her OB about 6 weeks ago. No other new meds. Her last echo was in '14. EF of 55%, no evidence of valvular disease     Review of Systems: As noted in HPI     Current Scheduled Meds:  Outpatient Encounter Prescriptions as of 8/16/2017   Medication Sig Dispense Refill     citalopram (CELEXA) 40 MG tablet TAKE 1 TABLET BY MOUTH DAILY.  Please keep this Rx on file for the next RF. 90 tablet 3     ergocalciferol (ERGOCALCIFEROL) 50,000 unit capsule TK 1 C PO ONCE WEEKLY       estrogens, conjugated, (PREMARIN) 1.25 MG tablet TAKE ONE TABLET BY MOUTH DAILY.  Please keep this Rx on file for the next RF. 90 tablet 3     ibuprofen (ADVIL,MOTRIN) 600 MG tablet        pramipexole (MIRAPEX) 0.75 MG tablet TAKE 1 TABLET BY MOUTH AT BEDTIME.  Please keep this Rx on file for the next RF. 90 tablet 3     SYNTHROID 112 mcg tablet TAKE 1 TABLET BY MOUTH EVERY DAY AS DIRECTED 90 tablet 3     traZODone (DESYREL) 150 MG tablet Take 0.5-1 tablets ( mg total) by mouth bedtime as needed. 90 tablet 3     traZODone (DESYREL) 150 MG tablet        VITAMIN D2 50,000 unit capsule TAKE 1 CAPSULE BY MOUTH ONCE WEEKLY 12 capsule 3     furosemide (LASIX) 20 MG tablet Take 1 tablet (20 mg total) by mouth daily. 30 tablet 0     No facility-administered encounter medications  on file as of 8/16/2017.        Objective / Physical Examination:  /64  Pulse 75  Wt 162 lb 12.8 oz (73.8 kg)  SpO2 96%  BMI 32.33 kg/m2  Wt Readings from Last 3 Encounters:   08/16/17 162 lb 12.8 oz (73.8 kg)   01/24/17 163 lb (73.9 kg)   09/19/16 157 lb (71.2 kg)     Body mass index is 32.33 kg/(m^2). (>25?)    General Appearance: Alert and oriented in no acute distress  Lungs: Clear to auscultation bilaterally. Normal inspiratory and expiratory effort.   Cardiovascular: RRR S1, S2. No m/r/g. Elevated JVD ~4-5 cm above sternal angle   Abdomen: Soft, non-tender  Extremities: Pulses 2+ and equal throughout. 1+ non pitting edema in feet and ankles. No calf tenderness. S  Integumentary: Warm and dr  Neuro: Alert and oriented, follows commands appropriately    Assessment / Plan / Medical Decision Making:      Encounter Diagnoses   Name Primary?     Bilateral leg edema Yes     JVD (jugular venous distension)         1. Bilateral leg edema  Unclear, cardiac, venous insufficiency? Will order a small dose of lasix 20 mg, however warned on blood pressure lowering effects, and keep an eye on bp. Elevation and stockings can help. Limit salt  Will check her renal, liver and a Bnp   - furosemide (LASIX) 20 MG tablet; Take 1 tablet (20 mg total) by mouth daily.  Dispense: 30 tablet; Refill: 0  - Comprehensive Metabolic Panel  - Echo Complete; Future  - BNP(B-type Natriuretic Peptide)    2. JVD (jugular venous distension)  Elevated JVP on exam, ~4-5 cm above sternal angle with head elevated ~ 45 degrees Will order echo to further eval cardiac HF and or valvular abnormalities.   - Echo Complete; Future    Return in about 2 weeks (around 8/30/2017). or earlier if needed.    Total time spent with patient was 15 minutes with >50% of time spent in face-to-face counseling regarding the above plan     Thomas Khan MD  [facility]

## 2021-06-13 NOTE — PROGRESS NOTES
"Assessment/Plan:    Obesity  51 y.o. female in clinic today to discuss treatment of the following conditions through radical diet change, lifestyle modification and weight loss:  1. Obesity    2. Hypothyroidism    3. Screening for lipid disorders    4. Screening for deficiency anemia    5. Vitamin D deficiency    6. Screening for diabetes mellitus    7. Metabolic syndrome    8. Dyslipidemia    9. Edema      This patient has multiple psychosocial components to her obesity and obstacles to losing weight.  She has a stressful job with long hours.  There are financial concerns which may necessitate her getting an additional job.  She is the caregiver of her  who is both emotionally needy and actively hostile to her degree of obesity and her desire to lose weight.  During our encounter we talked about strategies to structure her life to address her nutritional goals.  We also discussed the benefits of physical activity both a physical in a mental health perspective.  She recently had an echocardiogram which is normal.  There is no contra indication to her going on phentermine.  Given that she has been on phentermine we discussed that I will be evaluating her lifestyle changes while she is on this medication to crease likelihood that she does not gain weight after it is ultimately tapered.  Additionally, given the laboratory and physical exam characteristics consistent with metabolic syndrome, I am recommending metformin.  We discussed potential side effects.  She will start this medication a couple of weeks after starting phentermine.    The patient defines success for this program as follows:   - qualitative: Closed bit better, \"feel goo, \"increase strength in legs (metric would be to wear heels).   - goal weight: Lose 50 pounds.  She does not recall having been at this weight in the past.     Medications prescribed today:   - Phentermine.  No obvious contraindication (reviewed echo from August). Metformin was " prescribed.     The patient attended group visit to learn about obesity as a disease, an approach to treatment and metabolic factors. Nutrition counseling reviewed., Labs ordered and will review and discuss with the patient., Body composition analyzer done and results reviewed with the patient. Please see scanned results in chart. and Recommend 2000 mg of fish oil daily, a multivitamin daily, chromium as directed, and vitamin D supplementation as directed.    Dietary Intervention: Reduced calorie, reduced carbohydrates, whole food diet., Recommend increasing movement throughout the day--sitting less, moving more. Will increase activity over time to reach a goal of at least 150 minutes of moderate exercise each week., Behavior modification: Cognitive restructuring exercises, journaling stressors, triggers for food cravings., Recommend journaling and tracking food intake using either an online program such as myfitnesspal.com or loseit.com or tracking using a paper and pencil. Advised paying particular attention to total carbohydrates, fiber, protein, calories, and fats, and added sugars. and Greater than 50% of this 30 minute visit was spent in counseling regarding obesity is a disease as well as the nutritional and exercise recommendations of our program as it pertains to the patient's own individual healthcare needs.           Return to clinic in 4 weeks.             This note has been dictated using voice recognition software. Any grammatical or context distortions are unintentional and inherent to the software    ______________________________    SUBJECTIVE:    51 y.o. year old female with past medical history including laboratory evidence for metabolic syndrome, hypothyroidism, obesity, vitamin D deficiency, dyslipidemia presenting to clinic today to discuss treatment of these conditions through radical diet change, lifestyle modification, and weight loss (intensive therapeutic lifestyle interventions including  nutrition, physical activity, and behavior management).    This patient has struggled with weight for greater than 15 years.  Specifically, over the past year she is struggled with weight as she is worked to support her  as he is undergone a lung transplant.  She works 50-60 hours per week and anticipates the need to get another job to support her family from a financial perspective.  In describing her strategies toward eating, she does eat a reasonable breakfast and lunch.  Generally for dinner she ends up eating pizza or fast food.  She herself is not 1 to snack except when snack like foods are in her environment.  Her  is a snacker and generally has processed/refined foods present in the house.  In the past, she is on some success with manifest some Genex.  She is joint a gym in the past.  Currently she is not working out at all and she is dismayed that the idea that her legs are weak relative to overall body.  She has been struggling with swelling she points to her ankles and her upper chest as areas of concern.  She had an echocardiogram and some medication changes through her primary care physician which she thinks have not been particularly helpful.  The patient has a history of phentermine use.  She believes she lost about 10 pounds while she is on it for 6 months in [id number].  The weight regain when she discontinue the medication.    The patient has been overweight for 16 years.  Contributing factors includes: Sedentary lifestyle, excessive carbohydrate/fast food consumption, caregiver fatigue.  The patient does not exercise.  The patient does not have problems (physical, logistical) with exercise.  The patient does not eat nutritiously.  The patient does not overeat.  The patient does not snore.  The patient does not have a history of eating disorder.  The patient does not have a history of substance abuse (alcohol, drugs).    Social effects of obesity: low self esteem, body image  "dissatisfaction, diminished sex drive, impaired intimacy and sexual relationships or decreased work productivity    Definition of success:   - qualitative: Closed bit better, \"feel goo, \"increase strength in legs (metric would be to wear heels).   - goal weight: Lose 50 pounds.  She does not recall having been at this weight in the past.     Woman's health: The patient is not pregnant.   - Number of children:  0    Patient Active Problem List   Diagnosis     Obesity     Menopausal hot flushes     Migraine headache     Dysthymia     Former smoker - quit in 2014     Insomnia     RLS (restless legs syndrome)     Anxiety     Hypothyroidism     Metabolic syndrome     Dyslipidemia     Vitamin D deficiency       Past Medical History:   Diagnosis Date     10 year risk of MI or stroke 1.0% in 2017      Anxiety      Current use of estrogen therapy      Dysthymia      Endometriosis     History - had hysterectomy.      Former smoker      Graves disease      Hypothyroidism      Insomnia      Migraine headache      Nephrolithiasis      Trigeminal neuralgia      UTI (lower urinary tract infection)      Vitamin D deficiency        Past Surgical History:   Procedure Laterality Date     BILATERAL OOPHORECTOMY       CYSTOSCOPY W/ URETERAL STENT PLACEMENT       HYSTERECTOMY       THYROIDECTOMY         Current Outpatient Prescriptions on File Prior to Visit   Medication Sig Dispense Refill     citalopram (CELEXA) 40 MG tablet TAKE 1 TABLET BY MOUTH DAILY.  Please keep this Rx on file for the next RF. 90 tablet 3     estrogens, conjugated, (PREMARIN) 1.25 MG tablet TAKE ONE TABLET BY MOUTH DAILY.  Please keep this Rx on file for the next RF. 90 tablet 3     ibuprofen (ADVIL,MOTRIN) 600 MG tablet Take 600 mg by mouth every 8 (eight) hours as needed.        pramipexole (MIRAPEX) 0.75 MG tablet TAKE 1 TABLET BY MOUTH AT BEDTIME.  Please keep this Rx on file for the next RF. 90 tablet 3     SYNTHROID 112 mcg tablet TAKE 1 TABLET BY MOUTH " EVERY DAY AS DIRECTED 90 tablet 3     traZODone (DESYREL) 150 MG tablet Take 0.5-1 tablets ( mg total) by mouth bedtime as needed. 90 tablet 3     triamterene-hydrochlorothiazide (MAXZIDE-25) 37.5-25 mg per tablet Take 1 tablet by mouth daily. 90 tablet 3     [DISCONTINUED] furosemide (LASIX) 20 MG tablet Take 1 tablet (20 mg total) by mouth daily as needed. 30 tablet 0     No current facility-administered medications on file prior to visit.        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   has severe COPD in 2017 and is in his early 50's and may need lung transplant.        Quit smoking in 2014.        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         ROS  A comprehensive review of systems was negative.  Pertinent items are noted in HPI.  Patient denies chest pain or pressure, shortness of breath, exertional intolerance, palpitations, or lightheadedness.    Vitals:    10/06/17 0738   BP: 124/76   Pulse: 68     Weight: 163 lb (73.9 kg)  Body mass index is 33.49 kg/(m^2).    EXAM    Gen: Alert, pleasant,  cooperative, no distress, appears stated age, patient is obese.  Eyes: No conjunctival injection, no scleral icterus.  Neck: Supple, symmetrical, trachea midline.  No adenopathy.  Thyroid is without enlargement/tenderness/nodules.  Cardiac: Regular rate and rhythm, normal S1/S2, no murmurs or gallops, no edema at ankles bilaterally.  Respiratory: Clear to auscultation bilaterally.  Abdomen: Soft, nontender, no hepatosplenomegaly.  Extremities: Warm, well-perfused, no edema.  Skin: Skin, turgor, texture color is normal. Skin tags: No, striae: No, hirsutism: No, acanthosis nigricans: No.  Neurologic: Cranial nerves II through XII grossly intact.  2+ reflexes at the patella bilaterally.  Psych: Normal affect.  Normal rate of speech.  No tangentiality.      Body composition analyzer done and results reviewed with the patient.  Please see scanned results in chart.  Labs ordered and will review and discuss with the patient.    Recent Results (from the past 24 hour(s))   HM2(CBC w/o Differential)   Result Value Ref Range    WBC 8.7 4.0 - 11.0 thou/uL    RBC 4.52 3.80 - 5.40 mill/uL    Hemoglobin 13.7 12.0 - 16.0 g/dL    Hematocrit 40.7 35.0 - 47.0 %    MCV 90 80 - 100 fL    MCH 30.4 27.0 - 34.0 pg    MCHC 33.8 32.0 - 36.0 g/dL    RDW 11.4 11.0 - 14.5 %    Platelets 238 140 - 440 thou/uL    MPV 7.7 7.0 - 10.0 fL   Glycosylated Hemoglobin A1c   Result Value Ref Range    Hemoglobin A1c 5.5 3.5 - 6.0 %      Echo 8/2017 - Summary     Left ventricle ejection fraction is normal. The calculated left ventricular ejection fraction is 65%.    No hemodynamically significant valvular heart abnormalities.    When compared to the previous study dated 7/26/2014, no significant change

## 2021-06-14 NOTE — PROGRESS NOTES
Assessment and Plan:     Obesity  51 y.o. year old female in clinic today to discuss treatment of the following conditions through diet and lifestyle modification and weight loss:  1. Obesity    2. Vitamin D deficiency    3. Metabolic syndrome    4. Dyslipidemia      The patient's efforts this past month were successful as evidenced by weight loss.  I am concerned that the patient was not consuming adequate nutrition.  She is on an anorexigenic medication and had been waiting until she is hungry to trigger eating.  We performed a body composition analysis and compared to the study that was done last month.  She is losing approximately 1.25 pounds of fat for every pound muscle..  I recommend that the patient focus on eating regularily.   -Increase regularity of eating.  I recommended to the patient that she decrease the rate of weight loss given the balanced fat/muscle loss as shown in the body composition analysis.  -For now, continue phentermine.  If she continues to describe skipping meals and inadequate nutrition, will taper and discontinue this medication (or at least consider decreasing the dose given her weight).  -Continue metformin.  -Utilize ways to wellness.  Meet with dietitian.      Follow up in 1 month.  Continue supplements.    Recommend increasing movement throughout the day--sitting less, moving more.  Will increase activity over time to reach a goal of at least 150 minutes of moderate exercise each week.  Behavior modification:  Cognitive restructuring exercises, journaling stressors, triggers for food cravings.  Dietary Intervention:  Reduced calorie, reduced carbohydrates, whole food diet.  Greater than 50% of this 15 minute visit was spent in counseling regarding patient's obesity, medications, dietary, exercise, and behavior modification recommendations.      Subjective  Patient presents for treatment of chronic, comorbid conditions using intensive therapeutic lifestyle interventions including  "nutrition, physical activity, and behavior management.    She focused hard on losing 10 lbs.  She feels better.  She did some walking at work.  Medications have been helpful.  No cravings.  She is focusing on making decisions to eat.  No \"rewarding with food.\"  She is becoming more aware of stress eating.      Are you experiencing any side effects to the medications:  No  Hunger control:  good  Exercise was discussed: yes  Taking supplements:  yes  Discussed journaling food:  yes  Patient is pleased with the current results:  yes  The patient is following the nutrition plan:  Inadequate nutrition  Barriers to losing weight:  Behavior:  inadequate exercise    Patient Active Problem List   Diagnosis     Obesity     Menopausal hot flushes     Migraine headache     Dysthymia     Former smoker - quit in 2014     Insomnia     RLS (restless legs syndrome)     Anxiety     Hypothyroidism     Metabolic syndrome     Dyslipidemia     Vitamin D deficiency       Current Outpatient Prescriptions on File Prior to Visit   Medication Sig Dispense Refill     citalopram (CELEXA) 40 MG tablet TAKE 1 TABLET BY MOUTH DAILY.  Please keep this Rx on file for the next RF. 90 tablet 3     estrogens, conjugated, (PREMARIN) 1.25 MG tablet TAKE ONE TABLET BY MOUTH DAILY.  Please keep this Rx on file for the next RF. 90 tablet 3     ibuprofen (ADVIL,MOTRIN) 600 MG tablet Take 600 mg by mouth every 8 (eight) hours as needed.        levothyroxine (SYNTHROID) 100 MCG tablet Take 1 tablet (100 mcg total) by mouth daily. 90 tablet 1     metFORMIN (GLUCOPHAGE-XR) 500 MG 24 hr tablet TAKE 1 TABLET(500 MG) BY MOUTH DAILY WITH SUPPER 90 tablet 0     pramipexole (MIRAPEX) 0.75 MG tablet TAKE 1 TABLET BY MOUTH AT BEDTIME.  Please keep this Rx on file for the next RF. 90 tablet 3     traZODone (DESYREL) 150 MG tablet Take 0.5-1 tablets ( mg total) by mouth bedtime as needed. 90 tablet 3     [DISCONTINUED] phentermine 30 MG capsule Take 1 capsule (30 mg " total) by mouth every morning. 30 capsule 0     SYNTHROID 112 mcg tablet TAKE 1 TABLET BY MOUTH EVERY DAY AS DIRECTED 90 tablet 3     triamterene-hydrochlorothiazide (MAXZIDE-25) 37.5-25 mg per tablet Take 1 tablet by mouth daily. 90 tablet 3     No current facility-administered medications on file prior to visit.        Objective:  Vitals:    11/10/17 0722   BP: 122/68   Pulse: 88     Initial Weight: 163 lbs  Weight: 152 lb (68.9 kg)  Weight loss from initial: 11  % Weight loss: 6.75 %  Body mass index is 31.23 kg/(m^2).  General:  Patient is alert, pleasant, no distress.  Patient is obese.    This note has been dictated using voice recognition software. Any grammatical or context distortions are unintentional and inherent to the software

## 2021-06-14 NOTE — PROGRESS NOTES
"Assessment and Plan:     Obesity  52 y.o. year old female in clinic today to discuss treatment of the following conditions through diet and lifestyle modification and weight loss:  1. Obesity    2. Vitamin D deficiency    3. Metabolic syndrome    4. Dyslipidemia      The patient's efforts this past month were successful as evidenced by weightloss.  I recommend that the patient focus on journaling, increasing exercise.   - continue phentermine   - JOURNAL.  I encouraged the patient to slow down weight loss in the interest of stability.   - Plan (discuss with )      Follow up in 1 month.  Continue supplements.    Recommend increasing movement throughout the day--sitting less, moving more.  Will increase activity over time to reach a goal of at least 150 minutes of moderate exercise each week.  Behavior modification:  Cognitive restructuring exercises, journaling stressors, triggers for food cravings.  Dietary Intervention:  Reduced calorie, reduced carbohydrates, whole food diet.  Greater than 50% of this 15 minute visit was spent in counseling regarding patient's obesity, medications, dietary, exercise, and behavior modification recommendations.      Subjective  Patient presents for treatment of chronic, comorbid conditions using intensive therapeutic lifestyle interventions including nutrition, physical activity, and behavior management.    She feels good.  She continues to find the medication helpful.  Snacking has decreased.  She has been hungry (Breakfast cues).  She is eating B, L and dinner regularly.  She has been without phentermine the past three days.  She reacted by \"thinking\" her way through the day.  She is planning.     - Exercise: She has been walking more at the end of the day in a loop in her building.     - Journaling: not doing this yet    Are you experiencing any side effects to the medications:  No  Hunger control:  good  Exercise was discussed: yes  Taking supplements:  yes  Discussed " journaling food:  yes  Patient is pleased with the current results:  yes  The patient is following the nutrition plan:  yes  Barriers to losing weight:  Behavior:  inadequate exercise    Patient Active Problem List   Diagnosis     Obesity     Menopausal hot flushes     Migraine headache     Dysthymia     Former smoker - quit in 2014     Insomnia     RLS (restless legs syndrome)     Anxiety     Hypothyroidism     Metabolic syndrome     Dyslipidemia     Vitamin D deficiency       Current Outpatient Prescriptions on File Prior to Visit   Medication Sig Dispense Refill     citalopram (CELEXA) 40 MG tablet TAKE 1 TABLET BY MOUTH DAILY.  Please keep this Rx on file for the next RF. 90 tablet 3     estrogens, conjugated, (PREMARIN) 1.25 MG tablet TAKE ONE TABLET BY MOUTH DAILY.  Please keep this Rx on file for the next RF. 90 tablet 3     ibuprofen (ADVIL,MOTRIN) 600 MG tablet Take 600 mg by mouth every 8 (eight) hours as needed.        levothyroxine (SYNTHROID) 100 MCG tablet Take 1 tablet (100 mcg total) by mouth daily. 90 tablet 1     metFORMIN (GLUCOPHAGE-XR) 500 MG 24 hr tablet TAKE 1 TABLET(500 MG) BY MOUTH DAILY WITH SUPPER 90 tablet 0     phentermine 30 MG capsule Take 1 capsule (30 mg total) by mouth every morning. 30 capsule 0     pramipexole (MIRAPEX) 0.75 MG tablet TAKE 1 TABLET BY MOUTH AT BEDTIME.  Please keep this Rx on file for the next RF. 90 tablet 3     traZODone (DESYREL) 150 MG tablet Take 0.5-1 tablets ( mg total) by mouth bedtime as needed. 90 tablet 3     triamterene-hydrochlorothiazide (MAXZIDE-25) 37.5-25 mg per tablet Take 1 tablet by mouth daily. 90 tablet 3     SYNTHROID 112 mcg tablet TAKE 1 TABLET BY MOUTH EVERY DAY AS DIRECTED 90 tablet 3     No current facility-administered medications on file prior to visit.        Objective:  Vitals:    12/15/17 0738   BP: 126/80   Pulse: 76     Initial Weight: 163 lbs  Weight: 147 lb (66.7 kg)  Weight loss from initial: 16  % Weight loss: 9.82  %  Body mass index is 30.2 kg/(m^2).  General:  Patient is alert, pleasant, no distress.  Patient is obese.    This note has been dictated using voice recognition software. Any grammatical or context distortions are unintentional and inherent to the software

## 2021-06-15 NOTE — PROGRESS NOTES
Assessment and Plan:     Obesity  52 y.o. year old female in clinic today to discuss treatment of the following conditions through diet and lifestyle modification and weight loss:  1. Obesity    2. Vitamin D deficiency    3. Metabolic syndrome    4. Dyslipidemia    5. Visit for screening mammogram    6. Hypothyroidism      The patient's efforts this past month were successful as evidenced by weight stability.  - I am concerned the patient is describing depression.  She does endorse a history of variation of mood with the season.  She is working with a psychologist.  She also continues to take her citalopram as prescribed by her primary care physician.  Recommended a light box.  She might benefit from Wellbutrin but I am hesitant to prescribe this medication in conjunction with phentermine.  She continues to struggle, I will have her follow-up with her primary care physician.  -Continue planning and tracking.  -Continue metformin and phentermine.  -Check TSH.  Synthroid was recently adjusted.  -Return to clinic in 1 month.      Follow up in 1 month.  Continue supplements.    Recommend increasing movement throughout the day--sitting less, moving more.  Will increase activity over time to reach a goal of at least 150 minutes of moderate exercise each week.  Behavior modification:  Cognitive restructuring exercises, journaling stressors, triggers for food cravings.  Dietary Intervention:  Reduced calorie, reduced carbohydrates, whole food diet.  Greater than 50% of this 25 minute visit was spent in counseling regarding patient's obesity, medications, dietary, exercise, and behavior modification recommendations.      Subjective  Patient presents for treatment of chronic, comorbid conditions using intensive therapeutic lifestyle interventions including nutrition, physical activity, and behavior management.    Happy with weight stability.  She has focused on portion sizes.  She has been working to choose better snacks  (often an orange).  She thinks that she is eating enough.  She is charting but not focusing on the numbers.  Exercise: none    Depression:   - worse for the past month or more   - she attributes this to the winter.  She has been seeing a counselor.     - synthroid adjusted in October.    Are you experiencing any side effects to the medications:  No  Hunger control:  good  Exercise was discussed: yes  Taking supplements:  yes  Discussed journaling food:  yes  Patient is pleased with the current results:  no  The patient is following the nutrition plan:  yes  Barriers to losing weight:  Behavior:  inadequate exercise and Psychology:   Depression    Patient Active Problem List   Diagnosis     Obesity     Menopausal hot flushes     Migraine headache     Dysthymia     Former smoker - quit in 2014     Insomnia     RLS (restless legs syndrome)     Anxiety     Hypothyroidism     Metabolic syndrome     Dyslipidemia     Vitamin D deficiency       Current Outpatient Prescriptions on File Prior to Visit   Medication Sig Dispense Refill     citalopram (CELEXA) 40 MG tablet TAKE 1 TABLET BY MOUTH DAILY.  Please keep this Rx on file for the next RF. 90 tablet 3     estrogens, conjugated, (PREMARIN) 1.25 MG tablet TAKE ONE TABLET BY MOUTH DAILY.  Please keep this Rx on file for the next RF. 90 tablet 3     ibuprofen (ADVIL,MOTRIN) 600 MG tablet Take 600 mg by mouth every 8 (eight) hours as needed.        levothyroxine (SYNTHROID) 100 MCG tablet Take 1 tablet (100 mcg total) by mouth daily. 90 tablet 1     metFORMIN (GLUCOPHAGE-XR) 500 MG 24 hr tablet TAKE 1 TABLET(500 MG) BY MOUTH DAILY WITH SUPPER 90 tablet 1     pramipexole (MIRAPEX) 0.75 MG tablet TAKE 1 TABLET BY MOUTH AT BEDTIME.  Please keep this Rx on file for the next RF. 90 tablet 3     SYNTHROID 112 mcg tablet TAKE 1 TABLET BY MOUTH EVERY DAY AS DIRECTED 90 tablet 3     traZODone (DESYREL) 150 MG tablet Take 0.5-1 tablets ( mg total) by mouth bedtime as needed. 90  tablet 3     triamterene-hydrochlorothiazide (MAXZIDE-25) 37.5-25 mg per tablet Take 1 tablet by mouth daily. 90 tablet 3     [DISCONTINUED] metFORMIN (GLUCOPHAGE-XR) 500 MG 24 hr tablet TAKE 1 TABLET(500 MG) BY MOUTH DAILY WITH SUPPER 90 tablet 0     [DISCONTINUED] phentermine 30 MG capsule Take 1 capsule (30 mg total) by mouth every morning. 30 capsule 0     No current facility-administered medications on file prior to visit.        Objective:  Vitals:    01/15/18 0856   BP: 116/74   Pulse: 80     Initial Weight: 163 lbs  Weight: 147 lb (66.7 kg)  Weight loss from initial: 16  % Weight loss: 9.82 %  Body mass index is 30.2 kg/(m^2).  General:  Patient is alert, pleasant, no distress.  Patient is obese.  Psych: Flat affect.    I reviewed the patient's PHQ 9 is reflected in the flowsheet.    This note has been dictated using voice recognition software. Any grammatical or context distortions are unintentional and inherent to the software

## 2021-06-16 PROBLEM — J80 ARDS (ADULT RESPIRATORY DISTRESS SYNDROME) (H): Status: ACTIVE | Noted: 2020-01-01

## 2021-06-16 PROBLEM — F32.5 MAJOR DEPRESSION IN COMPLETE REMISSION (H): Chronic | Status: ACTIVE | Noted: 2018-08-26

## 2021-06-16 PROBLEM — Z91.89 AT HIGH RISK FOR CAREGIVER ROLE STRAIN: Status: ACTIVE | Noted: 2018-03-30

## 2021-06-16 PROBLEM — R19.5 POSITIVE COLORECTAL CANCER SCREENING USING COLOGUARD TEST: Status: ACTIVE | Noted: 2020-01-01

## 2021-06-16 PROBLEM — E78.5 DYSLIPIDEMIA: Status: ACTIVE | Noted: 2017-10-06

## 2021-06-16 PROBLEM — J96.01 ACUTE RESPIRATORY FAILURE WITH HYPOXEMIA (H): Status: ACTIVE | Noted: 2020-01-01

## 2021-06-16 PROBLEM — I26.99 PULMONARY EMBOLISM (H): Status: ACTIVE | Noted: 2020-01-01

## 2021-06-16 PROBLEM — I95.9 HYPOTENSION, UNSPECIFIED HYPOTENSION TYPE: Status: ACTIVE | Noted: 2020-01-01

## 2021-06-16 PROBLEM — J69.0 ASPIRATION PNEUMONIA DUE TO INHALATION OF VOMITUS (H): Status: ACTIVE | Noted: 2020-01-01

## 2021-06-16 PROBLEM — R19.7 DIARRHEA OF PRESUMED INFECTIOUS ORIGIN: Status: ACTIVE | Noted: 2020-01-01

## 2021-06-16 PROBLEM — J69.0 ASPIRATION PNEUMONIA OF LEFT LOWER LOBE DUE TO GASTRIC SECRETIONS (H): Status: ACTIVE | Noted: 2020-01-01

## 2021-06-16 PROBLEM — E55.9 VITAMIN D DEFICIENCY: Status: ACTIVE | Noted: 2017-10-06

## 2021-06-16 PROBLEM — N17.9 ACUTE KIDNEY FAILURE, UNSPECIFIED (H): Status: ACTIVE | Noted: 2020-01-01

## 2021-06-16 PROBLEM — I82.629 DEEP VEIN THROMBOSIS (DVT) OF UPPER EXTREMITY (H): Status: ACTIVE | Noted: 2020-01-01

## 2021-06-16 PROBLEM — E88.810 METABOLIC SYNDROME: Status: ACTIVE | Noted: 2017-10-06

## 2021-06-16 NOTE — PROGRESS NOTES
HPI: This patient is a 53yo F who presents for evaluation of her voice at the request of Dr. Romero. She had a bad episode of URI with cough around the time of the Super Bowl and since then has had a lot of hoarseness. It has improved only slightly in the last several weeks. Denies fevers, otalgia, weight loss, odynophagia, dysphagia, hemoptysis, and shortness of breath. Does not complain of sour taste, heartburn, or burping, but does complain of PND. Is not taking reflux medication. Quit smoking in 2014.     Past medical history, surgical history, social history, family history, medications, and allergies have been reviewed with the patient and are documented above.    Review of Systems: a 10-system review was performed. Pertinent positives are noted in the HPI and on a separate scanned document in the chart.    PHYSICAL EXAMINATION:  GEN: no acute distress, normocephalic  EYES: extraocular movements are intact, pupils are equal and round. Sclera clear.   EARS: auricles are normally formed. The external auditory canals are clear with minimal to no cerumen. Tympanic membranes are intact bilaterally with no signs of infection, effusion, retractions, or perforations.  NOSE: anterior nares are patent. There are no masses or lesions. The septum is non-obstructing.  OC/OP: clear, dentition is in good repair. The tongue and palate are fully mobile and symmetric. The floor of mouth, base of tongue, and tonsils are soft and symmetric. Cobblestoning of the posterior pharyngeal wall.  HP/L (scope): nasopharynx, base of tongue, vallecula, epiglottis, and pyriform sinuses are clear. The bilateral vocal folds are fully mobile. They exhibit John's edema and hyperemia with bilateral murphy's nodules.   NECK: soft and supple. No lymphadenopathy or masses. Airway is midline.  NEURO: CN II-XII are intact bilaterally. alert and oriented x 3. No nystagmus. Gait is normal.  PULM: breathing comfortably on room air, normal chest  Initial Treatment Date: 6/26/2019  [place] [employer] [place] [facility]  Referred by: Self  Primary Care Physician: Cristiane Francois MD  Date informed consent signed:  1/10/2019  Visit Number of Current Episode: 3  Length of Visit: 15 min.    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 The mid and lower back pain is improving after each treatment but she knows she is inconsistent with the treatment plan as outlined and is slowing progress. The mid and lower back pain is more or less constant, moderate, sore, tight, throbbing and achy with a little less frequent pinching nerve pain radiating into the left anterior thigh into the shin and around into the calf.     Objective findings   Moderate palpatory tenderness is noted over the thoracic, lumbar spine and left sacroiliac joint at the PSIS and PIIS levels and more mildly on the right as well. Moderate grade joint restrictions are noted at the following levels: T5-L2, L4-S1 and at the bilateral sacroiliac joints. Muscle hypertonicity is graded at 3 or moderate and is contributing to restricted mobility in the involved areas. These areas include the hamstring, anterior pelvic, gluteal and thoracolumbar paraspinals. Left sharp flank pain noted intermittently.     X-ray lumbar spine 9/10/2018 relevant highlights include: Mild (grade 1) retrolisthesis of L1 on L2, L2 on L3, L3 on L4, and L4 on L5. Mild disc space narrowing noted at L1-L2, L4-5 and L5-S1.    Assessment:  Left lumbar radiculopathy, lumbar facet pain, segmental dysfunction thoracic and sacroiliac joint dysfunction. Patient responding favorably overall but needs to be more consistent with the treatment plan. No recent reports of sharp excruciating left flank pain but again reminded that she should get to an urgent care/urologist as there is a possibility of kidney stone developing.    Complicating Factors/Co-morbidities:   Self  expansion with respiration  HEART: no cyanosis or clubbing    MEDICAL DECISION-MAKING: This patient is a 51yo F with chronic laryngitis and murphy's nodules secondary to a URI with strong cough. Will Rx a medrol dose pack to try to reduce some of the inflammation and will refer to Speech.     manipulation of her lower back since she's been very young and poor core stability.    Working diagnoses:      1. Left lumbar radiculopathy    2. Lumbar facet pain    3. Segmental dysfunction thoracic    4.      SI joint dysfunction both    Treatment today:   All joint restrictions in the thoracic, lumbar and bilateral sacroiliac joints were treated today and the exact levels are listed in the objective section. These areas were adjusted today utilizing a gentle diversified technique to correct aberrant joint function and reduce scar tissue formation. This procedure was done without incident at the site(s) of restriction.    Therapeutic exercises including post isometric relaxation stretching was performed to the following areas: hamstring, gluteal musculature, anterior pelvic musculature as well as the thoracic and lumbar paraspinals to improve range of motion, strength, balance and coordination to the involved sites for 8-15 minutes. Patient tolerated procedure well.    Plan: Patient was treated today and return next week for follow-up treatment.

## 2021-06-17 NOTE — PROGRESS NOTES
"Assessment and Plan:     Obesity  52 y.o. year old female in clinic today to discuss treatment of the following conditions through diet and lifestyle modification and weight loss:  1. Anxiety    2. Vitamin D deficiency    3. Metabolic syndrome    4. Dyslipidemia      The patient's efforts this past month were successful as evidenced by weight stability despite stress and increased cravings.  It appears that phentermine is no longer providing anorexigenic properties for this patient.  Her stress levels and family responsibilities remain high.  -Discussed taper regimen for phentermine.  The medicine was refilled last week.  -Start Wellbutrin/naltrexone combination.  Bupropion prescribed at 100 mg twice daily sustained-release formulation.  Start naltrexone 25 mg daily.  -Return to clinic in 1 month.    Follow up in 1 month.  Continue supplements.    Recommend increasing movement throughout the day--sitting less, moving more.  Will increase activity over time to reach a goal of at least 150 minutes of moderate exercise each week.  Behavior modification:  Cognitive restructuring exercises, journaling stressors, triggers for food cravings.  Dietary Intervention:  Reduced calorie, reduced carbohydrates, whole food diet.  Greater than 50% of this 15 minute visit was spent in counseling regarding patient's obesity, medications, dietary, exercise, and behavior modification recommendations.    Subjective  Patient presents for treatment of chronic, comorbid conditions using intensive therapeutic lifestyle interventions including nutrition, physical activity, and behavior management.    \"Struggling.\"  She feels hungry all the time.  Eating.  She is weighing herself daily.  Taking pills.  \"Not working.\"  She cannot feel full.  She is not eating to the point of being hungry.  Unusual cravings.  Mood: \"christophe burrows.\"  Cravings have been up and down.  Better over the past couple of days.  She is eating three times per day.  Energy " is poor.    Are you experiencing any side effects to the medications:  No  Hunger control:  poor  Exercise was discussed: yes  Taking supplements:  yes  Discussed journaling food:  yes  Patient is pleased with the current results:  yes  The patient is following the nutrition plan:  yes  Barriers to losing weight:  Behavior:  Amnesia Calories:   stress (comfort) and social    Patient Active Problem List   Diagnosis     Obesity     Menopausal hot flushes     Migraine headache     Dysthymia     Former smoker - quit in 2014     Insomnia     RLS (restless legs syndrome)     Anxiety     Hypothyroidism     Metabolic syndrome     Dyslipidemia     Vitamin D deficiency     At high risk for caregiver role strain       Current Outpatient Prescriptions on File Prior to Visit   Medication Sig Dispense Refill     citalopram (CELEXA) 40 MG tablet TAKE 1 TABLET BY MOUTH DAILY.  Please keep this Rx on file for the next RF. 90 tablet 3     ibuprofen (ADVIL,MOTRIN) 600 MG tablet Take 600 mg by mouth every 8 (eight) hours as needed.        levothyroxine (SYNTHROID, LEVOTHROID) 88 MCG tablet TAKE 1 TABLET(88 MCG) BY MOUTH DAILY 90 tablet 1     metFORMIN (GLUCOPHAGE-XR) 500 MG 24 hr tablet TAKE 1 TABLET(500 MG) BY MOUTH DAILY WITH SUPPER 90 tablet 1     phentermine 30 MG capsule TAKE 1 CAPSULE(30 MG) BY MOUTH EVERY MORNING 30 capsule 0     pramipexole (MIRAPEX) 0.75 MG tablet TAKE 1 TABLET BY MOUTH AT BEDTIME 90 tablet 1     PREMARIN 1.25 mg tablet TAKE 1 TABLET BY MOUTH DAILY 90 tablet 0     traZODone (DESYREL) 150 MG tablet Take 0.5-1 tablets ( mg total) by mouth bedtime as needed. 90 tablet 3     triamterene-hydrochlorothiazide (MAXZIDE-25) 37.5-25 mg per tablet Take 1 tablet by mouth daily. 90 tablet 3     [DISCONTINUED] benzonatate (TESSALON) 100 MG capsule Take 1 capsule (100 mg total) by mouth 3 (three) times a day as needed for cough. 60 capsule 0     No current facility-administered medications on file prior to visit.         Objective:  Vitals:    05/04/18 0756   BP: 124/72   Pulse: 68     Initial Weight: 163 lbs  Weight: 147 lb (66.7 kg)  Weight loss from initial: 16  % Weight loss: 9.82 %  Body mass index is 29.94 kg/(m^2).  No LMP recorded. Patient has had a hysterectomy.  General:  Patient is alert, pleasant, no distress.  Patient is obese.    This note has been dictated using voice recognition software. Any grammatical or context distortions are unintentional and inherent to the software

## 2021-06-17 NOTE — PROGRESS NOTES
"Assessment and Plan:     Obesity  52 y.o. year old female in clinic today to discuss treatment of the following conditions through diet and lifestyle modification and weight loss:  1. Vitamin D deficiency    2. Metabolic syndrome    3. Dyslipidemia      The patient's efforts this past month were mixed.   - she has strong social and family barriers to weight loss.  Ending the second job should help.   - continue phentermine, continue metformin.  -  Psychotherapy referral.     - consider starting long taper of phentermine if she continues to stagnate with her weight.    Follow up in 1 month.  Continue supplements.    Recommend increasing movement throughout the day--sitting less, moving more.  Will increase activity over time to reach a goal of at least 150 minutes of moderate exercise each week.  Behavior modification:  Cognitive restructuring exercises, journaling stressors, triggers for food cravings.  Dietary Intervention:  Reduced calorie, reduced carbohydrates, whole food diet.  Greater than 50% of this 25 minute visit was spent in counseling regarding patient's obesity, medications, dietary, exercise, and behavior modification recommendations.      Subjective  Patient presents for treatment of chronic, comorbid conditions using intensive therapeutic lifestyle interventions including nutrition, physical activity, and behavior management.    Struggled with cough a couple of months ago.  \"Not great.\"   Two jobs.   is \"so much work.\"   remains at the [facility] [employer]. She has stopped meal prepping.  She has not had a chance to exercise.  She thinks that she gained a couple of lbs this week.  She is back to old habits on the medication.    She will be quitting her second job.  She is a history of psychotherapy which she found helpful.  This was discontinued because of cost concerns as it was through her employer's wellness program.  She does not have a history diagnosed depression or anxiety.    Are " you experiencing any side effects to the medications:  No  Hunger control:  good  Exercise was discussed: yes  Taking supplements:  yes  Discussed journaling food:  yes, not active.  Patient is pleased with the current results:  no  The patient is following the nutrition plan:  no  Barriers to losing weight:  Behavior:  Amnesia Calories:   boredom, stress (comfort), habit, traditional  and social and Psychology:   Depression, lack of support issues and not important (motivation issues, wrong time)    Patient Active Problem List   Diagnosis     Obesity     Menopausal hot flushes     Migraine headache     Dysthymia     Former smoker - quit in 2014     Insomnia     RLS (restless legs syndrome)     Anxiety     Hypothyroidism     Metabolic syndrome     Dyslipidemia     Vitamin D deficiency     At high risk for caregiver role strain       Current Outpatient Prescriptions on File Prior to Visit   Medication Sig Dispense Refill     benzonatate (TESSALON) 100 MG capsule Take 1 capsule (100 mg total) by mouth 3 (three) times a day as needed for cough. 60 capsule 0     citalopram (CELEXA) 40 MG tablet TAKE 1 TABLET BY MOUTH DAILY.  Please keep this Rx on file for the next RF. 90 tablet 3     ibuprofen (ADVIL,MOTRIN) 600 MG tablet Take 600 mg by mouth every 8 (eight) hours as needed.        levothyroxine (SYNTHROID, LEVOTHROID) 88 MCG tablet Take 1 tablet (88 mcg total) by mouth daily. 45 tablet 1     metFORMIN (GLUCOPHAGE-XR) 500 MG 24 hr tablet TAKE 1 TABLET(500 MG) BY MOUTH DAILY WITH SUPPER 90 tablet 1     phentermine 30 MG capsule TAKE 1 CAPSULE(30 MG) BY MOUTH EVERY MORNING 30 capsule 0     pramipexole (MIRAPEX) 0.75 MG tablet TAKE 1 TABLET BY MOUTH AT BEDTIME 90 tablet 1     PREMARIN 1.25 mg tablet TAKE 1 TABLET BY MOUTH DAILY 90 tablet 0     traZODone (DESYREL) 150 MG tablet Take 0.5-1 tablets ( mg total) by mouth bedtime as needed. 90 tablet 3     triamterene-hydrochlorothiazide (MAXZIDE-25) 37.5-25 mg per tablet  Take 1 tablet by mouth daily. 90 tablet 3     No current facility-administered medications on file prior to visit.        Objective:  Vitals:    03/30/18 1416   BP: 124/64   Pulse: 80     Initial Weight: 163 lbs  Weight: 146 lb (66.2 kg)  Weight loss from initial: 17  % Weight loss: 10.43 %  Body mass index is 29.74 kg/(m^2).  No LMP recorded. Patient has had a hysterectomy.  General:  Patient is alert, pleasant, no distress.  Patient is obese.    This note has been dictated using voice recognition software. Any grammatical or context distortions are unintentional and inherent to the software

## 2021-06-18 NOTE — PROGRESS NOTES
"Assessment and Plan:     Obesity  52 y.o. year old female in clinic today to discuss treatment of the following conditions through diet and lifestyle modification and weight loss:  1. Class 1 obesity with serious comorbidity and body mass index (BMI) of 30.0 to 30.9 in adult, unspecified obesity type    2. Anxiety    3. Metabolic syndrome    4. Dyslipidemia    5. Vitamin D deficiency    6. Hypothyroidism      The patient's efforts this past month were successful as evidenced by weight loss.    - continue phentermine taper.  Every other day ---> 1 day on and 2 days off.   - continue bupropion/naltrexone at current dose.  Consider increasing bupropion.   - start walking at work (tomorrow) with follow-up    - check TSH. BMP.      Follow up in 1 month.  Continue supplements.    Recommend increasing movement throughout the day--sitting less, moving more.  Will increase activity over time to reach a goal of at least 150 minutes of moderate exercise each week.  Behavior modification:  Cognitive restructuring exercises, journaling stressors, triggers for food cravings.  Dietary Intervention:  Reduced calorie, reduced carbohydrates, whole food diet.  Greater than 50% of this 15 minute visit was spent in counseling regarding patient's obesity, medications, dietary, exercise, and behavior modification recommendations.    Subjective  Patient presents for treatment of chronic, comorbid conditions using intensive therapeutic lifestyle interventions including nutrition, physical activity, and behavior management.   - successes: happy that her weight has decreased.  Life stress is \"getting better.\"  See a counselor once per week.  Selling house.  She has been taking phentermine every other day.     - struggles: not much exercise.  Selling home.   - exercise plan: goal to walk at work.  She says that she will start tomorrow.   - tracking/journaling: not tracking.  Planning to start.  She does make mindful food choices.     - " following nutritional plan: yes generally.    - hunger: well controlled.   - medication side effects: none.   - Barriers to losing weight:  Behavior:  inadequate exercise    Patient Active Problem List   Diagnosis     Obesity     Menopausal hot flushes     Migraine headache     Dysthymia     Former smoker - quit in 2014     Insomnia     RLS (restless legs syndrome)     Anxiety     Hypothyroidism     Metabolic syndrome     Dyslipidemia     Vitamin D deficiency     At high risk for caregiver role strain       Current Outpatient Prescriptions on File Prior to Visit   Medication Sig Dispense Refill     buPROPion (WELLBUTRIN SR) 100 MG 12 hr tablet Take 1 tablet (100 mg total) by mouth 2 (two) times a day. 60 tablet 2     citalopram (CELEXA) 40 MG tablet TAKE 1 TABLET BY MOUTH DAILY.  Please keep this Rx on file for the next RF. 90 tablet 3     ibuprofen (ADVIL,MOTRIN) 600 MG tablet Take 600 mg by mouth every 8 (eight) hours as needed.        levothyroxine (SYNTHROID, LEVOTHROID) 88 MCG tablet TAKE 1 TABLET(88 MCG) BY MOUTH DAILY 90 tablet 1     naltrexone (DEPADE) 50 mg tablet Take 0.5 tablets (25 mg total) by mouth daily. 30 tablet 1     pramipexole (MIRAPEX) 0.75 MG tablet TAKE 1 TABLET BY MOUTH AT BEDTIME 90 tablet 1     PREMARIN 1.25 mg tablet TAKE 1 TABLET BY MOUTH DAILY 90 tablet 0     traZODone (DESYREL) 150 MG tablet Take 0.5-1 tablets ( mg total) by mouth bedtime as needed. 90 tablet 3     triamterene-hydrochlorothiazide (MAXZIDE-25) 37.5-25 mg per tablet Take 1 tablet by mouth daily. 90 tablet 3     [DISCONTINUED] metFORMIN (GLUCOPHAGE-XR) 500 MG 24 hr tablet TAKE 1 TABLET(500 MG) BY MOUTH DAILY WITH SUPPER 90 tablet 1     [DISCONTINUED] phentermine 30 MG capsule TAKE 1 CAPSULE(30 MG) BY MOUTH EVERY MORNING 30 capsule 0     No current facility-administered medications on file prior to visit.        Objective:  Vitals:    06/11/18 0803   BP: 124/80   Pulse: 76     Initial Weight: 163 lbs  Weight: 146 lb  (66.2 kg)  Weight loss from initial: 17  % Weight loss: 10.43 %  Body mass index is 29.74 kg/(m^2).  No LMP recorded. Patient has had a hysterectomy.  General:  Patient is alert, pleasant, no distress.  Patient is obese.    This note has been dictated using voice recognition software. Any grammatical or context distortions are unintentional and inherent to the software

## 2021-06-19 NOTE — PROGRESS NOTES
Assessment and Plan:     Obesity  52 y.o. year old female in clinic today to discuss treatment of the following conditions through diet and lifestyle modification and weight loss:  1. Class 1 obesity with serious comorbidity and body mass index (BMI) of 30.0 to 30.9 in adult, unspecified obesity type    2. Vitamin D deficiency    3. Metabolic syndrome    4. Dyslipidemia      The patient's weight loss result since the last visit was successful as evidenced by weight loss.  The patient did not taper phentermine as we had previously discussed.  However, she has been very diligent with planning and prepping her meals.  -Continue bupropion/naltrexone without change.  No side effects identified.  -Phentermine taper: 2 days on.  One day off.  -No symptoms of hypotension.  No down titration of blood pressure medications.  -Consider increasing bupropion at a future visit as we taper phentermine.  -Return to clinic in 1 month.    Follow up in 1 month.  Continue supplements.    Recommend increasing movement throughout the day--sitting less, moving more.  Will increase activity over time to reach a goal of at least 150 minutes of moderate exercise each week.  Behavior modification:  Cognitive restructuring exercises, journaling stressors, triggers for food cravings.  Dietary Intervention:  Reduced calorie, reduced carbohydrates, whole food diet.  Greater than 50% of this 15 minute visit was spent in counseling regarding patient's obesity, medications, dietary, exercise, and behavior modification recommendations.    Subjective  Patient presents for treatment of chronic, comorbid conditions using intensive therapeutic lifestyle interventions including nutrition, physical activity, and behavior management.   - successes: she is happy that she continues to lose weight. No cravings.  Forgot about phentermine taper.  She has been off phentermine for a few days.   - struggles: not exercising   - tracking/journaling: yes   - medication  side effects: none   - Barriers to losing weight:  Behavior:  inadequate exercise    Patient Active Problem List   Diagnosis     Obesity     Menopausal hot flushes     Migraine headache     Dysthymia     Former smoker - quit in 2014     Insomnia     RLS (restless legs syndrome)     Anxiety     Hypothyroidism     Metabolic syndrome     Dyslipidemia     Vitamin D deficiency     At high risk for caregiver role strain       Current Outpatient Prescriptions on File Prior to Visit   Medication Sig Dispense Refill     buPROPion (WELLBUTRIN SR) 100 MG 12 hr tablet Take 1 tablet (100 mg total) by mouth 2 (two) times a day. 60 tablet 2     citalopram (CELEXA) 40 MG tablet TAKE 1 TABLET BY MOUTH DAILY.  Please keep this Rx on file for the next RF. 90 tablet 3     estrogens, conjugated, (PREMARIN) 1.25 MG tablet Take 1 tablet (1.25 mg total) by mouth daily. 90 tablet 1     ibuprofen (ADVIL,MOTRIN) 600 MG tablet Take 600 mg by mouth every 8 (eight) hours as needed.        levothyroxine (SYNTHROID, LEVOTHROID) 88 MCG tablet TAKE 1 TABLET(88 MCG) BY MOUTH DAILY 90 tablet 1     metFORMIN (GLUCOPHAGE-XR) 500 MG 24 hr tablet TAKE 1 TABLET(500 MG) BY MOUTH DAILY WITH SUPPER 90 tablet 1     naltrexone (DEPADE) 50 mg tablet Take 0.5 tablets (25 mg total) by mouth daily. 30 tablet 1     pramipexole (MIRAPEX) 0.75 MG tablet Take 1 tablet (0.75 mg total) by mouth at bedtime. 90 tablet 1     traZODone (DESYREL) 150 MG tablet Take 0.5-1 tablets ( mg total) by mouth bedtime as needed. 90 tablet 3     triamterene-hydrochlorothiazide (MAXZIDE-25) 37.5-25 mg per tablet Take 1 tablet by mouth daily. 90 tablet 3     [DISCONTINUED] phentermine 30 MG capsule TAKE 1 CAPSULE(30 MG) BY MOUTH EVERY OTHER DAY 30 capsule 0     No current facility-administered medications on file prior to visit.        Objective:  Vitals:    07/27/18 0700   BP: 122/68   Pulse: 70     Initial Weight: 163 lbs  Weight: 141 lb (64 kg)  Weight loss from initial: 22  %  Weight loss: 13.5 %  Body mass index is 28.72 kg/(m^2).  No LMP recorded. Patient has had a hysterectomy.  General:  Patient is alert, pleasant, no distress.  Patient is obese.    This note has been dictated using voice recognition software. Any grammatical or context distortions are unintentional and inherent to the software

## 2021-06-19 NOTE — LETTER
Letter by Misael Romero MD at      Author: Misael Romero MD Service: -- Author Type: --    Filed:  Encounter Date: 5/15/2019 Status: (Other)               Harriett Romero  [facility] [place] [address]        May 15, 2019      Dear Harriett,    As a valued [facility] patient, your healthcare needs are our priority.  Your health care team has determined that you are due for an appointment regarding your mammogram.    To help prevent delays in your care, please call to schedule your mammogram at [phone].    MAMMOGRAPHY LOCATIONS    [facility]  [facility]  [address], [address]  3D Tomosynthesis available    [facility]  [address] (first floor[place] [address]    [facility]  [address] (lower [place] [address]    [facility] [place]  [address] [place] [id number]    [facility] [address] [address]    [facility]- [address]  [place] [address]    We look forward to partnering with you to achieve optimal health and wellbeing.    Sincerely,  Your care team at [facility]    [address] [address]

## 2021-06-19 NOTE — LETTER
Letter by Misael Romero MD at      Author: Misael Romero MD Service: -- Author Type: --    Filed:  Encounter Date: 7/3/2019 Status: (Other)         Harriett RONI Nick   [address] [facility] [address]         Dear Harriett,    I am sending you this letter to remind you that you are due for a follow up visit in August or September.    Please call to schedule this visit as soon as possible as our schedule fills up quickly.    If you already have an appointment scheduled with me for around this time, please ignore this notification.    I look forward to seeing you soon.      If you have any questions regarding the above, please feel free to contact me at [phone].        Sincerely,    Misael Romero MD  General Internal Medicine  [facility]

## 2021-06-19 NOTE — LETTER
Letter by Misael Romero MD at      Author: Misael Romero MD Service: -- Author Type: --    Filed:  Encounter Date: 4/17/2019 Status: (Other)               Harriett Romero  [address] [place] [address]      April 17, 2019      Dear Harriett,    As a valued [facility] patient, your healthcare needs are our priority.  Your health care team has determined that you are due for an appointment regarding your colonoscopy.    To help prevent delays in your care, please call the [facility] at [phone].    We look forward to partnering with you to achieve optimal health and well-being.    Sincerely,  Your care team at [facility]   [address] [place] [id number]

## 2021-06-20 NOTE — LETTER
Letter by Elif Urena RN at      Author: Elif Urena RN Service: -- Author Type: --    Filed:  Encounter Date: 2/26/2020 Status: (Other)       2/26/2020    Harriett RONI Romero  [address] [facility] [address]    Re: Recent Colonoscopy    Dear Harriett Romero.    We are writing with results from your recent colonoscopy which showed:      The colonoscopy was normal I did not find any abnormal lesions.  In a patient with no family history I would ordinarily recommend a follow-up in 10 years.     If any new concerns arise in the meantime, please contact your primary care provider for evaluation so your provider can help you decide if you need a colonscopy sooner. Some things to watch for include blood in your stool, stomach pain or cramping that does not resolve, or unexplained weight loss.    We sincerely appreciate the opportunity to provide your care. If you have any questions please feel free to contact us at [phone].    Sincerely,     Edward Patel MD

## 2021-06-20 NOTE — PROGRESS NOTES
Wt Readings from Last 20 Encounters:   08/24/18 141 lb 4.8 oz (64.1 kg)   07/27/18 141 lb (64 kg)   06/11/18 146 lb (66.2 kg)   05/04/18 147 lb (66.7 kg)   03/30/18 146 lb (66.2 kg)   02/23/18 144 lb (65.3 kg)   02/08/18 147 lb (66.7 kg)   01/15/18 147 lb (66.7 kg)   12/15/17 147 lb (66.7 kg)   11/10/17 152 lb (68.9 kg)   10/06/17 163 lb (73.9 kg)   09/01/17 159 lb 9.6 oz (72.4 kg)   08/22/17 162 lb (73.5 kg)   08/16/17 162 lb 12.8 oz (73.8 kg)   01/24/17 163 lb (73.9 kg)   09/19/16 157 lb (71.2 kg)   07/28/16 158 lb 6.4 oz (71.8 kg)   11/05/15 160 lb (72.6 kg)   05/29/15 160 lb 6.4 oz (72.8 kg)   01/30/15 153 lb 8 oz (69.6 kg)

## 2021-06-20 NOTE — PROGRESS NOTES
"HPI: This patient is a 51 yo F previously seen by me in 3/2018 for laryngitis who presents for evaluation of her tongue at the request of Dr. Romero.  Patient notes for the past 3-4 weeks she has had metallic taste in her mouth. She feels her tongue is \"too big\" for her mouth and \"gets in the way\" when talking.  She does have chronic dry mouth due to medication side effect and some of her antidepressant medications were increased recently. Denies fevers, otalgia, weight loss, odynophagia, dysphagia, hemoptysis, and shortness of breath. Does not complain of sour taste, heartburn, or burping, but does complain pain on the left side of her throat. Is not taking reflux medication. Quit smoking in 2014. Of note, patient was recommended to see SLP for her laryngitis and murphy's nodules at last visit, but due to scheduling difficulty she never went.     Past medical history, surgical history, social history, family history, medications, and allergies have been reviewed with the patient and are documented above.     Review of Systems: a 10-system review was performed. Pertinent positives are noted in the HPI and on a separate scanned document in the chart.     PHYSICAL EXAMINATION:  GEN: no acute distress, normocephalic  EYES: extraocular movements are intact, pupils are equal and round. Sclera clear.   EARS: auricles are normally formed. The external auditory canals are clear with minimal to no cerumen. Tympanic membranes are intact bilaterally with no signs of infection, effusion, retractions, or perforations.  NOSE: anterior nares are patent. There are no masses or lesions. The septum is non-obstructing.  OC/OP: clear, dentition is in good repair. Dry mucosa. The tongue and palate are fully mobile and symmetric.  Cobblestoning of the posterior pharyngeal wall.  HP/L (scope): nasopharynx, base of tongue, vallecula, epiglottis, and pyriform sinuses are clear. The bilateral vocal folds are fully mobile.  NECK: soft and " supple. No lymphadenopathy or masses. Airway is midline.  NEURO: CN VII and XII symmetric and strong. No nystagmus. Gait is normal.  PULM: breathing comfortably on room air, normal chest expansion with respiration     MEDICAL DECISION-MAKING: This patient is a 53 yo F with tongue discomfort related to LPR and medication side effects. Reviewed diet and lifestyle management of reflux including risk/benefits of PPI vs H2 blockers.  Patient with good understanding.  Reassured patient there was nothing worrisome noted on exam today. Patient evaluated and examined with Meryl Venegas PA-C.

## 2021-06-20 NOTE — PROGRESS NOTES
Assessment and Plan:     Obesity  52 y.o. year old female in clinic today to discuss treatment of the following conditions through diet and lifestyle modification and weight loss:  1. Obesity    2. Metabolic syndrome    3. Vitamin D deficiency    4. Dyslipidemia      The patient's weight loss result since the last visit was successful as evidenced by weight stability.  She has successfully started her taper of phentermine.  Bupropion/naltrexone is going well.   - increase bupropion/naltrexone.  She is approaching Contrave doses.   - continue to meal plan/prep.   - add exercise as able   - decrease phentermine.  Take medication every other day.   - continue taking metformin.    Continue supplements.    Recommend increasing movement throughout the day--sitting less, moving more.  Will increase activity over time to reach a goal of at least 150 minutes of moderate exercise each week.  Behavior modification:  Cognitive restructuring exercises, journaling stressors, triggers for food cravings.  Dietary Intervention:  Reduced calorie, reduced carbohydrates, whole food diet.  Greater than 50% of this 15 minute visit was spent in counseling regarding patient's obesity, medications, dietary, exercise, and behavior modification recommendations.    Subjective  Patient presents for treatment of chronic, comorbid conditions using intensive therapeutic lifestyle interventions including nutrition, physical activity, and behavior management.   - successes: she feels good about her meal plans.  She has not been on phentermine over the past couple of days.  Doing well over the past couple of days.  More cravings over the past couple of days.  Working more in a larger facility.  More walking than previously.  More energy which she attributes to bupropion.  She is interested increasing her dose.    - she plans meals the night before.     - Barriers to losing weight:  Behavior:  inadequate exercise    Patient Active Problem List    Diagnosis     Obesity     Menopausal hot flushes     Migraine headache     Former smoker - quit in 2014     Insomnia     RLS (restless legs syndrome)     Anxiety     Hypothyroidism     Metabolic syndrome     Dyslipidemia     Vitamin D deficiency     At high risk for caregiver role strain     Major depression in complete remission (H)       Current Outpatient Prescriptions on File Prior to Visit   Medication Sig Dispense Refill     citalopram (CELEXA) 40 MG tablet TAKE 1 TABLET BY MOUTH DAILY.  Please keep this Rx on file for the next RF. 90 tablet 3     estrogens, conjugated, (PREMARIN) 1.25 MG tablet Take 1 tablet (1.25 mg total) by mouth daily. 90 tablet 1     ibuprofen (ADVIL,MOTRIN) 600 MG tablet Take 600 mg by mouth every 8 (eight) hours as needed.        levothyroxine (SYNTHROID, LEVOTHROID) 88 MCG tablet TAKE 1 TABLET(88 MCG) BY MOUTH DAILY 90 tablet 1     metFORMIN (GLUCOPHAGE-XR) 500 MG 24 hr tablet TAKE 1 TABLET(500 MG) BY MOUTH DAILY WITH SUPPER 90 tablet 1     pramipexole (MIRAPEX) 0.75 MG tablet Take 1 tablet (0.75 mg total) by mouth at bedtime. 90 tablet 1     traZODone (DESYREL) 150 MG tablet Take 0.5-1 tablets ( mg total) by mouth bedtime as needed. 90 tablet 3     triamterene-hydrochlorothiazide (MAXZIDE-25) 37.5-25 mg per tablet Take 1 tablet by mouth daily. 90 tablet 3     [DISCONTINUED] buPROPion (WELLBUTRIN SR) 100 MG 12 hr tablet TAKE 1 TABLET(100 MG) BY MOUTH TWICE DAILY 180 tablet 1     [DISCONTINUED] naltrexone (DEPADE) 50 mg tablet Take 0.5 tablets (25 mg total) by mouth daily. 30 tablet 1     [DISCONTINUED] phentermine 30 MG capsule Take two out of three days (2 days on and one day off). 30 capsule 0     No current facility-administered medications on file prior to visit.        Objective:  Vitals:    08/29/18 0815   BP: 120/70   Pulse: 68     Initial Weight: 163 lbs  Weight: 141 lb 4.8 oz (64.1 kg)  Weight loss from initial: 21.7  % Weight loss: 13.31 %  Body mass index is 29.28  kg/(m^2).  No LMP recorded. Patient has had a hysterectomy.  General:  Patient is alert, pleasant, no distress.  Patient is obese.    This note has been dictated using voice recognition software. Any grammatical or context distortions are unintentional and inherent to the software

## 2021-06-23 NOTE — PROGRESS NOTES
"Assessment and Plan:     BMI 28.0-28.9,adult  53 y.o. year old female in clinic today to discuss treatment of the following conditions through diet and lifestyle modification and weight loss:  1. BMI 28.0-28.9,adult    2. Vitamin D deficiency    3. Metabolic syndrome    4. Dyslipidemia    5. Visit for screening mammogram    6. Hypothyroidism, unspecified type      From a purely weight loss perspective, this patient continues to do well.  At this time, she has lost 16% from where she started.  Her energy remains high and she has increased her physical activity over the past couple of months since her last visit.  I am somewhat concerned that her cravings continue to be \"overwhelming\" at times, in particular while she is on phentermine, naltrexone, bupropion.  She is been intolerant to metformin and this was discontinued.  For now, we will continue current eating strategy.  At a future visit I am going to suggest a deliberate increase in healthy fat consumption, specifically omega-3-containing foods.  Refill phentermine sent to pharmacy.    Follow-up: 1 months      Continue supplements.    Recommend increasing movement throughout the day--sitting less, moving more.  Will increase activity over time to reach a goal of at least 150 minutes of moderate exercise each week.  Behavior modification:  Cognitive restructuring exercises, journaling stressors, triggers for food cravings.  Dietary Intervention:  Reduced calorie, reduced carbohydrates, whole food diet.  Greater than 50% of this 15 minute visit was spent in counseling regarding patient's obesity, medications, dietary, exercise, and behavior modification recommendations.    Subjective  Patient presents for treatment of chronic, comorbid conditions using intensive therapeutic lifestyle interventions including nutrition, physical activity, and behavior management.   - successes: part time job in addition to day job.  She likes it.  She believes that she is doing well. " " New job requires that she walk constantly.  Her record # of steps was 47,000.  - cravings continue.  \"I still don't have control.\"  She feels like she has the resources to confront cravings.    - she has been taking phentermine 2 days on and 1 day off.  For the past week she has been taking the medication every other day.  Bupropion and naltrexone are also helpful.  She stopped metformin because she struggled with the pill.      Patient Active Problem List   Diagnosis     BMI 28.0-28.9,adult     Menopausal hot flushes     Migraine headache     Former smoker - quit in 2014     Insomnia     RLS (restless legs syndrome)     Anxiety     Hypothyroidism     Metabolic syndrome     Dyslipidemia     Vitamin D deficiency     At high risk for caregiver role strain     Major depression in complete remission (H)       Current Outpatient Medications on File Prior to Visit   Medication Sig Dispense Refill     buPROPion (WELLBUTRIN SR) 150 MG 12 hr tablet Take 1 tablet (150 mg total) by mouth 2 (two) times a day. 180 tablet 1     citalopram (CELEXA) 40 MG tablet TAKE 1 TABLET BY MOUTH DAILY.  Please keep this Rx on file for the next RF. 90 tablet 3     ibuprofen (ADVIL,MOTRIN) 600 MG tablet Take 600 mg by mouth every 8 (eight) hours as needed.        levothyroxine (SYNTHROID, LEVOTHROID) 88 MCG tablet TAKE 1 TABLET(88 MCG) BY MOUTH DAILY 90 tablet 1     naltrexone (DEPADE) 50 mg tablet Take 0.5 tablets (25 mg total) by mouth 2 (two) times a day. 90 tablet 1     pramipexole (MIRAPEX) 0.75 MG tablet Take 1 tablet (0.75 mg total) by mouth at bedtime. 90 tablet 1     PREMARIN 1.25 mg tablet TAKE 1 TABLET BY MOUTH DAILY 90 tablet 1     triamterene-hydrochlorothiazide (MAXZIDE-25) 37.5-25 mg per tablet Take 1 tablet by mouth daily. 90 tablet 3     [DISCONTINUED] phentermine 30 MG capsule TAKE 1 CAPSULE BY MOUTH FOR 2 OUT OF 3 DAYS(2 DAYS ON THEN 1 DAY OFF) AS DIRECTED 30 capsule 0     traZODone (DESYREL) 150 MG tablet Take 0.5-1 tablets " ( mg total) by mouth bedtime as needed. 90 tablet 3     [DISCONTINUED] metFORMIN (GLUCOPHAGE-XR) 500 MG 24 hr tablet TAKE 1 TABLET(500 MG) BY MOUTH DAILY WITH SUPPER 90 tablet 1     No current facility-administered medications on file prior to visit.        Objective:  Vitals:    02/08/19 0852   BP: 128/72   Pulse: 76     Initial Weight: 163 lbs  Weight: 136 lb 8 oz (61.9 kg)  Weight loss from initial: 26.5  % Weight loss: 16.26 %    Body mass index is 28.28 kg/m .  No LMP recorded. Patient has had a hysterectomy.  General:  Patient is alert, pleasant, no distress.  Patient is obese.    This note has been dictated using voice recognition software. Any grammatical or context distortions are unintentional and inherent to the software

## 2021-06-25 NOTE — PROGRESS NOTES
Progress Notes by Misael Romero MD at 9/1/2017 10:30 AM     Author: Misael Romero MD Service: -- Author Type: Physician    Filed: 9/4/2017 10:23 PM Encounter Date: 9/1/2017 Status: Signed    : Misael Romero MD (Physician)              [facility]    Comprehensive and complex medical care - Chronic disease management - Shared decision making - Care coordination - Compassionate care    Patient advocacy - Rational deprescribing - Minimally disruptive medicine - Ethical focus - Customized care    Date of Service: 9/1/2017  Primary Provider: Misael Romero MD    Patient Care Team:  Misael Romero MD as PCP - General (Internal Medicine)     ______________________________________________________________________     Patient's Pharmacy:    [facility] [address] [place] [address] [address] AT [facility] [address]  [address]  [facility] [address]  Phone: [phone] Fax: [phone]     Patient's Insurance:    Payor: [employer] / Plan: [employer] [facility] EMPLOYEE / Product Type: HMO /     ______________________________________________________________________     Harriett Romero is 51 y.o. female who comes in today for:    Chief Complaint   Patient presents with   ? Follow-up     swelling not doing any better, water pill not helping       Patient Active Problem List   Diagnosis   ? Menopausal hot flushes   ? Migraine headache   ? Dysthymia   ? Former smoker - quit in 2014   ? Insomnia   ? RLS (restless legs syndrome)   ? Anxiety   ? Hypothyroidism     Current Outpatient Prescriptions   Medication Sig   ? citalopram (CELEXA) 40 MG tablet TAKE 1 TABLET BY MOUTH DAILY.  Please keep this Rx on file for the next RF.   ? ibuprofen (ADVIL,MOTRIN) 600 MG tablet    ? pramipexole (MIRAPEX) 0.75 MG tablet TAKE 1 TABLET BY MOUTH AT BEDTIME.  Please keep this Rx on file for the next RF.   ? SYNTHROID 112 mcg tablet TAKE 1  TABLET BY MOUTH EVERY DAY AS DIRECTED   ? traZODone (DESYREL) 150 MG tablet Take 0.5-1 tablets ( mg total) by mouth bedtime as needed.   ? estrogens, conjugated, (PREMARIN) 1.25 MG tablet TAKE ONE TABLET BY MOUTH DAILY.  Please keep this Rx on file for the next RF.   ? triamterene-hydrochlorothiazide (MAXZIDE-25) 37.5-25 mg per tablet Take 1 tablet by mouth daily.     Social History     Social History Narrative    .   has severe COPD in 2017 and is in his early 50's and may need lung transplant.        Quit smoking in 2014.        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     ______________________________________________________________________     History of present illness:    Patient comes in today for a number of issues.    We first reviewed her lower extremity.  This is a problem she has had more in the last 6 months.  It is mainly in her ankles and into her feet.  They can be a little sore when they are tight.  She also feels like she gets some swelling in her hands towards the end of the day.  She denies any shortness of breath.  She denies any chest pain or chest pressure.  She has not done anything new as far as eating more salt or otherwise.    We next reviewed her obesity.  She wants to continue to work on this.  She wants to go to the [facility] weight loss clinic in the did provide a referral for this today.  She denies any major issues with this at this time.  She just wants to work on this for the swelling as well.    We reviewed her dysthymia.  Her PHQ 9 score is reviewed today.  She is on Celexa at this time.  She does not initially want to do anything more with this at this point.    We reviewed her thyroid and things are going okay with this.    On review of systems, the patient denies any chest pain or shortness of breath.    ______________________________________________________________________    Exam:    Wt Readings from Last 3 Encounters:   09/01/17 159  lb 9.6 oz (72.4 kg)   08/22/17 162 lb (73.5 kg)   08/16/17 162 lb 12.8 oz (73.8 kg)     BP Readings from Last 3 Encounters:   09/01/17 122/76   08/22/17 115/68   08/16/17 116/64     /76  Pulse 91  Temp 97.5  F (36.4  C) (Oral)   Ht 5' (1.524 m)  Wt 159 lb 9.6 oz (72.4 kg)  SpO2 97%  BMI 31.17 kg/m2   The patient is comfortable, no acute distress.  Mood good.  Insight good.  Eyes are nonicteric.  Neck is supple without mass.  No cervical adenopathy.  No thyromegaly. Heart regular rate and rhythm.  Lungs clear to auscultation bilaterally.  Respiratory effort is good.  Abdomen soft and nontender.  No hepatosplenomegaly.  Extremities trace edema.      ______________________________________________________________________    Diagnostics:    Results for orders placed or performed during the hospital encounter of 08/22/17   Echo Complete   Result Value Ref Range    LV volume diastolic 33.1 46 - 106 cm3    LV volume systolic 11.6 14 - 42 cm3    HR 72 bpm    IVSd 0.722 0.6 - 0.9 cm    LVIDd 3.93 3.8 - 5.2 cm    LVIDs 2.66 2.2 - 3.5 cm    LVOT diam 1.8 cm    LVOT mean gradient 1 mmHg    LVOT peak VTI 16.7 cm    LVOT mean krysta 51.3 cm/s    LVOT peak krysta 85 cm/s    LVOT peak gradient 3 mmHg    LV PWd 1.07 0.6 - 0.9 cm    MV E' lat krysta 9.86 cm/s    MV E' med krysta 5.51 cm/s    AV mean krysta 75 cm/s    AV mean gradient 3 mmHg    AV VTI 23.5 cm    AV peak krysta 113 cm/s    AO root 2.6 cm    LA size 3.1 cm    LA length 4.3 cm    LA/AO root ratio 1.19 no units    MV decel slope 3230 mm/s2    MV decel slope 3230 mm/s2    MV decel time 194 ms    MV P 1/2 time 60 ms    MV peak A krysta 62.6 cm/s    MV peak E krysta 66.5 cm/s    RVIDd 2.71 cm    LA area 2 15.3 cm2    LA area 1 15.3 cm2    BSA 1.76 m2    Hieght 60 in    Weight 2592 lbs    /68 mmHg    IVS/PW ratio 0.7     LV FS 32.3 28 - 44 %    Echo LVEF calculated 65 55 - 75 %    LA volume 46.3 cm3    LV mass 106.0 g    AV area 1.8 cm2    AV DIM IND krysta 0.8     MV area p 1/2 time  3.7 cm2    MV E/A Ratio 1.1     LVOT area 2.54 cm2    LVOT SV 42.5 cm3    AV peak gradient 5.1 mmHg    LV systolic volume index 6.6 11 - 31 cm3/m2    LV diastolic volume index 18.8 34 - 74 cm3/m2    LA volume index 26.3 mL/m2    LV mass index 60.2 g/m2    LV SVi 24.1 ml/m2    TAPSE 2.4 cm    MV med E/e' ratio 12.1     MV lat E/e' ratio 6.7     LV CO 3.1 l/min    LV Ci 1.7 l/min/m2    Height 60.0 in    Weight 162 lbs    MV Avg E/e' Ratio 8.7 cm/s    AV DIM IND VTI 0.7       ______________________________________________________________________    Assessment:    1. Bilateral lower extremity edema    2. Obesity    3. Dysthymia    4. Hypothyroidism       ______________________________________________________________________      PHQ-2 Total Score: 4 (9/4/2017 10:00 PM)  Depression Follow-up Plan: under care of mental health team (9/4/2017 10:00 PM)  PHQ-9 Total Score: 14 (9/4/2017 10:00 PM)    Plan:    1. We reviewed her records per her visit with Dr. Khan.  2. We did add in triamterene hydrochlorothiazide for swelling control.  3. The edema may be due to the pramipexole.  4. Weight loss will be pursued through the [facility].  5. Follow-up sooner if issues.    Patient's  recently received a lung transplant at the [facility] and is doing well.  This has helped with her mood.    Misael Romero MD  General Internal Medicine  [facility]     Return if symptoms worsen or fail to improve.

## 2021-06-25 NOTE — PROGRESS NOTES
Progress Notes by Misael Romero MD at 1/24/2017  8:25 AM     Author: Misael Romero MD Service: -- Author Type: Physician    Filed: 1/26/2017  8:43 AM Encounter Date: 1/24/2017 Status: Signed    : Misael Romero MD (Physician)              [facility]    Comprehensive and complex medical care - Chronic disease management - Shared decision making - Care coordination - Compassionate care    Patient advocacy - Rational deprescribing - Minimally disruptive medicine - Ethical focus    Date of Service: 1/24/2017  Primary Provider: Misael Romero MD    Patient Care Team:  Misael Romero MD as PCP - General (Internal Medicine)     ______________________________________________________________________     Patient's Pharmacy:    [facility] [id number] [place] [address] - [address] AT [address]  [address]  [facility] [address]  Phone: [phone] Fax: [phone]     Patient's Insurance:    Payor: [employer] / Plan: [employer] [facility] EMPLOYEE / Product Type: HMO /     ______________________________________________________________________     Harriett Romero is 51 y.o. female who comes in today for:    Chief Complaint   Patient presents with   ? Follow-up     Wants to discuss gastric bypass and sleep study,  blood work to check TSH       Patient Active Problem List   Diagnosis   ? Menopausal hot flushes   ? Migraine headache   ? Dysthymia   ? Former smoker - quit in 2014   ? Insomnia   ? RLS (restless legs syndrome)   ? Anxiety   ? Hypothyroidism     Current Outpatient Prescriptions   Medication Sig   ? citalopram (CELEXA) 40 MG tablet TAKE 1 TABLET BY MOUTH DAILY.  Please keep this Rx on file for the next RF.   ? estrogens, conjugated, (PREMARIN) 1.25 MG tablet TAKE ONE TABLET BY MOUTH DAILY.  Please keep this Rx on file for the next RF.   ? ibuprofen (ADVIL,MOTRIN) 600 MG tablet    ? pramipexole (MIRAPEX)  0.75 MG tablet TAKE 1 TABLET BY MOUTH AT BEDTIME.  Please keep this Rx on file for the next RF.   ? SYNTHROID 112 mcg tablet TAKE 1 TABLET BY MOUTH EVERY DAY AS DIRECTED   ? traZODone (DESYREL) 150 MG tablet Take 0.5-1 tablets ( mg total) by mouth bedtime as needed.   ? VITAMIN D2 50,000 unit capsule TAKE 1 CAPSULE BY MOUTH ONCE WEEKLY     Social History     Social History Narrative    .   has severe COPD in 2017 and is in his early 50's and may need lung transplant.        Quit smoking in 2014.        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     ______________________________________________________________________     History of present illness:    The patient comes in today for a number of issues.    First has been having increased problems with insomnia.  She has troubles getting the sleep and also staying asleep.  Some of this has to do with the fact that her  has severe chronic obstructive pulmonary disease (COPD) at age 55.  He is unable to work in their [employer] that they own due to the fumes.  She is waking up at night because she is concerned about his health and how he is breathing.  She recently restarted some trazodone she had from Dr. Vigil in the past.  She usually takes a half pill of 150 and this seems to be helping over the last few nights.  She does snore, and may wake herself up from snoring.  However, she doesn't have a lot of other symptoms at this time and doesn't know so I want to go through sleep testing at at this point.    Reviewed her obesity.  She has been on phentermine through us and we've done 6 months of this medication.  We talked about her eating when she is off the medication which gets to be a problem again.  I am going to refer her onto the weight loss clinic in relationship to this.    We reviewed her hot flushes and the Premarin still works well for this.  She has no side effects.    We reviewed her hypothyroidism and we're  checking blood work in relationship to this today.    Her restless legs are doing okay on the Mirapex.    Her anxiety is stable at this time on the citalopram and no changes need to be made.    On review of systems, the patient denies any chest pain or shortness of breath.    ______________________________________________________________________    Exam:    Wt Readings from Last 3 Encounters:   01/24/17 163 lb (73.9 kg)   09/19/16 157 lb (71.2 kg)   07/28/16 158 lb 6.4 oz (71.8 kg)     BP Readings from Last 3 Encounters:   01/24/17 116/70   09/19/16 124/80   07/28/16 124/80     Visit Vitals   ? /70 (Patient Site: Right Arm, Patient Position: Sitting, Cuff Size: Adult Large)   ? Pulse 79   ? Wt 163 lb (73.9 kg)   ? SpO2 98%   ? BMI 32.37 kg/m2      The patient is comfortable, no acute distress.  Mood good.  Insight good.  Eyes are nonicteric.  Neck is supple without mass.  No cervical adenopathy.  No thyromegaly. Heart regular rate and rhythm.  Lungs clear to auscultation bilaterally.  Respiratory effort is good.  Abdomen soft and nontender.  No hepatosplenomegaly.  Extremities no edema.    ______________________________________________________________________    Assessment:    1. Former smoker    2. RLS (restless legs syndrome)    3. Menopausal hot flushes    4. Hypothyroidism    5. Dysthymia    6. Anxiety    7. Migraine headache    8. Insomnia    9. Vitamin D deficiency    10. Obesity (BMI 30.0-34.9)       ______________________________________________________________________      PHQ-2 Total Score: 4 (1/24/2017  8:00 AM)  Depression Follow-up Plan: under care of community psychiatric nurse (1/24/2017  8:00 AM)  PHQ-9 Total Score: 16 (1/24/2017  8:00 AM)    Plan:    1. Referred to the weight loss clinic in [place].  2. I don't believe that she would qualify for bariatric surgery.  She has no significant medical complications.  3. She is at low risk for moderate to severe sleep apnea.  She would like to  work on weight loss first for this.  4. Refilled all medications and did blood work today in relationship to her chronic health issues.  5. She is to follow-up sooner if issues.    Misael Romero MD  General Internal Medicine  [facility]     Return in about 1 year (around 1/24/2018), or if symptoms worsen or fail to improve.    ______________________________________________________________________    1.  Do you snore loudly (louder than talking or loud enough to be heard through closed doors): Yes    2.  Do you often feel tired, fatigued, or sleepy during the day:  Yes    3.  Has anyone observed you stop breathing during sleep:  No    4.  Do you have or are you being treated for high blood pressure:   No    5.  Body mass index > 35:  Body mass index is 32.37 kg/(m^2).    6.  Age > 50:  51 y.o.     7.  Neck circumference greater than 40 cm:  35 cm    8.  Gender male:  female     Total score:  3    A score of 3 or greater indicates a risk for sleep apnea (84% sensitivity).  A score of 5 or greater is more predictive of clinically relevant moderate to severe obstructive sleep apnea.    ______________________________________________________________________

## 2021-06-26 NOTE — PROGRESS NOTES
Progress Notes by Misael Romero MD at 8/24/2018  3:55 PM     Author: Misael Romero MD Service: -- Author Type: Physician    Filed: 8/26/2018 10:01 PM Encounter Date: 8/24/2018 Status: Signed    : Misael Romero MD (Physician)              [facility]    Comprehensive and complex medical care - Chronic disease management - Shared decision making - Care coordination - Compassionate care    Patient advocacy - Rational deprescribing - Minimally disruptive medicine - Ethical focus - Customized care    ______________________________________________________________________     Date of Service: 8/24/2018  Primary Provider: Misael Romero MD    Patient Care Team:  Misael Romero MD as PCP - General (Internal Medicine)     ______________________________________________________________________     Patient[facility]acy:    [facility] [address] [facility] [address] AT [facility] [address]  [address]  [facility] [address]  Phone: [phone] Fax: [phone]     Patient's Contacts:  Name Home Phone Work Phone Mobile Phone Relationship Lgl VINCE Barbosa [phone] Spouse      Patient's Insurance:    Payor: [employer] / Plan: [employer] [facility] EMPLOYEE / Product Type: HMO /     ______________________________________________________________________     Harriett A Romero is 52 y.o. female who comes in today for:  Chief Complaint   Patient presents with   ? Follow-up     SORE TONGUE, THROAT PAIN IS GETTING WORSE, LUMP UNDER JAW IS GETTING SMALLER, GETTING HARDER TO TALK      ______________________________________________________________________     Patient Active Problem List   Diagnosis   ? Obesity   ? Menopausal hot flushes   ? Migraine headache   ? Former smoker - quit in 2014   ? Insomnia   ? RLS (restless legs syndrome)   ? Anxiety   ? Hypothyroidism   ? Metabolic syndrome   ?  Dyslipidemia   ? Vitamin D deficiency   ? At high risk for caregiver role strain   ? Major depression in complete remission (H)      Current Outpatient Prescriptions   Medication Sig   ? buPROPion (WELLBUTRIN SR) 100 MG 12 hr tablet TAKE 1 TABLET(100 MG) BY MOUTH TWICE DAILY   ? citalopram (CELEXA) 40 MG tablet TAKE 1 TABLET BY MOUTH DAILY.  Please keep this Rx on file for the next RF.   ? estrogens, conjugated, (PREMARIN) 1.25 MG tablet Take 1 tablet (1.25 mg total) by mouth daily.   ? ibuprofen (ADVIL,MOTRIN) 600 MG tablet Take 600 mg by mouth every 8 (eight) hours as needed.    ? levothyroxine (SYNTHROID, LEVOTHROID) 88 MCG tablet TAKE 1 TABLET(88 MCG) BY MOUTH DAILY   ? metFORMIN (GLUCOPHAGE-XR) 500 MG 24 hr tablet TAKE 1 TABLET(500 MG) BY MOUTH DAILY WITH SUPPER   ? naltrexone (DEPADE) 50 mg tablet Take 0.5 tablets (25 mg total) by mouth daily.   ? phentermine 30 MG capsule Take two out of three days (2 days on and one day off).   ? pramipexole (MIRAPEX) 0.75 MG tablet Take 1 tablet (0.75 mg total) by mouth at bedtime.   ? traZODone (DESYREL) 150 MG tablet Take 0.5-1 tablets ( mg total) by mouth bedtime as needed.   ? triamterene-hydrochlorothiazide (MAXZIDE-25) 37.5-25 mg per tablet Take 1 tablet by mouth daily.      ______________________________________________________________________       History of present illness:    Patient comes in today for problems with the left sore and left submandibular pain.  It has been going on for about a week.  It hurts her to swallow.  She has noticed a sore coming out first.  She did get some pain radiating to her left ear.  She denies any fevers or chills.  She has had no ill exposures.  She had problems previously with some throat issues, but these resolved fully from previous.  She has not had issues like she had previously for quite a while.  She denies any acid reflux.  Her weight has gone down but this is as a result of intentional weight loss with  "medication.    We reviewed her weight loss and she has been very happy with the care received.  The weight loss clinic in [place].    We reviewed her mood and her mood is doing better at this time as well.  She remains on her medications related to this.    Review colonoscopy and she is wanting to proceed with this.  She would like to have it done at the [facility] site.    She is reminded that she is due for mammogram and she usually gets this through an outside clinic    On review of systems, the patient denies any chest pain or shortness of breath.  ______________________________________________________________________     Exam:    /70  Pulse 85  Temp 98.6  F (37  C) (Oral)   Ht 4' 10.25\" (1.48 m)  Wt 141 lb 4.8 oz (64.1 kg)  SpO2 98%  BMI 29.28 kg/m2  Patient is comfortable, no apparent distress.  Mood good.  Insight good.  Ears - clear.  Nose exam shows no rhinitis.  Throat -shows a 3 mm ulcer over the lateral tongue.  This is the needed tongue..  Neck is supple, there is no cervical adenopathy.  There may be some mild submandibular gland swelling as well.  No thyromegaly.  Heart regular rate and rhythm.  Lungs are clear to auscultation bilaterally.  Respiratory effort is good.     ______________________________________________________________________     Assessment:    1. Tongue sore    2. Screen for colon cancer    3. Major depression in complete remission (H)    4. Visit for screening mammogram    5. Submandibular swelling       ______________________________________________________________________     PHQ-2 Total Score: 2 (8/25/2018  7:00 AM)  Depression Follow-up Plan: under care of mental health team (9/4/2017 10:00 PM)  PHQ-9 Total Score: 7 (8/25/2018  7:00 AM)    Estimated body mass index is 29.28 kg/(m^2) as calculated from the following:    Height as of this encounter: 4' 10.25\" (1.48 m).    Weight as of this encounter: 141 lb 4.8 oz (64.1 kg). "     ______________________________________________________________________       Plan:    1. I did a HSV culture of sore.  2. I suspect her issues are viral in nature.  3. She will treat them symptomatically.  She will use Anbesol.  4. I encouraged her to continue to work on weight loss.  5. She should get blood work in the future and a full review of her medications again in the future.  6. Reminded to get mammogram and colonoscopy will be scheduled.       Misael Romero MD  General Internal Medicine  [facility]     Personal office fax - [phone]   Voice mail - [phone]  E-mail - [email or web address]      Return if symptoms worsen or fail to improve.     Future Appointments  Date Time Provider Department Center   8/29/2018 8:20 AM Zain Cuevas MD [facility]        ______________________________________________________________________    Relevant ICD-10 codes and order associations:      ICD-10-CM    1. Tongue sore K14.6 Herpes Simplex PCR (not CSF)   2. Screen for colon cancer Z12.11 Ambulatory referral to General Surgery   3. Major depression in complete remission (H) F32.5    4. Visit for screening mammogram Z12.31    5. Submandibular swelling R22.0     R22.1

## 2021-06-26 NOTE — PROGRESS NOTES
Progress Notes by Misael Romero MD at 2/8/2018  8:25 AM     Author: Misael Romero MD Service: -- Author Type: Physician    Filed: 2/8/2018  2:29 PM Encounter Date: 2/8/2018 Status: Signed    : Misael Romero MD (Physician)              [facility]    Comprehensive and complex medical care - Chronic disease management - Shared decision making - Care coordination - Compassionate care    Patient advocacy - Rational deprescribing - Minimally disruptive medicine - Ethical focus - Customized care    ______________________________________________________________________     Date of Service: 2/8/2018  Primary Provider: Misael Romero MD    Patient Care Team:  Misael Romero MD as PCP - General (Internal Medicine)     ______________________________________________________________________     Patient's Pharmacy:    [facility] [id number] [facility] [address] AT [facility] [address] [facility] [address]  [address]  [facility] [address]  Phone: [phone] Fax: [phone]     Patient's Contacts:  Name Home Phone Work Phone Mobile Phone Relationship Lgl VINCE Barbosa [phone] Spouse      Patient's Insurance:    Payor: [employer] / Plan: [employer] [facility] [employer] EMPLOYEE / Product Type: HMO /     ______________________________________________________________________     Harriett A Romero is 52 y.o. female who comes in today for:  Chief Complaint   Patient presents with   ? Follow-up     went to urgency room on tues has swollen vocal cords doing worse      ______________________________________________________________________     Patient Active Problem List   Diagnosis   ? Obesity   ? Menopausal hot flushes   ? Migraine headache   ? Dysthymia   ? Former smoker - quit in 2014   ? Insomnia   ? RLS (restless legs syndrome)   ? Anxiety   ? Hypothyroidism   ? Metabolic syndrome   ? Dyslipidemia   ? Vitamin D  deficiency      Current Outpatient Prescriptions   Medication Sig Note   ? benzonatate (TESSALON) 100 MG capsule Take 100 mg by mouth. 2/8/2018: Received from: [facility] Received Sig: Take 1 capsule by mouth 3 times daily if needed for Cough.   ? citalopram (CELEXA) 40 MG tablet TAKE 1 TABLET BY MOUTH DAILY.  Please keep this Rx on file for the next RF.    ? codeine-guaiFENesin (GUAIFENESIN AC)  mg/5 mL liquid Take 10 mL by mouth. 2/8/2018: Received from: [facility] Received Sig: Take 10 mL by mouth every 6 hours if needed (Coughing).   ? estrogens, conjugated, (PREMARIN) 1.25 MG tablet TAKE ONE TABLET BY MOUTH DAILY.  Please keep this Rx on file for the next RF.    ? fluticasone (FLONASE) 50 mcg/actuation nasal spray 1 spray into each nostril. 2/8/2018: Received from: [facility] Received Sig: Inhale 1 Spray in the nostril(s) once daily.   ? ibuprofen (ADVIL,MOTRIN) 600 MG tablet Take 600 mg by mouth every 8 (eight) hours as needed.     ? levothyroxine (SYNTHROID, LEVOTHROID) 88 MCG tablet Take 1 tablet (88 mcg total) by mouth daily.    ? metFORMIN (GLUCOPHAGE-XR) 500 MG 24 hr tablet TAKE 1 TABLET(500 MG) BY MOUTH DAILY WITH SUPPER    ? phentermine 30 MG capsule Take 1 capsule (30 mg total) by mouth every morning.    ? pramipexole (MIRAPEX) 0.75 MG tablet TAKE 1 TABLET BY MOUTH AT BEDTIME.  Please keep this Rx on file for the next RF.    ? traZODone (DESYREL) 150 MG tablet Take 0.5-1 tablets ( mg total) by mouth bedtime as needed.    ? triamterene-hydrochlorothiazide (MAXZIDE-25) 37.5-25 mg per tablet Take 1 tablet by mouth daily.    ? ketorolac (TORADOL) 10 mg tablet Take 1 tablet (10 mg total) by mouth 3 (three) times a day for 4 days.       ______________________________________________________________________       History of present illness:    Patient comes in for follow up after UR visit.   "Records reviewed.    Sick since last Thursday.  Started with nasal congestion and pain in the throat.  Was given nasal steroid, cough syrup and tessalon.  These med have not helped much.  Seen 2 days ago.  Cough that has been dry.  Has pressure in the ears.  Using OTC medications like theraflu with some help.    Last night felt swelling in the throat.  Some issues with swallowing and may have been a little sob.  Can swallow food and liquids and manage saliva.  Feels like she is doing worse than 2 days ago.    On review of systems, the patient denies any chest pain or shortness of breath.  ______________________________________________________________________     Exam:    /70  Pulse 83  Temp 98.3  F (36.8  C) (Oral)   Ht 4' 10.75\" (1.492 m)  Wt 147 lb (66.7 kg)  SpO2 96%  BMI 29.94 kg/m2  Patient is comfortable, no apparent distress.  Sitting calmly and not SOB.  Has laryngitis.  Mood good.  Insight good.  Ears - some fluid in both ears.  Nose exam shows mild rhinitis.  Throat - clear.  She has larngitis.  Neck is supple, there is no cervical adenopathy.  No thyromegaly.  Heart regular rate and rhythm.  Lungs are clear to auscultation bilaterally.  Respiratory effort is good.  No stridor with listening over the trachea.    ______________________________________________________________________     Assessment:    1. Laryngitis    2. ST (sore throat)    3. Viral infection       ______________________________________________________________________    PHQ-2 Total Score: 5 (1/15/2018  9:00 AM)  Depression Follow-up Plan: under care of mental health team (9/4/2017 10:00 PM)  PHQ-9 Total Score: 14 (1/15/2018  9:00 AM)  ______________________________________________________________________      Plan:    1. Toradol for the pain at this time.  2. Follow up if not improving.  3. No evidence for epiglottitis at this time.  Follow up more urgently if worsening.  Discussed with patient.    Misael Romero, " MD  General Internal Medicine  [facility]    Return if symptoms worsen or fail to improve.    Future Appointments  Date Time Provider Department Center   2/19/2018 8:40 AM Zain Cuevas MD [facility]

## 2021-06-28 NOTE — PROGRESS NOTES
Progress Notes by Anand Martinez MD at 3/17/2020  9:59 AM     Author: Anand Martinez MD Service: Pulmonology Author Type: Resident    Filed: 3/17/2020 10:24 AM Date of Service: 3/17/2020  9:59 AM Status: Attested    : Anand Martinez MD (Resident) Cosigner: Tae Schmidt MD at 3/17/2020 12:05 PM    Attestation signed by Tae Schmidt MD at 3/17/2020 12:05 PM    Critical Care Note    I discussed the patient with Dr. Martinez, reviewed the relevant imaging studies and labs, discussed the case with the patient's nurse, and met with and examined the patient.  I agree with Dr. Martinez's documentation.    Critical care time: 40 minutes    Tae Schmidt MD  Pulmonary and Critical Care Medicine  [facility]  Cell [phone]  Office [phone]  Pager [phone]                  PULMONARY / CRITICAL CARE PROGRESS NOTE    Date / Time of Admission:  3/1/2020  4:51 PM    Assessment:   Harriett Romero is a 54 y.o. female former smoker, with past medical history significant for obesity, restless leg syndrome who apparently underwent colonoscopy back on 2/25/2020 and had an aspiration event.  Patient was started on clindamycin several days after that event.  There was no improvement with her symptoms and therefore she was admitted to the hospital. Intubated and bronch'd on 3/11 for worsening hypoxemic respiratory failure and CT c/w ARDS.     Principal Problem:    Aspiration pneumonia of left lower lobe due to gastric secretions (H)  Active Problems:    Hypokalemia    Former smoker - quit in 2014    Anxiety    Hypothyroidism    Metabolic syndrome    Aspiration pneumonia due to inhalation of vomitus (H)    Diarrhea of presumed infectious origin    Hypotension, unspecified hypotension type    Acute respiratory failure with hypoxia (H)    Cough    Chest pain at rest    Deep vein thrombosis (DVT) of upper extremity (H)    Pulmonary embolism (H)    Acute respiratory distress syndrome (ARDS) (H)    Diffuse  interstitial pulmonary disease (H)    Acute kidney failure, unspecified (H)      Plan:   NEURO:  Therapeutic sedationon vent. Had normal MS prior to intubation    Cont propofol, fentanyl    RASS goal -3    Tylenol prn pain    Avoid benzo's if possible    Required paralysis with vec pushes x2 due to vent dysyncrony. Plateau pressure improved so paralyzed with cisatracurium on 3/15/2020    Propofol labs OK 3/13, normal CK, LA and TG.     CV:  ciculatory shock. Likely hypovolemic vs. Vasodilatory from ?sepsis. No positive cultures. Capillary leak from ARDS possible. On and off low dose. May be due to sedation as well. Off levophed for past 24 hrs.    MAP >65, levo PRB    Lactate normal no need to trend    Echo 3/7 EF 63%, normal RV size/funcion, no pulm HTN, no RV strain, no shunt study done    Hold home anti-hypertensive for now     RESP:  Acute resp failure with hypoxemia, intubated 3/11 for worsening CT scan c/w ARDS. Presumed massive aspiration event sometime after colonoscopy 2/25, with progressive inlammation and pneumonitis. No positive cultures. Bronch 3/11 no mucus plugs. Bronch 3/15 no mucus plugs, diffuse airway inflammation/friabiliaty, BAL done in RML x2 NO evidence of DAH. Suspect poor lung compliance 2/2 fibroproliferative stage of ARDS. May need trach placed for prolonged ventilator wean.     Cont LPV, high PEEP/low FiO2 protocol, current 0.6/14 and trying to wean down FiO2    Pplat still above goal. Will keep paralyzed today    ABG q6h    Titrate FiO2 for goal O2 sat 88-92%, PaO2 55 mm Hg or greater    Permissive hypercapnia, last pH acceptable 7.39, continue to follow    Veletri FS, oxygenation still tenuous.     Holding off on proning as she is way out from initial lung injury (>36 hours)    IV steroids for extensive ground glass component/alveolitis, today is day 6. Continue slow taper. 40 mg methylpred Q12 hrs today    RADHA, ANCA, RF and HIV negative, no e/o if interstitial lung disease currently.  "No e/o DAH on bronch 3/15.     GI:  High GVR's a few days ago, improved. AXR 3/14 no obstruction. May have developing ileus.     Bowel regimen - miralax and senna    PPI    Start slow rate tube feeds today    Add reglan today for gut motility     RENAL:  Bump in SCr from aggressive diuresis. Low dose NE maintainin RPP. Scr improving today to 1.2. Net output of 1. 4 L yesterday. Down 0.9 L since admission. Bicarb is climbing as well as BUN, could be developing contraction alkalosis vs metabolic compensation for respiratory acidosis so will hold lasix today    Discontinue lasix today    Propofol labs (CK, TG, LA) nomal on 3/13, no e/o rhabdo/PIS    Clark in place    Avoid nephrotixins     ID:  Presumed aspiration pneumonia with ARDS, has gotten many days of abx, cultures negative on BAL 3/11. Bronch 3/15 (2nd one) c/w diffuse infectious/inflammatory process with some secretions, no mucus plugs, secretions were not thick or copious but airways friable and erythematous. WBC downtrending today to 20.2. Cultures growing out 1+ candida, likely airway colonizers.     Off all abx 3/15. Has gotten almost 2 weeks of IV abx.     F/u new bronch/BAL cultures from 3/15.     Fu culture data    PCT still mildly up but lower than 3/2.      HEMATOLOGIC:  LUE DVT around PICC (since removed), right subclavian thrombus and looked like there was a thrombus in RIJ on bedside US today (was doing an US prior to line palcement). bilateral PE\"s without RH strain. Marked leukocytosis from ?infection vs. Steroids vs. Stress.    Continue UFH drip    plt OK, cont to follow    hgb >7     ENDOCRINE:  Hyperglycemia due to high dose IV steroids and critical illness.    FSBG checks, insulin SS/drip per ICU protocol    Stop Lantus, ISS for now use insulin drip protocol.    Cont synthroid      ICU PROPHYLAXIS:    UFH drip    PPI    peridex     DISPO/CODE STATUS: full code    ICU DAILY CHECKLIST                         Can patient transfer out of MICU? " no    FAST HUG:    Feeding:  Feeding: YES  Clark:Yes  Analgesia/Sedation:Yes fentanyl and propofol  Thromboembolic prophylaxis: yes; Mode:  Heparin  HOB>30:  No  Stress Ulcer Protocol Active: yes; Mode: PPI  Glycemic Control: Any glucose > 180 no; Mode of Insulin Therapy: Insulin gtt    INTUBATED:  Can patient have daily waking:  no  Can patient have spontaneous breathing trial:  no    Restraints? No    Updated patients  today via phone.     Anand Martinez MD          PHYSICAL THERAPY AND MOBILITY:  Can patient have PT and mobility trial: no  Activity: Bed Rest    Subjective:   Harriett Romero is a 54 y.o. female former smoker, with past medical history significant for obesity, restless leg syndrome who apparently underwent colonoscopy back on 2/25/2020 and had an aspiration event. Intubated on 3/11/2020 for acute hypoxic respiratory failure 2/2 presumed ARDS.     Principal Problem:    Aspiration pneumonia of left lower lobe due to gastric secretions (H)  Active Problems:    Hypokalemia    Former smoker - quit in 2014    Anxiety    Hypothyroidism    Metabolic syndrome    Aspiration pneumonia due to inhalation of vomitus (H)    Diarrhea of presumed infectious origin    Hypotension, unspecified hypotension type    Acute respiratory failure with hypoxia (H)    Cough    Chest pain at rest    Deep vein thrombosis (DVT) of upper extremity (H)    Pulmonary embolism (H)    Acute respiratory distress syndrome (ARDS) (H)    Diffuse interstitial pulmonary disease (H)    Acute kidney failure, unspecified (H)    No acute events overnight. Plat pressures still elevated. Continues on cisatracurium for paralytic.         Allergies: Macrobid [nitrofurantoin monohyd/m-cryst]; Penicillins; and Sulfa (sulfonamide antibiotics)     MEDS:  Scheduled Meds:  ? albuterol  2.5 mg Nebulization QID   ? amino acids-protein hydrolys  1 packet Oral TID   ? buPROPion  150 mg Enteral Tube BID   ? chlorhexidine  15 mL Topical Q12H   ?  "citalopram  40 mg Enteral Tube QHS   ? docusate sodium (COLACE) 50 mg/5 mL oral liquid  100 mg Enteral Tube BID   ? fentaNYL pf  100 mcg Intravenous Once   ? levothyroxine  125 mcg Enteral Tube Daily 0600   ? methylPREDNISolone sodium succinate  40 mg Intravenous Q12H   ? metoclopramide  5 mg Intravenous Q6H   ? omeprazole  20 mg Oral QHS   ? polyethylene glycol  17 g Enteral Tube DAILY   ? pramipexole  0.75 mg Enteral Tube QHS   ? senna (SENOKOT) syrup  8.8 mg Enteral Tube BID   ? sodium chloride  2.5 mL Intravenous Line Care   ? thiamine (vitamin B1) IVPB  100 mg Intravenous DAILY     Continuous Infusions:  ? cisatracurium (NIMBEX) infusion 2.5 mcg/kg/min (03/17/20 0800)   ? dextrose 10%     ? epoprostenol 0.5 mg/50 mL (Full Strength) (VELETRI) inhalation solution 80,000 ng/hr (03/17/20 0430)   ? fentaNYL 2500 mcg/50 mL (50 mcg/mL) 150 mcg/hr (03/17/20 0800)   ? heparin 12 Units/kg/hr (03/17/20 0800)   ? insulin infusion (1 unit/mL) 1 Units/hr (03/17/20 0905)   ? norepinephrine 0.01 mcg/kg/min (03/17/20 0630)   ? propofol 35 mcg/kg/min (03/17/20 0822)   ? sodium chloride 0.9% 10 mL/hr (03/17/20 0800)     PRN Meds:.acetaminophen, acetaminophen, albuterol **AND** Nebulizer treatment intermittent, aluminum-magnesium hydroxide-simethicone, bisacodyL, dextrose 10%, dextrose 50 % (D50W), diphenhydrAMINE, fentaNYL - BOLUS DOSE from infusion, glucagon (human recombinant), hydrOXYzine pamoate, lidocaine (PF), lidocaine, lipase-protease-amylase **AND** sodium bicarbonate, LORazepam, magnesium hydroxide, melatonin, midazolam, morphine  injection, naloxone **OR** naloxone, ondansetron **OR** ondansetron, oxymetazoline, polyvinyl alcohol, sodium chloride bacteriostatic, sodium chloride, sodium chloride, sodium chloride    Objective:   VITALS:  /72 (Patient Position: Lying)   Pulse 88   Temp 97.9  F (36.6  C) (Oral)   Resp (!) 35   Ht 4' 10\" (1.473 m)   Wt 142 lb 13.7 oz (64.8 kg)   SpO2 96%   BMI 29.86 kg/m  "   EXAM:    General: intubated and sedated  Head: atraumatic, normocephalic  Eyes: normal sclera, pupils equal and reactive bilaterally, no conjunctival injection  Nose: nares patent, no discharge  Mouth: ETT in place  Neck: trachea midline  Chest: no deformity, non-tender to palpation  Lungs: Lungs CTA bilaterally  CV: RRR, no murmur  Abd: bowel sounds present, no distention  Extremities: warm and well perfused, no LE edema, no clubbing  Skin: no rashes on limited exam  Neuro: sedated. No abnormal movements or tremors    I&O:      Intake/Output Summary (Last 24 hours) at 3/17/2020 1006  Last data filed at 3/17/2020 0800  Gross per 24 hour   Intake 1278.51 ml   Output 1540 ml   Net -261.49 ml       Data Review:  EXAM: XR CHEST 1 VIEW PORTABLE  LOCATION: [facility]  DATE/TIME: 3/15/2020 12:37 PM     INDICATION: s/p left IJ TLC placement confirm location, r/o PTX  COMPARISON: 03/15/2020 at 0602 hours     IMPRESSION:   New left IJ central venous catheter tip is in the right atrium. No pneumothorax. NG tube courses below the diaphragm. Endotracheal tube remains above the johnnie. Bilateral pulmonary infiltrates have not appreciably changed.      Recent Results (from the past 24 hour(s))   POCT Glucose    Specimen: Blood   Result Value Ref Range    Glucose 125 70 - 139 mg/dL   Blood Gases, Arterial   Result Value Ref Range    pH, Arterial 7.33 (L) 7.37 - 7.44    pCO2, Arterial 71 (HH) 35 - 45 mm Hg    pO2, Arterial 65 (L) 80 - 90 mm Hg    Bicarbonate, Arterial Calc 32.5 (H) 23.0 - 29.0 mmol/L    O2 Sat, Arterial 96.0 96.0 - 97.0 %    Oxyhemoglobin 93.7 (L) 96.0 - 97.0 %    Base Excess, Arterial Calc 9.7 mmol/L    Ventilation Mode PCV/VG     Rate 35 rr/min    FIO2 60.00     Peep 14 cm H2O    Sample Stabilized Temperature 37.0 degrees C    Ventilator Tidal Volume 225 mL   POCT Glucose    Specimen: Capillary; Blood   Result Value Ref Range    Glucose 133 70 - 139 mg/dL   POCT Glucose    Specimen: Capillary; Blood    Result Value Ref Range    Glucose 121 70 - 139 mg/dL   POCT Glucose    Specimen: Blood   Result Value Ref Range    Glucose 117 70 - 139 mg/dL   POCT Glucose    Specimen: Capillary; Blood   Result Value Ref Range    Glucose 117 70 - 139 mg/dL   POCT Glucose    Specimen: Capillary; Blood   Result Value Ref Range    Glucose 131 70 - 139 mg/dL   Anti-Xa Heparin Level   Result Value Ref Range    Anti-Xa Heparin Assay 0.64 0.30 - 0.70 IU/mL   POCT Glucose    Specimen: Capillary; Blood   Result Value Ref Range    Glucose 116 70 - 139 mg/dL   POCT Glucose    Specimen: Blood   Result Value Ref Range    Glucose 121 70 - 139 mg/dL   Blood Gases, Arterial   Result Value Ref Range    pH, Arterial 7.37 7.37 - 7.44    pCO2, Arterial 67 (H) 35 - 45 mm Hg    pO2, Arterial 94 (H) 80 - 90 mm Hg    Bicarbonate, Arterial Calc 33.9 (H) 23.0 - 29.0 mmol/L    O2 Sat, Arterial 99.0 (H) 96.0 - 97.0 %    Oxyhemoglobin 95.9 (L) 96.0 - 97.0 %    Base Excess, Arterial Calc 11.5 mmol/L    Ventilation Mode VCV     Rate 35 rr/min    FIO2 0.50     Peep 14 cm H2O    Sample Stabilized Temperature 37.0 degrees C    Ventilator Tidal Volume 225 mL   POCT Glucose    Specimen: Capillary; Blood   Result Value Ref Range    Glucose 114 70 - 139 mg/dL   POCT Glucose    Specimen: Capillary; Blood   Result Value Ref Range    Glucose 131 70 - 139 mg/dL   POCT Glucose    Specimen: Capillary; Blood   Result Value Ref Range    Glucose 142 (H) 70 - 139 mg/dL   POCT Glucose    Specimen: Artery; Blood   Result Value Ref Range    Glucose 142 (H) 70 - 139 mg/dL   Blood Gases, Arterial   Result Value Ref Range    pH, Arterial 7.38 7.37 - 7.44    pCO2, Arterial 68 (H) 35 - 45 mm Hg    pO2, Arterial 81 80 - 90 mm Hg    Bicarbonate, Arterial Calc 35.1 (H) 23.0 - 29.0 mmol/L    O2 Sat, Arterial 98.2 (H) 96.0 - 97.0 %    Oxyhemoglobin 95.2 (L) 96.0 - 97.0 %    Base Excess, Arterial Calc 13.0 mmol/L    Ventilation Mode VCV     Rate 35 rr/min    FIO2 0.50     Peep 14 cm H2O     Sample Stabilized Temperature 37.0 degrees C    Ventilator Tidal Volume 225 mL   POCT Glucose    Specimen: Capillary; Blood   Result Value Ref Range    Glucose 121 70 - 139 mg/dL   Anti-Xa Heparin Level   Result Value Ref Range    Anti-Xa Heparin Assay 0.51 0.30 - 0.70 IU/mL   POCT Glucose    Specimen: Capillary; Blood   Result Value Ref Range    Glucose 112 70 - 139 mg/dL   POCT Glucose    Specimen: Capillary; Blood   Result Value Ref Range    Glucose 111 70 - 139 mg/dL   POCT Glucose    Specimen: Capillary; Blood   Result Value Ref Range    Glucose 123 70 - 139 mg/dL   POCT Glucose    Specimen: Capillary; Blood   Result Value Ref Range    Glucose 134 70 - 139 mg/dL   Blood Gases, Arterial   Result Value Ref Range    pH, Arterial 7.38 7.37 - 7.44    pCO2, Arterial 68 (H) 35 - 45 mm Hg    pO2, Arterial 77 (L) 80 - 90 mm Hg    Bicarbonate, Arterial Calc 35.0 (H) 23.0 - 29.0 mmol/L    O2 Sat, Arterial 98.0 (H) 96.0 - 97.0 %    Oxyhemoglobin 94.7 (L) 96.0 - 97.0 %    Base Excess, Arterial Calc 12.9 mmol/L    Ventilation Mode PCV/VG     Rate 35 rr/min    FIO2 50.00     Peep 14 cm H2O    Sample Stabilized Temperature 37.0 degrees C    Ventilator Tidal Volume 225 mL   Basic Metabolic Panel   Result Value Ref Range    Sodium 143 136 - 145 mmol/L    Potassium 4.8 3.5 - 5.0 mmol/L    Chloride 102 98 - 107 mmol/L    CO2 35 (H) 22 - 31 mmol/L    Anion Gap, Calculation 6 5 - 18 mmol/L    Glucose 129 (H) 70 - 125 mg/dL    Calcium 8.8 8.5 - 10.5 mg/dL    BUN 62 (H) 8 - 22 mg/dL    Creatinine 0.95 0.60 - 1.10 mg/dL    GFR MDRD Af Amer >60 >60 mL/min/1.73m2    GFR MDRD Non Af Amer >60 >60 mL/min/1.73m2   Magnesium   Result Value Ref Range    Magnesium 2.7 (H) 1.8 - 2.6 mg/dL   HM2(CBC w/o Differential)   Result Value Ref Range    WBC 29.4 (H) 4.0 - 11.0 thou/uL    RBC 3.10 (L) 3.80 - 5.40 mill/uL    Hemoglobin 9.1 (L) 12.0 - 16.0 g/dL    Hematocrit 29.1 (L) 35.0 - 47.0 %    MCV 94 80 - 100 fL    MCH 29.4 27.0 - 34.0 pg    MCHC 31.3  (L) 32.0 - 36.0 g/dL    RDW 14.0 11.0 - 14.5 %    Platelets 241 140 - 440 thou/uL    MPV 9.6 8.5 - 12.5 fL   POCT Glucose    Specimen: Artery; Blood   Result Value Ref Range    Glucose 131 70 - 139 mg/dL   POCT Glucose    Specimen: Capillary; Blood   Result Value Ref Range    Glucose 123 70 - 139 mg/dL   Anti-Xa Heparin Level   Result Value Ref Range    Anti-Xa Heparin Assay 0.46 0.30 - 0.70 IU/mL   POCT Glucose    Specimen: Capillary; Blood   Result Value Ref Range    Glucose 139 70 - 139 mg/dL   POCT Glucose    Specimen: Capillary; Blood   Result Value Ref Range    Glucose 110 70 - 139 mg/dL       Anand Martinez MD  [facility], PGY-1    Discussed with intensivist Dr. Schmidt.

## 2021-06-28 NOTE — PROGRESS NOTES
Progress Notes by Valdo Mayo, Diabetes Ed at 3/30/2020  8:00 AM     Author: Valdo Mayo, Diabetes Ed Service: -- Author Type: Diabetes Ed    Filed: 3/30/2020  8:17 AM Date of Service: 3/30/2020  8:00 AM Status: Signed    : Valdo Mayo, Diabetes Ed (Diabetes Ed)        Diabetes Care Screen Note  Situation:  Diabetes blood glucose screen    Background:  Noted blood sugar 26 at 0001 today.  Appears TF had been shut off yesterday and ? If D10 started at 60/hr (TF rate) at that time or not until low BG occurred.  Is to start when TF interrupted or stopped until TF resumed if Lantus on board for BG control while on TF. Also, no Prednisone yesterday as in previous days as well affecting BG rise in day    Home PTA diabetes meds:  Metformin  mg daily  Current Inpatient diabetes meds:  18 units of Lantus every am likely started for BG elevation with continuous TF which contains 141 grams carbohydrate  Values;  A1C: 5.2 3/8          GFR:>60        BMI:28.84       Intake documented: None but tube feeding when running    Assessment:   If Lantus is used for both basal and nutritional insulin needs with a patient on continuous tube feeding and the tubefeeding is interrupted or stopped, hypoglycemia can occur. It is recommended then to start D10 IV fluids at the same rate until the tube feeding is restarted.        Recommendations:  Diabetes Care in the Hospital: ADA Standards of Medical Care in Diabetes 2018  See Recommendations for Management of Hospitalized Patient link accessible from the Diabetes Management Order Set.    When TF resumed:      BG goal with TF: 140-180      [facility] goals < 180 for improved outcomes. There is impaired immune function at BG levels above 180 mg/dl.    Thanks.     Jada Mayo RN, Certified Diabetes Care and   [address] [address]  [email or web address]  [email or web address]   Office:  [phone]  Pager: [phone]

## 2021-06-28 NOTE — PROGRESS NOTES
Progress Notes by Anand Martinez MD at 3/16/2020  8:15 AM     Author: Anand Martinez MD Service: Pulmonology Author Type: Resident    Filed: 3/16/2020  9:50 AM Date of Service: 3/16/2020  8:15 AM Status: Attested    : Anand Martinez MD (Resident)    Related Notes: Original Note by Anand Martinez MD (Resident) filed at 3/16/2020  9:27 AM    Cosigner: Tae Schmidt MD at 3/16/2020 10:03 AM    Attestation signed by Tae Schmidt MD at 3/16/2020 10:03 AM    Critical Care Note    I discussed the patient with Dr. Martinez, reviewed the relevant imaging studies and labs, discussed the case with the patient's nurse, and met with and examined the patient.  I agree with Dr. Martinez's documentation.    Critical care time: 40 minutes    Tae Schmidt MD  Pulmonary and Critical Care Medicine  [facility]  Cell [phone]  Office [phone]  Pager [phone]                PULMONARY / CRITICAL CARE PROGRESS NOTE    Date / Time of Admission:  3/1/2020  4:51 PM    Assessment:   Harriett Romero is a 54 y.o. female former smoker, with past medical history significant for obesity, restless leg syndrome who apparently underwent colonoscopy back on 2/25/2020 and had an aspiration event.  Patient was started on clindamycin several days after that event.  There was no improvement with her symptoms and therefore she was admitted to the hospital. Intubated and bronch'd on 3/11 for worsening hypoxemic respiratory failure and CT c/w ARDS.     Principal Problem:    Aspiration pneumonia of left lower lobe due to gastric secretions (H)  Active Problems:    Hypokalemia    Former smoker - quit in 2014    Anxiety    Hypothyroidism    Metabolic syndrome    Aspiration pneumonia due to inhalation of vomitus (H)    Diarrhea of presumed infectious origin    Hypotension, unspecified hypotension type    Acute respiratory failure with hypoxia (H)    Cough    Chest pain at rest    Deep vein thrombosis (DVT) of upper extremity (H)     Pulmonary embolism (H)    Acute respiratory distress syndrome (ARDS) (H)    Diffuse interstitial pulmonary disease (H)    Acute kidney failure, unspecified (H)      Plan:   NEURO:  Therapeutic sedationon vent. Had normal MS prior to intubation    Cont propofol, fentanyl    RASS goal -3    Tylenol prn pain    Avoid benzo's if possible    Required paralysis with vec pushes x2 yesterday due to vent dysyncrony. Plateau pressure improved so paralyzed with cisatracurium on 3/15/2020    Propofol labs OK 3/13, normal CK, LA and TG.     CV:  ciculatory shock. Likely hypovolemic vs. Vasodilatory from ?sepsis. No positive cultures. Capillary leak from ARDS possible. On and off low dose. May be due to sedation as well. Off levophed for past 24 hrs.    MAP >65, levo PRB    Lactate normal no need to trend    Echo 3/7 EF 63%, normal RV size/funcion, no pulm HTN, no RV strain, no shunt study done    Hold home anti-hypertensive for now     RESP:  Acute resp failure with hypoxemia, intubated 3/11 for worsening CT scan c/w ARDS. Presumed massive aspiration event sometime after colonoscopy 2/25, with progressive inlammation and pneumonitis. No positive cultures. Bronch 3/11 no mucus plugs. Bronch 3/15 no mucus plugs, diffuse airway inflammation/friabiliaty, BAL done in RML x2 NO evidence of DAH. Suspect poor lung compliance 2/2 fibroproliferative stage of ARDS. May need trach placed for prolonged ventilator wean.     Cont LPV, high PEEP/low FiO2 protocol, current 0.6/14 and trying to wean down FiO2    Pplat still above goal ranging from 30-38. Reduced Vt to 170cc this AM. Yesterday had to increase due to developing hypercapnia. Repeat ABG this AM looks much better.    ABG q6h    Titrate FiO2 for goal O2 sat 88-92%, PaO2 55 mm Hg or greater    Permissive hypercapnia, last pH acceptable 7.37, continue to follow    Veletri FS, oxygenation still tenuous.     Holding off on proning as she is way out from initial lung injury (>36  "hours)    IV steroids for extensive ground glass component/alveolitis, today is day 5. Continue slow taper. 40 mg methylpred Q6 hrs.     RADHA, ANCA, RF and HIV negative, no e/o if interstitial lung disease currently. No e/o DAH on bronch 3/15.     GI:  High GVR's a few days ago, improved. AXR 3/14 no obstruction. May have developing ileus.     Bowel regimen - add miralax and senna today     PPI    Restart tube feeds tomorrow     RENAL:  Bump in SCr from aggressive diuresis. Low dose NE maintainin RPP. Scr improving today to 1.2. Net output of 1. 4 L yesterday. Down 0.9 L since admission. Bicarb is climbing as well as BUN, could be developing contraction alkalosis vs metabolic compensation for respiratory acidosis.     Continue Lasix 40mg IV daily to keep on dry side.     Propofol labs (CK, TG, LA) nomal on 3/13, no e/o rhabdo/PIS    Clark in place    Avoid nephrotixins     ID:  Presumed aspiration pneumonia with ARDS, has gotten many days of abx, cultures negative on BAL 3/11. Bronch 3/15 (2nd one) c/w diffuse infectious/inflammatory process with some secretions, no mucus plugs, secretions were not thick or copious but airways friable and erythematous. WBC downtrending today to 20.2. Cultures growing out 1+ candida, likely airway colonizers.     Off all abx 3/15. Has gotten almost 2 weeks of IV abx.     F/u new bronch/BAL cultures from 3/15.     Fu culture data    PCT still mildly up but lower than 3/2.      HEMATOLOGIC:  LUE DVT around PICC (since removed), right subclavian thrombus and looked like there was a thrombus in RIJ on bedside US today (was doing an US prior to line palcement). bilateral PE\"s without RH strain. Marked leukocytosis from ?infection vs. Steroids vs. Stress.    Continue UFH drip    plt OK, cont to follow    hgb >7     ENDOCRINE:  Hyperglycemia due to high dose IV steroids and critical illness.    FSBG checks, insulin SS/drip per ICU protocol    Stop Lantus, ISS for now use insulin drip " protocol.    Cont synthroid      ICU PROPHYLAXIS:    UFH drip    PPI    peridex     DISPO/CODE STATUS: full code    ICU DAILY CHECKLIST                         Can patient transfer out of MICU? no    FAST HUG:    Feeding:  Feeding: NO.  Patient is NPO  Clark:Yes  Analgesia/Sedation:Yes fentanyl and propofol  Thromboembolic prophylaxis: yes; Mode:  Heparin  HOB>30:  No  Stress Ulcer Protocol Active: yes; Mode: PPI  Glycemic Control: Any glucose > 180 no; Mode of Insulin Therapy: Insulin gtt    INTUBATED:  Can patient have daily waking:  no  Can patient have spontaneous breathing trial:  no    Restraints? yes    PHYSICAL THERAPY AND MOBILITY:  Can patient have PT and mobility trial: no  Activity: Bed Rest    Subjective:   Harriett Romero is a 54 y.o. female former smoker, with past medical history significant for obesity, restless leg syndrome who apparently underwent colonoscopy back on 2/25/2020 and had an aspiration event. Intubated on 3/11/2020 for acute hypoxic respiratory failure 2/2 presumed ARDS.     Principal Problem:    Aspiration pneumonia of left lower lobe due to gastric secretions (H)  Active Problems:    Hypokalemia    Former smoker - quit in 2014    Anxiety    Hypothyroidism    Metabolic syndrome    Aspiration pneumonia due to inhalation of vomitus (H)    Diarrhea of presumed infectious origin    Hypotension, unspecified hypotension type    Acute respiratory failure with hypoxia (H)    Cough    Chest pain at rest    Deep vein thrombosis (DVT) of upper extremity (H)    Pulmonary embolism (H)    Acute respiratory distress syndrome (ARDS) (H)    Diffuse interstitial pulmonary disease (H)    Acute kidney failure, unspecified (H)    No acute events overnight. Started on Cisatracurium gtt yesterday due to elevated plateau pressures, inability to decrease tidal volume due to hypercapnea. ABG this AM improving.          Allergies: Macrobid [nitrofurantoin monohyd/m-cryst]; Penicillins; and Sulfa  (sulfonamide antibiotics)     MEDS:  Scheduled Meds:  ? albuterol  2.5 mg Nebulization QID   ? amino acids-protein hydrolys  1 packet Oral TID   ? buPROPion  150 mg Enteral Tube BID   ? chlorhexidine  15 mL Topical Q12H   ? citalopram  40 mg Enteral Tube QHS   ? docusate sodium (COLACE) 50 mg/5 mL oral liquid  100 mg Enteral Tube BID   ? fentaNYL pf  100 mcg Intravenous Once   ? furosemide  40 mg Intravenous DAILY   ? levothyroxine  125 mcg Enteral Tube Daily 0600   ? methylPREDNISolone sodium succinate  40 mg Intravenous Q8H   ? pantoprazole  40 mg Intravenous Q24H   ? polyethylene glycol  17 g Enteral Tube DAILY   ? pramipexole  0.75 mg Enteral Tube QHS   ? senna (SENOKOT) syrup  8.8 mg Enteral Tube BID   ? sodium chloride  2.5 mL Intravenous Line Care   ? thiamine (vitamin B1) IVPB  100 mg Intravenous DAILY     Continuous Infusions:  ? cisatracurium (NIMBEX) infusion 1.5 mcg/kg/min (03/16/20 0000)   ? dextrose 10%     ? epoprostenol 0.5 mg/50 mL (Full Strength) (VELETRI) inhalation solution 80,000 ng/hr (03/16/20 0410)   ? fentaNYL 2500 mcg/50 mL (50 mcg/mL) 150 mcg/hr (03/16/20 0335)   ? heparin 13 Units/hr (03/16/20 0000)   ? insulin infusion (1 unit/mL) Stopped (03/16/20 0805)   ? norepinephrine Stopped (03/14/20 1837)   ? propofol 35 mcg/kg/min (03/16/20 0805)   ? sodium chloride 0.9% 10 mL/hr (03/16/20 0000)     PRN Meds:.acetaminophen, acetaminophen, albuterol **AND** Nebulizer treatment intermittent, aluminum-magnesium hydroxide-simethicone, bisacodyL, dextrose 10%, dextrose 50 % (D50W), diphenhydrAMINE, fentaNYL - BOLUS DOSE from infusion, glucagon (human recombinant), hydrOXYzine pamoate, insulin infusion (1 unit/mL), lidocaine (PF), lidocaine, lipase-protease-amylase **AND** sodium bicarbonate, LORazepam, magnesium hydroxide, melatonin, midazolam, morphine  injection, naloxone **OR** naloxone, ondansetron **OR** ondansetron, oxymetazoline, polyvinyl alcohol, sodium chloride bacteriostatic, sodium  "chloride, sodium chloride, sodium chloride    Objective:   VITALS:  /54   Pulse 79   Temp 97.9  F (36.6  C) (Oral)   Resp (!) 35   Ht 4' 10\" (1.473 m)   Wt 147 lb 4.8 oz (66.8 kg)   SpO2 99%   BMI 30.79 kg/m    EXAM:    General: intubated and sedated  Head: atraumatic, normocephalic  Eyes: normal sclera, pupils equal and reactive bilaterally, no conjunctival injection  Nose: nares patent, no discharge  Mouth: ETT in place  Neck: trachea midline  Chest: no deformity, non-tender to palpation  Lungs: coarse breath sounds bilateraly  CV: RRR, no murmur  Abd: bowel sounds present, no distention  Extremities: warm and well perfused, no LE edema, no clubbing  Skin: no rashes on limited exam  Neuro: sedated. No abnormal movements or tremors    I&O:      Intake/Output Summary (Last 24 hours) at 3/16/2020 0816  Last data filed at 3/16/2020 0600  Gross per 24 hour   Intake 1338.12 ml   Output 2715 ml   Net -1376.88 ml       Data Review:  EXAM: XR CHEST 1 VIEW PORTABLE  LOCATION: [facility]  DATE/TIME: 3/15/2020 12:37 PM     INDICATION: s/p left IJ TLC placement confirm location, r/o PTX  COMPARISON: 03/15/2020 at 0602 hours     IMPRESSION:   New left IJ central venous catheter tip is in the right atrium. No pneumothorax. NG tube courses below the diaphragm. Endotracheal tube remains above the johnnie. Bilateral pulmonary infiltrates have not appreciably changed.      Recent Results (from the past 24 hour(s))   POCT Glucose    Specimen: Capillary; Blood   Result Value Ref Range    Glucose 115 70 - 139 mg/dL   Culture/Gram Stain: Bronchial    Specimen: Respiratory   Result Value Ref Range    Gram Stain Result 3+ Polymorphonuclear leukocytes     Gram Stain Result No organisms seen    KOH Prep    Specimen: Lung, Bronchial Alveolar Lavage, Middle Lobe, Right; Body Fluid   Result Value Ref Range    KOH Prep Yeast and hyphal fragments (!) No Yeast or Fungal Elements Seen   POCT Glucose    Specimen: Capillary; " Blood   Result Value Ref Range    Glucose 123 70 - 139 mg/dL   POCT Glucose    Specimen: Blood   Result Value Ref Range    Glucose 132 70 - 139 mg/dL   Blood Gases, Arterial   Result Value Ref Range    pH, Arterial 7.24 (LL) 7.37 - 7.44    pCO2, Arterial 74 (HH) 35 - 45 mm Hg    pO2, Arterial 115 (H) 80 - 90 mm Hg    Bicarbonate, Arterial Calc 27.4 23.0 - 29.0 mmol/L    O2 Sat, Arterial 99.8 (H) 96.0 - 97.0 %    Oxyhemoglobin 96.6 96.0 - 97.0 %    Base Excess, Arterial Calc 3.1 mmol/L    Ventilation Mode PCV/VG     Rate 30 rr/min    FIO2 60.00     Peep 14 cm H2O    Sample Stabilized Temperature 37.0 degrees C    Ventilator Tidal Volume 205 mL   POCT Glucose    Specimen: Capillary; Blood   Result Value Ref Range    Glucose 123 70 - 139 mg/dL   POCT Glucose    Specimen: Blood   Result Value Ref Range    Glucose 148 (H) 70 - 139 mg/dL   POCT Glucose    Specimen: Capillary; Blood   Result Value Ref Range    Glucose 145 (H) 70 - 139 mg/dL   Blood Gases, Arterial   Result Value Ref Range    pH, Arterial 7.11 (LL) 7.37 - 7.44    pCO2, Arterial 102 (HH) 35 - 45 mm Hg    pO2, Arterial 108 (H) 80 - 90 mm Hg    Bicarbonate, Arterial Calc 25.9 23.0 - 29.0 mmol/L    O2 Sat, Arterial 99.5 (H) 96.0 - 97.0 %    Oxyhemoglobin 96.5 96.0 - 97.0 %    Base Excess, Arterial Calc 1.2 mmol/L    Ventilation Mode PCV/VG     Rate 35 rr/min    FIO2 50.00     Peep 14 cm H2O    Sample Stabilized Temperature 37.0 degrees C    Ventilator Tidal Volume 170 mL   POCT Glucose    Specimen: Artery; Blood   Result Value Ref Range    Glucose 133 70 - 139 mg/dL   Blood Gases, Arterial   Result Value Ref Range    pH, Arterial 7.15 (LL) 7.37 - 7.44    pCO2, Arterial 93 (HH) 35 - 45 mm Hg    pO2, Arterial 88 80 - 90 mm Hg    Bicarbonate, Arterial Calc 26.5 23.0 - 29.0 mmol/L    O2 Sat, Arterial 98.9 (H) 96.0 - 97.0 %    Oxyhemoglobin 95.9 (L) 96.0 - 97.0 %    Base Excess, Arterial Calc 2.0 mmol/L    Ventilation Mode PCV/VG     Rate 35 rr/min    FIO2 50.00      Peep 14 cm H2O    Sample Stabilized Temperature 37.0 degrees C    Ventilator Tidal Volume 195 mL   POCT Glucose    Specimen: Blood   Result Value Ref Range    Glucose 121 70 - 139 mg/dL   POCT Glucose    Specimen: Artery; Blood   Result Value Ref Range    Glucose 132 70 - 139 mg/dL   Blood Gases, Arterial   Result Value Ref Range    pH, Arterial 7.20 (LL) 7.37 - 7.44    pCO2, Arterial 84 (HH) 35 - 45 mm Hg    pO2, Arterial 80 80 - 90 mm Hg    Bicarbonate, Arterial Calc 27.6 23.0 - 29.0 mmol/L    O2 Sat, Arterial 98.0 (H) 96.0 - 97.0 %    Oxyhemoglobin 94.7 (L) 96.0 - 97.0 %    Base Excess, Arterial Calc 3.5 mmol/L    Ventilation Mode PCV/VG     Rate 35 rr/min    FIO2 50.00     Peep 14 cm H2O    Sample Stabilized Temperature 37.0 degrees C    Ventilator Tidal Volume 195 mL   Anti-Xa Heparin Level   Result Value Ref Range    Anti-Xa Heparin Assay 0.71 (H) 0.30 - 0.70 IU/mL   POCT Glucose    Specimen: Capillary; Blood   Result Value Ref Range    Glucose 96 70 - 139 mg/dL   POCT Glucose    Specimen: Blood   Result Value Ref Range    Glucose 124 70 - 139 mg/dL   POCT Glucose    Specimen: Artery; Blood   Result Value Ref Range    Glucose 127 70 - 139 mg/dL   Blood Gases, Arterial   Result Value Ref Range    pH, Arterial 7.16 (LL) 7.37 - 7.44    pCO2, Arterial 93 (HH) 35 - 45 mm Hg    pO2, Arterial 106 (H) 80 - 90 mm Hg    Bicarbonate, Arterial Calc 27.1 23.0 - 29.0 mmol/L    O2 Sat, Arterial 99.3 (H) 96.0 - 97.0 %    Oxyhemoglobin 96.2 96.0 - 97.0 %    Base Excess, Arterial Calc 2.8 mmol/L    Ventilation Mode PCV/VG     Rate 35 rr/min    FIO2 50.00     Peep 14 cm H2O    Sample Stabilized Temperature 37.0 degrees C    Ventilator Tidal Volume 195 mL   POCT Glucose    Specimen: Artery; Blood   Result Value Ref Range    Glucose 137 70 - 139 mg/dL   POCT Glucose    Specimen: Artery; Blood   Result Value Ref Range    Glucose 134 70 - 139 mg/dL   POCT Glucose    Specimen: Blood   Result Value Ref Range    Glucose 120 70 - 139  mg/dL   Anti-Xa Heparin Level   Result Value Ref Range    Anti-Xa Heparin Assay 0.62 0.30 - 0.70 IU/mL   Basic Metabolic Panel   Result Value Ref Range    Sodium 141 136 - 145 mmol/L    Potassium 4.7 3.5 - 5.0 mmol/L    Chloride 103 98 - 107 mmol/L    CO2 31 22 - 31 mmol/L    Anion Gap, Calculation 7 5 - 18 mmol/L    Glucose 93 70 - 125 mg/dL    Calcium 8.5 8.5 - 10.5 mg/dL    BUN 59 (H) 8 - 22 mg/dL    Creatinine 1.20 (H) 0.60 - 1.10 mg/dL    GFR MDRD Af Amer 57 (L) >60 mL/min/1.73m2    GFR MDRD Non Af Amer 47 (L) >60 mL/min/1.73m2   HM2(CBC w/o Differential)   Result Value Ref Range    WBC 20.2 (H) 4.0 - 11.0 thou/uL    RBC 2.78 (L) 3.80 - 5.40 mill/uL    Hemoglobin 8.3 (L) 12.0 - 16.0 g/dL    Hematocrit 26.8 (L) 35.0 - 47.0 %    MCV 96 80 - 100 fL    MCH 29.9 27.0 - 34.0 pg    MCHC 31.0 (L) 32.0 - 36.0 g/dL    RDW 13.7 11.0 - 14.5 %    Platelets 165 140 - 440 thou/uL    MPV 10.1 8.5 - 12.5 fL   Blood Gases, Arterial   Result Value Ref Range    pH, Arterial 7.37 7.37 - 7.44    pCO2, Arterial 59 (H) 35 - 45 mm Hg    pO2, Arterial 77 (L) 80 - 90 mm Hg    Bicarbonate, Arterial Calc 30.7 (H) 23.0 - 29.0 mmol/L    O2 Sat, Arterial 99.4 (H) 96.0 - 97.0 %    Oxyhemoglobin 96.9 96.0 - 97.0 %    Base Excess, Arterial Calc 7.4 mmol/L    Ventilation Mode PCV/VG     Rate 35 rr/min    FIO2 50.00     Peep 14 cm H2O    Sample Stabilized Temperature 37.0 degrees C    Ventilator Tidal Volume 225 mL   POCT Glucose    Specimen: Blood   Result Value Ref Range    Glucose 97 70 - 139 mg/dL   POCT Glucose    Specimen: Artery; Blood   Result Value Ref Range    Glucose 105 70 - 139 mg/dL       Anand Martinez MD  [facility], PGY-1    Discussed with intensivist Dr. Schmidt.

## 2021-07-01 NOTE — PROCEDURES
"Procedures by Tanika Grey RN at 3/7/2020  2:22 PM     Author: Tanika Grey RN Service: -- Author Type: Registered Nurse    Filed: 3/7/2020  2:31 PM Date of Service: 3/7/2020  2:22 PM Status: Signed    : Tanika Grey RN (Registered Nurse)       Procedures          PICC Line Insertion Procedure Note  Pt. Name: Harriett Romero  MRN:        [id number]    Procedure: Insertion of a  triple Lumen  5 fr  Bard SOLO (valved) Power PICC, Lot number [id number]    Indications: Medications    Contraindications : none    Procedure Details   Patient identified with 2 identifiers and \"Time Out\" conducted.  .     Central line insertion bundle followed: hand hygeine performed prior to procedure, site cleansed with cholraprep, hat, mask, sterile gloves,sterile gown worn, patient draped with maximum barrier head to toe drape, sterile field maintained.    Vein was assessed and found to be compressible and of adequate size. Two ml 1% Lidocaine administered sq to the insertion site. A 5 Fr PICC was inserted into the brachial vein of the right arm with ultrasound guidance. One attempt(s) required to access vein.   Catheter threaded without difficulty. Good blood return noted.    Modified Seldinger Technique used for insertion.    The 8 sharps that are included in the PICC kit were accounted for and disposed of in the sharps container prior to the breakdown of the sterile field.     Catheter secured with Statlock, biopatch and Tegaderm dressing applied.    Findings:  Total catheter length  41 cm, with 3 cm exposed. Mid upper arm circumference is 28 cm. Catheter was flushed with 30 cc NS. Patient  tolerated procedure well.    Tip placement verified by 3 ECG technology                          CLABSI prevention brochure left at bedside.    Patient's primary RN notified PICC is ready for use.    Comments:          Tanika Grey RN    [facility]  [phone]           "

## 2021-07-01 NOTE — CONSULTS
Consults by Veronica Luna CNS at 3/26/2020 11:14 AM     Author: Veronica Luna CNS Service: Pain Management Author Type: Clinical Nurse Specialist    Filed: 3/26/2020  2:15 PM Date of Service: 3/26/2020 11:14 AM Status: Signed    : Veronica Luna CNS (Clinical Nurse Specialist)       [facility] ACUTE PAIN SERVICE CONSULTATION ([facility], [facility], and [facility])     Date of Admission:  3/1/2020  Date of Consult (When I saw the patient): 03/26/20  Physician requesting consult: Casandra Franklin MD   Reason for consult: med recommendations       Assessment/Plan:     Harriett Romero is a 54 y.o. female who was admitted on 3/1/2020.  1 Day Post-Op. I was asked to see the patient for medication recommendations given concern for serotonin syndrome. Admitted with acute hypoxic respiratory failure secondary to an aspiration event during colonoscopy 2/25/2020, She had a prolonged hospitalization during which she was diagnosed with BL PEs and was subsequently intubated for progressively worsening hypoxic respiratory failure with ARDs on 3/11/2020, she underwent a tracheostomy on 3/25/2020. History of obesity, MDD, anxiety, restless leg syndrome, smoker/quit in 2011, migraine headaches, insomnia. No Pain behaviors and does not respond to commands.  Pt with ET tube, trached. On isolation precautions for suspected COVID 19. Team concerned for potential serotonin syndrome d/t rigid extremities, pain team consulted to help with long term pain management, transitioned from fentanyl drip to dilaudid PCA.     Per records, patient has been on wellbutrin since 4/22/2019 and on citalopram since 6/23/2019. Meds that have been given this hospitalization that can contribute to serotonin syndrome include: zofran, toradol, fentanyl,mirapex, seroquel, citalopram, wellbutrin. Serotonin syndrome could show w/in 24 hours of starting a new drug and should resolve or improve w/in  "24 hours if all meds are held. On the other hand serotonin DIScontinuation syndrome can be seen 2-4 days AFTER stopping an antidepressant that a patient has been taking for > 6 weeks. Wellbutrin and Citalopram both held on 3/20/2020 (citalopram resumed at a reduced dose on 3/23/2020), and symptoms of discontinuation syndrome include: nausea, vertigo, insomnia, odd sensory symptoms \"pinging\" feelings in skin, or \"zapping\" senstion in the brain, anxiety. NO tachycardia, no hypertension, there is no agitation, no ocular clonus, no tremor, I do not see ANY deep tendon hyperreflexia, no diaphoresis, no flushing. There is some muscle rigidity with turning. Perhaps the serotonin syndrome has resolved.     PLAN:   1) No pain present. Would minimize cymbalta, fentanyl, and seroquel to fully rule out serotonin syndrome.  Would concurrently suggest head CT and neuro consult and rule out anoxic brain injury.   2)Multimodal Medication Therapy  Topical:  prn lidocaine ointment to scheduled 5%    NSAID'S: none  Muscle Relaxants: none  Adjuvants: mirapex 0.75 mg ET at bedtime, prednisone 20 mg ET qam, APAP prn, hydroxyzine 25-50 mg ET q4h prn   Antidepressants/anxiolytics: Citalopram 20 mg ET daily, mirapex 0.75 mg ET at bedtime, ativan 1 mg IV q6hprn (increase to q4h prn)  Opioids: dilaudid PCA DC this   IV Pain medication: dilaudid 0.5mg PRN for pain behaviors   3)Non-medication interventions  -turning   4)Constipation Prophylaxis  -diarrhea with rectal tube in place   5) Follow up   -Discharge Recommendations - TBD         History of Present Illness (HPI):       Harriett Romero is a 54 y.o. old female who presented after aspiration of gastric contents. Appears calms. No hyperreflexia notes. Some fever last night. .  Home pain/psych medications include: wellbutrin  mg po two times a day, citalopram 40 mg po at bedtime, naltrexone 25 mg po two times a day, phentermine 30 mg po qam, mirapex 0.75 mg po at bedtime      Past " surgeries include Bilateral oophorectomy; Thyroidectomy; Cystoscopy w/ ureteral stent placement; Total abdominal hysterectomy (2006); pr colonoscopy flx dx w/collj spec when pfrmd (N/A, 2/25/2020); and pr esophagogastroduodenoscopy transoral diagnostic (N/A, 3/25/2020).    Last UDS: n/a     MN     pulled from system on 03/26/20. This indicated no opioid prescriptions in the past 24 hours.       Medical History  Active Ambulatory (Non-Hospital) Problems    Diagnosis   ? History of Positive colorectal cancer screening using Cologuard test   ? Major depression in complete remission (H)   ? At high risk for caregiver role strain   ? Dyslipidemia   ? Vitamin D deficiency   ? RLS (restless legs syndrome)   ? Menopausal hot flushes   ? Migraine headache   ? Insomnia   ? Obesity due to excess calories with serious comorbidity     Past Medical History:   Diagnosis Date   ? 10 year risk of MI or stroke 1.0% in 2017    ? Anxiety    ? Aspiration pneumonia of left lower lobe due to gastric secretions (H) 2/28/2020   ? Current use of estrogen therapy    ? Dysthymia    ? Endometriosis    ? Former smoker    ? Graves disease    ? Hypothyroidism    ? Insomnia    ? Migraine headache    ? Nephrolithiasis    ? Seizures (H)    ? Sleep apnea    ? Trigeminal neuralgia    ? UTI (lower urinary tract infection)    ? Vitamin D deficiency      Patient Active Problem List    Diagnosis Date Noted   ? Attention to tracheostomy (H)    ? Encounter for palliative care    ? Goals of care, counseling/discussion    ? Intensive care (ICU) myopathy    ? Altered mental status, unspecified altered mental status type    ? Acute encephalopathy    ? Acute kidney failure, unspecified (H) 03/15/2020   ? Deep vein thrombosis (DVT) of upper extremity (H) 03/11/2020   ? Pulmonary embolism (H) 03/11/2020   ? Acute respiratory distress syndrome (ARDS) (H) 03/11/2020   ? Diffuse interstitial pulmonary disease (H)    ? Cough    ? Chest pain at rest    ? Diarrhea of  "presumed infectious origin 03/03/2020   ? Hypotension, unspecified hypotension type 03/03/2020   ? Acute respiratory failure with hypoxemia (H) 03/03/2020   ? Aspiration pneumonia due to inhalation of vomitus (H) 03/01/2020   ? Aspiration pneumonia of left lower lobe due to gastric secretions (H) 02/28/2020   ? History of Positive colorectal cancer screening using Cologuard test 02/06/2020   ? Major depression in complete remission (H) 08/26/2018   ? At high risk for caregiver role strain 03/30/2018   ? Metabolic syndrome 10/06/2017   ? Dyslipidemia 10/06/2017   ? Vitamin D deficiency 10/06/2017   ? RLS (restless legs syndrome) 07/28/2016   ? Anxiety 07/28/2016   ? Hypothyroidism 07/28/2016   ? Menopausal hot flushes    ? Migraine headache    ? Former smoker - quit in 2014    ? Insomnia    ? Hypokalemia 07/26/2014   ? Obesity due to excess calories with serious comorbidity         Family History  Reviewed care everywhere to find family history  Nothing relevant to pain consult    Reviewed, and family history includes Breast cancer in her paternal aunt; Von Hippel-Lindau syndrome in her mother and sister.    Review of Systems  Complete ROS reviewed, unless noted  , all other systems reviewed (with patient) and all others found to be negative.         Objective:     Physical Exam:     BP 93/54   Pulse 79   Temp 99.4  F (37.4  C) (Oral)   Resp 24   Ht 4' 10\" (1.473 m)   Wt 60.4 kg (133 lb 2.5 oz)   SpO2 94%   BMI 27.83 kg/m    Weight:   Wt Readings from Last 1 Encounters:   03/26/20 0000 60.4 kg (133 lb 2.5 oz)   03/25/20 0400 60.9 kg (134 lb 4.2 oz)   03/24/20 0000 60.9 kg (134 lb 4.8 oz)   03/23/20 0000 64 kg (141 lb)   03/22/20 0200 64.4 kg (142 lb)   03/21/20 0000 64.8 kg (142 lb 13.7 oz)   03/20/20 0100 65.3 kg (143 lb 15.4 oz)   03/19/20 0600 63.5 kg (140 lb)   03/18/20 0000 63.8 kg (140 lb 11.2 oz)   03/17/20 0030 64.8 kg (142 lb 13.7 oz)   03/16/20 0054 66.8 kg (147 lb 4.8 oz)   03/15/20 0200 66.5 kg " (146 lb 8 oz)   03/14/20 0000 65.1 kg (143 lb 8 oz)   03/13/20 0000 63.9 kg (140 lb 14.4 oz)   03/12/20 0315 63.5 kg (139 lb 15.9 oz)   03/11/20 0000 63 kg (139 lb)   03/10/20 0400 62.4 kg (137 lb 9.1 oz)   03/07/20 0410 68.9 kg (151 lb 14.4 oz)   03/06/20 1435 69.2 kg (152 lb 8 oz)   03/05/20 1908 69.9 kg (154 lb)   03/01/20 1702 66.8 kg (147 lb 4.3 oz)     Weight change: -0.5 kg (-1 lb 1.6 oz)  Body mass index is 27.83 kg/m .      General Appearance:  nonresponsive - ventilated female calm    Head:  Normocephalic, without obvious abnormality, atraumatic   Eyes:  PERRL, conjunctiva/corneas clear, EOM's intact   ENT/Throat: Lips, mucosa, and tongue dry    Lymph/Neck: Supple, symmetrical, trachea midline, no adenopathy, thyroid: not enlarged, symmetric   Lungs:   respirations unlabored   Chest Wall:  No tenderness or deformity   Cardiovascular/Heart:  No tachycardia    Abdomen:   Diarrhea   Rectal tube    Musculoskeletal: Extremities edematous    Skin: Skin color pale    Neurologic: Does not move to command                 Total time spent 120 minutes with greater than 50% in consultation, education and coordination of care.     Also discussed with RN, MD, pain pharmacist and icu pharmacist.  Extra time spent in doning PPE, looking for an N95.     Thank you for this consultation.          Veronica Luna APRN, CNS-BC, CNP, ACHPN  Acute Care Pain Management Program Hour 7a-1700  [facility] ([place], [place])   Page via [facility]- Click HERE to page Veronica or call [phone]

## 2021-07-03 NOTE — ADDENDUM NOTE
Addendum Note by Zain Montelongo MD at 2/28/2020  2:20 PM     Author: Zain Montelongo MD Service: -- Author Type: Physician    Filed: 2/28/2020  4:06 PM Encounter Date: 2/28/2020 Status: Signed    : Zain Montelongo MD (Physician)    Addended by: ZAIN MONTELONGO on: 2/28/2020 04:06 PM        Modules accepted: Level of Service

## 2021-07-03 NOTE — ADDENDUM NOTE
Addendum Note by Zain Montelongo MD at 12/3/2019  8:40 AM     Author: Zain Montelongo MD Service: -- Author Type: Physician    Filed: 12/4/2019  8:57 AM Encounter Date: 12/3/2019 Status: Signed    : Zain Montelongo MD (Physician)    Addended by: ZAIN MONTELONGO on: 12/4/2019 08:57 AM        Modules accepted: Orders

## 2021-07-03 NOTE — ADDENDUM NOTE
Addendum Note by Zain Montelongo MD at 1/31/2020  2:40 PM     Author: Zain Montelongo MD Service: -- Author Type: Physician    Filed: 2/3/2020  6:54 AM Encounter Date: 1/31/2020 Status: Signed    : Zain Montelongo MD (Physician)    Addended by: ZAIN MONTELONGO on: 2/3/2020 06:54 AM        Modules accepted: Orders, SmartSet

## 2021-07-03 NOTE — ANESTHESIA PREPROCEDURE EVALUATION
Anesthesia Preprocedure Evaluation by Newton Hernandez MD at 2/25/2020  6:49 AM     Author: Newton Hernandez MD Service: -- Author Type: Physician    Filed: 2/25/2020  7:26 AM Date of Service: 2/25/2020  6:49 AM Status: Addendum    : Newton Hernandez MD (Physician)    Related Notes: Original Note by Newton Hernandez MD (Physician) filed at 2/25/2020  6:59 AM       Anesthesia Evaluation      Patient summary reviewed   No history of anesthetic complications     Airway   Mallampati: II  Neck ROM: full   Pulmonary - normal exam    breath sounds clear to auscultation  (+) sleep apnea on no CPAP, , a smoker (former)                         Cardiovascular   Exercise tolerance: > or = 4 METS  (+) , hypercholesterolemia,     (-) murmur  Rhythm: regular  Rate: normal,    no murmur      Neuro/Psych    (+) seizures well controlled, neuromuscular disease (trigeminal neuralgia; restless legs syndrome),  depression, anxiety/panic attacks,     Comments: Migraines    Endo/Other    (+) hypothyroidism, hyperthyroidism (Graves disease),      GI/Hepatic/Renal    (+)   chronic renal disease (hx of nephrolithiasis), bowel prep     Other findings: 1/31/20: TSH 9.07 (improved from 28 in December) advised to discuss with primary and consider a recheck to optimize her levothyroxine dosage.      Dental - normal exam                            Anesthesia Plan  Planned anesthetic: MAC  Plan for propofol gtt only and AVOID midazolam, fentanyl, ketamine to enhance recovery.  Discussed potential need to convert to GA w/ ETT vs LMA if not tolerating.  Dexamethasone and zofran for PONV.    ASA 2     Anesthetic plan and risks discussed with: patient and spouse  Anesthesia plan special considerations: antiemetics,   Post-op plan: routine recovery

## 2021-10-19 PROBLEM — F32.9 MAJOR DEPRESSION: Chronic | Status: ACTIVE | Noted: 2018-08-26

## 2023-02-16 NOTE — PROGRESS NOTES
Intensivist update    Last Pplat 38 despite Vt <6 cc/kg IBW.   PH already dropping so I don't think dropping Vt further will be safe  DP 38 - 14 = 24 cm H2O which is above goal    Last ABG 7.24/74/115 on 0.6/14, FS veletri  Continue to wean fiO2 as able  Will try dose of vecuronium to see if this helps reduce Plateau pressure. If it dose we'll need to consider therapeutic paralysis with cis-atracurium to keep Pplat closer to 30 and reduce risk of VILI.    Kirk (Pato) MD Pranav  [facility] Pulmonary & Critical Care  Pager [phone]  Clinic [phone]    UPDATE  2pm    10mg IV vec given -> Pplat improved from 38 to 32, DP 32-14 = 18.  Based on this we will start cis-atra infusion for neuromuscular blockade.  Given recent issues with high GVR's will hold TF for now.    Discussed with nursing staff and family at bedside.    UPDATE: 4:20pm  Cis-atra started  Pplat still 32-34  I dropped the Vt to 170cc, which is just above 4cc/kg IBW (exactly 4cc would be 160cc)   I increased the RR to 35bpm to maintain consistent minute ventilation.  O2 sat holding at 91-92% on 0.5/14  After dropping the Vt as above, the Pplat decreased to 25-29, DP is ~11-14 cm H2O which is better.  I am not sure proning will be useful over 2 weeks out from initial lung injury  Likely in the fibroproliferative state of ARDS.   Will start to taper steroids as I don't think these are likely to help much at this point.  Check ABG at 5pm to monitor gas exchange after above changes    Updated her  Brian.    Additional critical care time spent: 30 minutes.     UPDATE:  6:30pm  ABG after dropping Vt to 170cc:  7.11/102/108 on 0.5/14  Increased Vt to 195cc  Pplat was 31 on this  ABG on this Vt: 7.15//93/88    She's tolerating the acidemia/hypercapneic remarkably well, not requiring any pressors.     Plan to repeat ABG at 8pm to make sure she's continuing to trend in the right direction, would like to see her pH get above  7.2.           Implemented All Universal Safety Interventions:  [place] to call system. Call bell, personal items and telephone within reach. Instruct patient to call for assistance. Room bathroom lighting operational. Non-slip footwear when patient is off stretcher. Physically safe environment: no spills, clutter or unnecessary equipment. Stretcher in lowest position, wheels locked, appropriate side rails in place.

## 2024-06-14 NOTE — TELEPHONE ENCOUNTER
RN cannot approve Refill Request    RN can NOT refill this medication med is not covered by policy/route to provider. Last office visit: 2/8/2019 Zain Cuevas MD Last Physical: Visit date not found Last MTM visit: Visit date not found Last visit same specialty: 2/8/2019 Zain Cuevas MD.  Next visit within 3 mo: Visit date not found  Next physical within 3 mo: Visit date not found      Krystyna Mccann, [facility] Triage/Med Refill 4/21/2019    Requested Prescriptions   Pending Prescriptions Disp Refills     naltrexone (DEPADE) 50 mg tablet [Pharmacy Med Name: NALTREXONE 50MG TABLETS] 90 tablet 0     Sig: TAKE 1/2 TABLET(25 MG) BY MOUTH TWICE DAILY       There is no refill protocol information for this order            Start vitamin D 2000 units daily.